# Patient Record
Sex: FEMALE | Race: BLACK OR AFRICAN AMERICAN | NOT HISPANIC OR LATINO | Employment: OTHER | URBAN - METROPOLITAN AREA
[De-identification: names, ages, dates, MRNs, and addresses within clinical notes are randomized per-mention and may not be internally consistent; named-entity substitution may affect disease eponyms.]

---

## 2017-01-20 ENCOUNTER — CLINICAL SUPPORT (OUTPATIENT)
Dept: FAMILY MEDICINE | Facility: CLINIC | Age: 62
End: 2017-01-20
Payer: COMMERCIAL

## 2017-01-20 DIAGNOSIS — E53.8 VITAMIN B12 DEFICIENCY: Primary | ICD-10-CM

## 2017-01-20 PROCEDURE — 96372 THER/PROPH/DIAG INJ SC/IM: CPT | Mod: S$GLB,,, | Performed by: FAMILY MEDICINE

## 2017-01-20 RX ADMIN — CYANOCOBALAMIN 1000 MCG: 1000 INJECTION, SOLUTION INTRAMUSCULAR; SUBCUTANEOUS at 10:01

## 2017-02-17 ENCOUNTER — CLINICAL SUPPORT (OUTPATIENT)
Dept: FAMILY MEDICINE | Facility: CLINIC | Age: 62
End: 2017-02-17
Payer: COMMERCIAL

## 2017-02-17 DIAGNOSIS — E53.8 VITAMIN B12 DEFICIENCY: Primary | ICD-10-CM

## 2017-02-17 PROCEDURE — 96372 THER/PROPH/DIAG INJ SC/IM: CPT | Mod: S$GLB,,, | Performed by: FAMILY MEDICINE

## 2017-02-17 RX ADMIN — CYANOCOBALAMIN 1000 MCG: 1000 INJECTION, SOLUTION INTRAMUSCULAR; SUBCUTANEOUS at 10:02

## 2017-02-28 ENCOUNTER — LAB VISIT (OUTPATIENT)
Dept: LAB | Facility: HOSPITAL | Age: 62
End: 2017-02-28
Attending: FAMILY MEDICINE
Payer: COMMERCIAL

## 2017-02-28 DIAGNOSIS — E53.8 B12 DEFICIENCY: ICD-10-CM

## 2017-02-28 LAB — VIT B12 SERPL-MCNC: 1189 PG/ML

## 2017-02-28 PROCEDURE — 36415 COLL VENOUS BLD VENIPUNCTURE: CPT | Mod: PO

## 2017-02-28 PROCEDURE — 82607 VITAMIN B-12: CPT

## 2017-03-17 ENCOUNTER — CLINICAL SUPPORT (OUTPATIENT)
Dept: FAMILY MEDICINE | Facility: CLINIC | Age: 62
End: 2017-03-17
Payer: COMMERCIAL

## 2017-03-17 DIAGNOSIS — E53.8 VITAMIN B12 DEFICIENCY: Primary | ICD-10-CM

## 2017-03-17 PROCEDURE — 96372 THER/PROPH/DIAG INJ SC/IM: CPT | Mod: S$GLB,,, | Performed by: FAMILY MEDICINE

## 2017-03-17 RX ADMIN — CYANOCOBALAMIN 1000 MCG: 1000 INJECTION, SOLUTION INTRAMUSCULAR; SUBCUTANEOUS at 10:03

## 2017-04-18 ENCOUNTER — CLINICAL SUPPORT (OUTPATIENT)
Dept: FAMILY MEDICINE | Facility: CLINIC | Age: 62
End: 2017-04-18
Payer: COMMERCIAL

## 2017-04-18 DIAGNOSIS — E53.8 VITAMIN B 12 DEFICIENCY: Primary | ICD-10-CM

## 2017-04-18 PROCEDURE — 96372 THER/PROPH/DIAG INJ SC/IM: CPT | Mod: S$GLB,,, | Performed by: FAMILY MEDICINE

## 2017-04-18 RX ADMIN — CYANOCOBALAMIN 1000 MCG: 1000 INJECTION, SOLUTION INTRAMUSCULAR; SUBCUTANEOUS at 10:04

## 2017-05-18 ENCOUNTER — CLINICAL SUPPORT (OUTPATIENT)
Dept: FAMILY MEDICINE | Facility: CLINIC | Age: 62
End: 2017-05-18
Payer: COMMERCIAL

## 2017-05-18 DIAGNOSIS — E53.8 B12 DEFICIENCY: Primary | ICD-10-CM

## 2017-05-18 PROCEDURE — 96372 THER/PROPH/DIAG INJ SC/IM: CPT | Mod: S$GLB,,, | Performed by: FAMILY MEDICINE

## 2017-05-18 RX ADMIN — CYANOCOBALAMIN 1000 MCG: 1000 INJECTION, SOLUTION INTRAMUSCULAR; SUBCUTANEOUS at 09:05

## 2017-05-18 NOTE — PROGRESS NOTES
B12, 1000mcg given IM to RVG, without difficulty, tolerated well.  Next appt scheduled, patient aware

## 2017-06-13 ENCOUNTER — TELEPHONE (OUTPATIENT)
Dept: FAMILY MEDICINE | Facility: CLINIC | Age: 62
End: 2017-06-13

## 2017-06-13 NOTE — TELEPHONE ENCOUNTER
----- Message from Jax Kyle sent at 6/12/2017  4:31 PM CDT -----  Contact: Pt   Pt need to  reschedule nurse visit to 06/21/17 would like a call back...[phone]

## 2017-06-13 NOTE — TELEPHONE ENCOUNTER
----- Message from Jax Kyle sent at 6/13/2017 10:18 AM CDT -----  Contact: Pt   Pt returning nurse phone call in regards to her injection would like a call back please..[phone] (home) [phone] (work)

## 2017-06-13 NOTE — TELEPHONE ENCOUNTER
Not available at home number, unable to leave message at work as no department number left and there are 9 options

## 2017-06-13 NOTE — TELEPHONE ENCOUNTER
----- Message from Crys Guerin sent at 6/13/2017  1:38 PM CDT -----  Pt is requesting a call from nurse to r/s her B12 injection.        Please call pt back at [phone]

## 2017-06-20 ENCOUNTER — CLINICAL SUPPORT (OUTPATIENT)
Dept: FAMILY MEDICINE | Facility: CLINIC | Age: 62
End: 2017-06-20
Payer: COMMERCIAL

## 2017-06-20 DIAGNOSIS — E53.8 VITAMIN B 12 DEFICIENCY: Primary | ICD-10-CM

## 2017-06-20 PROCEDURE — 96372 THER/PROPH/DIAG INJ SC/IM: CPT | Mod: S$GLB,,, | Performed by: FAMILY MEDICINE

## 2017-06-20 PROCEDURE — 99999 PR PBB SHADOW E&M-EST. PATIENT-LVL II: CPT | Mod: PBBFAC,,,

## 2017-06-20 RX ADMIN — CYANOCOBALAMIN 1000 MCG: 1000 INJECTION, SOLUTION INTRAMUSCULAR; SUBCUTANEOUS at 09:06

## 2017-06-29 RX ORDER — OMEPRAZOLE 20 MG/1
CAPSULE, DELAYED RELEASE ORAL
Qty: 90 CAPSULE | Refills: 0 | Status: SHIPPED | OUTPATIENT
Start: 2017-06-29 | End: 2017-08-02 | Stop reason: SDUPTHER

## 2017-06-29 RX ORDER — BENAZEPRIL/HYDROCHLOROTHIAZIDE 20 MG-25MG
1 TABLET ORAL DAILY
Qty: 90 TABLET | Refills: 0 | Status: SHIPPED | OUTPATIENT
Start: 2017-06-29 | End: 2017-09-28 | Stop reason: SDUPTHER

## 2017-07-06 RX ORDER — FOLIC ACID 1 MG/1
TABLET ORAL
Qty: 90 TABLET | Refills: 0 | Status: SHIPPED | OUTPATIENT
Start: 2017-07-06 | End: 2017-10-22 | Stop reason: SDUPTHER

## 2017-07-06 RX ORDER — PRAVASTATIN SODIUM 20 MG/1
TABLET ORAL
Qty: 90 TABLET | Refills: 0 | Status: SHIPPED | OUTPATIENT
Start: 2017-07-06 | End: 2017-10-22 | Stop reason: SDUPTHER

## 2017-07-18 ENCOUNTER — CLINICAL SUPPORT (OUTPATIENT)
Dept: FAMILY MEDICINE | Facility: CLINIC | Age: 62
End: 2017-07-18
Payer: COMMERCIAL

## 2017-07-18 ENCOUNTER — OFFICE VISIT (OUTPATIENT)
Dept: FAMILY MEDICINE | Facility: CLINIC | Age: 62
End: 2017-07-18
Payer: COMMERCIAL

## 2017-07-18 ENCOUNTER — LAB VISIT (OUTPATIENT)
Dept: LAB | Facility: HOSPITAL | Age: 62
End: 2017-07-18
Attending: FAMILY MEDICINE
Payer: COMMERCIAL

## 2017-07-18 VITALS
SYSTOLIC BLOOD PRESSURE: 128 MMHG | HEIGHT: 66 IN | BODY MASS INDEX: 28.93 KG/M2 | HEART RATE: 83 BPM | DIASTOLIC BLOOD PRESSURE: 74 MMHG | WEIGHT: 180 LBS

## 2017-07-18 DIAGNOSIS — I10 ESSENTIAL HYPERTENSION: ICD-10-CM

## 2017-07-18 DIAGNOSIS — D64.9 NORMOCYTIC ANEMIA: ICD-10-CM

## 2017-07-18 DIAGNOSIS — Z00.00 ROUTINE HISTORY AND PHYSICAL EXAMINATION OF ADULT: Primary | ICD-10-CM

## 2017-07-18 DIAGNOSIS — E78.5 HYPERLIPIDEMIA, UNSPECIFIED HYPERLIPIDEMIA TYPE: ICD-10-CM

## 2017-07-18 DIAGNOSIS — Z11.59 NEED FOR HEPATITIS C SCREENING TEST: ICD-10-CM

## 2017-07-18 DIAGNOSIS — R79.89 LOW VITAMIN B12 LEVEL: ICD-10-CM

## 2017-07-18 DIAGNOSIS — E53.8 VITAMIN B 12 DEFICIENCY: Primary | ICD-10-CM

## 2017-07-18 DIAGNOSIS — M85.80 OSTEOPENIA, UNSPECIFIED LOCATION: ICD-10-CM

## 2017-07-18 DIAGNOSIS — R76.8 ELEVATED SERUM IMMUNOGLOBULIN FREE LIGHT CHAINS: ICD-10-CM

## 2017-07-18 DIAGNOSIS — Z85.3 HISTORY OF BREAST CANCER: ICD-10-CM

## 2017-07-18 LAB
ALBUMIN SERPL BCP-MCNC: 3.5 G/DL
ALP SERPL-CCNC: 118 U/L
ALT SERPL W/O P-5'-P-CCNC: 12 U/L
ANION GAP SERPL CALC-SCNC: 12 MMOL/L
AST SERPL-CCNC: 21 U/L
BILIRUB SERPL-MCNC: 0.3 MG/DL
BUN SERPL-MCNC: 13 MG/DL
CALCIUM SERPL-MCNC: 8.2 MG/DL
CHLORIDE SERPL-SCNC: 96 MMOL/L
CHOLEST/HDLC SERPL: 2.8 {RATIO}
CO2 SERPL-SCNC: 26 MMOL/L
CREAT SERPL-MCNC: 1 MG/DL
EST. GFR  (AFRICAN AMERICAN): >60 ML/MIN/1.73 M^2
EST. GFR  (NON AFRICAN AMERICAN): 59.7 ML/MIN/1.73 M^2
GLUCOSE SERPL-MCNC: 101 MG/DL
HDL/CHOLESTEROL RATIO: 35.7 %
HDLC SERPL-MCNC: 171 MG/DL
HDLC SERPL-MCNC: 61 MG/DL
LDLC SERPL CALC-MCNC: 92 MG/DL
NONHDLC SERPL-MCNC: 110 MG/DL
POTASSIUM SERPL-SCNC: 3.9 MMOL/L
PROT SERPL-MCNC: 7.9 G/DL
SODIUM SERPL-SCNC: 134 MMOL/L
TRIGL SERPL-MCNC: 90 MG/DL

## 2017-07-18 PROCEDURE — 80061 LIPID PANEL: CPT

## 2017-07-18 PROCEDURE — 99999 PR PBB SHADOW E&M-EST. PATIENT-LVL II: CPT | Mod: PBBFAC,,,

## 2017-07-18 PROCEDURE — 80053 COMPREHEN METABOLIC PANEL: CPT

## 2017-07-18 PROCEDURE — 36415 COLL VENOUS BLD VENIPUNCTURE: CPT | Mod: PO

## 2017-07-18 PROCEDURE — 86803 HEPATITIS C AB TEST: CPT

## 2017-07-18 PROCEDURE — 96372 THER/PROPH/DIAG INJ SC/IM: CPT | Mod: S$GLB,,, | Performed by: FAMILY MEDICINE

## 2017-07-18 PROCEDURE — 99396 PREV VISIT EST AGE 40-64: CPT | Mod: 25,S$GLB,, | Performed by: FAMILY MEDICINE

## 2017-07-18 PROCEDURE — 99999 PR PBB SHADOW E&M-EST. PATIENT-LVL III: CPT | Mod: PBBFAC,,, | Performed by: FAMILY MEDICINE

## 2017-07-18 RX ADMIN — CYANOCOBALAMIN 1000 MCG: 1000 INJECTION, SOLUTION INTRAMUSCULAR; SUBCUTANEOUS at 08:07

## 2017-07-18 NOTE — PROGRESS NOTES
Patient presents physical exam.  Hypertension controlled.  Hyperlipidemia needs laboratory.  Low B12 currently on replacement.  She had elevated free light chains mild normocytic anemia last year.  Last note from oncology mentioned bone marrow biopsy, but apparently this was never done and she's not followed up.  History of breast cancer with mammograms done through her breast surgeon.  She is on Fosamax due to osteopenia associated with a apparently nontraumatic pubic ramus fracture last year.  This has healed.    Past Medical History:  Past Medical History:   Diagnosis Date    Acute pancreatitis 3/21/2016    Breast cancer 2005    right side with lumpectomy, chemoand radiation    Closed fracture of ramus of right pubis 6/8/2016    Colon polyp     last scope 4/2012- repeat 10 y per pt- Dr. Johnson    GERD (gastroesophageal reflux disease) 5/29/2012    Hyperlipidemia 4/16/2013    Hypertension     Osteopenia      Past Surgical History:   Procedure Laterality Date    BREAST LUMPECTOMY      DILATION AND CURETTAGE OF UTERUS      ESOPHAGEAL DILATION      ESOPHAGOGASTRODUODENOSCOPY  6/1/2012    ROTATOR CUFF REPAIR      TEAR DUCT SURGERY      right rye     Social History     Social History    Marital status:      Spouse name: N/A    Number of children: N/A    Years of education: N/A     Occupational History    Not on file.     Social History Main Topics    Smoking status: Former Smoker     Packs/day: 0.30     Types: Cigarettes     Quit date: 5/29/2005    Smokeless tobacco: Never Used    Alcohol use 1.2 oz/week     2 Standard drinks or equivalent per week      Comment: prior 6 pack/week    Drug use:     Sexual activity: Not on file     Other Topics Concern    Not on file     Social History Narrative    No narrative on file     Family History   Problem Relation Age of Onset    Stroke Brother 57     Review of patient's allergies indicates:   Allergen Reactions    No known drug allergies   "    Current Outpatient Prescriptions on File Prior to Visit   Medication Sig Dispense Refill    alendronate (FOSAMAX) 70 MG tablet Take 1 tablet (70 mg total) by mouth every 7 days. 4 tablet 11    benazepril-hydrochlorthiazide (LOTENSIN HCT) 20-25 mg Tab TAKE 1 TABLET BY MOUTH ONCE DAILY 90 tablet 0    cholecalciferol, vitamin D3, 50,000 unit capsule Take 1 capsule (50,000 Units total) by mouth once a week. 12 capsule 3    MAGOX 400 mg tablet Take 400 mg by mouth 3 (three) times daily.  0    omeprazole (PRILOSEC) 20 MG capsule TAKE 1 CAPSULE BY MOUTH ONCE DAILY 90 capsule 0    pravastatin (PRAVACHOL) 20 MG tablet TAKE 1 TABLET BY MOUTH ONCE DAILY 90 tablet 0    folic acid (FOLVITE) 1 MG tablet TAKE 1 TABLET BY MOUTH ONCE DAILY 90 tablet 0     Current Facility-Administered Medications on File Prior to Visit   Medication Dose Route Frequency Provider Last Rate Last Dose    cyanocobalamin injection 1,000 mcg  1,000 mcg Intramuscular Q30 Days Taqueria Melendrez MD   1,000 mcg at 06/20/17 0919    cyanocobalamin injection 1,000 mcg  1,000 mcg Intramuscular Q30 Days Taqueria Melendrez MD   1,000 mcg at 07/18/17 0847           ROS:  GENERAL: No fever, chills,  or significant weight changes.  HEENT: No headache or hearing complaints.  No dysphagia  Eyes: No vision complaints  CHEST: Denies MCDOWELL, cyanosis, wheezing, cough and sputum production.  CARDIOVASCULAR: Denies chest pain, PND, orthopnea or reduced exercise tolerance.  ABDOMEN: Appetite fine. Denies diarrhea, abdominal pain, hematemesis or blood in stool.  URINARY: No flank pain, dysuria or hematuria.  MUSCULOSKELETAL: No warmth swelling or tenderness of the joints  NEUROLOGIC: No focal weakness numbness or paresthesia  PSYCHIATRIC: Denies depression        OBJECTIVE:     Vitals:    07/18/17 0852   BP: 128/74   Pulse: 83   Weight: 81.6 kg (180 lb)   Height: 5' 5.5" (1.664 m)     Wt Readings from Last 3 Encounters:   07/18/17 81.6 kg (180 lb)   10/19/16 82.1 kg " (181 lb)   10/13/16 82.1 kg (181 lb)     APPEARANCE: Well nourished, well developed, in no acute distress.    HEAD: Normocephalic.  Atraumatic.  No sinus tenderness.  EYES:   Right eye: Pupil reactive.  Conjunctiva clear.    Left eye: Pupil reactive.  Conjunctiva clear.    Both fundi:  Grossly normal to nondilated exam. EOMI.    EARS: TM's intact. Light reflex normal. No retraction or perforation.    NOSE:  clear.  MOUTH & THROAT:  No pharyngeal erythema or exudate. No lesions.  NECK: Supple. No bruits.  No JVD.  No cervical lymphadenopathy.  No thyromegaly.    CHEST: Breath sounds clear bilaterally.  Normal respiratory effort  CARDIOVASCULAR: Normal rate.  Regular rhythm.  No murmurs.  No rub.  No gallops.  ABDOMEN: Bowel sounds normal.  Soft.  No tenderness.  No organomegaly.  PERIPHERAL VASCULAR: No cyanosis.  No clubbing.  No edema.  NEUROLOGIC: No focal findings.  MENTAL STATUS: Alert.  Oriented x 3.          Carla HERRING was seen today for medication refill.    Diagnoses and all orders for this visit:    Routine history and physical examination of adult    Essential hypertension  -     Comprehensive metabolic panel; Future    Hyperlipidemia, unspecified hyperlipidemia type  -     Comprehensive metabolic panel; Future  -     Lipid panel; Future    Low vitamin B12 level    Normocytic anemia    Elevated serum immunoglobulin free light chains    Need for hepatitis C screening test  -     Hepatitis C antibody; Future    History of breast cancer    Osteopenia, unspecified location      Anticipatory guidance: Don't smoke.  Healthy diet and regular exercise recommended.  Keep follow-up scheduled with her breast surgeon for her mammogram which is already pending.  Arrange follow-up appointment with hematology oncology Dr. Mejia.  Continue current medications

## 2017-07-19 LAB — HCV AB SERPL QL IA: NEGATIVE

## 2017-07-28 ENCOUNTER — TELEPHONE (OUTPATIENT)
Dept: FAMILY MEDICINE | Facility: CLINIC | Age: 62
End: 2017-07-28

## 2017-07-28 DIAGNOSIS — E83.51 LOW CALCIUM LEVELS: Primary | ICD-10-CM

## 2017-07-28 NOTE — TELEPHONE ENCOUNTER
MD AMAIRANI Shaw Staff             Calcium level was slightly low, otherwise blood work okay.  Recommend repeat calcium level, PTH, vitamin D

## 2017-08-02 RX ORDER — OMEPRAZOLE 20 MG/1
CAPSULE, DELAYED RELEASE ORAL
Qty: 90 CAPSULE | Refills: 3 | Status: SHIPPED | OUTPATIENT
Start: 2017-08-02 | End: 2018-09-26 | Stop reason: SDUPTHER

## 2017-08-02 RX ORDER — FOLIC ACID 1 MG/1
TABLET ORAL
Qty: 90 TABLET | Refills: 0 | OUTPATIENT
Start: 2017-08-02

## 2017-08-08 ENCOUNTER — TELEPHONE (OUTPATIENT)
Dept: FAMILY MEDICINE | Facility: CLINIC | Age: 62
End: 2017-08-08

## 2017-08-08 DIAGNOSIS — E83.51 LOW CALCIUM LEVELS: Primary | ICD-10-CM

## 2017-08-11 ENCOUNTER — LAB VISIT (OUTPATIENT)
Dept: LAB | Facility: HOSPITAL | Age: 62
End: 2017-08-11
Attending: FAMILY MEDICINE
Payer: COMMERCIAL

## 2017-08-11 DIAGNOSIS — E83.51 LOW CALCIUM LEVELS: ICD-10-CM

## 2017-08-11 LAB
25(OH)D3+25(OH)D2 SERPL-MCNC: 33 NG/ML
PTH-INTACT SERPL-MCNC: 189 PG/ML

## 2017-08-11 PROCEDURE — 83970 ASSAY OF PARATHORMONE: CPT

## 2017-08-11 PROCEDURE — 82306 VITAMIN D 25 HYDROXY: CPT

## 2017-08-11 PROCEDURE — 36415 COLL VENOUS BLD VENIPUNCTURE: CPT | Mod: PO

## 2017-08-17 ENCOUNTER — TELEPHONE (OUTPATIENT)
Dept: FAMILY MEDICINE | Facility: CLINIC | Age: 62
End: 2017-08-17

## 2017-08-17 ENCOUNTER — CLINICAL SUPPORT (OUTPATIENT)
Dept: FAMILY MEDICINE | Facility: CLINIC | Age: 62
End: 2017-08-17
Payer: COMMERCIAL

## 2017-08-17 DIAGNOSIS — R79.89 LOW VITAMIN B12 LEVEL: Primary | ICD-10-CM

## 2017-08-17 PROCEDURE — 99999 PR PBB SHADOW E&M-EST. PATIENT-LVL II: CPT | Mod: PBBFAC,,,

## 2017-08-17 PROCEDURE — 96372 THER/PROPH/DIAG INJ SC/IM: CPT | Mod: S$GLB,,, | Performed by: FAMILY MEDICINE

## 2017-08-17 RX ADMIN — CYANOCOBALAMIN 1000 MCG: 1000 INJECTION, SOLUTION INTRAMUSCULAR; SUBCUTANEOUS at 08:08

## 2017-08-17 NOTE — TELEPHONE ENCOUNTER
Pt rec'd b12 inj today. Her order end date is foe 8-20-17 after 12 doses. However she has not rec'd 12 doses. Can her end date be extended?   TY

## 2017-08-21 RX ORDER — CYANOCOBALAMIN 1000 UG/ML
1000 INJECTION, SOLUTION INTRAMUSCULAR; SUBCUTANEOUS
Status: ACTIVE | OUTPATIENT
Start: 2017-09-19 | End: 2018-09-14

## 2017-09-06 ENCOUNTER — TELEPHONE (OUTPATIENT)
Dept: FAMILY MEDICINE | Facility: CLINIC | Age: 62
End: 2017-09-06

## 2017-09-06 DIAGNOSIS — E55.9 VITAMIN D DEFICIENCY: Primary | ICD-10-CM

## 2017-09-06 DIAGNOSIS — R79.89 ELEVATED PARATHYROID HORMONE RELATED PEPTIDE LEVEL: ICD-10-CM

## 2017-09-06 NOTE — TELEPHONE ENCOUNTER
----- Message from Taqueria Melendrez MD sent at 8/30/2017  8:25 PM CDT -----  Parathyroid hormone elevated.  Vitamin D in the low-normal range.  Recommend continue vitamin D supplement.  She was supposed to have a calcium repeat, but apparently had to reschedule and was not done.  Please get a calcium level with albumin.  At this point I would go ahead and have her see endocrine to review.

## 2017-09-06 NOTE — TELEPHONE ENCOUNTER
----- Message from Francine Gambino sent at 9/5/2017  3:30 PM CDT -----  Pt at [phone]//states she is returning your call//please call again//melissa/duran

## 2017-09-07 ENCOUNTER — HOSPITAL ENCOUNTER (OUTPATIENT)
Dept: RADIOLOGY | Facility: HOSPITAL | Age: 62
Discharge: HOME | End: 2017-09-07
Attending: SURGERY
Payer: COMMERCIAL

## 2017-09-07 DIAGNOSIS — Z12.31 ENCOUNTER FOR SCREENING MAMMOGRAM FOR MALIGNANT NEOPLASM OF BREAST: ICD-10-CM

## 2017-09-07 PROCEDURE — 77067 SCR MAMMO BI INCL CAD: CPT | Mod: 26,,, | Performed by: RADIOLOGY

## 2017-09-07 PROCEDURE — 77063 BREAST TOMOSYNTHESIS BI: CPT | Mod: 26,,, | Performed by: RADIOLOGY

## 2017-09-07 PROCEDURE — 77067 SCR MAMMO BI INCL CAD: CPT | Mod: TC

## 2017-09-13 ENCOUNTER — LAB VISIT (OUTPATIENT)
Dept: LAB | Facility: HOSPITAL | Age: 62
End: 2017-09-13
Attending: INTERNAL MEDICINE
Payer: COMMERCIAL

## 2017-09-13 ENCOUNTER — OFFICE VISIT (OUTPATIENT)
Dept: HEMATOLOGY/ONCOLOGY | Facility: CLINIC | Age: 62
End: 2017-09-13
Payer: COMMERCIAL

## 2017-09-13 VITALS
DIASTOLIC BLOOD PRESSURE: 74 MMHG | BODY MASS INDEX: 30.22 KG/M2 | OXYGEN SATURATION: 100 % | HEIGHT: 66 IN | SYSTOLIC BLOOD PRESSURE: 110 MMHG | HEART RATE: 93 BPM | WEIGHT: 188.06 LBS

## 2017-09-13 DIAGNOSIS — E53.8 VITAMIN B12 DEFICIENCY: ICD-10-CM

## 2017-09-13 DIAGNOSIS — Z85.3 HISTORY OF BREAST CANCER: ICD-10-CM

## 2017-09-13 DIAGNOSIS — D64.9 NORMOCYTIC ANEMIA: Primary | ICD-10-CM

## 2017-09-13 DIAGNOSIS — R76.8 ELEVATED SERUM IMMUNOGLOBULIN FREE LIGHT CHAINS: ICD-10-CM

## 2017-09-13 DIAGNOSIS — D64.9 NORMOCYTIC ANEMIA: ICD-10-CM

## 2017-09-13 LAB
BASOPHILS # BLD AUTO: 0.03 K/UL
BASOPHILS NFR BLD: 0.3 %
DIFFERENTIAL METHOD: ABNORMAL
EOSINOPHIL # BLD AUTO: 0 K/UL
EOSINOPHIL NFR BLD: 0.5 %
ERYTHROCYTE [DISTWIDTH] IN BLOOD BY AUTOMATED COUNT: 12.7 %
ERYTHROCYTE [SEDIMENTATION RATE] IN BLOOD BY WESTERGREN METHOD: 34 MM/HR
FERRITIN SERPL-MCNC: 105 NG/ML
HCT VFR BLD AUTO: 31 %
HGB BLD-MCNC: 10.2 G/DL
IRON SERPL-MCNC: 54 UG/DL
LDH SERPL L TO P-CCNC: 186 U/L
LYMPHOCYTES # BLD AUTO: 2.2 K/UL
LYMPHOCYTES NFR BLD: 25.5 %
MCH RBC QN AUTO: 29.7 PG
MCHC RBC AUTO-ENTMCNC: 32.9 G/DL
MCV RBC AUTO: 90 FL
MONOCYTES # BLD AUTO: 0.7 K/UL
MONOCYTES NFR BLD: 8.1 %
NEUTROPHILS # BLD AUTO: 5.7 K/UL
NEUTROPHILS NFR BLD: 65.6 %
PLATELET # BLD AUTO: 326 K/UL
PMV BLD AUTO: 9.5 FL
RBC # BLD AUTO: 3.43 M/UL
SATURATED IRON: 18 %
TOTAL IRON BINDING CAPACITY: 295 UG/DL
TRANSFERRIN SERPL-MCNC: 199 MG/DL
VIT B12 SERPL-MCNC: 1247 PG/ML
WBC # BLD AUTO: 8.72 K/UL

## 2017-09-13 PROCEDURE — 36415 COLL VENOUS BLD VENIPUNCTURE: CPT | Mod: PO

## 2017-09-13 PROCEDURE — 84165 PROTEIN E-PHORESIS SERUM: CPT | Mod: 26,,, | Performed by: PATHOLOGY

## 2017-09-13 PROCEDURE — 99999 PR PBB SHADOW E&M-EST. PATIENT-LVL III: CPT | Mod: PBBFAC,,, | Performed by: INTERNAL MEDICINE

## 2017-09-13 PROCEDURE — 82607 VITAMIN B-12: CPT

## 2017-09-13 PROCEDURE — 82746 ASSAY OF FOLIC ACID SERUM: CPT

## 2017-09-13 PROCEDURE — 3074F SYST BP LT 130 MM HG: CPT | Mod: S$GLB,,, | Performed by: INTERNAL MEDICINE

## 2017-09-13 PROCEDURE — 85025 COMPLETE CBC W/AUTO DIFF WBC: CPT | Mod: PO

## 2017-09-13 PROCEDURE — 83520 IMMUNOASSAY QUANT NOS NONAB: CPT | Mod: 59

## 2017-09-13 PROCEDURE — 82728 ASSAY OF FERRITIN: CPT

## 2017-09-13 PROCEDURE — 83615 LACTATE (LD) (LDH) ENZYME: CPT

## 2017-09-13 PROCEDURE — 86334 IMMUNOFIX E-PHORESIS SERUM: CPT | Mod: 26,,, | Performed by: PATHOLOGY

## 2017-09-13 PROCEDURE — 83540 ASSAY OF IRON: CPT

## 2017-09-13 PROCEDURE — 85651 RBC SED RATE NONAUTOMATED: CPT | Mod: PO

## 2017-09-13 PROCEDURE — 99214 OFFICE O/P EST MOD 30 MIN: CPT | Mod: S$GLB,,, | Performed by: INTERNAL MEDICINE

## 2017-09-13 PROCEDURE — 86334 IMMUNOFIX E-PHORESIS SERUM: CPT

## 2017-09-13 PROCEDURE — 84165 PROTEIN E-PHORESIS SERUM: CPT

## 2017-09-13 PROCEDURE — 3078F DIAST BP <80 MM HG: CPT | Mod: S$GLB,,, | Performed by: INTERNAL MEDICINE

## 2017-09-13 PROCEDURE — 3008F BODY MASS INDEX DOCD: CPT | Mod: S$GLB,,, | Performed by: INTERNAL MEDICINE

## 2017-09-13 RX ORDER — MINOCYCLINE HYDROCHLORIDE 100 MG/1
90 TABLET ORAL
COMMUNITY
End: 2023-05-23

## 2017-09-14 LAB
ALBUMIN SERPL ELPH-MCNC: 3.93 G/DL
ALPHA1 GLOB SERPL ELPH-MCNC: 0.36 G/DL
ALPHA2 GLOB SERPL ELPH-MCNC: 0.89 G/DL
B-GLOBULIN SERPL ELPH-MCNC: 1.06 G/DL
FOLATE SERPL-MCNC: >40 NG/ML
GAMMA GLOB SERPL ELPH-MCNC: 1.37 G/DL
INTERPRETATION SERPL IFE-IMP: NORMAL
KAPPA LC SER QL IA: 3.02 MG/DL
KAPPA LC/LAMBDA SER IA: 1.56
LAMBDA LC SER QL IA: 1.93 MG/DL
PATHOLOGIST INTERPRETATION IFE: NORMAL
PATHOLOGIST INTERPRETATION SPE: NORMAL
PROT SERPL-MCNC: 7.6 G/DL

## 2017-09-14 NOTE — PROGRESS NOTES
Reason for visit: Normocytic anemia/Elevated free light chains    HPI:   The patient is a 62-year-old -American female who presents to the hematology oncology clinic today for follow up to discuss further evaluation and management recommendations for normocytic anemia and elevated free light chains.    Today the patient reports that overall she feels well and has no specific complaints.  She has been taking vitamin B12 injections monthly as recommended.  Her past clinical history is also significant for right breast carcinoma diagnosed and treated in 2005.  She denies any fevers, chills or night sweats.  She denies any loss of appetite or unintentional weight loss.  She denies any melena, hematochezia, hematemesis, hemoptysis or hematuria.  She denies any chest pain or shortness of breath.  She denies any nausea, vomiting or abdominal pain.  She reports that she had an EGD/colonoscopy by Dr. Johnson in 2016 and was told that she would need a repeat screening colonoscopy in 2026.  I have reviewed all of the patient's clinical history available in the medical record and have utilized this in my evaluation and management recommendations today.    PAST MEDICAL HISTORY:   1.  Right breast carcinoma diagnosed and treated in 2005 status post lumpectomy, axillary lymph node [[place] versus dissection], chemotherapy ×4 cycles and radiation therapy.  She states that she did not receive any adjuvant endocrine therapy.  She was treated by Dr. Nguyễn in [place].  2.  GERD  3.  Osteopenia  4.  Dyslipidemia  5.  Hypertension  6.  Vitamin B12 deficiency  7.  Acute pancreatitis  8.  Right pubic ramus fracture    SURGICAL HISTORY:   1.  Right breast lumpectomy in 2005  2.  D&C  3.  Esophageal dilation  4.  Rotator cuff repair    FAMILY HISTORY: She denies any immediate family members with cancer or bleeding/clotting disorders.    SOCIAL HISTORY: She reports a 1 pack year smoking history and quit in 2003.  She drinks alcohol  socially.  She has never used any recreational drugs.  She works as a  at the city [employer] in [place].  She is  and does not have any children.  She lives in [place], [place].    ALLERGIES: [NKDA]    MEDICATIONS: [Medcard has been reviewed and/or reconciled.]    REVIEW OF SYSTEMS:   GENERAL: [No fevers, chills or sweats. No fatigue, weight loss or loss of appetite.]  HEENT: [No blurred vision, tinnitus, nasal discharge, sorethroat or dysphagia.]  HEART: [No chest pain, palpitations or shortness of breath.]    LUNGS: [No cough, hemoptysis or breathing problems.]  ABDOMEN: [No abdominal pain, nausea, vomiting, diarrhea, constipation or melena.]  GENITOURINARY: [No dysuria, bleeding or malodorous discharge.]  NEURO: [No headache, dizziness or vertigo.]  HEMATOLOGY: [No easy bruising, spontaneous bleeding or blood clots in the past].  MUSCULOSKELETAL: [No arthralgias, myalgias or bone pains.]  SKIN: [No rashes or skin lesions.]  PSYCHIATRY: [No depression or anxiety.]    PHYSICAL EXAMINATION:   VS: Reviewed on nurse's notes.  APPEARANCE: The patient is a well-developed, well-nourished and well-groomed -American female who appears in no acute distress.  HEENT: No scleral icterus. Both external auditory canals clear. No oral ulcers, lesions. Throat clear  HEAD: No sinus tenderness.  NECK: Supple. No palpable lymphadenopathy. Thyroid non-tender, no palpable masses  CHEST: Breath sounds clear bilaterally. No rales. No rhonchi. Unlabored respirations.  CARDIOVASCULAR: Normal S1, S2. Normal rate. Regular rhythm.  ABDOMEN: Bowel sounds normal. No tenderness. No abdominal distention. No hepatomegaly. No splenomegaly.  LYMPHATIC: No palpable supraclavicular, axillary nodes  EXTREMITIES: No clubbing, cyanosis, edema  SKIN: No lesions. No petechiae. No ecchymoses. No induration or nodules  NEUROLOGIC: No focal findings. Alert & Oriented x 3. Mood appropriate to affect    LABS:    Reviewed    IMAGING:  Reviewed    IMPRESSION:  1.  Normocytic anemia  2.  Elevated free light chains with abnormal ratio  3.  Vitamin B12 deficiency  4.  History of right breast cancer in 2005    PLAN:  1.  I had a detailed discussion with the patient today with regard to the various possible etiologies for her normocytic anemia.  Results of prior labwork and UPEP reviewed in detail. Options include close observation vs proceeding with bone marrow biopsy for further evaluation.   2.   I have discussed that differential diagnosis does include the possibility of myelodysplastic syndrome considering her history of chemotherapy.    3.  She will continue monthly vitamin B12 injections for treatment of B12 deficiency.  She understands that she will have to continue on vitamin B12 replacement for the rest of her life.  4. She is agreeable to proceed with bone marrow biopsy for further evaluation for myelodysplasia and/or plasma cell dyscrasia. I will recheck labs today and we will make a decision about this after reviewing results. She would like to get this with sedation at [facility], [place] and this will be arranged if needed.    Further evaluation/management/follow-up recommendations will depend on the results of the above evaluation.  She knows to call for any additional questions or new problems.    Lance Mejia MD

## 2017-09-19 ENCOUNTER — CLINICAL SUPPORT (OUTPATIENT)
Dept: FAMILY MEDICINE | Facility: CLINIC | Age: 62
End: 2017-09-19
Payer: COMMERCIAL

## 2017-09-19 DIAGNOSIS — E55.9 VITAMIN D DEFICIENCY: Primary | ICD-10-CM

## 2017-09-19 PROCEDURE — 96372 THER/PROPH/DIAG INJ SC/IM: CPT | Mod: S$GLB,,, | Performed by: FAMILY MEDICINE

## 2017-09-19 PROCEDURE — 99999 PR PBB SHADOW E&M-EST. PATIENT-LVL II: CPT | Mod: PBBFAC,,,

## 2017-09-19 RX ADMIN — CYANOCOBALAMIN 1000 MCG: 1000 INJECTION, SOLUTION INTRAMUSCULAR at 08:09

## 2017-09-29 RX ORDER — BENAZEPRIL/HYDROCHLOROTHIAZIDE 20 MG-25MG
1 TABLET ORAL DAILY
Qty: 90 TABLET | Refills: 3 | Status: SHIPPED | OUTPATIENT
Start: 2017-09-29 | End: 2018-09-26 | Stop reason: SDUPTHER

## 2017-10-19 ENCOUNTER — CLINICAL SUPPORT (OUTPATIENT)
Dept: FAMILY MEDICINE | Facility: CLINIC | Age: 62
End: 2017-10-19
Payer: COMMERCIAL

## 2017-10-19 DIAGNOSIS — E55.9 VITAMIN D DEFICIENCY: Primary | ICD-10-CM

## 2017-10-19 PROCEDURE — 96372 THER/PROPH/DIAG INJ SC/IM: CPT | Mod: S$GLB,,, | Performed by: FAMILY MEDICINE

## 2017-10-19 PROCEDURE — 99999 PR PBB SHADOW E&M-EST. PATIENT-LVL II: CPT | Mod: PBBFAC,,,

## 2017-10-19 RX ADMIN — CYANOCOBALAMIN 1000 MCG: 1000 INJECTION, SOLUTION INTRAMUSCULAR at 11:10

## 2017-10-23 RX ORDER — PRAVASTATIN SODIUM 20 MG/1
TABLET ORAL
Qty: 90 TABLET | Refills: 5 | Status: SHIPPED | OUTPATIENT
Start: 2017-10-23 | End: 2018-09-26 | Stop reason: SDUPTHER

## 2017-10-23 RX ORDER — FOLIC ACID 1 MG/1
TABLET ORAL
Qty: 90 TABLET | Refills: 5 | Status: SHIPPED | OUTPATIENT
Start: 2017-10-23 | End: 2018-12-13 | Stop reason: SDUPTHER

## 2017-11-17 ENCOUNTER — CLINICAL SUPPORT (OUTPATIENT)
Dept: FAMILY MEDICINE | Facility: CLINIC | Age: 62
End: 2017-11-17
Payer: COMMERCIAL

## 2017-11-17 DIAGNOSIS — R79.89 LOW VITAMIN B12 LEVEL: Primary | ICD-10-CM

## 2017-11-17 PROCEDURE — 99999 PR PBB SHADOW E&M-EST. PATIENT-LVL I: CPT | Mod: PBBFAC,,,

## 2017-11-17 PROCEDURE — 96372 THER/PROPH/DIAG INJ SC/IM: CPT | Mod: S$GLB,,, | Performed by: FAMILY MEDICINE

## 2017-11-17 RX ADMIN — CYANOCOBALAMIN 1000 MCG: 1000 INJECTION, SOLUTION INTRAMUSCULAR at 09:11

## 2017-11-21 ENCOUNTER — IMMUNIZATION (OUTPATIENT)
Dept: FAMILY MEDICINE | Facility: CLINIC | Age: 62
End: 2017-11-21
Payer: COMMERCIAL

## 2017-11-21 PROCEDURE — 90686 IIV4 VACC NO PRSV 0.5 ML IM: CPT | Mod: S$GLB,,, | Performed by: FAMILY MEDICINE

## 2017-11-21 PROCEDURE — 90471 IMMUNIZATION ADMIN: CPT | Mod: S$GLB,,, | Performed by: FAMILY MEDICINE

## 2017-11-30 DIAGNOSIS — D64.9 NORMOCYTIC ANEMIA: Primary | ICD-10-CM

## 2017-12-19 ENCOUNTER — CLINICAL SUPPORT (OUTPATIENT)
Dept: FAMILY MEDICINE | Facility: CLINIC | Age: 62
End: 2017-12-19
Payer: COMMERCIAL

## 2017-12-19 DIAGNOSIS — E53.8 VITAMIN B 12 DEFICIENCY: Primary | ICD-10-CM

## 2017-12-19 PROCEDURE — 99499 UNLISTED E&M SERVICE: CPT | Mod: S$GLB,,, | Performed by: FAMILY MEDICINE

## 2017-12-19 PROCEDURE — 96372 THER/PROPH/DIAG INJ SC/IM: CPT | Mod: S$GLB,,, | Performed by: FAMILY MEDICINE

## 2017-12-19 RX ADMIN — CYANOCOBALAMIN 1000 MCG: 1000 INJECTION, SOLUTION INTRAMUSCULAR at 08:12

## 2018-01-03 ENCOUNTER — TELEPHONE (OUTPATIENT)
Dept: HEMATOLOGY/ONCOLOGY | Facility: CLINIC | Age: 63
End: 2018-01-03

## 2018-01-18 ENCOUNTER — CLINICAL SUPPORT (OUTPATIENT)
Dept: FAMILY MEDICINE | Facility: CLINIC | Age: 63
End: 2018-01-18
Payer: COMMERCIAL

## 2018-01-18 DIAGNOSIS — E53.8 B12 DEFICIENCY: Primary | ICD-10-CM

## 2018-01-18 PROCEDURE — 99999 PR PBB SHADOW E&M-EST. PATIENT-LVL II: CPT | Mod: PBBFAC,,,

## 2018-01-18 PROCEDURE — 96372 THER/PROPH/DIAG INJ SC/IM: CPT | Mod: S$GLB,,, | Performed by: FAMILY MEDICINE

## 2018-01-18 RX ADMIN — CYANOCOBALAMIN 1000 MCG: 1000 INJECTION, SOLUTION INTRAMUSCULAR at 01:01

## 2018-02-16 ENCOUNTER — CLINICAL SUPPORT (OUTPATIENT)
Dept: FAMILY MEDICINE | Facility: CLINIC | Age: 63
End: 2018-02-16
Payer: COMMERCIAL

## 2018-02-16 DIAGNOSIS — E53.8 VITAMIN B 12 DEFICIENCY: Primary | ICD-10-CM

## 2018-02-16 PROCEDURE — 99999 PR PBB SHADOW E&M-EST. PATIENT-LVL I: CPT | Mod: PBBFAC,,,

## 2018-02-16 PROCEDURE — 96372 THER/PROPH/DIAG INJ SC/IM: CPT | Mod: S$GLB,,, | Performed by: FAMILY MEDICINE

## 2018-02-16 RX ADMIN — CYANOCOBALAMIN 1000 MCG: 1000 INJECTION, SOLUTION INTRAMUSCULAR at 08:02

## 2018-03-16 ENCOUNTER — CLINICAL SUPPORT (OUTPATIENT)
Dept: FAMILY MEDICINE | Facility: CLINIC | Age: 63
End: 2018-03-16
Payer: COMMERCIAL

## 2018-03-16 DIAGNOSIS — E53.8 VITAMIN B 12 DEFICIENCY: Primary | ICD-10-CM

## 2018-03-16 PROCEDURE — 99999 PR PBB SHADOW E&M-EST. PATIENT-LVL I: CPT | Mod: PBBFAC,,,

## 2018-03-16 PROCEDURE — 96372 THER/PROPH/DIAG INJ SC/IM: CPT | Mod: S$GLB,,, | Performed by: FAMILY MEDICINE

## 2018-03-16 PROCEDURE — 99499 UNLISTED E&M SERVICE: CPT | Mod: S$GLB,,, | Performed by: FAMILY MEDICINE

## 2018-03-16 RX ADMIN — CYANOCOBALAMIN 1000 MCG: 1000 INJECTION, SOLUTION INTRAMUSCULAR at 09:03

## 2018-03-16 NOTE — PROGRESS NOTES
Administered B12 injection to right ventrogluteal per pt request. Pt tolerated well. Advised pt to wait in lobby 15 minutes prior to leaving. [id number] Exp 07/19

## 2018-04-13 ENCOUNTER — CLINICAL SUPPORT (OUTPATIENT)
Dept: FAMILY MEDICINE | Facility: CLINIC | Age: 63
End: 2018-04-13
Payer: COMMERCIAL

## 2018-04-13 DIAGNOSIS — E53.8 VITAMIN B 12 DEFICIENCY: Primary | ICD-10-CM

## 2018-04-13 PROCEDURE — 96372 THER/PROPH/DIAG INJ SC/IM: CPT | Mod: S$GLB,,, | Performed by: FAMILY MEDICINE

## 2018-04-13 PROCEDURE — 99499 UNLISTED E&M SERVICE: CPT | Mod: S$GLB,,, | Performed by: FAMILY MEDICINE

## 2018-04-13 PROCEDURE — 99999 PR PBB SHADOW E&M-EST. PATIENT-LVL I: CPT | Mod: PBBFAC,,,

## 2018-04-13 RX ADMIN — CYANOCOBALAMIN 1000 MCG: 1000 INJECTION, SOLUTION INTRAMUSCULAR at 09:04

## 2018-04-13 NOTE — PROGRESS NOTES
Administered B12 inj to left ventrogluteal. Pt tolerated well. Advised pt to wait in lobby 15 minutes prior to leaving. [id number] Exp 7/2019

## 2018-05-11 ENCOUNTER — CLINICAL SUPPORT (OUTPATIENT)
Dept: FAMILY MEDICINE | Facility: CLINIC | Age: 63
End: 2018-05-11
Payer: MEDICARE

## 2018-05-11 DIAGNOSIS — E53.8 VITAMIN B 12 DEFICIENCY: Primary | ICD-10-CM

## 2018-05-11 DIAGNOSIS — M85.80 OSTEOPENIA: ICD-10-CM

## 2018-05-11 PROCEDURE — 99211 OFF/OP EST MAY X REQ PHY/QHP: CPT | Mod: PBBFAC,PO

## 2018-05-11 PROCEDURE — 99999 PR PBB SHADOW E&M-EST. PATIENT-LVL I: CPT | Mod: PBBFAC,,,

## 2018-05-11 PROCEDURE — 96372 THER/PROPH/DIAG INJ SC/IM: CPT | Mod: PBBFAC,PO

## 2018-05-11 PROCEDURE — 99499 UNLISTED E&M SERVICE: CPT | Mod: S$PBB,,, | Performed by: FAMILY MEDICINE

## 2018-05-11 RX ORDER — ALENDRONATE SODIUM 70 MG/1
TABLET ORAL
Qty: 12 TABLET | Refills: 1 | Status: SHIPPED | OUTPATIENT
Start: 2018-05-11 | End: 2019-06-03 | Stop reason: SDUPTHER

## 2018-05-11 RX ADMIN — CYANOCOBALAMIN 1000 MCG: 1000 INJECTION, SOLUTION INTRAMUSCULAR at 09:05

## 2018-05-11 NOTE — PROGRESS NOTES
Administered B12 injection to right ventrogluteal. Pt tolerated well. Advised pt to wait in lobby 15 minutes prior to leaving. [id number] Exp 8/2019

## 2018-06-08 ENCOUNTER — CLINICAL SUPPORT (OUTPATIENT)
Dept: FAMILY MEDICINE | Facility: CLINIC | Age: 63
End: 2018-06-08
Payer: MEDICARE

## 2018-06-08 DIAGNOSIS — E53.8 VITAMIN B 12 DEFICIENCY: Primary | ICD-10-CM

## 2018-06-08 PROCEDURE — 99211 OFF/OP EST MAY X REQ PHY/QHP: CPT | Mod: PBBFAC,PO

## 2018-06-08 PROCEDURE — 99499 UNLISTED E&M SERVICE: CPT | Mod: S$PBB,,, | Performed by: FAMILY MEDICINE

## 2018-06-08 PROCEDURE — 99999 PR PBB SHADOW E&M-EST. PATIENT-LVL I: CPT | Mod: PBBFAC,,,

## 2018-06-08 NOTE — PROGRESS NOTES
Administered B12 inj to left ventrogluteal. Pt tolerated well. Advised to wait in lobby 15 minutes prior to leaving. [id number]  Exp 7/2019

## 2018-07-06 ENCOUNTER — CLINICAL SUPPORT (OUTPATIENT)
Dept: FAMILY MEDICINE | Facility: CLINIC | Age: 63
End: 2018-07-06
Payer: MEDICARE

## 2018-07-06 DIAGNOSIS — E53.8 VITAMIN B 12 DEFICIENCY: Primary | ICD-10-CM

## 2018-07-06 PROCEDURE — 99999 PR PBB SHADOW E&M-EST. PATIENT-LVL I: CPT | Mod: PBBFAC,,,

## 2018-07-06 PROCEDURE — 99211 OFF/OP EST MAY X REQ PHY/QHP: CPT | Mod: PBBFAC,PO,25

## 2018-07-06 PROCEDURE — 96372 THER/PROPH/DIAG INJ SC/IM: CPT | Mod: PBBFAC,PO

## 2018-07-06 PROCEDURE — 99499 UNLISTED E&M SERVICE: CPT | Mod: S$PBB,,, | Performed by: FAMILY MEDICINE

## 2018-07-06 RX ADMIN — CYANOCOBALAMIN 1000 MCG: 1000 INJECTION, SOLUTION INTRAMUSCULAR at 08:07

## 2018-07-06 NOTE — PROGRESS NOTES
Administered B12 inj to left ventrogluteal. Pt tolerated well. Advised to wait in lobby 15 minutes prior to leaving. [id number] Exp 9/2019

## 2018-08-03 ENCOUNTER — CLINICAL SUPPORT (OUTPATIENT)
Dept: FAMILY MEDICINE | Facility: CLINIC | Age: 63
End: 2018-08-03
Payer: MEDICARE

## 2018-08-03 DIAGNOSIS — E53.8 VITAMIN B 12 DEFICIENCY: Primary | ICD-10-CM

## 2018-08-03 DIAGNOSIS — E53.8 B12 DEFICIENCY: Primary | ICD-10-CM

## 2018-08-03 PROCEDURE — 99499 UNLISTED E&M SERVICE: CPT | Mod: S$PBB,,, | Performed by: FAMILY MEDICINE

## 2018-08-03 RX ORDER — CYANOCOBALAMIN 1000 UG/ML
1000 INJECTION, SOLUTION INTRAMUSCULAR; SUBCUTANEOUS
Status: ACTIVE | OUTPATIENT
Start: 2018-09-28 | End: 2019-09-23

## 2018-08-03 NOTE — PROGRESS NOTES
Administered B12 to right ventrogluteal. Pt tolerated well. Advised to wait in lobby 15 minutes prior to leaving. [id number] Exp 10/2019

## 2018-08-31 ENCOUNTER — CLINICAL SUPPORT (OUTPATIENT)
Dept: FAMILY MEDICINE | Facility: CLINIC | Age: 63
End: 2018-08-31
Payer: MEDICARE

## 2018-08-31 DIAGNOSIS — E53.8 VITAMIN B 12 DEFICIENCY: Primary | ICD-10-CM

## 2018-08-31 PROCEDURE — 99212 OFFICE O/P EST SF 10 MIN: CPT | Mod: PBBFAC,PO,25

## 2018-08-31 PROCEDURE — 99999 PR PBB SHADOW E&M-EST. PATIENT-LVL II: CPT | Mod: PBBFAC,,,

## 2018-08-31 PROCEDURE — 96372 THER/PROPH/DIAG INJ SC/IM: CPT | Mod: PBBFAC,PO

## 2018-08-31 RX ADMIN — CYANOCOBALAMIN 1000 MCG: 1000 INJECTION, SOLUTION INTRAMUSCULAR at 09:08

## 2018-09-11 ENCOUNTER — HOSPITAL ENCOUNTER (OUTPATIENT)
Dept: RADIOLOGY | Facility: HOSPITAL | Age: 63
Discharge: HOME | End: 2018-09-11
Attending: SURGERY
Payer: MEDICARE

## 2018-09-11 VITALS — WEIGHT: 188 LBS | BODY MASS INDEX: 31.32 KG/M2 | HEIGHT: 65 IN

## 2018-09-11 DIAGNOSIS — Z12.39 SCREENING BREAST EXAMINATION: ICD-10-CM

## 2018-09-11 PROCEDURE — 77067 SCR MAMMO BI INCL CAD: CPT | Mod: 26,,, | Performed by: RADIOLOGY

## 2018-09-11 PROCEDURE — 77063 BREAST TOMOSYNTHESIS BI: CPT | Mod: TC,PO

## 2018-09-11 PROCEDURE — 77063 BREAST TOMOSYNTHESIS BI: CPT | Mod: 26,,, | Performed by: RADIOLOGY

## 2018-09-17 ENCOUNTER — HOSPITAL ENCOUNTER (OUTPATIENT)
Dept: RADIOLOGY | Facility: HOSPITAL | Age: 63
Discharge: HOME | End: 2018-09-17
Attending: SURGERY
Payer: MEDICARE

## 2018-09-17 DIAGNOSIS — R92.30 BREAST DENSITY: ICD-10-CM

## 2018-09-17 DIAGNOSIS — N63.0 LUMP OR MASS IN BREAST: ICD-10-CM

## 2018-09-17 PROCEDURE — 76642 ULTRASOUND BREAST LIMITED: CPT | Mod: 26,RT,, | Performed by: RADIOLOGY

## 2018-09-17 PROCEDURE — 77065 DX MAMMO INCL CAD UNI: CPT | Mod: 26,,, | Performed by: RADIOLOGY

## 2018-09-17 PROCEDURE — 76642 ULTRASOUND BREAST LIMITED: CPT | Mod: TC,PO,RT

## 2018-09-17 PROCEDURE — 77061 BREAST TOMOSYNTHESIS UNI: CPT | Mod: TC,PO

## 2018-09-17 PROCEDURE — 77061 BREAST TOMOSYNTHESIS UNI: CPT | Mod: 26,,, | Performed by: RADIOLOGY

## 2018-09-17 PROCEDURE — 77065 DX MAMMO INCL CAD UNI: CPT | Mod: TC,PO

## 2018-09-20 ENCOUNTER — TELEPHONE (OUTPATIENT)
Dept: FAMILY MEDICINE | Facility: CLINIC | Age: 63
End: 2018-09-20

## 2018-09-20 NOTE — TELEPHONE ENCOUNTER
----- Message from Gayle Cosby sent at 9/20/2018  2:47 PM CDT -----  Contact: pt   Pt is requesting a call back from the nurse in regards to pt getting her next B12 shot  [phone] (home)

## 2018-09-24 RX ORDER — BENAZEPRIL/HYDROCHLOROTHIAZIDE 20 MG-25MG
1 TABLET ORAL DAILY
Qty: 90 TABLET | Refills: 0 | OUTPATIENT
Start: 2018-09-24

## 2018-09-24 RX ORDER — OMEPRAZOLE 20 MG/1
CAPSULE, DELAYED RELEASE ORAL
Qty: 90 CAPSULE | Refills: 0 | OUTPATIENT
Start: 2018-09-24

## 2018-09-25 ENCOUNTER — IMMUNIZATION (OUTPATIENT)
Dept: FAMILY MEDICINE | Facility: CLINIC | Age: 63
End: 2018-09-25
Payer: MEDICARE

## 2018-09-25 PROCEDURE — 90662 IIV NO PRSV INCREASED AG IM: CPT | Mod: PBBFAC,PO

## 2018-09-26 RX ORDER — PRAVASTATIN SODIUM 20 MG/1
20 TABLET ORAL DAILY
Qty: 90 TABLET | Refills: 0 | Status: SHIPPED | OUTPATIENT
Start: 2018-09-26 | End: 2018-12-13 | Stop reason: SDUPTHER

## 2018-09-26 RX ORDER — OMEPRAZOLE 20 MG/1
20 CAPSULE, DELAYED RELEASE ORAL DAILY
Qty: 90 CAPSULE | Refills: 0 | Status: SHIPPED | OUTPATIENT
Start: 2018-09-26 | End: 2018-12-13 | Stop reason: SDUPTHER

## 2018-09-26 RX ORDER — BENAZEPRIL/HYDROCHLOROTHIAZIDE 20 MG-25MG
1 TABLET ORAL DAILY
Qty: 90 TABLET | Refills: 0 | Status: SHIPPED | OUTPATIENT
Start: 2018-09-26 | End: 2018-12-13 | Stop reason: SDUPTHER

## 2018-09-26 NOTE — TELEPHONE ENCOUNTER
----- Message from Ubaldo Romero sent at 9/26/2018 12:30 PM CDT -----  Contact: [phone]   Pt would like refills called in on Rx medication   .1. What is the name of the medication you are requesting? Pravastatin, Benazet HCTZ  and Omprazole  2. What is the dose? Unknown  3. How do you take the medication? Orally, topically, etc? Orally   4. How often do you take this medication?  Unknown   5. Do you need a 30 day or 90 day supply? 90 day  6. How many refills are you requesting? However many that's allowed  7. What is your preferred pharmacy and location of the pharmacy?    [facility]  [place] - [address]  [address]  [place] [address]  Phone: [phone] Fax: [phone]      8. Who can we contact with further questions? Pt- [phone]

## 2018-09-27 RX ORDER — OMEPRAZOLE 20 MG/1
CAPSULE, DELAYED RELEASE ORAL
Qty: 90 CAPSULE | Refills: 0 | OUTPATIENT
Start: 2018-09-27

## 2018-09-27 RX ORDER — BENAZEPRIL/HYDROCHLOROTHIAZIDE 20 MG-25MG
1 TABLET ORAL DAILY
Qty: 90 TABLET | Refills: 0 | OUTPATIENT
Start: 2018-09-27

## 2018-09-28 ENCOUNTER — CLINICAL SUPPORT (OUTPATIENT)
Dept: FAMILY MEDICINE | Facility: CLINIC | Age: 63
End: 2018-09-28
Payer: MEDICARE

## 2018-09-28 DIAGNOSIS — E53.8 VITAMIN B 12 DEFICIENCY: Primary | ICD-10-CM

## 2018-09-28 PROCEDURE — 96372 THER/PROPH/DIAG INJ SC/IM: CPT | Mod: PBBFAC,PO

## 2018-09-28 PROCEDURE — 99999 PR PBB SHADOW E&M-EST. PATIENT-LVL I: CPT | Mod: PBBFAC,,,

## 2018-09-28 PROCEDURE — 99211 OFF/OP EST MAY X REQ PHY/QHP: CPT | Mod: PBBFAC,PO

## 2018-09-28 RX ADMIN — CYANOCOBALAMIN 1000 MCG: 1000 INJECTION, SOLUTION INTRAMUSCULAR at 09:09

## 2018-09-28 NOTE — PROGRESS NOTES
Pt received 1000mcg of b12 to right ventrogluteal. Pt instructed to wait in waiting area for 15 minutes after injection pt verbalized understanding. Follow up shot apt booked

## 2018-10-26 ENCOUNTER — CLINICAL SUPPORT (OUTPATIENT)
Dept: FAMILY MEDICINE | Facility: CLINIC | Age: 63
End: 2018-10-26
Payer: MEDICARE

## 2018-10-26 DIAGNOSIS — E53.8 VITAMIN B12 DEFICIENCY: Primary | ICD-10-CM

## 2018-10-26 PROCEDURE — 96372 THER/PROPH/DIAG INJ SC/IM: CPT | Mod: PBBFAC,PO

## 2018-10-26 PROCEDURE — 99211 OFF/OP EST MAY X REQ PHY/QHP: CPT | Mod: PBBFAC,PO

## 2018-10-26 PROCEDURE — 99999 PR PBB SHADOW E&M-EST. PATIENT-LVL I: CPT | Mod: PBBFAC,,,

## 2018-10-26 RX ADMIN — CYANOCOBALAMIN 1000 MCG: 1000 INJECTION, SOLUTION INTRAMUSCULAR at 09:10

## 2018-10-26 NOTE — PROGRESS NOTES
Pt received B12 injection in R ventrogluteal. Pt instructed to wait 15 min post injection. Pt tolerated well.

## 2018-11-23 ENCOUNTER — CLINICAL SUPPORT (OUTPATIENT)
Dept: FAMILY MEDICINE | Facility: CLINIC | Age: 63
End: 2018-11-23
Payer: MEDICARE

## 2018-11-23 DIAGNOSIS — E53.8 VITAMIN B12 DEFICIENCY: Primary | ICD-10-CM

## 2018-11-23 PROCEDURE — 99999 PR PBB SHADOW E&M-EST. PATIENT-LVL I: CPT | Mod: PBBFAC,,,

## 2018-11-23 PROCEDURE — 99211 OFF/OP EST MAY X REQ PHY/QHP: CPT | Mod: PBBFAC,PO,25

## 2018-11-23 PROCEDURE — 96372 THER/PROPH/DIAG INJ SC/IM: CPT | Mod: PBBFAC,PO

## 2018-11-23 RX ADMIN — CYANOCOBALAMIN 1000 MCG: 1000 INJECTION, SOLUTION INTRAMUSCULAR at 09:11

## 2018-11-23 NOTE — PROGRESS NOTES
Pt received B12 in left ventrogluteal. Pt instructed to wait 15 min post injection. Pt verb understanding and tolerated well.

## 2018-12-13 ENCOUNTER — OFFICE VISIT (OUTPATIENT)
Dept: FAMILY MEDICINE | Facility: CLINIC | Age: 63
End: 2018-12-13
Payer: MEDICARE

## 2018-12-13 VITALS
DIASTOLIC BLOOD PRESSURE: 62 MMHG | HEART RATE: 92 BPM | SYSTOLIC BLOOD PRESSURE: 108 MMHG | WEIGHT: 177 LBS | TEMPERATURE: 98 F | BODY MASS INDEX: 29.49 KG/M2 | HEIGHT: 65 IN

## 2018-12-13 DIAGNOSIS — D64.9 NORMOCYTIC ANEMIA: ICD-10-CM

## 2018-12-13 DIAGNOSIS — I10 ESSENTIAL HYPERTENSION: ICD-10-CM

## 2018-12-13 DIAGNOSIS — E55.9 VITAMIN D DEFICIENCY: ICD-10-CM

## 2018-12-13 DIAGNOSIS — Z00.00 ANNUAL PHYSICAL EXAM: Primary | ICD-10-CM

## 2018-12-13 DIAGNOSIS — M85.80 OSTEOPENIA, UNSPECIFIED LOCATION: ICD-10-CM

## 2018-12-13 DIAGNOSIS — K21.9 GASTROESOPHAGEAL REFLUX DISEASE, ESOPHAGITIS PRESENCE NOT SPECIFIED: ICD-10-CM

## 2018-12-13 DIAGNOSIS — Z85.3 HISTORY OF BREAST CANCER: ICD-10-CM

## 2018-12-13 DIAGNOSIS — E78.5 HYPERLIPIDEMIA, UNSPECIFIED HYPERLIPIDEMIA TYPE: ICD-10-CM

## 2018-12-13 DIAGNOSIS — E53.8 VITAMIN B 12 DEFICIENCY: ICD-10-CM

## 2018-12-13 PROCEDURE — 99213 OFFICE O/P EST LOW 20 MIN: CPT | Mod: S$PBB,,, | Performed by: NURSE PRACTITIONER

## 2018-12-13 PROCEDURE — 99999 PR PBB SHADOW E&M-EST. PATIENT-LVL IV: CPT | Mod: PBBFAC,,, | Performed by: NURSE PRACTITIONER

## 2018-12-13 PROCEDURE — 99214 OFFICE O/P EST MOD 30 MIN: CPT | Mod: PBBFAC,PO | Performed by: NURSE PRACTITIONER

## 2018-12-13 PROCEDURE — 90670 PCV13 VACCINE IM: CPT | Mod: PBBFAC,PO

## 2018-12-13 RX ORDER — BENAZEPRIL/HYDROCHLOROTHIAZIDE 20 MG-25MG
1 TABLET ORAL DAILY
Qty: 90 TABLET | Refills: 3 | Status: SHIPPED | OUTPATIENT
Start: 2018-12-13 | End: 2019-12-23

## 2018-12-13 RX ORDER — OMEPRAZOLE 20 MG/1
CAPSULE, DELAYED RELEASE ORAL
Qty: 90 CAPSULE | Refills: 0 | OUTPATIENT
Start: 2018-12-13

## 2018-12-13 RX ORDER — FOLIC ACID 1 MG/1
1000 TABLET ORAL DAILY
Qty: 90 TABLET | Refills: 3 | Status: SHIPPED | OUTPATIENT
Start: 2018-12-13 | End: 2020-01-16

## 2018-12-13 RX ORDER — OMEPRAZOLE 20 MG/1
20 CAPSULE, DELAYED RELEASE ORAL DAILY
Qty: 90 CAPSULE | Refills: 3 | Status: SHIPPED | OUTPATIENT
Start: 2018-12-13 | End: 2019-12-23

## 2018-12-13 RX ORDER — BENAZEPRIL/HYDROCHLOROTHIAZIDE 20 MG-25MG
1 TABLET ORAL DAILY
Qty: 90 TABLET | Refills: 0 | OUTPATIENT
Start: 2018-12-13

## 2018-12-13 RX ORDER — FOLIC ACID 1 MG/1
TABLET ORAL
Qty: 90 TABLET | Refills: 1 | OUTPATIENT
Start: 2018-12-13

## 2018-12-13 RX ORDER — PRAVASTATIN SODIUM 20 MG/1
20 TABLET ORAL DAILY
Qty: 90 TABLET | Refills: 3 | Status: SHIPPED | OUTPATIENT
Start: 2018-12-13 | End: 2020-01-15 | Stop reason: SDUPTHER

## 2018-12-14 NOTE — PROGRESS NOTES
Subjective:       Patient ID: Carla Dang is a 63 y.o. female.    Chief Complaint: Medication Refill  Pt in today for annual exam. HTN, hyperlipidemia managed with medication. DEXA UTD; taking vitamin D supplement for vitamin deficiency, osteopenia. Sees hematology for anemia, b12 deficiency. Mammogram UTD; has every 6 m due to breast cancer history. GERD controlled with medication. Labs, pneumonia vaccine due. Pt has no other complaints today.  Past Medical History:   Diagnosis Date    Acute pancreatitis 3/21/2016    Breast cancer     right side with lumpectomy, chemoand radiation    Closed fracture of ramus of right pubis 2016    Colon polyp     last scope 2012- repeat 10 y per pt- Dr. Johnson    GERD (gastroesophageal reflux disease) 2012    Hyperlipidemia 2013    Hypertension     Osteopenia      Social History     Socioeconomic History    Marital status:      Spouse name: Not on file    Number of children: Not on file    Years of education: Not on file    Highest education level: Not on file   Social Needs    Financial resource strain: Not on file    Food insecurity - worry: Not on file    Food insecurity - inability: Not on file    Transportation needs - medical: Not on file    Transportation needs - non-medical: Not on file   Occupational History    Not on file   Tobacco Use    Smoking status: Former Smoker     Packs/day: 0.30     Types: Cigarettes     Last attempt to quit: 2005     Years since quittin.5    Smokeless tobacco: Never Used   Substance and Sexual Activity    Alcohol use: Yes     Alcohol/week: 1.2 oz     Types: 2 Standard drinks or equivalent per week     Comment: prior 6 pack/week    Drug use: Yes    Sexual activity: Not on file   Other Topics Concern    Not on file   Social History Narrative    Not on file     Past Surgical History:   Procedure Laterality Date    BREAST LUMPECTOMY      DILATION AND CURETTAGE OF UTERUS       ESOPHAGEAL DILATION      ESOPHAGOGASTRODUODENOSCOPY  6/1/2012    ROTATOR CUFF REPAIR      TEAR DUCT SURGERY      right rye       HPI  Review of Systems   Constitutional: Negative.    HENT: Negative.    Eyes: Negative.    Respiratory: Negative.    Cardiovascular: Negative.    Gastrointestinal: Negative.    Endocrine: Negative.    Genitourinary: Negative.    Musculoskeletal: Negative.    Skin: Negative.    Allergic/Immunologic: Negative.    Neurological: Negative.    Psychiatric/Behavioral: Negative.        Objective:      Physical Exam   Constitutional: She is oriented to person, place, and time. She appears well-developed and well-nourished.   HENT:   Head: Normocephalic.   Right Ear: External ear normal.   Left Ear: External ear normal.   Nose: Nose normal.   Mouth/Throat: Oropharynx is clear and moist.   Eyes: Conjunctivae are normal. Pupils are equal, round, and reactive to light.   Neck: Normal range of motion. Neck supple.   Cardiovascular: Normal rate, regular rhythm and normal heart sounds.   Pulmonary/Chest: Effort normal and breath sounds normal.   Abdominal: Soft. Bowel sounds are normal.   Musculoskeletal: Normal range of motion.   Neurological: She is alert and oriented to person, place, and time.   Skin: Skin is warm and dry. Capillary refill takes 2 to 3 seconds.   Psychiatric: She has a normal mood and affect. Her behavior is normal. Judgment and thought content normal.   Nursing note and vitals reviewed.      Assessment:       1. Annual physical exam    2. Essential hypertension    3. Hyperlipidemia, unspecified hyperlipidemia type    4. Vitamin D deficiency    5. Osteopenia, unspecified location    6. Vitamin B 12 deficiency    7. History of breast cancer    8.      Medication refill   9.      Normocytic anemia  10.    Gastroesophageal reflux disease, esophagitis presence not specified      Plan:           Carla HERRING was seen today for medication refill.    Diagnoses and all orders for this  visit:    Annual physical exam  Essential hypertension  Hyperlipidemia, unspecified hyperlipidemia type  Vitamin D deficiency  Osteopenia, unspecified location  Vitamin B 12 deficiency  History of breast cancer  Normocytic anemia  Gastroesophageal reflux disease, esophagitis presence not specified  -     CBC auto differential; Future  -     Comprehensive metabolic panel; Future  -     Lipid panel; Future  -     TSH; Future  -     Vitamin B12; Future  -     Vitamin D; Future        -     (In Office Administered) Pneumococcal Conjugate Vaccine (13 Valent) (IM)    Medication refill  -     pravastatin (PRAVACHOL) 20 MG tablet; Take 1 tablet (20 mg total) by mouth once daily.  -     folic acid (FOLVITE) 1 MG tablet; Take 1 tablet (1,000 mcg total) by mouth once daily.  -     benazepril-hydrochlorthiazide (LOTENSIN HCT) 20-25 mg Tab; Take 1 tablet by mouth once daily.  -     omeprazole (PRILOSEC) 20 MG capsule; Take 1 capsule (20 mg total) by mouth once daily.

## 2018-12-21 ENCOUNTER — CLINICAL SUPPORT (OUTPATIENT)
Dept: FAMILY MEDICINE | Facility: CLINIC | Age: 63
End: 2018-12-21
Payer: MEDICARE

## 2018-12-21 DIAGNOSIS — E53.8 B12 DEFICIENCY: Primary | ICD-10-CM

## 2018-12-21 PROCEDURE — 96372 THER/PROPH/DIAG INJ SC/IM: CPT | Mod: PBBFAC,PO

## 2018-12-21 RX ADMIN — CYANOCOBALAMIN 1000 MCG: 1000 INJECTION, SOLUTION INTRAMUSCULAR at 09:12

## 2018-12-21 NOTE — PROGRESS NOTES
Pt received B12 in right ventrogluteal. Pt instructed to wait 15 min post injection. Pt verb understanding and tolerated well.

## 2019-01-18 ENCOUNTER — OFFICE VISIT (OUTPATIENT)
Dept: FAMILY MEDICINE | Facility: CLINIC | Age: 64
End: 2019-01-18
Payer: MEDICARE

## 2019-01-18 VITALS
DIASTOLIC BLOOD PRESSURE: 74 MMHG | WEIGHT: 174 LBS | HEART RATE: 94 BPM | HEIGHT: 66 IN | TEMPERATURE: 99 F | BODY MASS INDEX: 27.97 KG/M2 | SYSTOLIC BLOOD PRESSURE: 123 MMHG

## 2019-01-18 DIAGNOSIS — H69.92 DYSFUNCTION OF LEFT EUSTACHIAN TUBE: Primary | ICD-10-CM

## 2019-01-18 DIAGNOSIS — H93.12 TINNITUS OF LEFT EAR: ICD-10-CM

## 2019-01-18 PROCEDURE — 99999 PR PBB SHADOW E&M-EST. PATIENT-LVL III: ICD-10-PCS | Mod: PBBFAC,,, | Performed by: FAMILY MEDICINE

## 2019-01-18 PROCEDURE — 99999 PR PBB SHADOW E&M-EST. PATIENT-LVL III: CPT | Mod: PBBFAC,,, | Performed by: FAMILY MEDICINE

## 2019-01-18 PROCEDURE — 99213 PR OFFICE/OUTPT VISIT, EST, LEVL III, 20-29 MIN: ICD-10-PCS | Mod: S$PBB,,, | Performed by: FAMILY MEDICINE

## 2019-01-18 PROCEDURE — 99213 OFFICE O/P EST LOW 20 MIN: CPT | Mod: S$PBB,,, | Performed by: FAMILY MEDICINE

## 2019-01-18 PROCEDURE — 99213 OFFICE O/P EST LOW 20 MIN: CPT | Mod: PBBFAC,PO | Performed by: FAMILY MEDICINE

## 2019-01-18 RX ORDER — FLUTICASONE PROPIONATE 50 MCG
SPRAY, SUSPENSION (ML) NASAL
Qty: 16 G | Refills: 0 | Status: SHIPPED | OUTPATIENT
Start: 2019-01-18 | End: 2020-01-15

## 2019-01-18 NOTE — PROGRESS NOTES
Patient states that she had upper respiratory symptoms with congestion postnasal drainage mild cough about a week ago.  Symptoms all improved with the exception of some sneezing mild rhinorrhea yesterday.  She did develop an echo sensation at the left ear.  She has had some mild tinnitus on the left side-sounds like central heating.  Denies otalgia.  She can hear and of the left ear, though occasionally feels muffled.      Past Medical History:  Past Medical History:   Diagnosis Date    Acute pancreatitis 3/21/2016    Breast cancer 2005    right side with lumpectomy, chemoand radiation    Closed fracture of ramus of right pubis 2016    Colon polyp     last scope 2012- repeat 10 y per pt- Dr. Johnson    GERD (gastroesophageal reflux disease) 2012    Hyperlipidemia 2013    Hypertension     Osteopenia      Past Surgical History:   Procedure Laterality Date    BREAST LUMPECTOMY      DILATION AND CURETTAGE OF UTERUS      ESOPHAGEAL DILATION      ESOPHAGOGASTRODUODENOSCOPY  2012    ROTATOR CUFF REPAIR      TEAR DUCT SURGERY      right rye     Social History     Socioeconomic History    Marital status:      Spouse name: Not on file    Number of children: Not on file    Years of education: Not on file    Highest education level: Not on file   Social Needs    Financial resource strain: Not on file    Food insecurity - worry: Not on file    Food insecurity - inability: Not on file    Transportation needs - medical: Not on file    Transportation needs - non-medical: Not on file   Occupational History    Not on file   Tobacco Use    Smoking status: Former Smoker     Packs/day: 0.30     Types: Cigarettes     Last attempt to quit: 2005     Years since quittin.6    Smokeless tobacco: Never Used   Substance and Sexual Activity    Alcohol use: Yes     Alcohol/week: 1.2 oz     Types: 2 Standard drinks or equivalent per week     Comment: prior 6 pack/week    Drug use:  "Yes    Sexual activity: Not on file   Other Topics Concern    Not on file   Social History Narrative    Not on file     Family History   Problem Relation Age of Onset    Stroke Brother 57     Review of patient's allergies indicates:   Allergen Reactions    No known drug allergies      Current Outpatient Medications on File Prior to Visit   Medication Sig Dispense Refill    alendronate (FOSAMAX) 70 MG tablet TAKE 1 TABLET BY MOUTH ONCE WEEKLY  12 tablet 1    benazepril-hydrochlorthiazide (LOTENSIN HCT) 20-25 mg Tab Take 1 tablet by mouth once daily. 90 tablet 3    cholecalciferol, vitamin D3, 50,000 unit capsule Take 1 capsule (50,000 Units total) by mouth once a week. 12 capsule 3    folic acid (FOLVITE) 1 MG tablet Take 1 tablet (1,000 mcg total) by mouth once daily. 90 tablet 3    MAGOX 400 mg tablet Take 400 mg by mouth 3 (three) times daily.  0    minocycline (DYNACIN) 100 MG tablet Take 90 mg by mouth every 12 (twelve) hours.      omeprazole (PRILOSEC) 20 MG capsule Take 1 capsule (20 mg total) by mouth once daily. 90 capsule 3    pravastatin (PRAVACHOL) 20 MG tablet Take 1 tablet (20 mg total) by mouth once daily. 90 tablet 3     Current Facility-Administered Medications on File Prior to Visit   Medication Dose Route Frequency Provider Last Rate Last Dose    cyanocobalamin injection 1,000 mcg  1,000 mcg Intramuscular Q30 Days Taqueria Melendrez MD   1,000 mcg at 12/21/18 0930           OBJECTIVE:     Vitals:    01/18/19 1303   BP: 123/74   Pulse: 94   Temp: 98.6 °F (37 °C)   Weight: 78.9 kg (174 lb)   Height: 5' 5.5" (1.664 m)     Wt Readings from Last 3 Encounters:   01/18/19 78.9 kg (174 lb)   12/13/18 80.3 kg (177 lb)   09/11/18 85.3 kg (188 lb)     APPEARANCE: Well nourished, well developed, in no acute distress.    HEAD: Normocephalic.  Atraumatic.  No sinus tenderness.  EYES:   Right eye: Pupil reactive.  Conjunctiva clear.    Left eye: Pupil reactive.  Conjunctiva clear.     EOMI.    EARS: " TM's intact. Light reflex normal. No retraction or perforation.    NOSE:  clear.  MOUTH & THROAT:  No pharyngeal erythema or exudate. No lesions.  NECK: Supple. No bruits.  No JVD.  No cervical lymphadenopathy.  No thyromegaly.    CHEST: Breath sounds clear bilaterally.  Normal respiratory effort  CARDIOVASCULAR: Normal rate.  Regular rhythm.  No murmurs.  No rub.  No gallops.  NEUROLOGIC: No focal findings.  MENTAL STATUS: Alert.  Oriented x 3.      Carla HERRING was seen today for tinnitus.    Diagnoses and all orders for this visit:    Dysfunction of left eustachian tube    Tinnitus of left ear    Other orders  -     fluticasone (FLONASE) 50 mcg/actuation nasal spray; Use 2 sprays to each nostril daily      Follow-up ENT if symptoms not resolved in the next 2 weeks or if new symptoms develop

## 2019-01-22 ENCOUNTER — LAB VISIT (OUTPATIENT)
Dept: LAB | Facility: HOSPITAL | Age: 64
End: 2019-01-22
Attending: NURSE PRACTITIONER
Payer: MEDICARE

## 2019-01-22 ENCOUNTER — CLINICAL SUPPORT (OUTPATIENT)
Dept: FAMILY MEDICINE | Facility: CLINIC | Age: 64
End: 2019-01-22
Payer: MEDICARE

## 2019-01-22 DIAGNOSIS — I10 ESSENTIAL HYPERTENSION: ICD-10-CM

## 2019-01-22 DIAGNOSIS — Z00.00 ANNUAL PHYSICAL EXAM: ICD-10-CM

## 2019-01-22 DIAGNOSIS — E78.5 HYPERLIPIDEMIA, UNSPECIFIED HYPERLIPIDEMIA TYPE: ICD-10-CM

## 2019-01-22 DIAGNOSIS — E55.9 VITAMIN D DEFICIENCY: ICD-10-CM

## 2019-01-22 DIAGNOSIS — E53.8 VITAMIN B 12 DEFICIENCY: ICD-10-CM

## 2019-01-22 DIAGNOSIS — E53.8 B12 DEFICIENCY: Primary | ICD-10-CM

## 2019-01-22 LAB
25(OH)D3+25(OH)D2 SERPL-MCNC: 16 NG/ML
ALBUMIN SERPL BCP-MCNC: 3.5 G/DL
ALP SERPL-CCNC: 109 U/L
ALT SERPL W/O P-5'-P-CCNC: 10 U/L
ANION GAP SERPL CALC-SCNC: 10 MMOL/L
AST SERPL-CCNC: 16 U/L
BASOPHILS # BLD AUTO: 0.05 K/UL
BASOPHILS NFR BLD: 0.8 %
BILIRUB SERPL-MCNC: 0.4 MG/DL
BUN SERPL-MCNC: 14 MG/DL
CALCIUM SERPL-MCNC: 7.9 MG/DL
CHLORIDE SERPL-SCNC: 97 MMOL/L
CHOLEST SERPL-MCNC: 156 MG/DL
CHOLEST/HDLC SERPL: 2.2 {RATIO}
CO2 SERPL-SCNC: 27 MMOL/L
CREAT SERPL-MCNC: 1.1 MG/DL
DIFFERENTIAL METHOD: ABNORMAL
EOSINOPHIL # BLD AUTO: 0.1 K/UL
EOSINOPHIL NFR BLD: 0.8 %
ERYTHROCYTE [DISTWIDTH] IN BLOOD BY AUTOMATED COUNT: 12.3 %
EST. GFR  (AFRICAN AMERICAN): >60 ML/MIN/1.73 M^2
EST. GFR  (NON AFRICAN AMERICAN): 52.8 ML/MIN/1.73 M^2
GLUCOSE SERPL-MCNC: 114 MG/DL
HCT VFR BLD AUTO: 30.5 %
HDLC SERPL-MCNC: 71 MG/DL
HDLC SERPL: 45.5 %
HGB BLD-MCNC: 10 G/DL
IMM GRANULOCYTES # BLD AUTO: 0.02 K/UL
IMM GRANULOCYTES NFR BLD AUTO: 0.3 %
LDLC SERPL CALC-MCNC: 73.2 MG/DL
LYMPHOCYTES # BLD AUTO: 2.3 K/UL
LYMPHOCYTES NFR BLD: 36.6 %
MCH RBC QN AUTO: 30.5 PG
MCHC RBC AUTO-ENTMCNC: 32.8 G/DL
MCV RBC AUTO: 93 FL
MONOCYTES # BLD AUTO: 0.5 K/UL
MONOCYTES NFR BLD: 7.5 %
NEUTROPHILS # BLD AUTO: 3.4 K/UL
NEUTROPHILS NFR BLD: 54 %
NONHDLC SERPL-MCNC: 85 MG/DL
NRBC BLD-RTO: 0 /100 WBC
PLATELET # BLD AUTO: 349 K/UL
PMV BLD AUTO: 9.5 FL
POTASSIUM SERPL-SCNC: 3.5 MMOL/L
PROT SERPL-MCNC: 7.5 G/DL
RBC # BLD AUTO: 3.28 M/UL
SODIUM SERPL-SCNC: 134 MMOL/L
TRIGL SERPL-MCNC: 59 MG/DL
TSH SERPL DL<=0.005 MIU/L-ACNC: 0.52 UIU/ML
VIT B12 SERPL-MCNC: 1230 PG/ML
WBC # BLD AUTO: 6.36 K/UL

## 2019-01-22 PROCEDURE — 96372 THER/PROPH/DIAG INJ SC/IM: CPT | Mod: PBBFAC,PO

## 2019-01-22 PROCEDURE — 84443 ASSAY THYROID STIM HORMONE: CPT

## 2019-01-22 PROCEDURE — 80053 COMPREHEN METABOLIC PANEL: CPT

## 2019-01-22 PROCEDURE — 82306 VITAMIN D 25 HYDROXY: CPT

## 2019-01-22 PROCEDURE — 99212 OFFICE O/P EST SF 10 MIN: CPT | Mod: PBBFAC,PO

## 2019-01-22 PROCEDURE — 85025 COMPLETE CBC W/AUTO DIFF WBC: CPT

## 2019-01-22 PROCEDURE — 80061 LIPID PANEL: CPT

## 2019-01-22 PROCEDURE — 36415 COLL VENOUS BLD VENIPUNCTURE: CPT | Mod: PO

## 2019-01-22 PROCEDURE — 82607 VITAMIN B-12: CPT

## 2019-01-22 PROCEDURE — 99999 PR PBB SHADOW E&M-EST. PATIENT-LVL II: CPT | Mod: PBBFAC,,,

## 2019-01-22 PROCEDURE — 99999 PR PBB SHADOW E&M-EST. PATIENT-LVL II: ICD-10-PCS | Mod: PBBFAC,,,

## 2019-01-22 RX ADMIN — CYANOCOBALAMIN 1000 MCG: 1000 INJECTION, SOLUTION INTRAMUSCULAR at 08:01

## 2019-01-22 NOTE — PROGRESS NOTES
Patient here for vitamin b 12 1000 mcg every 30 days per Dr. Melendrez order, Given Im to left ventrogluteal IM, Patient advised to wait 15 min injection time in waiting room, patient verbalized understanding, patient tolerated well.

## 2019-01-23 ENCOUNTER — TELEPHONE (OUTPATIENT)
Dept: FAMILY MEDICINE | Facility: CLINIC | Age: 64
End: 2019-01-23

## 2019-01-23 DIAGNOSIS — E21.3 PARATHYROID HORMONE EXCESS: Primary | ICD-10-CM

## 2019-01-23 DIAGNOSIS — E83.51 LOW CALCIUM LEVELS: ICD-10-CM

## 2019-01-23 DIAGNOSIS — R79.89 LOW VITAMIN D LEVEL: ICD-10-CM

## 2019-01-23 NOTE — TELEPHONE ENCOUNTER
----- Message from Katty Marie NP sent at 1/23/2019  1:49 PM CST -----  Reviewed; vitamin D low; recommend vitamin D, calcium low. She does have a history of parathyroid hormone abnormality and was supposed to have been seeing endocrinology. I don't see where she ever followed up with them; she needs to see them. She receives b12 injections, which would account for the b12 level. BG elevated but not at diabetic level. Otherwise, labs stable.

## 2019-02-06 ENCOUNTER — OFFICE VISIT (OUTPATIENT)
Dept: ENDOCRINOLOGY | Facility: CLINIC | Age: 64
End: 2019-02-06
Payer: MEDICARE

## 2019-02-06 VITALS
HEART RATE: 97 BPM | HEIGHT: 65 IN | TEMPERATURE: 98 F | DIASTOLIC BLOOD PRESSURE: 72 MMHG | SYSTOLIC BLOOD PRESSURE: 130 MMHG | WEIGHT: 177.94 LBS | BODY MASS INDEX: 29.65 KG/M2

## 2019-02-06 DIAGNOSIS — E21.3 HYPERPARATHYROIDISM: ICD-10-CM

## 2019-02-06 DIAGNOSIS — E83.51 HYPOCALCEMIA: Primary | ICD-10-CM

## 2019-02-06 DIAGNOSIS — E55.9 VITAMIN D DEFICIENCY: ICD-10-CM

## 2019-02-06 PROCEDURE — 99204 OFFICE O/P NEW MOD 45 MIN: CPT | Mod: S$PBB,,, | Performed by: INTERNAL MEDICINE

## 2019-02-06 PROCEDURE — 99999 PR PBB SHADOW E&M-EST. PATIENT-LVL III: ICD-10-PCS | Mod: PBBFAC,,, | Performed by: INTERNAL MEDICINE

## 2019-02-06 PROCEDURE — 99204 PR OFFICE/OUTPT VISIT, NEW, LEVL IV, 45-59 MIN: ICD-10-PCS | Mod: S$PBB,,, | Performed by: INTERNAL MEDICINE

## 2019-02-06 PROCEDURE — 99999 PR PBB SHADOW E&M-EST. PATIENT-LVL III: CPT | Mod: PBBFAC,,, | Performed by: INTERNAL MEDICINE

## 2019-02-06 PROCEDURE — 99213 OFFICE O/P EST LOW 20 MIN: CPT | Mod: PBBFAC,PN | Performed by: INTERNAL MEDICINE

## 2019-02-06 RX ORDER — ERGOCALCIFEROL 1.25 MG/1
50000 CAPSULE ORAL
Qty: 12 CAPSULE | Refills: 1 | Status: SHIPPED | OUTPATIENT
Start: 2019-02-06 | End: 2019-04-10 | Stop reason: SDUPTHER

## 2019-02-06 NOTE — PATIENT INSTRUCTIONS
Take Calcium and vit D OTC twice a day with a meal.    -------------------------------------------------  Vit D 5,000 units once a week.  -------------------------------------------------

## 2019-02-06 NOTE — PROGRESS NOTES
Referring Provider:  Katty Marie *  PCP:  Taqueria Melendrez MD    Reason for referral:   Parathyroid hormone excess  Low vitamin-D level  Low calcium levels    Carla Dang 63 y.o. female    CC:  Parathyroid problem     HPI:  Lab Results   Component Value Date    .0 (H) 08/11/2017    CALCIUM 7.9 (L) 01/22/2019     Pt was found to have elevated level of PTH, and low calcium level and low vitamin-D level.  No Fracture history except for one toe fx years ago  No Kidney stone  No numbness or tingling  In hands or feet  No Bone pain  No muscle pain  No nausea  No polyuria  No teeth problem or jaw pain  Visiting to dentist on reg basis  Taking calium 600 mg supplement with Vit D  No taking otc vitamins  On B12 once a month  Breast cancer in 2005, treated by lumpectomy, chemo and radiation x 6 weeks  On Fosamax weekly.  Rare side effects on jaw discussed.    Past Medical History:   Diagnosis Date    Acute pancreatitis 3/21/2016    Breast cancer 2005    right side with lumpectomy, chemoand radiation    Closed fracture of ramus of right pubis 6/8/2016    Colon polyp     last scope 4/2012- repeat 10 y per pt- Dr. Johnson    GERD (gastroesophageal reflux disease) 5/29/2012    Hyperlipidemia 4/16/2013    Hypertension     Osteopenia        Past Surgical History:   Procedure Laterality Date    BREAST LUMPECTOMY  2005    DILATION AND CURETTAGE OF UTERUS      ESOPHAGEAL DILATION      ESOPHAGOGASTRODUODENOSCOPY  6/1/2012    ROTATOR CUFF REPAIR      TEAR DUCT SURGERY      right rye       Social History     Socioeconomic History    Marital status:      Spouse name: Not on file    Number of children: Not on file    Years of education: Not on file    Highest education level: Not on file   Social Needs    Financial resource strain: Not on file    Food insecurity - worry: Not on file    Food insecurity - inability: Not on file    Transportation needs - medical: Not on file     Transportation needs - non-medical: Not on file   Occupational History    Not on file   Tobacco Use    Smoking status: Former Smoker     Packs/day: 0.30     Types: Cigarettes     Last attempt to quit: 2005     Years since quittin.7    Smokeless tobacco: Never Used   Substance and Sexual Activity    Alcohol use: Yes     Alcohol/week: 1.2 oz     Types: 2 Standard drinks or equivalent per week     Comment: prior 6 pack/week    Drug use: Yes    Sexual activity: Not on file   Other Topics Concern    Not on file   Social History Narrative    Not on file         ROS:   History of breast cancer  No fracture  No kidney stones  No numbness or tingling  ROS otherwise normal except for what is mentioned in the PMH,PSH, and HPI.    PE:  Vitals:    19 1106   BP: 130/72   Pulse: 97   Temp: 98 °F (36.7 °C)     Alert and oriented  No acute distress  No proptosis or conjunctivitis  Nose nl  No rash on tongue  + Teeth  No goitre by inspection  Thyroid gland is not palpable  No cervical lymphadenopathy  Heart reg, no gallop  Lungs cta, no wheezing  Abd soft, no tnd  No edema in lower legs  [place]one tnd in lower legs  No rash  No bruises  No tremor in hands  Behavior normal  Speech normal  Body mass index is 29.61 kg/m².      Lab:    Lab Results   Component Value Date    .0 (H) 2017    CALCIUM 7.9 (L) 2019     Lab Results   Component Value Date    TSH 0.518 2019     Results for ANA BELL (MRN [id number]) as of 2019 11:33   Ref. Range 2017 09:24 2017 09:04 2017 15:36 2019 08:24   Calcium Latest Ref Range: 8.7 - 10.5 mg/dL 8.2 (L) 8.8  7.9 (L)      Ref. Range 2016 09:40 2017 08:37 2019 08:24   Vit D, 25-Hydroxy Latest Ref Range: 30 - 96 ng/mL 9 (L) 33 16 (L)         BMP  Lab Results   Component Value Date     (L) 2019    K 3.5 2019    CL 97 2019    CO2 27 2019    BUN 14 2019    CREATININE 1.1 2019    CALCIUM  7.9 (L) 01/22/2019    ANIONGAP 10 01/22/2019    ESTGFRAFRICA >60.0 01/22/2019    EGFRNONAA 52.8 (A) 01/22/2019         A/P:  Hypocalcemia, which is asymptomatic.  Calcium level was 7.9 recently.  Hyperparathyroidism.  Parathyroid hormone level was 189 in 2017.  Vitamin D deficiency.  Twenty-five hydroxy vitamin-D level was 16 recently.  -     PTH, intact; Future; Expected date: 02/06/2019  -     Calcium; Future; Expected date: 02/06/2019  -     Vitamin D; Future; Expected date: 02/06/2019    To increase the dose of calcium 600 mg plus vitamin D over-the-counter 2 twice a day with meals    -     ergocalciferol (ERGOCALCIFEROL) 50,000 unit Cap; Take 1 capsule (50,000 Units total) by mouth every 7 days.  Dispense: 12 capsule; Refill: 1      Appt in 2 weeks    Pt understands the plan and instructions.

## 2019-02-06 NOTE — LETTER
February 6, 2019      [place]  [address]  [place] [address]           [facility]  [address]  [place] [address]  Phone: [phone]  Fax: [phone]          Patient: Carla Dang   MR Number: [id number]   YOB: 1955   Date of Visit: 2/6/2019       Dear Katty Marie:    Thank you for referring Carla Dang to me for evaluation. Here you will find relevant portions of my assessment and plan of care.    A/P:  Hypocalcemia, which is asymptomatic.  Calcium level was 7.9 recently.  Hyperparathyroidism.  Parathyroid hormone level was 189 in 2017.  Vitamin D deficiency.  Twenty-five hydroxy vitamin-D level was 16 recently.  -     PTH, intact; Future; Expected date: 02/06/2019  -     Calcium; Future; Expected date: 02/06/2019  -     Vitamin D; Future; Expected date: 02/06/2019    To increase the dose of calcium 600 mg plus vitamin D over-the-counter 2 twice a day with meals    -     ergocalciferol (ERGOCALCIFEROL) 50,000 unit Cap; Take 1 capsule (50,000 Units total) by mouth every 7 days.  Dispense: 12 capsule; Refill: 1      Appt in 2 weeks    Pt understands the plan and instructions.          If you have questions, please do not hesitate to call me. I look forward to following Carla Dang along with you.    Sincerely,    Mckenna Sharpe MD    Enclosure  CC:  No Recipients    If you would like to receive this communication electronically, please contact [email or web address] or [phone] to request more information on [facility] access.    For providers and/or their staff who would like to refer a patient to [facility], please contact us through our one-stop-shop provider referral line, [facility] , at [phone].    If you feel you have received this communication in error or would no longer like to receive these types of communications, please e-mail [email or web address]

## 2019-02-22 ENCOUNTER — CLINICAL SUPPORT (OUTPATIENT)
Dept: FAMILY MEDICINE | Facility: CLINIC | Age: 64
End: 2019-02-22
Payer: MEDICARE

## 2019-02-22 DIAGNOSIS — E53.8 VITAMIN B 12 DEFICIENCY: Primary | ICD-10-CM

## 2019-02-22 PROCEDURE — 99212 OFFICE O/P EST SF 10 MIN: CPT | Mod: PBBFAC,PO,25

## 2019-02-22 PROCEDURE — 99999 PR PBB SHADOW E&M-EST. PATIENT-LVL II: ICD-10-PCS | Mod: PBBFAC,,,

## 2019-02-22 PROCEDURE — 96372 THER/PROPH/DIAG INJ SC/IM: CPT | Mod: PBBFAC,PO

## 2019-02-22 PROCEDURE — 99999 PR PBB SHADOW E&M-EST. PATIENT-LVL II: CPT | Mod: PBBFAC,,,

## 2019-02-22 RX ADMIN — CYANOCOBALAMIN 1000 MCG: 1000 INJECTION, SOLUTION INTRAMUSCULAR at 08:02

## 2019-02-22 NOTE — PROGRESS NOTES
Patient here for vit b 12 1000 mcg every 30 days, given IM to left ventrogluteal per patient request, patient advised to wait in waiting room 15 min injection time, patient verbalized understanding of this, patient tolerated well.

## 2019-03-22 ENCOUNTER — CLINICAL SUPPORT (OUTPATIENT)
Dept: FAMILY MEDICINE | Facility: CLINIC | Age: 64
End: 2019-03-22
Payer: MEDICARE

## 2019-03-22 DIAGNOSIS — E53.8 B12 DEFICIENCY: Primary | ICD-10-CM

## 2019-03-22 PROCEDURE — 99212 OFFICE O/P EST SF 10 MIN: CPT | Mod: PBBFAC,PO,25

## 2019-03-22 PROCEDURE — 96372 THER/PROPH/DIAG INJ SC/IM: CPT | Mod: PBBFAC,PO

## 2019-03-22 PROCEDURE — 99999 PR PBB SHADOW E&M-EST. PATIENT-LVL II: ICD-10-PCS | Mod: PBBFAC,,,

## 2019-03-22 PROCEDURE — 99999 PR PBB SHADOW E&M-EST. PATIENT-LVL II: CPT | Mod: PBBFAC,,,

## 2019-03-22 RX ADMIN — CYANOCOBALAMIN 1000 MCG: 1000 INJECTION, SOLUTION INTRAMUSCULAR at 08:03

## 2019-04-10 ENCOUNTER — LAB VISIT (OUTPATIENT)
Dept: LAB | Facility: HOSPITAL | Age: 64
End: 2019-04-10
Attending: INTERNAL MEDICINE
Payer: MEDICARE

## 2019-04-10 ENCOUNTER — OFFICE VISIT (OUTPATIENT)
Dept: ENDOCRINOLOGY | Facility: CLINIC | Age: 64
End: 2019-04-10
Payer: MEDICARE

## 2019-04-10 VITALS
HEIGHT: 65 IN | DIASTOLIC BLOOD PRESSURE: 70 MMHG | TEMPERATURE: 98 F | BODY MASS INDEX: 29.61 KG/M2 | HEART RATE: 83 BPM | WEIGHT: 177.69 LBS | SYSTOLIC BLOOD PRESSURE: 124 MMHG

## 2019-04-10 DIAGNOSIS — E83.51 HYPOCALCEMIA: Primary | ICD-10-CM

## 2019-04-10 DIAGNOSIS — E55.9 VITAMIN D DEFICIENCY: ICD-10-CM

## 2019-04-10 DIAGNOSIS — E83.51 HYPOCALCEMIA: ICD-10-CM

## 2019-04-10 LAB
25(OH)D3+25(OH)D2 SERPL-MCNC: 27 NG/ML (ref 30–96)
CALCIUM SERPL-MCNC: 7.9 MG/DL (ref 8.7–10.5)
PHOSPHATE SERPL-MCNC: 2.6 MG/DL (ref 2.7–4.5)
PTH-INTACT SERPL-MCNC: 358 PG/ML (ref 9–77)

## 2019-04-10 PROCEDURE — 99999 PR PBB SHADOW E&M-EST. PATIENT-LVL III: CPT | Mod: PBBFAC,,, | Performed by: INTERNAL MEDICINE

## 2019-04-10 PROCEDURE — 99999 PR PBB SHADOW E&M-EST. PATIENT-LVL III: ICD-10-PCS | Mod: PBBFAC,,, | Performed by: INTERNAL MEDICINE

## 2019-04-10 PROCEDURE — 82310 ASSAY OF CALCIUM: CPT

## 2019-04-10 PROCEDURE — 99213 OFFICE O/P EST LOW 20 MIN: CPT | Mod: PBBFAC,PN | Performed by: INTERNAL MEDICINE

## 2019-04-10 PROCEDURE — 99214 OFFICE O/P EST MOD 30 MIN: CPT | Mod: S$PBB,,, | Performed by: INTERNAL MEDICINE

## 2019-04-10 PROCEDURE — 99214 PR OFFICE/OUTPT VISIT, EST, LEVL IV, 30-39 MIN: ICD-10-PCS | Mod: S$PBB,,, | Performed by: INTERNAL MEDICINE

## 2019-04-10 PROCEDURE — 84100 ASSAY OF PHOSPHORUS: CPT

## 2019-04-10 PROCEDURE — 83970 ASSAY OF PARATHORMONE: CPT

## 2019-04-10 PROCEDURE — 36415 COLL VENOUS BLD VENIPUNCTURE: CPT

## 2019-04-10 PROCEDURE — 82306 VITAMIN D 25 HYDROXY: CPT

## 2019-04-10 RX ORDER — ERGOCALCIFEROL 1.25 MG/1
50000 CAPSULE ORAL
Qty: 12 CAPSULE | Refills: 0 | Status: SHIPPED | OUTPATIENT
Start: 2019-04-10 | End: 2019-07-10 | Stop reason: SDUPTHER

## 2019-04-10 NOTE — PROGRESS NOTES
Referring Provider:  Katty Marie NP    PCP:  Taqueria Melendrez MD    Reason for referral:   Parathyroid hormone excess  Low vitamin-D level  Low calcium levels    Carla Dang 63 y.o. female    CC:  Follow-up on the abnormal test.     HPI:  Lab Results   Component Value Date    .0 (H) 08/11/2017    CALCIUM 7.9 (L) 01/22/2019     Pt was found to have elevated level of PTH, and low calcium level and low vitamin-D level.  Vitamin-D 46749 units once a week was prescribed few months ago and calcium dose was increased to twice a day.  Patient is feeling fine.  She has no complaints of dysphagia, chest pain, shortness of breath, nausea, vomiting, rash, or edema.  No Fracture history except for one toe fx years ago  No Kidney stone  No numbness or tingling  In hands or feet  No Bone pain  No muscle pain  No teeth problem or jaw pain.Visiting to dentist on reg basis  On Fosamax weekly.Rare side effects on jaw and teeth discussed.  S/P visit to a dentist for teeth cleaning April 1st. Pt is advised to let her dentist know about Fosamax.  Taking calium 600 mg supplement with Vit D bid  On B12 once a month  Breast cancer in 2005, treated by lumpectomy, chemo and radiation x 6 weeks  History of Depo Inj x couple y many y ago.    Past Medical History:   Diagnosis Date    Acute pancreatitis 3/21/2016    Breast cancer 2005    right side with lumpectomy, chemoand radiation    Closed fracture of ramus of right pubis 6/8/2016    Colon polyp     last scope 4/2012- repeat 10 y per pt- Dr. Johnson    GERD (gastroesophageal reflux disease) 5/29/2012    Hyperlipidemia 4/16/2013    Hypertension     Osteopenia        Past Surgical History:   Procedure Laterality Date    BREAST LUMPECTOMY  2005    DILATION AND CURETTAGE OF UTERUS      ESOPHAGEAL DILATION      ESOPHAGOGASTRODUODENOSCOPY  6/1/2012    ROTATOR CUFF REPAIR      TEAR DUCT SURGERY      right rye       Social History     Socioeconomic History     Marital status:      Spouse name: Not on file    Number of children: Not on file    Years of education: Not on file    Highest education level: Not on file   Occupational History    Not on file   Social Needs    Financial resource strain: Not on file    Food insecurity:     Worry: Not on file     Inability: Not on file    Transportation needs:     Medical: Not on file     Non-medical: Not on file   Tobacco Use    Smoking status: Former Smoker     Packs/day: 0.30     Types: Cigarettes     Last attempt to quit: 2005     Years since quittin.8    Smokeless tobacco: Never Used   Substance and Sexual Activity    Alcohol use: Yes     Alcohol/week: 1.2 oz     Types: 2 Standard drinks or equivalent per week     Comment: prior 6 pack/week    Drug use: Yes    Sexual activity: Not on file   Lifestyle    Physical activity:     Days per week: Not on file     Minutes per session: Not on file    Stress: Not on file   Relationships    Social connections:     Talks on phone: Not on file     Gets together: Not on file     Attends Yazidi service: Not on file     Active member of club or organization: Not on file     Attends meetings of clubs or organizations: Not on file     Relationship status: Not on file   Other Topics Concern    Not on file   Social History Narrative    Not on file         ROS:   History of breast cancer  No fracture  No kidney stones  No numbness or tingling in feet or in hands  No jaw pain    PE:  Vitals:    04/10/19 1101   BP: 124/70   Pulse: 83   Temp: 98 °F (36.7 °C)     Alert and oriented  No acute distress  No proptosis or conjunctivitis  Nose nl  No rash on tongue  2 broken teeth   No goitre by inspection  Thyroid gland is not palpable  No cervical lymphadenopathy  Heart reg, no gallop  Lungs cta, no wheezing  Abd soft, no tnd  No edema in lower legs  No bone tnd in lower legs  No tremor in hands  Behavior normal  Speech normal  Body mass index is 29.57  kg/m².      Lab:    Lab Results   Component Value Date    .0 (H) 08/11/2017    CALCIUM 7.9 (L) 01/22/2019     Lab Results   Component Value Date    TSH 0.518 01/22/2019     Results for ANA BELL (MRN [id number]) as of 2/6/2019 11:33   Ref. Range 7/18/2017 09:24 9/7/2017 09:04 9/13/2017 15:36 1/22/2019 08:24   Calcium Latest Ref Range: 8.7 - 10.5 mg/dL 8.2 (L) 8.8  7.9 (L)      Ref. Range 7/19/2016 09:40 8/11/2017 08:37 1/22/2019 08:24   Vit D, 25-Hydroxy Latest Ref Range: 30 - 96 ng/mL 9 (L) 33 16 (L)         BMP  Lab Results   Component Value Date     (L) 01/22/2019    K 3.5 01/22/2019    CL 97 01/22/2019    CO2 27 01/22/2019    BUN 14 01/22/2019    CREATININE 1.1 01/22/2019    CALCIUM 7.9 (L) 01/22/2019    ANIONGAP 10 01/22/2019    ESTGFRAFRICA >60.0 01/22/2019    EGFRNONAA 52.8 (A) 01/22/2019       A/P:  Hypocalcemia, which is asymptomatic.  Calcium level was 7.9 in January.  Hyperparathyroidism.  Parathyroid hormone level was 189 in 2017.  Vitamin D deficiency    To continue taking the dose of calcium 600 mg plus vitamin D over-the-counter  twice a day with meals  -   On  ergocalciferol (ERGOCALCIFEROL) 50,000 unit Cap; Take 1 capsule (50,000 Units total) by mouth every 7 days.  Dispense: 12 capsule    Orders Placed This Encounter   Procedures    Calcium     Standing Status:   Future     Standing Expiration Date:   5/10/2019    PTH, intact     Standing Status:   Future     Standing Expiration Date:   5/10/2019    Vitamin D     Standing Status:   Future     Standing Expiration Date:   5/10/2019    Phosphorus     Standing Status:   Future     Standing Expiration Date:   5/10/2019       History of osteopenia  T-score was -2.2   On Fosamax once a week    Advised to see a dentist q 6 months.    Appt in 3 months.    Pt understands the plan and instructions.

## 2019-04-18 ENCOUNTER — CLINICAL SUPPORT (OUTPATIENT)
Dept: FAMILY MEDICINE | Facility: CLINIC | Age: 64
End: 2019-04-18
Payer: MEDICARE

## 2019-04-18 DIAGNOSIS — E53.8 VITAMIN B 12 DEFICIENCY: Primary | ICD-10-CM

## 2019-04-18 PROCEDURE — 99999 PR PBB SHADOW E&M-EST. PATIENT-LVL II: ICD-10-PCS | Mod: PBBFAC,,,

## 2019-04-18 PROCEDURE — 99212 OFFICE O/P EST SF 10 MIN: CPT | Mod: PBBFAC,PO

## 2019-04-18 PROCEDURE — 96372 THER/PROPH/DIAG INJ SC/IM: CPT | Mod: PBBFAC,PO

## 2019-04-18 PROCEDURE — 99999 PR PBB SHADOW E&M-EST. PATIENT-LVL II: CPT | Mod: PBBFAC,,,

## 2019-04-18 RX ADMIN — CYANOCOBALAMIN 1000 MCG: 1000 INJECTION, SOLUTION INTRAMUSCULAR at 08:04

## 2019-05-16 RX ORDER — CYANOCOBALAMIN 1000 UG/ML
1000 INJECTION, SOLUTION INTRAMUSCULAR; SUBCUTANEOUS
Status: DISCONTINUED | OUTPATIENT
Start: 2019-05-17 | End: 2020-12-23

## 2019-05-17 ENCOUNTER — CLINICAL SUPPORT (OUTPATIENT)
Dept: FAMILY MEDICINE | Facility: CLINIC | Age: 64
End: 2019-05-17
Payer: MEDICARE

## 2019-05-17 DIAGNOSIS — E53.8 VITAMIN B12 DEFICIENCY: Primary | ICD-10-CM

## 2019-05-17 PROCEDURE — 99999 PR PBB SHADOW E&M-EST. PATIENT-LVL II: ICD-10-PCS | Mod: PBBFAC,,,

## 2019-05-17 PROCEDURE — 99999 PR PBB SHADOW E&M-EST. PATIENT-LVL II: CPT | Mod: PBBFAC,,,

## 2019-05-17 PROCEDURE — 99212 OFFICE O/P EST SF 10 MIN: CPT | Mod: PBBFAC,PO

## 2019-05-17 PROCEDURE — 96372 THER/PROPH/DIAG INJ SC/IM: CPT | Mod: PBBFAC,PO

## 2019-05-17 RX ORDER — CYANOCOBALAMIN 1000 UG/ML
1000 INJECTION, SOLUTION INTRAMUSCULAR; SUBCUTANEOUS
Status: DISCONTINUED | OUTPATIENT
Start: 2019-05-17 | End: 2020-12-23

## 2019-05-17 RX ADMIN — CYANOCOBALAMIN 1000 MCG: 1000 INJECTION, SOLUTION INTRAMUSCULAR at 09:05

## 2019-05-17 NOTE — PROGRESS NOTES
Pt received 1000mcg b12 to left ventrogluteal. Pt advised to wait in waiting area for 15 minutes after injeciton pt verbalized understanding 9/1/2020

## 2019-06-03 DIAGNOSIS — M85.80 OSTEOPENIA: ICD-10-CM

## 2019-06-03 DIAGNOSIS — E21.3 HYPERPARATHYROIDISM: Primary | ICD-10-CM

## 2019-06-03 RX ORDER — ALENDRONATE SODIUM 70 MG/1
TABLET ORAL
Qty: 12 TABLET | Refills: 0 | Status: SHIPPED | OUTPATIENT
Start: 2019-06-03 | End: 2019-12-18

## 2019-06-05 DIAGNOSIS — M85.80 OSTEOPENIA: ICD-10-CM

## 2019-06-05 NOTE — TELEPHONE ENCOUNTER
Dexa scan appointment scheduled for patient, letter mailed to patient's address in [facility] for notification.

## 2019-06-05 NOTE — TELEPHONE ENCOUNTER
Patient notified that meds were sent to her pharmacy and notified of need for dexa scan in July, orders pended to be able to schedule.

## 2019-06-06 RX ORDER — ALENDRONATE SODIUM 70 MG/1
70 TABLET ORAL
Qty: 12 TABLET | Refills: 0 | OUTPATIENT
Start: 2019-06-06

## 2019-06-14 ENCOUNTER — CLINICAL SUPPORT (OUTPATIENT)
Dept: FAMILY MEDICINE | Facility: CLINIC | Age: 64
End: 2019-06-14
Payer: MEDICARE

## 2019-06-14 DIAGNOSIS — D64.9 ANEMIA, UNSPECIFIED TYPE: Primary | ICD-10-CM

## 2019-06-14 PROCEDURE — 99212 OFFICE O/P EST SF 10 MIN: CPT | Mod: PBBFAC,PO,25

## 2019-06-14 PROCEDURE — 96372 THER/PROPH/DIAG INJ SC/IM: CPT | Mod: PBBFAC,PO

## 2019-06-14 PROCEDURE — 99999 PR PBB SHADOW E&M-EST. PATIENT-LVL II: ICD-10-PCS | Mod: PBBFAC,,,

## 2019-06-14 PROCEDURE — 99999 PR PBB SHADOW E&M-EST. PATIENT-LVL II: CPT | Mod: PBBFAC,,,

## 2019-06-14 RX ADMIN — CYANOCOBALAMIN 1000 MCG: 1000 INJECTION, SOLUTION INTRAMUSCULAR at 09:06

## 2019-06-14 NOTE — PROGRESS NOTES
B12 1000mcg IM given to left ventrogluteal, tolerated well.  Patient instructed to remain in clinic 15 minutes after injection, verbalized understanding.

## 2019-07-10 ENCOUNTER — TELEPHONE (OUTPATIENT)
Dept: ENDOCRINOLOGY | Facility: CLINIC | Age: 64
End: 2019-07-10

## 2019-07-10 ENCOUNTER — LAB VISIT (OUTPATIENT)
Dept: LAB | Facility: HOSPITAL | Age: 64
End: 2019-07-10
Attending: INTERNAL MEDICINE
Payer: MEDICARE

## 2019-07-10 ENCOUNTER — OFFICE VISIT (OUTPATIENT)
Dept: ENDOCRINOLOGY | Facility: CLINIC | Age: 64
End: 2019-07-10
Payer: MEDICARE

## 2019-07-10 VITALS
DIASTOLIC BLOOD PRESSURE: 70 MMHG | HEIGHT: 65 IN | SYSTOLIC BLOOD PRESSURE: 120 MMHG | WEIGHT: 171.06 LBS | BODY MASS INDEX: 28.5 KG/M2 | TEMPERATURE: 98 F

## 2019-07-10 DIAGNOSIS — E83.51 HYPOCALCEMIA: ICD-10-CM

## 2019-07-10 DIAGNOSIS — E83.51 HYPOCALCEMIA: Primary | ICD-10-CM

## 2019-07-10 DIAGNOSIS — E21.3 HYPERPARATHYROIDISM: ICD-10-CM

## 2019-07-10 LAB
25(OH)D3+25(OH)D2 SERPL-MCNC: 31 NG/ML (ref 30–96)
CALCIUM SERPL-MCNC: 6.8 MG/DL (ref 8.7–10.5)
PHOSPHATE SERPL-MCNC: 2.3 MG/DL (ref 2.7–4.5)
PTH-INTACT SERPL-MCNC: 252 PG/ML (ref 9–77)

## 2019-07-10 PROCEDURE — 99214 PR OFFICE/OUTPT VISIT, EST, LEVL IV, 30-39 MIN: ICD-10-PCS | Mod: S$PBB,,, | Performed by: INTERNAL MEDICINE

## 2019-07-10 PROCEDURE — 82306 VITAMIN D 25 HYDROXY: CPT

## 2019-07-10 PROCEDURE — 99999 PR PBB SHADOW E&M-EST. PATIENT-LVL III: CPT | Mod: PBBFAC,,, | Performed by: INTERNAL MEDICINE

## 2019-07-10 PROCEDURE — 82310 ASSAY OF CALCIUM: CPT

## 2019-07-10 PROCEDURE — 99213 OFFICE O/P EST LOW 20 MIN: CPT | Mod: PBBFAC | Performed by: INTERNAL MEDICINE

## 2019-07-10 PROCEDURE — 99214 OFFICE O/P EST MOD 30 MIN: CPT | Mod: S$PBB,,, | Performed by: INTERNAL MEDICINE

## 2019-07-10 PROCEDURE — 99999 PR PBB SHADOW E&M-EST. PATIENT-LVL III: ICD-10-PCS | Mod: PBBFAC,,, | Performed by: INTERNAL MEDICINE

## 2019-07-10 PROCEDURE — 84100 ASSAY OF PHOSPHORUS: CPT

## 2019-07-10 PROCEDURE — 36415 COLL VENOUS BLD VENIPUNCTURE: CPT

## 2019-07-10 PROCEDURE — 83970 ASSAY OF PARATHORMONE: CPT

## 2019-07-10 RX ORDER — ERGOCALCIFEROL 1.25 MG/1
50000 CAPSULE ORAL
Qty: 24 CAPSULE | Refills: 0 | Status: SHIPPED | OUTPATIENT
Start: 2019-07-10 | End: 2019-07-10

## 2019-07-10 RX ORDER — CALCIUM CARBONATE 500(1250)
TABLET ORAL
Qty: 90 TABLET | Refills: 1 | COMMUNITY
Start: 2019-07-10 | End: 2019-07-10 | Stop reason: SDUPTHER

## 2019-07-10 RX ORDER — ERGOCALCIFEROL 1.25 MG/1
CAPSULE ORAL
Qty: 12 CAPSULE | Refills: 1 | Status: SHIPPED | OUTPATIENT
Start: 2019-07-10 | End: 2019-11-05 | Stop reason: SDUPTHER

## 2019-07-10 RX ORDER — ERGOCALCIFEROL 1.25 MG/1
50000 CAPSULE ORAL
Qty: 12 CAPSULE | Refills: 0 | Status: SHIPPED | OUTPATIENT
Start: 2019-07-10 | End: 2019-07-10 | Stop reason: SDUPTHER

## 2019-07-10 RX ORDER — CALCIUM CARBONATE 500(1250)
TABLET ORAL
Qty: 90 TABLET | Refills: 1 | COMMUNITY
Start: 2019-07-10 | End: 2020-09-15 | Stop reason: SDUPTHER

## 2019-07-10 RX ORDER — ERGOCALCIFEROL 1.25 MG/1
50000 CAPSULE ORAL
Qty: 12 CAPSULE | Refills: 0 | Status: SHIPPED | OUTPATIENT
Start: 2019-07-10 | End: 2019-07-10

## 2019-07-10 NOTE — TELEPHONE ENCOUNTER
Called patient and explained to her that her calcium was low and to take the calcium pills as directed in previous message by  . Patient verbalized she understood

## 2019-07-10 NOTE — PROGRESS NOTES
Referring Provider:  Katty Marie NP    PCP:  Taqueria Melendrez MD    Reason for referral:   Parathyroid hormone excess  Low vitamin-D level  Low calcium levels    Carla Dang 64 y.o. female    CC:  Follow-up on the abnormal test.     HPI:  Lab Results   Component Value Date    .0 (H) 04/10/2019    CALCIUM 7.9 (L) 04/10/2019    PHOS 2.6 (L) 04/10/2019     Pt was found to have elevated level of PTH, and low calcium level and low vitamin-D level.  Vitamin-D 15349 units once a week was prescribed few months ago and calcium dose was increased to twice a day.  Patient believes she has been taking calcium plus vitamin-D over-the-counter just once a day.  Patient is feeling fine.  She has no complaints of dysphagia, chest pain, shortness of breath, nausea, vomiting, rash, or edema.  No Fracture history except for one toe fx years ago  No Kidney stone  No numbness or tingling  In hands or feet  No Bone pain  No muscle pain  One tooth was pulled by a dentist since last visit.  No jaw pain.Visiting a dentist on reg basis  On Fosamax weekly.  On B12 once a month  Breast cancer in 2005, treated by lumpectomy, chemo and radiation x 6 weeks  History of Depo Inj x couple y many y ago.    Past Medical History:   Diagnosis Date    Acute pancreatitis 3/21/2016    Breast cancer 2005    right side with lumpectomy, chemoand radiation    Closed fracture of ramus of right pubis 6/8/2016    Colon polyp     last scope 4/2012- repeat 10 y per pt- Dr. Johnson    GERD (gastroesophageal reflux disease) 5/29/2012    Hyperlipidemia 4/16/2013    Hypertension     Osteopenia        Past Surgical History:   Procedure Laterality Date    BREAST LUMPECTOMY  2005    DILATION AND CURETTAGE OF UTERUS      ESOPHAGEAL DILATION      ESOPHAGOGASTRODUODENOSCOPY  6/1/2012    ROTATOR CUFF REPAIR      TEAR DUCT SURGERY      right rye       Social History     Socioeconomic History    Marital status:      Spouse name:  Not on file    Number of children: Not on file    Years of education: Not on file    Highest education level: Not on file   Occupational History    Not on file   Social Needs    Financial resource strain: Not on file    Food insecurity:     Worry: Not on file     Inability: Not on file    Transportation needs:     Medical: Not on file     Non-medical: Not on file   Tobacco Use    Smoking status: Former Smoker     Packs/day: 0.30     Types: Cigarettes     Last attempt to quit: 2005     Years since quittin.1    Smokeless tobacco: Never Used   Substance and Sexual Activity    Alcohol use: Yes     Alcohol/week: 1.2 oz     Types: 2 Standard drinks or equivalent per week     Comment: prior 6 pack/week    Drug use: Yes    Sexual activity: Not on file   Lifestyle    Physical activity:     Days per week: Not on file     Minutes per session: Not on file    Stress: Not on file   Relationships    Social connections:     Talks on phone: Not on file     Gets together: Not on file     Attends Tenriism service: Not on file     Active member of club or organization: Not on file     Attends meetings of clubs or organizations: Not on file     Relationship status: Not on file   Other Topics Concern    Not on file   Social History Narrative    Not on file         ROS:   History of breast cancer  History of No fracture except for toe fracture  No kidney stones  No numbness or tingling in feet or in hands  No jaw pain  S/P one tooth pulled  No complaints of nausea or vomiting  No complaints of chest pain or shortness breath    PE:  Vitals:    07/10/19 1007   BP: 120/70   Temp: 98.1 °F (36.7 °C)     Alert and oriented  No acute distress  No proptosis or conjunctivitis  Nose nl  No rash on tongue  No goitre by inspection  Thyroid gland is not palpable  No cervical lymphadenopathy  Heart reg, no gallop  Lungs cta, no wheezing  No edema in lower legs  No bone tnd in lower legs  No tremor in hands  Behavior  normal  Speech normal  Body mass index is 28.47 kg/m².      Lab:    Lab Results   Component Value Date    .0 (H) 04/10/2019    CALCIUM 7.9 (L) 04/10/2019    PHOS 2.6 (L) 04/10/2019     Lab Results   Component Value Date    TSH 0.518 01/22/2019        Ref. Range 7/18/2017 09:24 9/7/2017 09:04 9/13/2017 15:36 1/22/2019 08:24   Calcium Latest Ref Range: 8.7 - 10.5 mg/dL 8.2 (L) 8.8  7.9 (L)      Ref. Range 7/19/2016 09:40 8/11/2017 08:37 1/22/2019 08:24   Vit D, 25-Hydroxy Latest Ref Range: 30 - 96 ng/mL 9 (L) 33 16 (L)         BMP  Lab Results   Component Value Date     (L) 01/22/2019    K 3.5 01/22/2019    CL 97 01/22/2019    CO2 27 01/22/2019    BUN 14 01/22/2019    CREATININE 1.1 01/22/2019    CALCIUM 7.9 (L) 04/10/2019    ANIONGAP 10 01/22/2019    ESTGFRAFRICA >60.0 01/22/2019    EGFRNONAA 52.8 (A) 01/22/2019     A/P:  Hypocalcemia, which is asymptomatic.  Calcium level was 7.9.  Hyperparathyroidism.  Parathyroid hormone level was 189 in 2017, then last PTH was above 300.  Vitamin D deficiency    To continue taking the dose of calcium 600 mg plus vitamin D over-the-counter once a day  -   On  ergocalciferol (ERGOCALCIFEROL) 50,000 unit Cap; Take 1 capsule (50,000 Units total) by mouth every 7 days.  Dispense: 12 capsule  Orders Placed This Encounter   Procedures    Phosphorus     Standing Status:   Future     Number of Occurrences:   1     Standing Expiration Date:   11/10/2019    Calcium     Standing Status:   Future     Number of Occurrences:   1     Standing Expiration Date:   11/10/2019    PTH, intact     Standing Status:   Future     Number of Occurrences:   1     Standing Expiration Date:   11/10/2019    Vitamin D     Standing Status:   Future     Number of Occurrences:   1     Standing Expiration Date:   11/10/2019       History of osteopenia  T-score was -2.2   On Fosamax once a week  ONJ rare side effect discussed, and to let her dentist know she is on Fosamax      Appt in 3 months.    Pt  understands the plan and instructions.

## 2019-07-10 NOTE — TELEPHONE ENCOUNTER
----- Message from Mckenna Sharpe MD sent at 7/10/2019  4:35 PM CDT -----  Call pt to let her know her calcium level was low and she needs to do the following;  To start taking calcium pill (it is calcium carbonate) 1 tablet 3 TIMES A DAY  (have just sent a prescription to the pharmacy)  AND  To take the 59756 units of vitamin-D  every Thursday, Saturday and Monday.    BLOOD TEST AND APPT  IS A MUST IN 2 WEEKS.

## 2019-07-15 ENCOUNTER — TELEPHONE (OUTPATIENT)
Dept: ENDOCRINOLOGY | Facility: CLINIC | Age: 64
End: 2019-07-15

## 2019-07-15 NOTE — TELEPHONE ENCOUNTER
----- Message from Mckenna Sharpe MD sent at 7/12/2019 12:04 PM CDT -----  Please schedule the patient's lab before next visit.  BLOOD TEST AND APPT  IS A MUST IN 2 WEEKS.

## 2019-07-16 ENCOUNTER — CLINICAL SUPPORT (OUTPATIENT)
Dept: FAMILY MEDICINE | Facility: CLINIC | Age: 64
End: 2019-07-16
Payer: MEDICARE

## 2019-07-16 DIAGNOSIS — E53.8 VITAMIN B12 DEFICIENCY: Primary | ICD-10-CM

## 2019-07-16 PROCEDURE — 99999 PR PBB SHADOW E&M-EST. PATIENT-LVL II: ICD-10-PCS | Mod: PBBFAC,,,

## 2019-07-16 PROCEDURE — 96372 THER/PROPH/DIAG INJ SC/IM: CPT | Mod: PBBFAC,PO

## 2019-07-16 PROCEDURE — 99212 OFFICE O/P EST SF 10 MIN: CPT | Mod: PBBFAC,PO

## 2019-07-16 PROCEDURE — 99999 PR PBB SHADOW E&M-EST. PATIENT-LVL II: CPT | Mod: PBBFAC,,,

## 2019-07-16 RX ADMIN — CYANOCOBALAMIN 1000 MCG: 1000 INJECTION, SOLUTION INTRAMUSCULAR at 10:07

## 2019-07-16 NOTE — PROGRESS NOTES
Patient here for vit b12 1000mcg injection per orders from Dr. Melendrez, given Im to left ventrogluteal per patient request, patient advised to wait in waiting room 15 min injection time, patient verbalized understanding of this, patient tolerated well.

## 2019-07-24 ENCOUNTER — LAB VISIT (OUTPATIENT)
Dept: LAB | Facility: HOSPITAL | Age: 64
End: 2019-07-24
Attending: INTERNAL MEDICINE
Payer: MEDICARE

## 2019-07-24 ENCOUNTER — TELEPHONE (OUTPATIENT)
Dept: ENDOCRINOLOGY | Facility: CLINIC | Age: 64
End: 2019-07-24

## 2019-07-24 DIAGNOSIS — E83.42 HYPOMAGNESEMIA: ICD-10-CM

## 2019-07-24 DIAGNOSIS — E83.51 HYPOCALCEMIA: ICD-10-CM

## 2019-07-24 LAB
CALCIUM SERPL-MCNC: 8.8 MG/DL (ref 8.7–10.5)
MAGNESIUM SERPL-MCNC: 0.7 MG/DL (ref 1.6–2.6)

## 2019-07-24 PROCEDURE — 83735 ASSAY OF MAGNESIUM: CPT

## 2019-07-24 PROCEDURE — 82310 ASSAY OF CALCIUM: CPT

## 2019-07-24 PROCEDURE — 36415 COLL VENOUS BLD VENIPUNCTURE: CPT | Mod: PO

## 2019-07-24 RX ORDER — LANOLIN ALCOHOL/MO/W.PET/CERES
400 CREAM (GRAM) TOPICAL 3 TIMES DAILY
Qty: 90 TABLET | Refills: 0 | Status: SHIPPED | OUTPATIENT
Start: 2019-07-24 | End: 2019-08-22 | Stop reason: SDUPTHER

## 2019-07-24 NOTE — TELEPHONE ENCOUNTER
Called with critical magnesium level. Called patient and will start mg oxide 400 mg TID. Will notify her endocrine provider.

## 2019-07-25 ENCOUNTER — HOSPITAL ENCOUNTER (OUTPATIENT)
Dept: RADIOLOGY | Facility: HOSPITAL | Age: 64
Discharge: HOME | End: 2019-07-25
Attending: FAMILY MEDICINE
Payer: MEDICARE

## 2019-07-25 DIAGNOSIS — M85.80 OSTEOPENIA: ICD-10-CM

## 2019-07-25 DIAGNOSIS — E21.3 HYPERPARATHYROIDISM: ICD-10-CM

## 2019-07-25 PROCEDURE — 77080 DEXA BONE DENSITY SPINE HIP: ICD-10-PCS | Mod: 26,,, | Performed by: RADIOLOGY

## 2019-07-25 PROCEDURE — 77080 DXA BONE DENSITY AXIAL: CPT | Mod: TC,PO

## 2019-07-25 PROCEDURE — 77080 DXA BONE DENSITY AXIAL: CPT | Mod: 26,,, | Performed by: RADIOLOGY

## 2019-07-29 ENCOUNTER — OFFICE VISIT (OUTPATIENT)
Dept: ENDOCRINOLOGY | Facility: CLINIC | Age: 64
End: 2019-07-29
Payer: MEDICARE

## 2019-07-29 VITALS
SYSTOLIC BLOOD PRESSURE: 122 MMHG | DIASTOLIC BLOOD PRESSURE: 64 MMHG | TEMPERATURE: 98 F | HEIGHT: 65 IN | WEIGHT: 170.44 LBS | BODY MASS INDEX: 28.4 KG/M2 | HEART RATE: 64 BPM

## 2019-07-29 DIAGNOSIS — E83.42 HYPOMAGNESEMIA: ICD-10-CM

## 2019-07-29 DIAGNOSIS — E83.51 HYPOCALCEMIA: Primary | ICD-10-CM

## 2019-07-29 DIAGNOSIS — E55.9 VITAMIN D DEFICIENCY: ICD-10-CM

## 2019-07-29 PROCEDURE — 99213 OFFICE O/P EST LOW 20 MIN: CPT | Mod: PBBFAC | Performed by: INTERNAL MEDICINE

## 2019-07-29 PROCEDURE — 99214 PR OFFICE/OUTPT VISIT, EST, LEVL IV, 30-39 MIN: ICD-10-PCS | Mod: S$PBB,,, | Performed by: INTERNAL MEDICINE

## 2019-07-29 PROCEDURE — 99999 PR PBB SHADOW E&M-EST. PATIENT-LVL III: CPT | Mod: PBBFAC,,, | Performed by: INTERNAL MEDICINE

## 2019-07-29 PROCEDURE — 99214 OFFICE O/P EST MOD 30 MIN: CPT | Mod: S$PBB,,, | Performed by: INTERNAL MEDICINE

## 2019-07-29 PROCEDURE — 99999 PR PBB SHADOW E&M-EST. PATIENT-LVL III: ICD-10-PCS | Mod: PBBFAC,,, | Performed by: INTERNAL MEDICINE

## 2019-07-29 NOTE — PROGRESS NOTES
Referring Provider:  Katty Marie NP    PCP:  Taqueria Melendrez MD    Reason for referral:   Parathyroid hormone excess  Low vitamin-D level  Low calcium levels    Carla Dang 64 y.o. female    CC:  Follow-up on the abnormal test.     HPI:  Lab Results   Component Value Date    .0 (H) 07/10/2019    CALCIUM 8.8 07/24/2019    PHOS 2.3 (L) 07/10/2019     Pt was found to have elevated level of PTH, and low calcium level and low vitamin-D level.  Vitamin-D 50,000 units once a week was prescribed few months ago and calcium dose was increased to twice a day.  Patient was found to have very low magnesium level of 0.7 last week so she was prescribed  Magnesium oxide by Dr. Tejeda.  Patient has been taking:  Mag Oxide 400 mg TID  CALCIUM 600 MG+ Vit D 800 units tid  Vit D 50,000 units 3 times a week  Patient is feeling fine.  She has no complaints of dysphagia, chest pain, shortness of breath, nausea, vomiting, rash, or edema.  No Fracture history except for one toe fx years ago  No Kidney stone  No numbness or tingling   No Bone pain  No muscle pain  One tooth was pulled by a dentist prior to last visit.  No jaw pain or gum pain.Visiting a dentist on reg basis.  ONJ side effect discussed.  On Fosamax weekly.  On B12 once a month, but it is not helping and patient is still having fatigue  Breast cancer in 2005, treated by lumpectomy, chemo and radiation x 6 weeks  History of Depo Inj x couple y many y ago.    Past Medical History:   Diagnosis Date    Acute pancreatitis 3/21/2016    Breast cancer 2005    right side with lumpectomy, chemoand radiation    Closed fracture of ramus of right pubis 6/8/2016    Colon polyp     last scope 4/2012- repeat 10 y per pt- Dr. Johnson    GERD (gastroesophageal reflux disease) 5/29/2012    Hyperlipidemia 4/16/2013    Hypertension     Osteopenia        Past Surgical History:   Procedure Laterality Date    BREAST LUMPECTOMY  2005    DILATION AND CURETTAGE OF  UTERUS      ESOPHAGEAL DILATION      ESOPHAGOGASTRODUODENOSCOPY  2012    ROTATOR CUFF REPAIR      TEAR DUCT SURGERY      right rye       Social History     Socioeconomic History    Marital status:      Spouse name: Not on file    Number of children: Not on file    Years of education: Not on file    Highest education level: Not on file   Occupational History    Not on file   Social Needs    Financial resource strain: Not on file    Food insecurity:     Worry: Not on file     Inability: Not on file    Transportation needs:     Medical: Not on file     Non-medical: Not on file   Tobacco Use    Smoking status: Former Smoker     Packs/day: 0.30     Types: Cigarettes     Last attempt to quit: 2005     Years since quittin.1    Smokeless tobacco: Never Used   Substance and Sexual Activity    Alcohol use: Yes     Alcohol/week: 1.2 oz     Types: 2 Standard drinks or equivalent per week     Comment: prior 6 pack/week    Drug use: Yes    Sexual activity: Not on file   Lifestyle    Physical activity:     Days per week: Not on file     Minutes per session: Not on file    Stress: Not on file   Relationships    Social connections:     Talks on phone: Not on file     Gets together: Not on file     Attends Taoism service: Not on file     Active member of club or organization: Not on file     Attends meetings of clubs or organizations: Not on file     Relationship status: Not on file   Other Topics Concern    Not on file   Social History Narrative    Not on file         ROS:   No numbness or tingling in feet or in hands  No jaw pain  No muscle pain  No complaints of nausea or vomiting  No complaints of chest pain or shortness breath  ---------------------------  History of breast cancer  History of No fracture except for toe fracture  No kidney stones    PE:  Alert and oriented  No acute distress  No proptosis or conjunctivitis  No goitre by inspection  Thyroid gland is not palpable  Heart  reg, no gallop  Lungs cta, no wheezing  No edema in lower legs  No bone tnd in lower legs  No tremor in hands  Behavior normal  Speech normal    Lab:    Lab Results   Component Value Date    .0 (H) 07/10/2019    CALCIUM 8.8 07/24/2019    PHOS 2.3 (L) 07/10/2019     Lab Results   Component Value Date    TSH 0.518 01/22/2019     Results for ANA BELL (MRN [id number]) as of 7/29/2019 11:15   Ref. Range 4/10/2019 11:43 7/10/2019 10:53 7/24/2019 09:45   Calcium Latest Ref Range: 8.7 - 10.5 mg/dL 7.9 (L) 6.8 (LL) 8.8   Phosphorus Latest Ref Range: 2.7 - 4.5 mg/dL 2.6 (L) 2.3 (L)    Magnesium Latest Ref Range: 1.6 - 2.6 mg/dL   0.7 (LL)       BMP  Lab Results   Component Value Date     (L) 01/22/2019    K 3.5 01/22/2019    CL 97 01/22/2019    CO2 27 01/22/2019    BUN 14 01/22/2019    CREATININE 1.1 01/22/2019    CALCIUM 8.8 07/24/2019    ANIONGAP 10 01/22/2019    ESTGFRAFRICA >60.0 01/22/2019    EGFRNONAA 52.8 (A) 01/22/2019         A/P:  Hypocalcemia, which is asymptomatic.   Serum calcium improved from 6.8 to 8.8 within 2 weeks  Hypomagnesemia  Serum Magnesium was 0.7 prior to starting on magnesium oxide 400 mg tablet 3 times a day  Hyperparathyroidism  Significant elevation in parathyroid hormone  Vitamin D deficiency  History of breast cancer    Patient Instructions   Vitamin D 50,000 UNITS ONLY TWICE A WEEK.  --------------------------------------------------------------------------------  Mag Oxide 3 times a day till Friday, then twice a day.    --------------------------------------------------------------------    Calcium 600 mg +D  3 times a day.         Blood test will be necessary for follow-up by next visit in 3 weeks    History of osteopenia  History of T-score was -2.2   Bone density scan recently showed a T-score -1  On Fosamax once a week    Appt in 3 weeks.    Pt understands the plan and instructions.

## 2019-07-29 NOTE — PATIENT INSTRUCTIONS
Vitamin D 50,000 UNITS ONLY TWICE A WEEK.  --------------------------------------------------------------------------------  Mag Oxide 3 times a day till Friday, then twice a day.    --------------------------------------------------------------------    Calcium 600 mg +D  3 times a day.

## 2019-08-16 ENCOUNTER — CLINICAL SUPPORT (OUTPATIENT)
Dept: FAMILY MEDICINE | Facility: CLINIC | Age: 64
End: 2019-08-16
Payer: MEDICARE

## 2019-08-16 DIAGNOSIS — E53.8 VITAMIN B12 DEFICIENCY: Primary | ICD-10-CM

## 2019-08-16 PROCEDURE — 99212 OFFICE O/P EST SF 10 MIN: CPT | Mod: PBBFAC,PO,25

## 2019-08-16 PROCEDURE — 96372 THER/PROPH/DIAG INJ SC/IM: CPT | Mod: PBBFAC,PO

## 2019-08-16 PROCEDURE — 99999 PR PBB SHADOW E&M-EST. PATIENT-LVL II: CPT | Mod: PBBFAC,,,

## 2019-08-16 PROCEDURE — 99999 PR PBB SHADOW E&M-EST. PATIENT-LVL II: ICD-10-PCS | Mod: PBBFAC,,,

## 2019-08-16 RX ADMIN — CYANOCOBALAMIN 1000 MCG: 1000 INJECTION, SOLUTION INTRAMUSCULAR at 09:08

## 2019-08-19 ENCOUNTER — LAB VISIT (OUTPATIENT)
Dept: LAB | Facility: HOSPITAL | Age: 64
End: 2019-08-19
Attending: INTERNAL MEDICINE
Payer: MEDICARE

## 2019-08-19 DIAGNOSIS — E83.51 HYPOCALCEMIA: ICD-10-CM

## 2019-08-19 LAB
CALCIUM SERPL-MCNC: 9.7 MG/DL (ref 8.7–10.5)
MAGNESIUM SERPL-MCNC: 1.8 MG/DL (ref 1.6–2.6)
PHOSPHATE SERPL-MCNC: 3.7 MG/DL (ref 2.7–4.5)

## 2019-08-19 PROCEDURE — 36415 COLL VENOUS BLD VENIPUNCTURE: CPT | Mod: PO

## 2019-08-19 PROCEDURE — 83735 ASSAY OF MAGNESIUM: CPT

## 2019-08-19 PROCEDURE — 84100 ASSAY OF PHOSPHORUS: CPT

## 2019-08-19 PROCEDURE — 82310 ASSAY OF CALCIUM: CPT

## 2019-08-22 ENCOUNTER — OFFICE VISIT (OUTPATIENT)
Dept: ENDOCRINOLOGY | Facility: CLINIC | Age: 64
End: 2019-08-22
Payer: MEDICARE

## 2019-08-22 VITALS
SYSTOLIC BLOOD PRESSURE: 113 MMHG | WEIGHT: 170 LBS | HEIGHT: 65 IN | DIASTOLIC BLOOD PRESSURE: 65 MMHG | BODY MASS INDEX: 28.32 KG/M2

## 2019-08-22 DIAGNOSIS — E83.51 HYPOCALCEMIA: Primary | ICD-10-CM

## 2019-08-22 DIAGNOSIS — E55.9 VITAMIN D DEFICIENCY: ICD-10-CM

## 2019-08-22 DIAGNOSIS — E83.42 HYPOMAGNESEMIA: ICD-10-CM

## 2019-08-22 PROCEDURE — 99999 PR PBB SHADOW E&M-EST. PATIENT-LVL III: ICD-10-PCS | Mod: PBBFAC,,, | Performed by: INTERNAL MEDICINE

## 2019-08-22 PROCEDURE — 99214 OFFICE O/P EST MOD 30 MIN: CPT | Mod: S$PBB,,, | Performed by: INTERNAL MEDICINE

## 2019-08-22 PROCEDURE — 99999 PR PBB SHADOW E&M-EST. PATIENT-LVL III: CPT | Mod: PBBFAC,,, | Performed by: INTERNAL MEDICINE

## 2019-08-22 PROCEDURE — 99214 PR OFFICE/OUTPT VISIT, EST, LEVL IV, 30-39 MIN: ICD-10-PCS | Mod: S$PBB,,, | Performed by: INTERNAL MEDICINE

## 2019-08-22 PROCEDURE — 99213 OFFICE O/P EST LOW 20 MIN: CPT | Mod: PBBFAC | Performed by: INTERNAL MEDICINE

## 2019-08-22 RX ORDER — LANOLIN ALCOHOL/MO/W.PET/CERES
CREAM (GRAM) TOPICAL
Qty: 60 TABLET | Refills: 0 | Status: SHIPPED | OUTPATIENT
Start: 2019-08-22 | End: 2019-09-24 | Stop reason: SDUPTHER

## 2019-08-22 RX ORDER — HYDROCODONE BITARTRATE AND ACETAMINOPHEN 5; 325 MG/1; MG/1
1 TABLET ORAL EVERY 6 HOURS
Refills: 0 | COMMUNITY
Start: 2019-06-24 | End: 2019-11-05 | Stop reason: ALTCHOICE

## 2019-08-22 RX ORDER — AMOXICILLIN 500 MG/1
500 CAPSULE ORAL 3 TIMES DAILY
Refills: 0 | COMMUNITY
Start: 2019-06-24 | End: 2019-11-05 | Stop reason: ALTCHOICE

## 2019-08-22 NOTE — PROGRESS NOTES
Referring Provider:  Katty Marie NP    PCP:  Taqueria Melendrez MD    Reason for referral:   Parathyroid hormone excess  Low vitamin-D level  Low calcium levels    Carla Dang 64 y.o. female    CC:  Follow-up on the abnormal test.     HPI:  Lab Results   Component Value Date    .0 (H) 07/10/2019    CALCIUM 9.7 08/19/2019    PHOS 3.7 08/19/2019     Pt was found to have elevated level of PTH, and low calcium level and low vitamin-D level.  Patient is feeling fine.  Patient was found to have very low magnesium level of 0.7 prior to last visit.  Mag Oxide 400 mg BID  CALCIUM 600 MG+ Vit D 800 units tid  Vit D 50,000 units 3 times a week  Patient has no complaints of dysphagia, chest pain, shortness of breath, nausea, vomiting, rash, or edema.  No Fracture history except for one toe fx years ago  No Kidney stone  No numbness or tingling   No Bone pain  No muscle pain  One front tooth has been loose.  No jaw pain or gum pain.  ONJ side effect discussed.  Appointment with a dentist is pending.  On Fosamax weekly.  On B12 once a month  Breast cancer in 2005, treated by lumpectomy, chemo and radiation x 6 weeks  History of Depo Inj x couple y many y ago.    Past Medical History:   Diagnosis Date    Acute pancreatitis 3/21/2016    Breast cancer 2005    right side with lumpectomy, chemoand radiation    Closed fracture of ramus of right pubis 6/8/2016    Colon polyp     last scope 4/2012- repeat 10 y per pt- Dr. Johnson    GERD (gastroesophageal reflux disease) 5/29/2012    Hyperlipidemia 4/16/2013    Hypertension     Osteopenia        Past Surgical History:   Procedure Laterality Date    BREAST LUMPECTOMY  2005    DILATION AND CURETTAGE OF UTERUS      ESOPHAGEAL DILATION      ESOPHAGOGASTRODUODENOSCOPY  6/1/2012    ROTATOR CUFF REPAIR      TEAR DUCT SURGERY      right rye       Social History     Socioeconomic History    Marital status:      Spouse name: Not on file    Number of  children: Not on file    Years of education: Not on file    Highest education level: Not on file   Occupational History    Not on file   Social Needs    Financial resource strain: Not on file    Food insecurity:     Worry: Not on file     Inability: Not on file    Transportation needs:     Medical: Not on file     Non-medical: Not on file   Tobacco Use    Smoking status: Former Smoker     Packs/day: 0.30     Types: Cigarettes     Last attempt to quit: 2005     Years since quittin.2    Smokeless tobacco: Never Used   Substance and Sexual Activity    Alcohol use: Yes     Alcohol/week: 1.2 oz     Types: 2 Standard drinks or equivalent per week     Comment: prior 6 pack/week    Drug use: Yes    Sexual activity: Not on file   Lifestyle    Physical activity:     Days per week: Not on file     Minutes per session: Not on file    Stress: Not on file   Relationships    Social connections:     Talks on phone: Not on file     Gets together: Not on file     Attends Faith service: Not on file     Active member of club or organization: Not on file     Attends meetings of clubs or organizations: Not on file     Relationship status: Not on file   Other Topics Concern    Not on file   Social History Narrative    Not on file         ROS:   No complaints of nausea or vomiting  No complaints of chest pain or shortness breath  No jaw pain  One tooth is somewhat loose  No fracture  ---------------------------  History of breast cancer  History of No fracture except for toe fracture  No kidney stones    PE:  Alert and oriented  No acute distress  No proptosis or conjunctivitis  No goitre by inspection  Thyroid gland is not palpable  Heart reg, no gallop  Lungs cta, no wheezing  No edema in lower legs  No tremor in hands  Behavior normal  Speech normal    Lab:    Results for ANA BELL (MRN [id number]) as of 2019 10:40   Ref. Range 4/10/2019 11:43 7/10/2019 10:53 2019 09:45 2019 09:10  8/19/2019 10:50   Calcium Latest Ref Range: 8.7 - 10.5 mg/dL 7.9 (L) 6.8 (LL) 8.8  9.7   Phosphorus Latest Ref Range: 2.7 - 4.5 mg/dL 2.6 (L) 2.3 (L)   3.7   Magnesium Latest Ref Range: 1.6 - 2.6 mg/dL   0.7 (LL)  1.8   Vit D, 25-Hydroxy Latest Ref Range: 30 - 96 ng/mL 27 (L) 31      PTH Latest Ref Range: 9.0 - 77.0 pg/mL 358.0 (H) 252.0 (H)          A/P:  Hypocalcemia, asymptomatic  Serum calcium improved from 6.8 to 8.8  then to 9.7  Hypomagnesemia   Serum Magnesium is back to normal  Hyperparathyroidism  Significant elevation in parathyroid hormone  Vitamin D deficiency  25 hydroxy vitamin-D level improved  History of breast cancer    The doses of magnesium oxide, calcium, and vitamin-D to be reduced.  Patient Instructions   Mag Oxide 400 mg once a day    ----------------------------------------------------------------------  CALCIUM 600 MG+ Vit D 800 units   Twice a day  -----------------------------------------------------------------------    Vit D 50,000 units once a week.    -----------------------------------------------------------------------------  Let your dentist know you are taking Fosamax.  ----------------------------------------------------------------------------    Visit your PCP to discuss taking you off Fosamax       History of osteopenia  History of T-score was -2.2   Bone density scan recently showed a T-score -1  On Fosamax once a week  Patient make appointment with her primary care physician, Dr. Melendrez for consideration of DC of Fosamax    Appt in one month    Pt understands the plan and instructions.

## 2019-08-27 ENCOUNTER — TELEPHONE (OUTPATIENT)
Dept: FAMILY MEDICINE | Facility: CLINIC | Age: 64
End: 2019-08-27

## 2019-08-27 NOTE — TELEPHONE ENCOUNTER
----- Message from Ryan Covington MD sent at 8/22/2019  4:09 PM CDT -----  Patient should dc fosamax   ----- Message -----  From: Mckenna Sharpe MD  Sent: 8/22/2019  10:41 AM  To: MD Dr. Parvin Shaw,  I have sent a copy of my note to you.  Please review for consideration of DC of bisphosphonate. Thanks!

## 2019-09-13 ENCOUNTER — CLINICAL SUPPORT (OUTPATIENT)
Dept: FAMILY MEDICINE | Facility: CLINIC | Age: 64
End: 2019-09-13
Payer: MEDICARE

## 2019-09-13 DIAGNOSIS — E53.8 VITAMIN B12 DEFICIENCY: Primary | ICD-10-CM

## 2019-09-13 PROCEDURE — 99999 PR PBB SHADOW E&M-EST. PATIENT-LVL II: CPT | Mod: PBBFAC,,,

## 2019-09-13 PROCEDURE — 96372 THER/PROPH/DIAG INJ SC/IM: CPT | Mod: PBBFAC,PO

## 2019-09-13 PROCEDURE — 99212 OFFICE O/P EST SF 10 MIN: CPT | Mod: PBBFAC,PO

## 2019-09-13 PROCEDURE — 99999 PR PBB SHADOW E&M-EST. PATIENT-LVL II: ICD-10-PCS | Mod: PBBFAC,,,

## 2019-09-13 RX ADMIN — CYANOCOBALAMIN 1000 MCG: 1000 INJECTION, SOLUTION INTRAMUSCULAR at 10:09

## 2019-09-13 NOTE — Clinical Note
Administered last ordered injection of B12 on 9/13/19. Pt inquiring if she will continue to receive injections. Please contact pt to advise. Thanks!

## 2019-09-17 DIAGNOSIS — E53.8 VITAMIN B 12 DEFICIENCY: Primary | ICD-10-CM

## 2019-09-17 RX ORDER — CYANOCOBALAMIN 1000 UG/ML
1000 INJECTION, SOLUTION INTRAMUSCULAR; SUBCUTANEOUS
Status: COMPLETED | OUTPATIENT
Start: 2019-10-15 | End: 2020-09-08

## 2019-09-17 NOTE — PROGRESS NOTES
New orders submitted to Dr Melendrez to sign, left message on patient's voice mail, next appt 10/15/19, appt letter mailed

## 2019-09-23 ENCOUNTER — LAB VISIT (OUTPATIENT)
Dept: LAB | Facility: HOSPITAL | Age: 64
End: 2019-09-23
Attending: INTERNAL MEDICINE
Payer: MEDICARE

## 2019-09-23 DIAGNOSIS — E55.9 VITAMIN D DEFICIENCY: ICD-10-CM

## 2019-09-23 DIAGNOSIS — E83.51 HYPOCALCEMIA: ICD-10-CM

## 2019-09-23 DIAGNOSIS — E83.42 HYPOMAGNESEMIA: ICD-10-CM

## 2019-09-23 LAB
CALCIUM SERPL-MCNC: 8.4 MG/DL (ref 8.7–10.5)
MAGNESIUM SERPL-MCNC: 1.1 MG/DL (ref 1.6–2.6)
PHOSPHATE SERPL-MCNC: 3.1 MG/DL (ref 2.7–4.5)
PTH-INTACT SERPL-MCNC: 240 PG/ML (ref 9–77)

## 2019-09-23 PROCEDURE — 84100 ASSAY OF PHOSPHORUS: CPT

## 2019-09-23 PROCEDURE — 82310 ASSAY OF CALCIUM: CPT

## 2019-09-23 PROCEDURE — 83970 ASSAY OF PARATHORMONE: CPT

## 2019-09-23 PROCEDURE — 36415 COLL VENOUS BLD VENIPUNCTURE: CPT | Mod: PO

## 2019-09-23 PROCEDURE — 83735 ASSAY OF MAGNESIUM: CPT

## 2019-09-24 ENCOUNTER — TELEPHONE (OUTPATIENT)
Dept: ENDOCRINOLOGY | Facility: CLINIC | Age: 64
End: 2019-09-24

## 2019-09-24 DIAGNOSIS — E83.42 HYPOMAGNESEMIA: ICD-10-CM

## 2019-09-24 RX ORDER — LANOLIN ALCOHOL/MO/W.PET/CERES
CREAM (GRAM) TOPICAL
Qty: 60 TABLET | Refills: 0 | Status: SHIPPED | OUTPATIENT
Start: 2019-09-24 | End: 2019-09-30 | Stop reason: SDUPTHER

## 2019-09-24 NOTE — TELEPHONE ENCOUNTER
----- Message from Mckenna Sharpe MD sent at 9/24/2019  2:06 PM CDT -----  Call the pt to take Magnesium oxide TWICE A DAY ( I have sent a new prescription),  And to keep her appointment with me.

## 2019-09-24 NOTE — TELEPHONE ENCOUNTER
Informed pt to take Magnesium oxide TWICE A DAY  and to keep her appointment with provider, per Dr. Sharpe. Patient verbalized understanding of information given.

## 2019-09-30 ENCOUNTER — OFFICE VISIT (OUTPATIENT)
Dept: ENDOCRINOLOGY | Facility: CLINIC | Age: 64
End: 2019-09-30
Payer: MEDICARE

## 2019-09-30 VITALS
DIASTOLIC BLOOD PRESSURE: 68 MMHG | HEART RATE: 97 BPM | TEMPERATURE: 98 F | WEIGHT: 170.19 LBS | SYSTOLIC BLOOD PRESSURE: 122 MMHG | HEIGHT: 65 IN | BODY MASS INDEX: 28.36 KG/M2

## 2019-09-30 DIAGNOSIS — E83.51 HYPOCALCEMIA: ICD-10-CM

## 2019-09-30 DIAGNOSIS — N25.81 SECONDARY HYPERPARATHYROIDISM: ICD-10-CM

## 2019-09-30 DIAGNOSIS — E83.42 HYPOMAGNESEMIA: Primary | ICD-10-CM

## 2019-09-30 PROCEDURE — 99214 PR OFFICE/OUTPT VISIT, EST, LEVL IV, 30-39 MIN: ICD-10-PCS | Mod: S$PBB,,, | Performed by: INTERNAL MEDICINE

## 2019-09-30 PROCEDURE — 99999 PR PBB SHADOW E&M-EST. PATIENT-LVL III: ICD-10-PCS | Mod: PBBFAC,,, | Performed by: INTERNAL MEDICINE

## 2019-09-30 PROCEDURE — 99213 OFFICE O/P EST LOW 20 MIN: CPT | Mod: PBBFAC | Performed by: INTERNAL MEDICINE

## 2019-09-30 PROCEDURE — 99999 PR PBB SHADOW E&M-EST. PATIENT-LVL III: CPT | Mod: PBBFAC,,, | Performed by: INTERNAL MEDICINE

## 2019-09-30 PROCEDURE — 99214 OFFICE O/P EST MOD 30 MIN: CPT | Mod: S$PBB,,, | Performed by: INTERNAL MEDICINE

## 2019-09-30 RX ORDER — LANOLIN ALCOHOL/MO/W.PET/CERES
CREAM (GRAM) TOPICAL
Qty: 67 TABLET | Refills: 0 | Status: SHIPPED | OUTPATIENT
Start: 2019-09-30 | End: 2019-11-05 | Stop reason: SDUPTHER

## 2019-09-30 NOTE — PROGRESS NOTES
Referring Provider:  Katty Marie NP    PCP:  Taqueria Melendrez MD    Reason for referral:   Parathyroid hormone excess  Low vitamin-D level  Low calcium levels    Carla Dang 64 y.o. female    CC:  Follow-up on the abnormal test.     HPI:  Lab Results   Component Value Date    .0 (H) 09/23/2019    CALCIUM 8.4 (L) 09/23/2019    PHOS 3.1 09/23/2019     Pt was found to have elevated level of PTH, and low calcium level and low vitamin-D level.  Patient is feeling fine.  She has just been feeling pain and needles in her feet and like her feet asleep.  She was treated with magnesium oxide 3 times a day because magnesium was only 0.7  And then the level improved significantly to 1.8 then the dose was changed to only once a day  And magnesium level was down to 1.1 so few days ago patient was called to increase the dose to twice a day.  Current dose of magnesium oxide is twice a day.  Current dose of calcium carbonate is twice a day.    Mag Oxide 400 mg BID  CALCIUM 600 MG+ Vit D 800 units tid  Vit D 50,000 units 3 times a week  Patient has no complaints of dysphagia, chest pain, shortness of breath, nausea, vomiting, rash, or edema.  No Fracture history except for one toe fx years ago  No Kidney stone  No Bone pain  One front tooth has been loose.  No jaw pain or gum pain.  ONJ side effect discussed.  Appointment with a dentist is pending.  On Fosamax weekly.  On B12 once a month  Breast cancer in 2005, treated by lumpectomy, chemo and radiation x 6 weeks  History of Depo Inj x couple y many y ago.    Past Medical History:   Diagnosis Date    Acute pancreatitis 3/21/2016    Breast cancer 2005    right side with lumpectomy, chemoand radiation    Closed fracture of ramus of right pubis 6/8/2016    Colon polyp     last scope 4/2012- repeat 10 y per pt- Dr. Johnson    GERD (gastroesophageal reflux disease) 5/29/2012    Hyperlipidemia 4/16/2013    Hypertension     Osteopenia        Past Surgical  History:   Procedure Laterality Date    BREAST LUMPECTOMY  2005    DILATION AND CURETTAGE OF UTERUS      ESOPHAGEAL DILATION      ESOPHAGOGASTRODUODENOSCOPY  2012    ROTATOR CUFF REPAIR      TEAR DUCT SURGERY      right rye       Social History     Socioeconomic History    Marital status:      Spouse name: Not on file    Number of children: Not on file    Years of education: Not on file    Highest education level: Not on file   Occupational History    Not on file   Social Needs    Financial resource strain: Not on file    Food insecurity:     Worry: Not on file     Inability: Not on file    Transportation needs:     Medical: Not on file     Non-medical: Not on file   Tobacco Use    Smoking status: Former Smoker     Packs/day: 0.30     Types: Cigarettes     Last attempt to quit: 2005     Years since quittin.3    Smokeless tobacco: Never Used   Substance and Sexual Activity    Alcohol use: Yes     Alcohol/week: 2.0 standard drinks     Types: 2 Standard drinks or equivalent per week     Comment: prior 6 pack/week    Drug use: Yes    Sexual activity: Not on file   Lifestyle    Physical activity:     Days per week: Not on file     Minutes per session: Not on file    Stress: Not on file   Relationships    Social connections:     Talks on phone: Not on file     Gets together: Not on file     Attends Synagogue service: Not on file     Active member of club or organization: Not on file     Attends meetings of clubs or organizations: Not on file     Relationship status: Not on file   Other Topics Concern    Not on file   Social History Narrative    Not on file         ROS:   No complaints of nausea or vomiting  No complaints of chest pain or shortness breath  No complaint of jaw pain  No fracture  ---------------------------  History of breast cancer  History of No fracture except for toe fracture  No kidney stones    PE:  Alert and oriented  No acute distress  No proptosis or  conjunctivitis  No goitre by inspection  Thyroid gland is not palpable  Heart reg, no gallop  Lungs cta, no wheezing  No edema in lower legs  No tremor in hands  Behavior normal  Speech normal    Lab:    Results for ANA BELL (MRN [id number]) as of 8/22/2019 10:40   Ref. Range 4/10/2019 11:43 7/10/2019 10:53 7/24/2019 09:45 7/25/2019 09:10 8/19/2019 10:50   Calcium Latest Ref Range: 8.7 - 10.5 mg/dL 7.9 (L) 6.8 (LL) 8.8  9.7   Phosphorus Latest Ref Range: 2.7 - 4.5 mg/dL 2.6 (L) 2.3 (L)   3.7   Magnesium Latest Ref Range: 1.6 - 2.6 mg/dL   0.7 (LL)  1.8   Vit D, 25-Hydroxy Latest Ref Range: 30 - 96 ng/mL 27 (L) 31      PTH Latest Ref Range: 9.0 - 77.0 pg/mL 358.0 (H) 252.0 (H)           Ref. Range 2/4/2009 08:27 11/12/2010 10:20 5/29/2012 10:20 4/16/2013 15:50 12/20/2013 08:38 3/30/2015 09:40 4/6/2016 09:40 7/19/2016 09:40 7/18/2017 09:24 9/7/2017 09:04 1/22/2019 08:24 4/10/2019 11:43 7/10/2019 10:53 7/24/2019 09:45 8/19/2019 10:50 9/23/2019 09:08   Calcium Latest Ref Range: 8.7 - 10.5 mg/dL 10.2 9.2 9.2 9.1 9.3 8.7 8.6 (L) 8.9 8.2 (L) 8.8 7.9 (L) 7.9 (L) 6.8 (LL) 8.8 9.7 8.4 (L)     A/P:  Hypocalcemia  Hypomagnesemia   Secondary Hyperparathyroidism  Significant elevation in parathyroid hormone  Vitamin D deficiency  25 hydroxy vitamin-D level improved  History of breast cancer    Mag Oxide 400 mg twice a day  CALCIUM 600 MG+ Vit D 800 units   Twice a day  Vit D 50,000 units once a week.    To visit your PCP to discuss taking you off Fosamax     History of osteopenia  History of T-score was -2.2   Bone density scan recently showed a T-score -1    Appt in 3 weeks.    Pt understands the plan and instructions.

## 2019-10-15 ENCOUNTER — CLINICAL SUPPORT (OUTPATIENT)
Dept: FAMILY MEDICINE | Facility: CLINIC | Age: 64
End: 2019-10-15
Payer: MEDICARE

## 2019-10-15 DIAGNOSIS — E53.8 VITAMIN B 12 DEFICIENCY: Primary | ICD-10-CM

## 2019-10-15 PROCEDURE — 90662 IIV NO PRSV INCREASED AG IM: CPT | Mod: PBBFAC,PO

## 2019-10-15 PROCEDURE — 96372 THER/PROPH/DIAG INJ SC/IM: CPT | Mod: PBBFAC,PO

## 2019-10-15 RX ADMIN — CYANOCOBALAMIN 1000 MCG: 1000 INJECTION, SOLUTION INTRAMUSCULAR at 11:10

## 2019-10-15 NOTE — PROGRESS NOTES
Pt received B12 in left ventrogluteal and Flu in left deltoid. Pt instructed to wait 15 min post injection. Pt verb understanding.

## 2019-10-22 ENCOUNTER — LAB VISIT (OUTPATIENT)
Dept: LAB | Facility: HOSPITAL | Age: 64
End: 2019-10-22
Attending: INTERNAL MEDICINE
Payer: MEDICARE

## 2019-10-22 DIAGNOSIS — E83.42 HYPOMAGNESEMIA: ICD-10-CM

## 2019-10-22 DIAGNOSIS — E83.51 HYPOCALCEMIA: ICD-10-CM

## 2019-10-22 LAB
CALCIUM SERPL-MCNC: 9.1 MG/DL (ref 8.7–10.5)
MAGNESIUM SERPL-MCNC: 1.6 MG/DL (ref 1.6–2.6)

## 2019-10-22 PROCEDURE — 83735 ASSAY OF MAGNESIUM: CPT

## 2019-10-22 PROCEDURE — 36415 COLL VENOUS BLD VENIPUNCTURE: CPT | Mod: PO

## 2019-10-22 PROCEDURE — 82310 ASSAY OF CALCIUM: CPT

## 2019-11-05 ENCOUNTER — OFFICE VISIT (OUTPATIENT)
Dept: ENDOCRINOLOGY | Facility: CLINIC | Age: 64
End: 2019-11-05
Payer: MEDICARE

## 2019-11-05 VITALS
TEMPERATURE: 98 F | HEART RATE: 80 BPM | RESPIRATION RATE: 16 BRPM | HEIGHT: 66 IN | DIASTOLIC BLOOD PRESSURE: 68 MMHG | SYSTOLIC BLOOD PRESSURE: 116 MMHG | BODY MASS INDEX: 27.56 KG/M2 | WEIGHT: 171.5 LBS

## 2019-11-05 DIAGNOSIS — E83.42 HYPOMAGNESEMIA: ICD-10-CM

## 2019-11-05 DIAGNOSIS — E83.51 HYPOCALCEMIA: Primary | ICD-10-CM

## 2019-11-05 PROCEDURE — 99214 PR OFFICE/OUTPT VISIT, EST, LEVL IV, 30-39 MIN: ICD-10-PCS | Mod: S$PBB,,, | Performed by: INTERNAL MEDICINE

## 2019-11-05 PROCEDURE — 99213 OFFICE O/P EST LOW 20 MIN: CPT | Mod: PBBFAC | Performed by: INTERNAL MEDICINE

## 2019-11-05 PROCEDURE — 99214 OFFICE O/P EST MOD 30 MIN: CPT | Mod: S$PBB,,, | Performed by: INTERNAL MEDICINE

## 2019-11-05 PROCEDURE — 99999 PR PBB SHADOW E&M-EST. PATIENT-LVL III: ICD-10-PCS | Mod: PBBFAC,,, | Performed by: INTERNAL MEDICINE

## 2019-11-05 PROCEDURE — 99999 PR PBB SHADOW E&M-EST. PATIENT-LVL III: CPT | Mod: PBBFAC,,, | Performed by: INTERNAL MEDICINE

## 2019-11-05 RX ORDER — LANOLIN ALCOHOL/MO/W.PET/CERES
CREAM (GRAM) TOPICAL
Qty: 60 TABLET | Refills: 0 | Status: SHIPPED | OUTPATIENT
Start: 2019-11-05 | End: 2020-09-15 | Stop reason: SDUPTHER

## 2019-11-05 RX ORDER — ERGOCALCIFEROL 1.25 MG/1
CAPSULE ORAL
Qty: 12 CAPSULE | Refills: 1 | Status: SHIPPED | OUTPATIENT
Start: 2019-11-05 | End: 2019-12-18 | Stop reason: SDUPTHER

## 2019-11-05 NOTE — PROGRESS NOTES
Referring Provider:  Katty Marie NP    PCP:  Taqueria Melendrez MD    Reason for referral:   Parathyroid hormone excess  Low vitamin-D level  Low calcium levels    Carla Dang 64 y.o. female    CC:  Follow-up on the abnormal test.     HPI:  Lab Results   Component Value Date    .0 (H) 09/23/2019    CALCIUM 9.1 10/22/2019    PHOS 3.1 09/23/2019     Pt was found to have elevated level of PTH, and low calcium level and low vitamin-D level.  Patient is feeling fine.  No complaints today.    She had feeling pain and needles in her feet and like her feet asleep.  She was treated with magnesium oxide 3 times a day because magnesium was only 0.7  and then the level improved significantly to 1.8 then the dose was changed to only once a day  and magnesium level was down to 1.1 so few days ago patient was called to increase the dose to twice a day.  Current dose of magnesium oxide is twice a day.  Current dose of calcium carbonate is once a day.  Off vitamin-D 50,000 units for few weeks.  Mag Oxide 400 mg BID  CALCIUM 600 MG+ Vit D 800 units tid    Patient has no complaints of dysphagia, chest pain, shortness of breath, nausea, vomiting, rash, or edema.  No Fracture history except for one toe fx years ago  No Kidney stone  No Bone pain  One front tooth has been loose.  No jaw pain or gum pain.  ONJ side effect discussed.  Appointment with a dentist is pending.  On Fosamax weekly.  On B12 once a month  Breast cancer in 2005, treated by lumpectomy, chemo and radiation x 6 weeks  History of Depo Inj x couple y many y ago.    Past Medical History:   Diagnosis Date    Acute pancreatitis 3/21/2016    Breast cancer 2005    right side with lumpectomy, chemoand radiation    Closed fracture of ramus of right pubis 6/8/2016    Colon polyp     last scope 4/2012- repeat 10 y per pt- Dr. Johnson    GERD (gastroesophageal reflux disease) 5/29/2012    Hyperlipidemia 4/16/2013    Hypertension     Osteopenia         Past Surgical History:   Procedure Laterality Date    BREAST LUMPECTOMY  2005    DILATION AND CURETTAGE OF UTERUS      ESOPHAGEAL DILATION      ESOPHAGOGASTRODUODENOSCOPY  2012    ROTATOR CUFF REPAIR      TEAR DUCT SURGERY      right rye       Social History     Socioeconomic History    Marital status:      Spouse name: Not on file    Number of children: Not on file    Years of education: Not on file    Highest education level: Not on file   Occupational History    Not on file   Social Needs    Financial resource strain: Not on file    Food insecurity:     Worry: Not on file     Inability: Not on file    Transportation needs:     Medical: Not on file     Non-medical: Not on file   Tobacco Use    Smoking status: Former Smoker     Packs/day: 0.30     Types: Cigarettes     Last attempt to quit: 2005     Years since quittin.4    Smokeless tobacco: Never Used   Substance and Sexual Activity    Alcohol use: Yes     Alcohol/week: 2.0 standard drinks     Types: 2 Standard drinks or equivalent per week     Comment: prior 6 pack/week    Drug use: Yes    Sexual activity: Not on file   Lifestyle    Physical activity:     Days per week: Not on file     Minutes per session: Not on file    Stress: Not on file   Relationships    Social connections:     Talks on phone: Not on file     Gets together: Not on file     Attends Taoist service: Not on file     Active member of club or organization: Not on file     Attends meetings of clubs or organizations: Not on file     Relationship status: Not on file   Other Topics Concern    Not on file   Social History Narrative    Not on file         ROS:   No complaints of nausea or vomiting  No complaints of chest pain or shortness breath  No complaint of jaw pain  ---------------------------  History of breast cancer  History of No fracture except for toe fracture  No kidney stones    PE:  Alert and oriented  No acute distress  No proptosis  or conjunctivitis  No goitre by inspection  Thyroid gland is not palpable  Heart reg, no gallop  Lungs cta, no wheezing  No edema in lower legs  No tremor in hands  Behavior normal  Speech normal    Lab:       Ref. Range 4/10/2019 11:43 7/10/2019 10:53 7/24/2019 09:45 7/25/2019 09:10 8/19/2019 10:50   Calcium Latest Ref Range: 8.7 - 10.5 mg/dL 7.9 (L) 6.8 (LL) 8.8  9.7   Phosphorus Latest Ref Range: 2.7 - 4.5 mg/dL 2.6 (L) 2.3 (L)   3.7   Magnesium Latest Ref Range: 1.6 - 2.6 mg/dL   0.7 (LL)  1.8   Vit D, 25-Hydroxy Latest Ref Range: 30 - 96 ng/mL 27 (L) 31      PTH Latest Ref Range: 9.0 - 77.0 pg/mL 358.0 (H) 252.0 (H)           Ref. Range 2/4/2009 08:27 11/12/2010 10:20 5/29/2012 10:20 4/16/2013 15:50 12/20/2013 08:38 3/30/2015 09:40 4/6/2016 09:40 7/19/2016 09:40 7/18/2017 09:24 9/7/2017 09:04 1/22/2019 08:24 4/10/2019 11:43 7/10/2019 10:53 7/24/2019 09:45 8/19/2019 10:50 9/23/2019 09:08   Calcium Latest Ref Range: 8.7 - 10.5 mg/dL 10.2 9.2 9.2 9.1 9.3 8.7 8.6 (L) 8.9 8.2 (L) 8.8 7.9 (L) 7.9 (L) 6.8 (LL) 8.8 9.7 8.4 (L)     A/P:  Hypocalcemia  Hypomagnesemia   Serum magnesium was back to normal on 10/22/2019  Secondary Hyperparathyroidism  Significant elevation in parathyroid hormone  Vitamin D deficiency  25 hydroxy vitamin-D level improved  History of breast cancer  Mag Oxide 400 mg twice a day  CALCIUM 600 MG+ Vit D 800 units   once a day  Vit D 50,000 units once a week.  Blood test to be done 2 weeks  Orders Placed This Encounter   Procedures    Calcium     Standing Status:   Future     Standing Expiration Date:   3/5/2020    Magnesium     Standing Status:   Future     Standing Expiration Date:   1/3/2021    PTH, intact     Standing Status:   Future     Standing Expiration Date:   3/5/2020           History of osteopenia  History of T-score was -2.2   Bone density scan recently showed a T-score -1        To visit your PCP to discuss taking you off Fosamax       Appt in 6 weeks.

## 2019-11-13 ENCOUNTER — TELEPHONE (OUTPATIENT)
Dept: FAMILY MEDICINE | Facility: CLINIC | Age: 64
End: 2019-11-13

## 2019-11-13 NOTE — TELEPHONE ENCOUNTER
----- Message from Piedad Wu sent at 11/13/2019 11:14 AM CST -----  Contact: pt  Patient needs to r/s B12 shot for today or Friday and would like a call back at [phone] or [phone].        Thanks,  Piedad Wu

## 2019-11-15 ENCOUNTER — CLINICAL SUPPORT (OUTPATIENT)
Dept: FAMILY MEDICINE | Facility: CLINIC | Age: 64
End: 2019-11-15
Payer: MEDICARE

## 2019-11-15 DIAGNOSIS — E53.8 VITAMIN B 12 DEFICIENCY: Primary | ICD-10-CM

## 2019-11-15 PROCEDURE — 99999 PR PBB SHADOW E&M-EST. PATIENT-LVL II: CPT | Mod: PBBFAC,,,

## 2019-11-15 PROCEDURE — 96372 THER/PROPH/DIAG INJ SC/IM: CPT | Mod: PBBFAC,PO

## 2019-11-15 PROCEDURE — 99999 PR PBB SHADOW E&M-EST. PATIENT-LVL II: ICD-10-PCS | Mod: PBBFAC,,,

## 2019-11-15 PROCEDURE — 99212 OFFICE O/P EST SF 10 MIN: CPT | Mod: PBBFAC,PO,25

## 2019-11-15 RX ADMIN — CYANOCOBALAMIN 1000 MCG: 1000 INJECTION, SOLUTION INTRAMUSCULAR at 04:11

## 2019-11-19 ENCOUNTER — LAB VISIT (OUTPATIENT)
Dept: LAB | Facility: HOSPITAL | Age: 64
End: 2019-11-19
Attending: INTERNAL MEDICINE
Payer: MEDICARE

## 2019-11-19 DIAGNOSIS — E83.42 HYPOMAGNESEMIA: ICD-10-CM

## 2019-11-19 LAB
CALCIUM SERPL-MCNC: 8.8 MG/DL (ref 8.7–10.5)
MAGNESIUM SERPL-MCNC: 1.3 MG/DL (ref 1.6–2.6)
PTH-INTACT SERPL-MCNC: 291 PG/ML (ref 9–77)

## 2019-11-19 PROCEDURE — 83735 ASSAY OF MAGNESIUM: CPT

## 2019-11-19 PROCEDURE — 83970 ASSAY OF PARATHORMONE: CPT

## 2019-11-19 PROCEDURE — 36415 COLL VENOUS BLD VENIPUNCTURE: CPT | Mod: PO

## 2019-11-19 PROCEDURE — 82310 ASSAY OF CALCIUM: CPT

## 2019-11-21 ENCOUNTER — TELEPHONE (OUTPATIENT)
Dept: ENDOCRINOLOGY | Facility: CLINIC | Age: 64
End: 2019-11-21

## 2019-11-21 NOTE — TELEPHONE ENCOUNTER
Ask the pt to take Mg Oxide as follows:  Twice a day on Sunday, Tuesday, Thursday,   and 3 times a day on other days of the week.patient return verbalization of instructions

## 2019-12-13 ENCOUNTER — CLINICAL SUPPORT (OUTPATIENT)
Dept: FAMILY MEDICINE | Facility: CLINIC | Age: 64
End: 2019-12-13
Payer: MEDICARE

## 2019-12-13 DIAGNOSIS — E53.8 VITAMIN B 12 DEFICIENCY: Primary | ICD-10-CM

## 2019-12-13 PROCEDURE — 99999 PR PBB SHADOW E&M-EST. PATIENT-LVL II: ICD-10-PCS | Mod: PBBFAC,,,

## 2019-12-13 PROCEDURE — 96372 THER/PROPH/DIAG INJ SC/IM: CPT | Mod: PBBFAC,PO

## 2019-12-13 PROCEDURE — 99999 PR PBB SHADOW E&M-EST. PATIENT-LVL II: CPT | Mod: PBBFAC,,,

## 2019-12-13 PROCEDURE — 99212 OFFICE O/P EST SF 10 MIN: CPT | Mod: PBBFAC,PO,25

## 2019-12-13 RX ADMIN — CYANOCOBALAMIN 1000 MCG: 1000 INJECTION, SOLUTION INTRAMUSCULAR at 10:12

## 2019-12-18 ENCOUNTER — OFFICE VISIT (OUTPATIENT)
Dept: ENDOCRINOLOGY | Facility: CLINIC | Age: 64
End: 2019-12-18
Payer: MEDICARE

## 2019-12-18 ENCOUNTER — LAB VISIT (OUTPATIENT)
Dept: LAB | Facility: HOSPITAL | Age: 64
End: 2019-12-18
Attending: INTERNAL MEDICINE
Payer: MEDICARE

## 2019-12-18 VITALS
HEART RATE: 79 BPM | BODY MASS INDEX: 27.67 KG/M2 | SYSTOLIC BLOOD PRESSURE: 122 MMHG | WEIGHT: 172.19 LBS | DIASTOLIC BLOOD PRESSURE: 70 MMHG | HEIGHT: 66 IN | TEMPERATURE: 98 F

## 2019-12-18 DIAGNOSIS — E83.42 HYPOMAGNESEMIA: Primary | ICD-10-CM

## 2019-12-18 DIAGNOSIS — E83.42 HYPOMAGNESEMIA: ICD-10-CM

## 2019-12-18 DIAGNOSIS — N25.81 SECONDARY HYPERPARATHYROIDISM: ICD-10-CM

## 2019-12-18 LAB
CALCIUM SERPL-MCNC: 9.5 MG/DL (ref 8.7–10.5)
MAGNESIUM SERPL-MCNC: 1.9 MG/DL (ref 1.6–2.6)
PTH-INTACT SERPL-MCNC: 144 PG/ML (ref 9–77)

## 2019-12-18 PROCEDURE — 82310 ASSAY OF CALCIUM: CPT

## 2019-12-18 PROCEDURE — 1159F MED LIST DOCD IN RCRD: CPT | Mod: ,,, | Performed by: INTERNAL MEDICINE

## 2019-12-18 PROCEDURE — 99213 OFFICE O/P EST LOW 20 MIN: CPT | Mod: PBBFAC | Performed by: INTERNAL MEDICINE

## 2019-12-18 PROCEDURE — 36415 COLL VENOUS BLD VENIPUNCTURE: CPT

## 2019-12-18 PROCEDURE — 83735 ASSAY OF MAGNESIUM: CPT

## 2019-12-18 PROCEDURE — 1159F PR MEDICATION LIST DOCUMENTED IN MEDICAL RECORD: ICD-10-PCS | Mod: ,,, | Performed by: INTERNAL MEDICINE

## 2019-12-18 PROCEDURE — 99999 PR PBB SHADOW E&M-EST. PATIENT-LVL III: CPT | Mod: PBBFAC,,, | Performed by: INTERNAL MEDICINE

## 2019-12-18 PROCEDURE — 99999 PR PBB SHADOW E&M-EST. PATIENT-LVL III: ICD-10-PCS | Mod: PBBFAC,,, | Performed by: INTERNAL MEDICINE

## 2019-12-18 PROCEDURE — 99214 OFFICE O/P EST MOD 30 MIN: CPT | Mod: S$PBB,,, | Performed by: INTERNAL MEDICINE

## 2019-12-18 PROCEDURE — 1126F AMNT PAIN NOTED NONE PRSNT: CPT | Mod: ,,, | Performed by: INTERNAL MEDICINE

## 2019-12-18 PROCEDURE — 83970 ASSAY OF PARATHORMONE: CPT

## 2019-12-18 PROCEDURE — 1126F PR PAIN SEVERITY QUANTIFIED, NO PAIN PRESENT: ICD-10-PCS | Mod: ,,, | Performed by: INTERNAL MEDICINE

## 2019-12-18 PROCEDURE — 99214 PR OFFICE/OUTPT VISIT, EST, LEVL IV, 30-39 MIN: ICD-10-PCS | Mod: S$PBB,,, | Performed by: INTERNAL MEDICINE

## 2019-12-18 RX ORDER — ERGOCALCIFEROL 1.25 MG/1
CAPSULE ORAL
Qty: 12 CAPSULE | Refills: 1 | Status: SHIPPED | OUTPATIENT
Start: 2019-12-18 | End: 2020-02-20 | Stop reason: SDUPTHER

## 2019-12-18 NOTE — PROGRESS NOTES
"Referring Provider:  Katty Marie NP    PCP:  Taqueria Melendrez MD    Reason for referral:   Parathyroid hormone excess  Low vitamin-D level  Low calcium levels    Carla Dang 64 y.o. female    CC:  Follow-up on the abnormal test.     HPI:  Lab Results   Component Value Date    .0 (H) 11/19/2019    CALCIUM 8.8 11/19/2019    PHOS 3.1 09/23/2019     Pt was found to have elevated level of PTH, and low calcium level and low vitamin-D level.  Patient is feeling fine.  No complaints today.  She was treated with magnesium oxide 3 times a day because magnesium was only 0.7  and then the level improved significantly to 1.8 then the dose was changed to only once a day  and magnesium level was down to 1.1.  Last magnesium was 1.3 when patient was on magnesium oxide twice a day.  Current dose of magnesium oxide is twice every other day in alteration with 3 times every other day.    Off vitamin-D 50,000 units for few weeks.  Mag Oxide 400 mg BID  CALCIUM 600 MG+ Vit D 800 units daily  History of feeling pain and needles in her feet and feeling "feet asleep".  Patient has no complaints of dysphagia, chest pain, shortness of breath, nausea, vomiting, rash, or edema.  No Fracture history except for one toe fx years ago  No Kidney stone  No Bone pain  One front tooth has been loose.  No jaw pain or gum pain.  Off Fosamax.  On B12 once a month  Breast cancer in 2005, treated by lumpectomy, chemo and radiation x 6 weeks  History of Depo Inj x couple y many y ago.    Past Medical History:   Diagnosis Date    Acute pancreatitis 3/21/2016    Breast cancer 2005    right side with lumpectomy, chemoand radiation    Closed fracture of ramus of right pubis 6/8/2016    Colon polyp     last scope 4/2012- repeat 10 y per pt- Dr. Johnson    GERD (gastroesophageal reflux disease) 5/29/2012    Hyperlipidemia 4/16/2013    Hypertension     Osteopenia        Past Surgical History:   Procedure Laterality Date    BREAST " LUMPECTOMY  2005    DILATION AND CURETTAGE OF UTERUS      ESOPHAGEAL DILATION      ESOPHAGOGASTRODUODENOSCOPY  2012    ROTATOR CUFF REPAIR      TEAR DUCT SURGERY      right rye       Social History     Socioeconomic History    Marital status:      Spouse name: Not on file    Number of children: Not on file    Years of education: Not on file    Highest education level: Not on file   Occupational History    Not on file   Social Needs    Financial resource strain: Not on file    Food insecurity:     Worry: Not on file     Inability: Not on file    Transportation needs:     Medical: Not on file     Non-medical: Not on file   Tobacco Use    Smoking status: Former Smoker     Packs/day: 0.30     Types: Cigarettes     Last attempt to quit: 2005     Years since quittin.5    Smokeless tobacco: Never Used   Substance and Sexual Activity    Alcohol use: Yes     Alcohol/week: 2.0 standard drinks     Types: 2 Standard drinks or equivalent per week     Comment: prior 6 pack/week    Drug use: Yes    Sexual activity: Not on file   Lifestyle    Physical activity:     Days per week: Not on file     Minutes per session: Not on file    Stress: Not on file   Relationships    Social connections:     Talks on phone: Not on file     Gets together: Not on file     Attends Hoahaoism service: Not on file     Active member of club or organization: Not on file     Attends meetings of clubs or organizations: Not on file     Relationship status: Not on file   Other Topics Concern    Not on file   Social History Narrative    Not on file         ROS:   No complaints of nausea or vomiting  No complaints of chest pain or shortness breath  No complaint of jaw pain  ---------------------------  History of breast cancer  History of No fracture except for toe fracture  No kidney stones    PE:  Alert and oriented  No acute distress  No proptosis or conjunctivitis  No goitre by inspection  Thyroid gland is not  palpable  Heart reg, no gallop  Lungs cta, no wheezing  No edema in lower legs  No tremor in hands  Behavior normal  Speech normal    Lab:       Ref. Range 4/10/2019 11:43 7/10/2019 10:53 7/24/2019 09:45 7/25/2019 09:10 8/19/2019 10:50   Calcium Latest Ref Range: 8.7 - 10.5 mg/dL 7.9 (L) 6.8 (LL) 8.8  9.7   Phosphorus Latest Ref Range: 2.7 - 4.5 mg/dL 2.6 (L) 2.3 (L)   3.7   Magnesium Latest Ref Range: 1.6 - 2.6 mg/dL   0.7 (LL)  1.8   Vit D, 25-Hydroxy Latest Ref Range: 30 - 96 ng/mL 27 (L) 31      PTH Latest Ref Range: 9.0 - 77.0 pg/mL 358.0 (H) 252.0 (H)           Ref. Range 2/4/2009 08:27 11/12/2010 10:20 5/29/2012 10:20 4/16/2013 15:50 12/20/2013 08:38 3/30/2015 09:40 4/6/2016 09:40 7/19/2016 09:40 7/18/2017 09:24 9/7/2017 09:04 1/22/2019 08:24 4/10/2019 11:43 7/10/2019 10:53 7/24/2019 09:45 8/19/2019 10:50 9/23/2019 09:08   Calcium Latest Ref Range: 8.7 - 10.5 mg/dL 10.2 9.2 9.2 9.1 9.3 8.7 8.6 (L) 8.9 8.2 (L) 8.8 7.9 (L) 7.9 (L) 6.8 (LL) 8.8 9.7 8.4 (L)     A/P:  Hypocalcemia  Hypomagnesemia   Secondary Hyperparathyroidism  Significant elevation in parathyroid hormone  Vitamin D deficiency  25 hydroxy vitamin-D level improved  History of breast cancer  Mag Oxide 400 mg twice every other day in alteration with 3 times every other day  On CALCIUM 600 MG+ Vit D 800 units once a day  Vit D 50,000 units 3 times a week    History of osteopenia  History of T-score was -2.2   Bone density scan recently showed a T-score -1        Off Fosamax     Orders Placed This Encounter   Procedures    Magnesium     Standing Status:   Future     Standing Expiration Date:   2/15/2021    Calcium     Standing Status:   Future     Standing Expiration Date:   4/18/2020    PTH, intact     Standing Status:   Future     Standing Expiration Date:   4/18/2020         Medications Ordered This Encounter   Medications    ergocalciferol (ERGOCALCIFEROL) 50,000 unit Cap     Sig: Three times a week ( Thurs, Sat and Mon)     Dispense:  12  capsule     Refill:  1         Appt in 2 months.

## 2019-12-19 ENCOUNTER — TELEPHONE (OUTPATIENT)
Dept: ENDOCRINOLOGY | Facility: CLINIC | Age: 64
End: 2019-12-19

## 2019-12-19 NOTE — TELEPHONE ENCOUNTER
Left message to return call         ----- Message from Mckenna Sharpe MD sent at 12/19/2019  5:25 PM CST -----  Call pt to change the frequency of taking the medication   Mag Oxide 400 mg twice every other day in alteration with 3 times every other day to the following:  Twice a day every day except for Sunday and Wednesday to take 3 times a day.  ALL lab results improved a lot.

## 2019-12-20 ENCOUNTER — TELEPHONE (OUTPATIENT)
Dept: ENDOCRINOLOGY | Facility: CLINIC | Age: 64
End: 2019-12-20

## 2019-12-20 NOTE — TELEPHONE ENCOUNTER
----- Message from Jacqueline Rosario sent at 12/20/2019 10:23 AM CST -----  Contact: pt  Type:  Patient Returning Call    Who Called:pt  Who Left Message for Patient:nurse  Does the patient know what this is regarding?:she was seen on Wednesday, maybe blood work  Would the patient rather a call back or a response via [facility]? Call back  Best Call Back Number:[phone]  Additional Information: .    Thank you

## 2019-12-20 NOTE — TELEPHONE ENCOUNTER
----- Message from Sandra Chen sent at 12/20/2019  1:53 PM CST -----  Contact: self  Type:  Patient Returning Call    Who Called:pt  Who Left Message for Patient:n/a  Does the patient know what this is regarding?:no  Would the patient rather a call back or a response via [facility]? Call back  Best Call Back Number:[phone]  Additional Information: PLEASE CALL CELL PHONE ONLY.

## 2019-12-23 RX ORDER — BENAZEPRIL/HYDROCHLOROTHIAZIDE 20 MG-25MG
1 TABLET ORAL DAILY
Qty: 90 TABLET | Refills: 0 | Status: SHIPPED | OUTPATIENT
Start: 2019-12-23 | End: 2020-03-26

## 2019-12-23 RX ORDER — OMEPRAZOLE 20 MG/1
CAPSULE, DELAYED RELEASE ORAL
Qty: 90 CAPSULE | Refills: 0 | Status: SHIPPED | OUTPATIENT
Start: 2019-12-23 | End: 2020-03-26

## 2020-01-14 ENCOUNTER — CLINICAL SUPPORT (OUTPATIENT)
Dept: FAMILY MEDICINE | Facility: CLINIC | Age: 65
End: 2020-01-14
Payer: MEDICARE

## 2020-01-14 DIAGNOSIS — E53.8 VITAMIN B 12 DEFICIENCY: Primary | ICD-10-CM

## 2020-01-14 PROCEDURE — 99999 PR PBB SHADOW E&M-EST. PATIENT-LVL II: CPT | Mod: PBBFAC,,,

## 2020-01-14 PROCEDURE — 99212 OFFICE O/P EST SF 10 MIN: CPT | Mod: PBBFAC,PO

## 2020-01-14 PROCEDURE — 96372 THER/PROPH/DIAG INJ SC/IM: CPT | Mod: PBBFAC,PO

## 2020-01-14 PROCEDURE — 99999 PR PBB SHADOW E&M-EST. PATIENT-LVL II: ICD-10-PCS | Mod: PBBFAC,,,

## 2020-01-14 RX ADMIN — CYANOCOBALAMIN 1000 MCG: 1000 INJECTION, SOLUTION INTRAMUSCULAR at 10:01

## 2020-01-15 ENCOUNTER — LAB VISIT (OUTPATIENT)
Dept: LAB | Facility: HOSPITAL | Age: 65
End: 2020-01-15
Attending: FAMILY MEDICINE
Payer: MEDICARE

## 2020-01-15 ENCOUNTER — OFFICE VISIT (OUTPATIENT)
Dept: FAMILY MEDICINE | Facility: CLINIC | Age: 65
End: 2020-01-15
Payer: MEDICARE

## 2020-01-15 VITALS
SYSTOLIC BLOOD PRESSURE: 124 MMHG | BODY MASS INDEX: 27.7 KG/M2 | TEMPERATURE: 98 F | HEART RATE: 80 BPM | DIASTOLIC BLOOD PRESSURE: 79 MMHG | WEIGHT: 172.38 LBS | HEIGHT: 66 IN

## 2020-01-15 DIAGNOSIS — E53.8 VITAMIN B12 DEFICIENCY: ICD-10-CM

## 2020-01-15 DIAGNOSIS — I10 ESSENTIAL HYPERTENSION: ICD-10-CM

## 2020-01-15 DIAGNOSIS — R76.8 ELEVATED SERUM IMMUNOGLOBULIN FREE LIGHT CHAINS: ICD-10-CM

## 2020-01-15 DIAGNOSIS — E55.9 VITAMIN D DEFICIENCY: ICD-10-CM

## 2020-01-15 DIAGNOSIS — N25.81 SECONDARY HYPERPARATHYROIDISM: ICD-10-CM

## 2020-01-15 DIAGNOSIS — D64.9 NORMOCYTIC ANEMIA: ICD-10-CM

## 2020-01-15 DIAGNOSIS — Z85.3 HISTORY OF BREAST CANCER: ICD-10-CM

## 2020-01-15 DIAGNOSIS — M85.80 OSTEOPENIA, UNSPECIFIED LOCATION: ICD-10-CM

## 2020-01-15 DIAGNOSIS — E78.5 HYPERLIPIDEMIA, UNSPECIFIED HYPERLIPIDEMIA TYPE: ICD-10-CM

## 2020-01-15 DIAGNOSIS — K21.9 GASTROESOPHAGEAL REFLUX DISEASE, ESOPHAGITIS PRESENCE NOT SPECIFIED: ICD-10-CM

## 2020-01-15 DIAGNOSIS — D64.9 NORMOCYTIC ANEMIA: Primary | ICD-10-CM

## 2020-01-15 LAB
BASOPHILS # BLD AUTO: 0.05 K/UL (ref 0–0.2)
BASOPHILS NFR BLD: 0.8 % (ref 0–1.9)
DIFFERENTIAL METHOD: ABNORMAL
EOSINOPHIL # BLD AUTO: 0.1 K/UL (ref 0–0.5)
EOSINOPHIL NFR BLD: 1.6 % (ref 0–8)
ERYTHROCYTE [DISTWIDTH] IN BLOOD BY AUTOMATED COUNT: 13.2 % (ref 11.5–14.5)
HCT VFR BLD AUTO: 32.6 % (ref 37–48.5)
HGB BLD-MCNC: 9.9 G/DL (ref 12–16)
IMM GRANULOCYTES # BLD AUTO: 0.01 K/UL (ref 0–0.04)
IMM GRANULOCYTES NFR BLD AUTO: 0.2 % (ref 0–0.5)
LYMPHOCYTES # BLD AUTO: 2.1 K/UL (ref 1–4.8)
LYMPHOCYTES NFR BLD: 35 % (ref 18–48)
MCH RBC QN AUTO: 29.8 PG (ref 27–31)
MCHC RBC AUTO-ENTMCNC: 30.4 G/DL (ref 32–36)
MCV RBC AUTO: 98 FL (ref 82–98)
MONOCYTES # BLD AUTO: 0.5 K/UL (ref 0.3–1)
MONOCYTES NFR BLD: 7.4 % (ref 4–15)
NEUTROPHILS # BLD AUTO: 3.4 K/UL (ref 1.8–7.7)
NEUTROPHILS NFR BLD: 55 % (ref 38–73)
NRBC BLD-RTO: 0 /100 WBC
PLATELET # BLD AUTO: 314 K/UL (ref 150–350)
PMV BLD AUTO: 10 FL (ref 9.2–12.9)
RBC # BLD AUTO: 3.32 M/UL (ref 4–5.4)
WBC # BLD AUTO: 6.11 K/UL (ref 3.9–12.7)

## 2020-01-15 PROCEDURE — 1126F AMNT PAIN NOTED NONE PRSNT: CPT | Mod: ,,, | Performed by: FAMILY MEDICINE

## 2020-01-15 PROCEDURE — 99999 PR PBB SHADOW E&M-EST. PATIENT-LVL III: CPT | Mod: PBBFAC,,, | Performed by: FAMILY MEDICINE

## 2020-01-15 PROCEDURE — 1159F MED LIST DOCD IN RCRD: CPT | Mod: ,,, | Performed by: FAMILY MEDICINE

## 2020-01-15 PROCEDURE — 99999 PR PBB SHADOW E&M-EST. PATIENT-LVL III: ICD-10-PCS | Mod: PBBFAC,,, | Performed by: FAMILY MEDICINE

## 2020-01-15 PROCEDURE — 85025 COMPLETE CBC W/AUTO DIFF WBC: CPT

## 2020-01-15 PROCEDURE — 99214 PR OFFICE/OUTPT VISIT, EST, LEVL IV, 30-39 MIN: ICD-10-PCS | Mod: S$PBB,,, | Performed by: FAMILY MEDICINE

## 2020-01-15 PROCEDURE — 99214 OFFICE O/P EST MOD 30 MIN: CPT | Mod: S$PBB,,, | Performed by: FAMILY MEDICINE

## 2020-01-15 PROCEDURE — 1159F PR MEDICATION LIST DOCUMENTED IN MEDICAL RECORD: ICD-10-PCS | Mod: ,,, | Performed by: FAMILY MEDICINE

## 2020-01-15 PROCEDURE — 80061 LIPID PANEL: CPT

## 2020-01-15 PROCEDURE — 36415 COLL VENOUS BLD VENIPUNCTURE: CPT | Mod: PO

## 2020-01-15 PROCEDURE — 80053 COMPREHEN METABOLIC PANEL: CPT

## 2020-01-15 PROCEDURE — 1126F PR PAIN SEVERITY QUANTIFIED, NO PAIN PRESENT: ICD-10-PCS | Mod: ,,, | Performed by: FAMILY MEDICINE

## 2020-01-15 PROCEDURE — 82607 VITAMIN B-12: CPT

## 2020-01-15 PROCEDURE — 99213 OFFICE O/P EST LOW 20 MIN: CPT | Mod: PBBFAC,PO,25 | Performed by: FAMILY MEDICINE

## 2020-01-15 PROCEDURE — G0009 ADMIN PNEUMOCOCCAL VACCINE: HCPCS | Mod: PBBFAC,PO

## 2020-01-15 RX ORDER — PRAVASTATIN SODIUM 20 MG/1
20 TABLET ORAL DAILY
Qty: 90 TABLET | Refills: 3 | Status: SHIPPED | OUTPATIENT
Start: 2020-01-15 | End: 2020-12-28

## 2020-01-15 NOTE — PROGRESS NOTES
Patient presents for follow-up.  Blood pressure controlled.  Hyperlipidemia needs laboratory.  She is seeing Endocrine regarding vitamin-D deficiency, osteopenia and secondary hyperparathyroidism and calcium issues.  She does have a chronic history of anemia.  She is on B12 injections.  Previously seeing Hematology Oncology, but not for several years.  Previous mild elevation of free light chains, but ratio was normal on repeat.  Reflux controlled.  Remote breast cancer, mammogram up-to-date.    Carla HERRING was seen today for medication refill.    Diagnoses and all orders for this visit:    Normocytic anemia  -     CBC auto differential; Future  -     Vitamin B12; Future    Elevated serum immunoglobulin free light chains    Essential hypertension  -     Comprehensive metabolic panel; Future    Hyperlipidemia, unspecified hyperlipidemia type  -     Lipid panel; Future    Secondary hyperparathyroidism    Vitamin B12 deficiency  -     CBC auto differential; Future  -     Vitamin B12; Future    Vitamin D deficiency    Osteopenia, unspecified location    Gastroesophageal reflux disease, esophagitis presence not specified    History of breast cancer    Other orders  -     pravastatin (PRAVACHOL) 20 MG tablet; Take 1 tablet (20 mg total) by mouth once daily.  -     Pneumococcal Polysaccharide Vaccine (23 Valent) (SQ/IM)      Recent laboratory reviewed within [facility] from Endocrine.  Obtain laboratory above.  She will likely need follow-up with the hematologist regarding chronic anemia.  Continue current medications for now.            Past Medical History:  Past Medical History:   Diagnosis Date    Acute pancreatitis 3/21/2016    Breast cancer 2005    right side with lumpectomy, chemoand radiation    Closed fracture of ramus of right pubis 6/8/2016    Colon polyp     last scope 4/2012- repeat 10 y per pt- Dr. Johnson    GERD (gastroesophageal reflux disease) 5/29/2012    Hyperlipidemia 4/16/2013    Hypertension      Osteopenia      Past Surgical History:   Procedure Laterality Date    BREAST LUMPECTOMY  2005    DILATION AND CURETTAGE OF UTERUS      ESOPHAGEAL DILATION      ESOPHAGOGASTRODUODENOSCOPY  6/1/2012    ROTATOR CUFF REPAIR      TEAR DUCT SURGERY      right rye     Review of patient's allergies indicates:   Allergen Reactions    No known drug allergies      Current Outpatient Medications on File Prior to Visit   Medication Sig Dispense Refill    benazepril-hydrochlorthiazide (LOTENSIN HCT) 20-25 mg Tab TAKE 1 TABLET BY MOUTH ONCE DAILY 90 tablet 0    calcium carbonate (OS-NASEEM) 500 mg calcium (1,250 mg) tablet tid 90 tablet 1    ergocalciferol (ERGOCALCIFEROL) 50,000 unit Cap Three times a week ( Thurs, Sat and Mon) 12 capsule 1    folic acid (FOLVITE) 1 MG tablet Take 1 tablet (1,000 mcg total) by mouth once daily. 90 tablet 3    magnesium oxide (MAG-OX) 400 mg (241.3 mg magnesium) tablet BID 60 tablet 0    minocycline (DYNACIN) 100 MG tablet Take 90 mg by mouth every 12 (twelve) hours.      omeprazole (PRILOSEC) 20 MG capsule TAKE 1 CAPSULE BY MOUTH ONCE DAILY 90 capsule 0    [DISCONTINUED] pravastatin (PRAVACHOL) 20 MG tablet Take 1 tablet (20 mg total) by mouth once daily. 90 tablet 3    [DISCONTINUED] fluticasone (FLONASE) 50 mcg/actuation nasal spray Use 2 sprays to each nostril daily (Patient not taking: Reported on 1/15/2020) 16 g 0     Current Facility-Administered Medications on File Prior to Visit   Medication Dose Route Frequency Provider Last Rate Last Dose    cyanocobalamin injection 1,000 mcg  1,000 mcg Subcutaneous Q30 Days Ryan Covington MD        cyanocobalamin injection 1,000 mcg  1,000 mcg Intramuscular Q30 Days Phan Goode MD   1,000 mcg at 09/13/19 1056    cyanocobalamin injection 1,000 mcg  1,000 mcg Intramuscular Q30 Days Taqueria Melendrez MD   1,000 mcg at 01/14/20 1011     Social History     Socioeconomic History    Marital status:      Spouse name: Not on file     "Number of children: Not on file    Years of education: Not on file    Highest education level: Not on file   Occupational History    Not on file   Social Needs    Financial resource strain: Not on file    Food insecurity:     Worry: Not on file     Inability: Not on file    Transportation needs:     Medical: Not on file     Non-medical: Not on file   Tobacco Use    Smoking status: Former Smoker     Packs/day: 0.30     Types: Cigarettes     Last attempt to quit: 2005     Years since quittin.6    Smokeless tobacco: Never Used   Substance and Sexual Activity    Alcohol use: Yes     Alcohol/week: 2.0 standard drinks     Types: 2 Standard drinks or equivalent per week     Comment: prior 6 pack/week    Drug use: Yes    Sexual activity: Not on file   Lifestyle    Physical activity:     Days per week: Not on file     Minutes per session: Not on file    Stress: Not on file   Relationships    Social connections:     Talks on phone: Not on file     Gets together: Not on file     Attends Zoroastrianism service: Not on file     Active member of club or organization: Not on file     Attends meetings of clubs or organizations: Not on file     Relationship status: Not on file   Other Topics Concern    Not on file   Social History Narrative    Not on file     Family History   Problem Relation Age of Onset    Stroke Brother 57           ROS:  GENERAL: No fever, chills,  or significant weight changes.   CARDIOVASCULAR: Denies chest pain, PND, orthopnea or reduced exercise tolerance.  ABDOMEN: Appetite fine. Denies diarrhea, abdominal pain, hematemesis or blood in stool.  URINARY: No flank pain, dysuria or hematuria.    Vitals:    01/15/20 0919   BP: 124/79   Pulse: 80   Temp: 98.2 °F (36.8 °C)   Weight: 78.2 kg (172 lb 6.4 oz)   Height: 5' 5.5" (1.664 m)     Wt Readings from Last 3 Encounters:   01/15/20 78.2 kg (172 lb 6.4 oz)   19 78.1 kg (172 lb 2.9 oz)   19 77.8 kg (171 lb 8.3 oz)       OBJECTIVE: "   APPEARANCE: Well nourished, well developed, in no acute distress.    HEAD: Normocephalic.  Atraumatic.  No sinus tenderness.  EYES:   Right eye: Pupil reactive.  Conjunctiva clear.    Left eye: Pupil reactive.  Conjunctiva clear.  EOMI.    EARS: TM's intact. Light reflex normal. No retraction or perforation.    NOSE:  clear.  MOUTH & THROAT:  No pharyngeal erythema or exudate. No lesions.  NECK: Supple. No bruits.  No JVD.  No cervical lymphadenopathy.  No thyromegaly.    CHEST: Breath sounds clear bilaterally.  Normal respiratory effort  CARDIOVASCULAR: Normal rate.  Regular rhythm.  No murmurs.  No rub.  No gallops.  ABDOMEN: Bowel sounds normal.  Soft.  No tenderness.  No organomegaly.  PERIPHERAL VASCULAR: No cyanosis.  No clubbing.  No edema.  NEUROLOGIC: No focal findings.  MENTAL STATUS: Alert.  Oriented x 3.

## 2020-01-16 LAB
ALBUMIN SERPL BCP-MCNC: 3.6 G/DL (ref 3.5–5.2)
ALP SERPL-CCNC: 86 U/L (ref 55–135)
ALT SERPL W/O P-5'-P-CCNC: 5 U/L (ref 10–44)
ANION GAP SERPL CALC-SCNC: 10 MMOL/L (ref 8–16)
AST SERPL-CCNC: 16 U/L (ref 10–40)
BILIRUB SERPL-MCNC: 0.3 MG/DL (ref 0.1–1)
BUN SERPL-MCNC: 20 MG/DL (ref 8–23)
CALCIUM SERPL-MCNC: 9.1 MG/DL (ref 8.7–10.5)
CHLORIDE SERPL-SCNC: 101 MMOL/L (ref 95–110)
CHOLEST SERPL-MCNC: 188 MG/DL (ref 120–199)
CHOLEST/HDLC SERPL: 2.2 {RATIO} (ref 2–5)
CO2 SERPL-SCNC: 24 MMOL/L (ref 23–29)
CREAT SERPL-MCNC: 1.4 MG/DL (ref 0.5–1.4)
EST. GFR  (AFRICAN AMERICAN): 45.2 ML/MIN/1.73 M^2
EST. GFR  (NON AFRICAN AMERICAN): 39.2 ML/MIN/1.73 M^2
GLUCOSE SERPL-MCNC: 92 MG/DL (ref 70–110)
HDLC SERPL-MCNC: 86 MG/DL (ref 40–75)
HDLC SERPL: 45.7 % (ref 20–50)
LDLC SERPL CALC-MCNC: 90.4 MG/DL (ref 63–159)
NONHDLC SERPL-MCNC: 102 MG/DL
POTASSIUM SERPL-SCNC: 4 MMOL/L (ref 3.5–5.1)
PROT SERPL-MCNC: 7.6 G/DL (ref 6–8.4)
SODIUM SERPL-SCNC: 135 MMOL/L (ref 136–145)
TRIGL SERPL-MCNC: 58 MG/DL (ref 30–150)
VIT B12 SERPL-MCNC: >2000 PG/ML (ref 210–950)

## 2020-01-16 RX ORDER — FOLIC ACID 1 MG/1
TABLET ORAL
Qty: 90 TABLET | Refills: 0 | Status: SHIPPED | OUTPATIENT
Start: 2020-01-16 | End: 2020-03-30

## 2020-02-03 ENCOUNTER — TELEPHONE (OUTPATIENT)
Dept: FAMILY MEDICINE | Facility: CLINIC | Age: 65
End: 2020-02-03

## 2020-02-03 DIAGNOSIS — D64.9 ANEMIA, UNSPECIFIED TYPE: ICD-10-CM

## 2020-02-03 DIAGNOSIS — N28.9 FUNCTION KIDNEY DECREASED: Primary | ICD-10-CM

## 2020-02-03 NOTE — TELEPHONE ENCOUNTER
MD AMAIRANI Shaw Staff             Cholesterol okay.  Persistent anemia noted.  Kidney function slightly decreased from last year.  Make sure that she is drinking plenty of fluids.  Avoid NSAIDs such as ibuprofen or Aleve.  Refer to Hematology regarding anemia.  Repeat BMP and urinalysis in 3 months

## 2020-02-04 ENCOUNTER — HOSPITAL ENCOUNTER (OUTPATIENT)
Dept: RADIOLOGY | Facility: HOSPITAL | Age: 65
Discharge: HOME | End: 2020-02-04
Attending: NURSE PRACTITIONER
Payer: MEDICARE

## 2020-02-04 ENCOUNTER — OFFICE VISIT (OUTPATIENT)
Dept: FAMILY MEDICINE | Facility: CLINIC | Age: 65
End: 2020-02-04
Payer: MEDICARE

## 2020-02-04 VITALS
BODY MASS INDEX: 28.69 KG/M2 | TEMPERATURE: 98 F | HEART RATE: 80 BPM | SYSTOLIC BLOOD PRESSURE: 124 MMHG | DIASTOLIC BLOOD PRESSURE: 73 MMHG | HEIGHT: 65 IN | WEIGHT: 172.19 LBS

## 2020-02-04 DIAGNOSIS — M25.561 ACUTE PAIN OF RIGHT KNEE: ICD-10-CM

## 2020-02-04 DIAGNOSIS — J06.9 UPPER RESPIRATORY TRACT INFECTION, UNSPECIFIED TYPE: Primary | ICD-10-CM

## 2020-02-04 DIAGNOSIS — R05.9 COUGH: ICD-10-CM

## 2020-02-04 PROCEDURE — 73562 X-RAY EXAM OF KNEE 3: CPT | Mod: 59,TC,PO,LT

## 2020-02-04 PROCEDURE — 99999 PR PBB SHADOW E&M-EST. PATIENT-LVL IV: ICD-10-PCS | Mod: PBBFAC,,, | Performed by: NURSE PRACTITIONER

## 2020-02-04 PROCEDURE — 73564 X-RAY EXAM KNEE 4 OR MORE: CPT | Mod: 26,RT,, | Performed by: RADIOLOGY

## 2020-02-04 PROCEDURE — 73562 X-RAY EXAM OF KNEE 3: CPT | Mod: 26,LT,, | Performed by: RADIOLOGY

## 2020-02-04 PROCEDURE — 73562 XR KNEE ORTHO RIGHT WITH FLEXION: ICD-10-PCS | Mod: 26,LT,, | Performed by: RADIOLOGY

## 2020-02-04 PROCEDURE — 99214 OFFICE O/P EST MOD 30 MIN: CPT | Mod: PBBFAC,PO,25 | Performed by: NURSE PRACTITIONER

## 2020-02-04 PROCEDURE — 99213 PR OFFICE/OUTPT VISIT, EST, LEVL III, 20-29 MIN: ICD-10-PCS | Mod: S$PBB,,, | Performed by: NURSE PRACTITIONER

## 2020-02-04 PROCEDURE — 99999 PR PBB SHADOW E&M-EST. PATIENT-LVL IV: CPT | Mod: PBBFAC,,, | Performed by: NURSE PRACTITIONER

## 2020-02-04 PROCEDURE — 73564 XR KNEE ORTHO RIGHT WITH FLEXION: ICD-10-PCS | Mod: 26,RT,, | Performed by: RADIOLOGY

## 2020-02-04 PROCEDURE — 99213 OFFICE O/P EST LOW 20 MIN: CPT | Mod: S$PBB,,, | Performed by: NURSE PRACTITIONER

## 2020-02-04 RX ORDER — CETIRIZINE HYDROCHLORIDE 10 MG/1
10 TABLET ORAL DAILY
Qty: 10 TABLET | Refills: 0 | Status: SHIPPED | OUTPATIENT
Start: 2020-02-04 | End: 2021-04-06

## 2020-02-04 RX ORDER — MONTELUKAST SODIUM 10 MG/1
10 TABLET ORAL NIGHTLY
Qty: 30 TABLET | Refills: 0 | Status: SHIPPED | OUTPATIENT
Start: 2020-02-04 | End: 2020-03-30

## 2020-02-04 RX ORDER — BENZONATATE 200 MG/1
200 CAPSULE ORAL 3 TIMES DAILY PRN
Qty: 30 CAPSULE | Refills: 0 | Status: SHIPPED | OUTPATIENT
Start: 2020-02-04 | End: 2020-02-14

## 2020-02-04 NOTE — PROGRESS NOTES
Subjective:       Patient ID: Carla Dang is a 64 y.o. female.    Chief Complaint: Leg Swelling (right leg since last wednesday)    URI    This is a new problem. The current episode started in the past 7 days. The problem has been unchanged. There has been no fever. Associated symptoms include congestion, coughing and rhinorrhea. Pertinent negatives include no abdominal pain, chest pain, diarrhea, dysuria, ear pain, headaches, joint pain, joint swelling, nausea, neck pain, plugged ear sensation, rash, sinus pain, sneezing, sore throat, swollen glands, vomiting or wheezing. She has tried nothing for the symptoms. The treatment provided no relief.   Knee Pain    The incident occurred 3 to 5 days ago. There was no injury mechanism. The pain is present in the right knee. The pain is mild. Pain course: resolved. Pertinent negatives include no inability to bear weight, loss of motion, loss of sensation, muscle weakness, numbness or tingling. She reports no foreign bodies present. The symptoms are aggravated by weight bearing. She has tried NSAIDs (States used voltaren gel) for the symptoms. The treatment provided significant relief.     Past Medical History:   Diagnosis Date    Acute pancreatitis 3/21/2016    Breast cancer 2005    right side with lumpectomy, chemoand radiation    Closed fracture of ramus of right pubis 6/8/2016    Colon polyp     last scope 4/2012- repeat 10 y per pt- Dr. Johnson    GERD (gastroesophageal reflux disease) 5/29/2012    Hyperlipidemia 4/16/2013    Hypertension     Osteopenia      Social History     Socioeconomic History    Marital status:      Spouse name: Not on file    Number of children: Not on file    Years of education: Not on file    Highest education level: Not on file   Occupational History    Not on file   Social Needs    Financial resource strain: Not on file    Food insecurity:     Worry: Not on file     Inability: Not on file    Transportation needs:      Medical: Not on file     Non-medical: Not on file   Tobacco Use    Smoking status: Former Smoker     Packs/day: 0.30     Types: Cigarettes     Last attempt to quit: 2005     Years since quittin.6    Smokeless tobacco: Never Used   Substance and Sexual Activity    Alcohol use: Yes     Alcohol/week: 2.0 standard drinks     Types: 2 Standard drinks or equivalent per week     Comment: prior 6 pack/week    Drug use: Yes    Sexual activity: Not on file   Lifestyle    Physical activity:     Days per week: Not on file     Minutes per session: Not on file    Stress: Not on file   Relationships    Social connections:     Talks on phone: Not on file     Gets together: Not on file     Attends Jain service: Not on file     Active member of club or organization: Not on file     Attends meetings of clubs or organizations: Not on file     Relationship status: Not on file   Other Topics Concern    Not on file   Social History Narrative    Not on file     Past Surgical History:   Procedure Laterality Date    BREAST LUMPECTOMY      DILATION AND CURETTAGE OF UTERUS      ESOPHAGEAL DILATION      ESOPHAGOGASTRODUODENOSCOPY  2012    ROTATOR CUFF REPAIR      TEAR DUCT SURGERY      right rye       Review of Systems   Constitutional: Negative.    HENT: Positive for congestion, postnasal drip and rhinorrhea. Negative for ear pain, sinus pain, sneezing and sore throat.    Eyes: Negative.    Respiratory: Positive for cough. Negative for wheezing.    Cardiovascular: Negative.  Negative for chest pain.   Gastrointestinal: Negative.  Negative for abdominal pain, diarrhea, nausea and vomiting.   Endocrine: Negative.    Genitourinary: Negative.  Negative for dysuria.   Musculoskeletal: Positive for arthralgias (right knee; resolved). Negative for joint pain and neck pain.   Skin: Negative.  Negative for rash.   Allergic/Immunologic: Negative.    Neurological: Negative.  Negative for tingling, numbness and  headaches.   Psychiatric/Behavioral: Negative.        Objective:      Physical Exam   Constitutional: She is oriented to person, place, and time. She appears well-developed and well-nourished.   HENT:   Head: Normocephalic.   Right Ear: Hearing, tympanic membrane, external ear and ear canal normal.   Left Ear: Hearing, tympanic membrane, external ear and ear canal normal.   Nose: Rhinorrhea present. Right sinus exhibits no maxillary sinus tenderness and no frontal sinus tenderness. Left sinus exhibits no maxillary sinus tenderness and no frontal sinus tenderness.   Mouth/Throat: Uvula is midline, oropharynx is clear and moist and mucous membranes are normal.   Eyes: Pupils are equal, round, and reactive to light. Conjunctivae are normal.   Neck: Normal range of motion. Neck supple.   Cardiovascular: Normal rate, regular rhythm and normal heart sounds.   Pulmonary/Chest: Effort normal and breath sounds normal.   Abdominal: Soft. Bowel sounds are normal.   Musculoskeletal: Normal range of motion.        Right knee: Normal. She exhibits normal range of motion, no swelling, no effusion, no ecchymosis, no deformity, no laceration, no erythema, normal alignment, no LCL laxity, normal patellar mobility, no bony tenderness, normal meniscus and no MCL laxity. No tenderness found.   Neurological: She is alert and oriented to person, place, and time.   Skin: Skin is warm and dry. Capillary refill takes 2 to 3 seconds.   Psychiatric: She has a normal mood and affect. Her behavior is normal. Judgment and thought content normal.   Nursing note and vitals reviewed.      Assessment:       1. Upper respiratory tract infection, unspecified type    2. Cough    3. Acute pain of right knee        Plan:           Carla HERRING was seen today for leg swelling.    Diagnoses and all orders for this visit:    Upper respiratory tract infection, unspecified type  Cough  -     benzonatate (TESSALON) 200 MG capsule; Take 1 capsule (200 mg total) by  mouth 3 (three) times daily as needed.  -     montelukast (SINGULAIR) 10 mg tablet; Take 1 tablet (10 mg total) by mouth every evening. for 7 days  -     cetirizine (ZYRTEC) 10 MG tablet; Take 1 tablet (10 mg total) by mouth once daily. for 10 days  Handout r/t singulair uses, potential side effects/interactions given    Acute pain of right knee  -     X-ray Knee Ortho Right with Flexion; Future

## 2020-02-04 NOTE — PATIENT INSTRUCTIONS
"Zyrtec in AM; singulair in PM  Report to ER immediately if symptoms worsen    Montelukast: Patient drug information   Access [employer] here.   Copyright 9803-2485 [employer] All rights reserved.   (For additional information see "Montelukast: Drug information" and see "Montelukast: Pediatric drug information")  Brand Names: US   Singulair  Brand Names: Audi   ACH-Montelukast [DSC];   AG-Montelukast;   APO-Montelukast;   Auro-Montelukast;   BIO-Montelukast;   DOM-Montelukast;   DOM-Montelukast FC;   JAMP-Montelukast;   Mar-Montelukast;   MINT-Montelukast;   MYLAN-Montelukast [DSC];   PMS-Montelukast;   PRIVA-Montelukast FC;   RAN-Montelukast;   RIDGE-Montelukast FC;   SANDOZ Montelukast;   Singulair;   TEVA-Montelukast  What is this drug used for?    It is used to ease allergy signs.    It is used to prevent breathing problems that happen with exercise.    It is used to treat or prevent asthma.    It may be given to you for other reasons. Talk with the doctor.   For breathing problems:    This drug is not to be used to treat intense flare-ups of shortness of breath. Use a rescue inhaler. Talk with the doctor.   What do I need to tell my doctor BEFORE I take this drug?    If you are allergic to this drug; any part of this drug; or any other drugs, foods, or substances. Tell your doctor about the allergy and what signs you had.   This drug may interact with other drugs or health problems.   Tell your doctor and pharmacist about all of your drugs (prescription or OTC, natural products, vitamins) and health problems. You must check to make sure that it is safe for you to take this drug with all of your drugs and health problems. Do not start, stop, or change the dose of any drug without checking with your doctor.  What are some things I need to know or do while I take this drug?   For all uses of this drug:    Tell all of your health care providers that you take this drug. This includes your doctors, " nurses, pharmacists, and dentists.    If you have phenylketonuria (PKU), talk with your doctor. Some products have phenylalanine.    Tell your doctor if you are pregnant, plan on getting pregnant, or are breast-feeding. You will need to talk about the benefits and risks to you and the baby.   For breathing problems:    Call your doctor right away if your breathing problems get worse, if your rescue inhaler does not work as well, or if you need to use your rescue inhaler more often.    If you take this drug for asthma or allergy, do not take another dose to prevent breathing problems that happen with exercise.    If you have asthma and taking aspirin makes it worse, keep avoiding aspirin and NSAIDs while you take this drug.    If you are switching to this drug from a steroid, do not stop taking the steroid all of a sudden. The dose of the steroid may need to be slowly lowered to avoid side effects. Talk with the doctor.  What are some side effects that I need to call my doctor about right away?   WARNING/CAUTION: Even though it may be rare, some people may have very bad and sometimes deadly side effects when taking a drug. Tell your doctor or get medical help right away if you have any of the following signs or symptoms that may be related to a very bad side effect:   For all patients taking this drug:    Signs of an allergic reaction, like rash; hives; itching; red, swollen, blistered, or peeling skin with or without fever; wheezing; tightness in the chest or throat; trouble breathing, swallowing, or talking; unusual hoarseness; or swelling of the mouth, face, lips, tongue, or throat.    Signs of liver problems like dark urine, feeling tired, not hungry, upset stomach or stomach pain, light-colored stools, throwing up, or yellow skin or eyes.    Signs of a pancreas problem (pancreatitis) like very bad stomach pain, very bad back pain, or very bad upset stomach or throwing up.    Signs or symptoms of  depression, suicidal thoughts, emotional ups and downs, abnormal thinking, anxiety, or lack of interest in life.    Signs of a very bad skin reaction (Awad-Oziel syndrome/toxic epidermal necrolysis) like red, swollen, blistered, or peeling skin (with or without fever); red or irritated eyes; or sores in the mouth, throat, nose, or eyes.    Hallucinations (seeing or hearing things that are not there).    Memory problems or loss.    Feeling confused, not able to focus, or change in behavior.    Strange or odd dreams.    Trouble sleeping.    Trouble speaking.    Restlessness.    Sleepwalking.    Shakiness.    Trouble controlling body movements.    Seizures.    Trouble breathing that is new or worse.    Fever.    Flu-like signs.    Sinus pain.    Chest pain.    A heartbeat that does not feel normal.    Any unexplained bruising or bleeding.    A burning, numbness, or tingling feeling that is not normal.    Ear pain.    Muscle or joint pain.    Swelling.   Children:    Bedwetting.  What are some other side effects of this drug?   All drugs may cause side effects. However, many people have no side effects or only have minor side effects. Call your doctor or get medical help if any of these side effects or any other side effects bother you or do not go away:    Headache.    Stomach pain or diarrhea.    Signs of a common cold.    Cough.   These are not all of the side effects that may occur. If you have questions about side effects, call your doctor. Call your doctor for medical advice about side effects.   You may report side effects to your national health agency.  How is this drug best taken?   Use this drug as ordered by your doctor. Read all information given to you. Follow all instructions closely.   All products:    Take with or without food.    Keep taking this drug even when you are not having symptoms.    If working out or playing sports causes signs, use at least 2 hours before  doing it.    If this drug is for asthma, take in the evening.   Chewable tablet:    Chew well before swallowing.   Granules:    Granules may be placed right in the mouth or mixed with cold or room temperature applesauce, baby formula, breast milk, mashed carrots, rice, or ice cream. Do not mix granules in other liquids.    Do not open the packet until you are ready to take a dose. If mixing, take your dose within 15 minutes. Do not store for future use.  What do I do if I miss a dose?    Skip the missed dose and go back to your normal time.    Do not take 2 doses at the same time or extra doses.  How do I store and/or throw out this drug?    Store at room temperature protected from light. Store in a dry place. Do not store in a bathroom.    Store in original container.    Keep all drugs in a safe place. Keep all drugs out of the reach of children and pets.    Throw away unused or  drugs. Do not flush down a toilet or pour down a drain unless you are told to do so. Check with your pharmacist if you have questions about the best way to throw out drugs. There may be drug take-back programs in your area.  General drug facts    If your symptoms or health problems do not get better or if they become worse, call your doctor.    Do not share your drugs with others and do not take anyone else's drugs.    Some drugs may have another patient information leaflet. If you have any questions about this drug, please talk with your doctor, nurse, pharmacist, or other health care provider.    If you think there has been an overdose, call your poison control center or get medical care right away. Be ready to tell or show what was taken, how much, and when it happened.

## 2020-02-06 ENCOUNTER — TELEPHONE (OUTPATIENT)
Dept: FAMILY MEDICINE | Facility: CLINIC | Age: 65
End: 2020-02-06

## 2020-02-06 NOTE — TELEPHONE ENCOUNTER
----- Message from Lynnette Rodrigues sent at 2/6/2020 11:53 AM CST -----  Contact: [phone]/self  Patient called in returning your call. Please advise.

## 2020-02-11 ENCOUNTER — CLINICAL SUPPORT (OUTPATIENT)
Dept: FAMILY MEDICINE | Facility: CLINIC | Age: 65
End: 2020-02-11
Payer: MEDICARE

## 2020-02-11 DIAGNOSIS — E53.8 VITAMIN B12 DEFICIENCY: Primary | ICD-10-CM

## 2020-02-11 PROCEDURE — 99999 PR PBB SHADOW E&M-EST. PATIENT-LVL II: ICD-10-PCS | Mod: PBBFAC,,,

## 2020-02-11 PROCEDURE — 96372 THER/PROPH/DIAG INJ SC/IM: CPT | Mod: PBBFAC,PO

## 2020-02-11 PROCEDURE — 99999 PR PBB SHADOW E&M-EST. PATIENT-LVL II: CPT | Mod: PBBFAC,,,

## 2020-02-11 PROCEDURE — 99212 OFFICE O/P EST SF 10 MIN: CPT | Mod: PBBFAC,PO,25

## 2020-02-11 RX ADMIN — CYANOCOBALAMIN 1000 MCG: 1000 INJECTION, SOLUTION INTRAMUSCULAR at 11:02

## 2020-02-18 ENCOUNTER — LAB VISIT (OUTPATIENT)
Dept: LAB | Facility: HOSPITAL | Age: 65
End: 2020-02-18
Attending: INTERNAL MEDICINE
Payer: MEDICARE

## 2020-02-18 ENCOUNTER — OFFICE VISIT (OUTPATIENT)
Dept: ENDOCRINOLOGY | Facility: CLINIC | Age: 65
End: 2020-02-18
Payer: MEDICARE

## 2020-02-18 VITALS
HEIGHT: 65 IN | WEIGHT: 175.69 LBS | BODY MASS INDEX: 29.27 KG/M2 | DIASTOLIC BLOOD PRESSURE: 76 MMHG | SYSTOLIC BLOOD PRESSURE: 118 MMHG | HEART RATE: 80 BPM

## 2020-02-18 DIAGNOSIS — R79.89 ELEVATED PARATHYROID HORMONE: ICD-10-CM

## 2020-02-18 DIAGNOSIS — N25.81 SECONDARY HYPERPARATHYROIDISM: ICD-10-CM

## 2020-02-18 DIAGNOSIS — E55.9 VITAMIN D DEFICIENCY: ICD-10-CM

## 2020-02-18 DIAGNOSIS — R79.89 ELEVATED PARATHYROID HORMONE: Primary | ICD-10-CM

## 2020-02-18 PROCEDURE — 36415 COLL VENOUS BLD VENIPUNCTURE: CPT

## 2020-02-18 PROCEDURE — 99213 OFFICE O/P EST LOW 20 MIN: CPT | Mod: PBBFAC | Performed by: INTERNAL MEDICINE

## 2020-02-18 PROCEDURE — 99214 OFFICE O/P EST MOD 30 MIN: CPT | Mod: S$PBB,,, | Performed by: INTERNAL MEDICINE

## 2020-02-18 PROCEDURE — 99999 PR PBB SHADOW E&M-EST. PATIENT-LVL III: ICD-10-PCS | Mod: PBBFAC,,, | Performed by: INTERNAL MEDICINE

## 2020-02-18 PROCEDURE — 99999 PR PBB SHADOW E&M-EST. PATIENT-LVL III: CPT | Mod: PBBFAC,,, | Performed by: INTERNAL MEDICINE

## 2020-02-18 PROCEDURE — 83735 ASSAY OF MAGNESIUM: CPT

## 2020-02-18 PROCEDURE — 83970 ASSAY OF PARATHORMONE: CPT

## 2020-02-18 PROCEDURE — 99214 PR OFFICE/OUTPT VISIT, EST, LEVL IV, 30-39 MIN: ICD-10-PCS | Mod: S$PBB,,, | Performed by: INTERNAL MEDICINE

## 2020-02-18 NOTE — PROGRESS NOTES
"Referring Provider:  Katty Marie NP    PCP:  Taqueria Melendrez MD    Reason for referral:   Parathyroid hormone excess  Low vitamin-D level  Low calcium levels    Carla Dang 64 y.o. female    CC:  Follow-up on the abnormal test.     HPI:  Lab Results   Component Value Date    .0 (H) 12/18/2019    CALCIUM 9.1 01/15/2020    PHOS 3.1 09/23/2019     Pt was found to have elevated level of PTH, and low calcium level and low vitamin-D level several months ago, in April 2019.  In the last few weeks she had some right knee pain and she visited her primary care physician, otherwise she is doing fine.  She was treated with magnesium oxide 3 times a day because magnesium was only 0.7  and then the level improved significantly to 1.8 then the dose was changed to only once a day  and magnesium level was down to 1.1  Current dose of magnesium oxide is twice daily 5 days a week and 3 times daily 2 days a week  Taking vitamin-D 50,000 units once a week only  CALCIUM 600 MG+ Vit D 800 units daily  History of feeling pain and needles in her feet and feeling "feet asleep".  Patient has no complaints of dysphagia, chest pain, shortness of breath, nausea, vomiting, rash, or edema.  No Fracture history except for one toe fx years ago  No Kidney stone  No Bone pain  One front tooth has been loose.  No jaw pain or gum pain.  Off Fosamax.  On B12 once a month  Breast cancer in 2005, treated by lumpectomy, chemo and radiation x 6 weeks  History of Depo Inj x couple y many y ago.    Past Medical History:   Diagnosis Date    Acute pancreatitis 3/21/2016    Breast cancer 2005    right side with lumpectomy, chemoand radiation    Closed fracture of ramus of right pubis 6/8/2016    Colon polyp     last scope 4/2012- repeat 10 y per pt- Dr. Johnson    GERD (gastroesophageal reflux disease) 5/29/2012    Hyperlipidemia 4/16/2013    Hypertension     Osteopenia        Past Surgical History:   Procedure Laterality Date "    BREAST LUMPECTOMY  2005    DILATION AND CURETTAGE OF UTERUS      ESOPHAGEAL DILATION      ESOPHAGOGASTRODUODENOSCOPY  2012    ROTATOR CUFF REPAIR      TEAR DUCT SURGERY      right rye       Social History     Socioeconomic History    Marital status:      Spouse name: Not on file    Number of children: Not on file    Years of education: Not on file    Highest education level: Not on file   Occupational History    Not on file   Social Needs    Financial resource strain: Not on file    Food insecurity:     Worry: Not on file     Inability: Not on file    Transportation needs:     Medical: Not on file     Non-medical: Not on file   Tobacco Use    Smoking status: Former Smoker     Packs/day: 0.30     Types: Cigarettes     Last attempt to quit: 2005     Years since quittin.7    Smokeless tobacco: Never Used   Substance and Sexual Activity    Alcohol use: Yes     Alcohol/week: 2.0 standard drinks     Types: 2 Standard drinks or equivalent per week     Comment: prior 6 pack/week    Drug use: Yes    Sexual activity: Not on file   Lifestyle    Physical activity:     Days per week: Not on file     Minutes per session: Not on file    Stress: Not on file   Relationships    Social connections:     Talks on phone: Not on file     Gets together: Not on file     Attends Adventism service: Not on file     Active member of club or organization: Not on file     Attends meetings of clubs or organizations: Not on file     Relationship status: Not on file   Other Topics Concern    Not on file   Social History Narrative    Not on file         ROS:   No complaints of nausea or vomiting  No complaints of chest pain or shortness breath  No complaint of jaw pain  History of breast cancer  History of No fracture except for toe fracture  Right knee pain improved    PE:  Alert and oriented  No acute distress  No proptosis or conjunctivitis  No goitre by inspection  Thyroid gland is not  palpable  Heart reg, no gallop  Lungs cta, no wheezing  No edema in lower legs  No tremor in hands  Behavior normal  Speech normal    Lab:       Ref. Range 4/10/2019 11:43 7/10/2019 10:53 7/24/2019 09:45 7/25/2019 09:10 8/19/2019 10:50   Calcium Latest Ref Range: 8.7 - 10.5 mg/dL 7.9 (L) 6.8 (LL) 8.8  9.7   Phosphorus Latest Ref Range: 2.7 - 4.5 mg/dL 2.6 (L) 2.3 (L)   3.7   Magnesium Latest Ref Range: 1.6 - 2.6 mg/dL   0.7 (LL)  1.8   Vit D, 25-Hydroxy Latest Ref Range: 30 - 96 ng/mL 27 (L) 31      PTH Latest Ref Range: 9.0 - 77.0 pg/mL 358.0 (H) 252.0 (H)           Ref. Range 2/4/2009 08:27 11/12/2010 10:20 5/29/2012 10:20 4/16/2013 15:50 12/20/2013 08:38 3/30/2015 09:40 4/6/2016 09:40 7/19/2016 09:40 7/18/2017 09:24 9/7/2017 09:04 1/22/2019 08:24 4/10/2019 11:43 7/10/2019 10:53 7/24/2019 09:45 8/19/2019 10:50 9/23/2019 09:08   Calcium Latest Ref Range: 8.7 - 10.5 mg/dL 10.2 9.2 9.2 9.1 9.3 8.7 8.6 (L) 8.9 8.2 (L) 8.8 7.9 (L) 7.9 (L) 6.8 (LL) 8.8 9.7 8.4 (L)     A/P:  Hypocalcemia  Hypomagnesemia   Secondary Hyperparathyroidism  Significant elevation in parathyroid hormone  Serum PTH improved from above 300 to just above 200 and the last PTH was 144  Vitamin D deficiency  25 hydroxy vitamin-D level improved  History of breast cancer  Mag Oxide 400 mg twice a day daily except for 3 times a day twice a week  On CALCIUM 600 MG+ Vit D 800 units once a day  Vit D 50,000 units once a week    Orders Placed This Encounter   Procedures    Magnesium     Standing Status:   Future     Standing Expiration Date:   4/18/2021    PTH, intact     Standing Status:   Future     Standing Expiration Date:   6/18/2020         History of osteopenia  History of T-score was -2.2   Bone density scan recently showed a T-score -1        Off Fosamax     Appt in 9 weeks.

## 2020-02-19 LAB
MAGNESIUM SERPL-MCNC: 1.5 MG/DL (ref 1.6–2.6)
PTH-INTACT SERPL-MCNC: 220 PG/ML (ref 9–77)

## 2020-02-20 ENCOUNTER — TELEPHONE (OUTPATIENT)
Dept: ENDOCRINOLOGY | Facility: CLINIC | Age: 65
End: 2020-02-20

## 2020-02-20 RX ORDER — ERGOCALCIFEROL 1.25 MG/1
CAPSULE ORAL
Qty: 24 CAPSULE | Refills: 1 | Status: SHIPPED | OUTPATIENT
Start: 2020-02-20 | End: 2020-09-15 | Stop reason: SDUPTHER

## 2020-02-20 NOTE — TELEPHONE ENCOUNTER
----- Message from Mckenna Sharpe MD sent at 2/20/2020  9:24 AM CST -----  Call the pt. Mg is borderline, so patient will need to continue taking magnesium same way it is prescribed.  She will need to take the vitamin-D (Ergocalciferol) twice a week instead of just once a week  So I will send a new prescription.  She will need a new follow-up appointment in 5 weeks.

## 2020-02-20 NOTE — TELEPHONE ENCOUNTER
Spoke with patient, and confirmed medication changes. Pt understands and will follow up in 5 weeks.

## 2020-03-10 ENCOUNTER — OFFICE VISIT (OUTPATIENT)
Dept: FAMILY MEDICINE | Facility: CLINIC | Age: 65
End: 2020-03-10
Payer: MEDICARE

## 2020-03-10 VITALS
HEIGHT: 65 IN | TEMPERATURE: 99 F | OXYGEN SATURATION: 100 % | HEART RATE: 91 BPM | WEIGHT: 171.19 LBS | BODY MASS INDEX: 28.52 KG/M2 | SYSTOLIC BLOOD PRESSURE: 107 MMHG | DIASTOLIC BLOOD PRESSURE: 68 MMHG

## 2020-03-10 DIAGNOSIS — R52 BODY ACHES: ICD-10-CM

## 2020-03-10 DIAGNOSIS — J06.9 UPPER RESPIRATORY TRACT INFECTION, UNSPECIFIED TYPE: Primary | ICD-10-CM

## 2020-03-10 DIAGNOSIS — R05.9 COUGH: ICD-10-CM

## 2020-03-10 LAB
INFLUENZA A, MOLECULAR: NEGATIVE
INFLUENZA B, MOLECULAR: NEGATIVE
SPECIMEN SOURCE: NORMAL

## 2020-03-10 PROCEDURE — 99999 PR PBB SHADOW E&M-EST. PATIENT-LVL IV: ICD-10-PCS | Mod: PBBFAC,,, | Performed by: NURSE PRACTITIONER

## 2020-03-10 PROCEDURE — 99213 OFFICE O/P EST LOW 20 MIN: CPT | Mod: S$PBB,,, | Performed by: NURSE PRACTITIONER

## 2020-03-10 PROCEDURE — 99213 PR OFFICE/OUTPT VISIT, EST, LEVL III, 20-29 MIN: ICD-10-PCS | Mod: S$PBB,,, | Performed by: NURSE PRACTITIONER

## 2020-03-10 PROCEDURE — 99999 PR PBB SHADOW E&M-EST. PATIENT-LVL IV: CPT | Mod: PBBFAC,,, | Performed by: NURSE PRACTITIONER

## 2020-03-10 PROCEDURE — 99214 OFFICE O/P EST MOD 30 MIN: CPT | Mod: PBBFAC,PO | Performed by: NURSE PRACTITIONER

## 2020-03-10 PROCEDURE — 87502 INFLUENZA DNA AMP PROBE: CPT | Mod: PO

## 2020-03-10 RX ORDER — CEPHALEXIN 500 MG/1
500 CAPSULE ORAL EVERY 12 HOURS
Qty: 20 CAPSULE | Refills: 0 | Status: SHIPPED | OUTPATIENT
Start: 2020-03-10 | End: 2020-03-20

## 2020-03-10 RX ORDER — PROMETHAZINE HYDROCHLORIDE AND DEXTROMETHORPHAN HYDROBROMIDE 6.25; 15 MG/5ML; MG/5ML
5 SYRUP ORAL 2 TIMES DAILY PRN
Qty: 118 ML | Refills: 0 | Status: SHIPPED | OUTPATIENT
Start: 2020-03-10 | End: 2020-03-20

## 2020-03-10 RX ORDER — ALBUTEROL SULFATE 90 UG/1
2 AEROSOL, METERED RESPIRATORY (INHALATION) EVERY 6 HOURS PRN
Qty: 18 G | Refills: 0 | Status: SHIPPED | OUTPATIENT
Start: 2020-03-10 | End: 2021-04-06

## 2020-03-10 NOTE — PROGRESS NOTES
Subjective:       Patient ID: Carla Dang is a 64 y.o. female.    Chief Complaint: Follow-up (URI)    Cough   This is a new problem. The current episode started in the past 7 days (States started on Friday). The problem has been unchanged. The problem occurs every few minutes. The cough is productive of sputum. Associated symptoms include chills, myalgias, nasal congestion, postnasal drip, rhinorrhea and wheezing. Pertinent negatives include no chest pain, ear congestion, ear pain, fever, headaches, heartburn, hemoptysis, rash, sore throat, shortness of breath, sweats or weight loss. Nothing aggravates the symptoms. She has tried nothing for the symptoms. The treatment provided no relief. There is no history of asthma, bronchiectasis, bronchitis, COPD, emphysema, environmental allergies or pneumonia.     Past Medical History:   Diagnosis Date    Acute pancreatitis 3/21/2016    Breast cancer 2005    right side with lumpectomy, chemoand radiation    Closed fracture of ramus of right pubis 2016    Colon polyp     last scope 2012- repeat 10 y per pt- Dr. Johnson    GERD (gastroesophageal reflux disease) 2012    Hyperlipidemia 2013    Hypertension     Osteopenia      Social History     Socioeconomic History    Marital status:      Spouse name: Not on file    Number of children: Not on file    Years of education: Not on file    Highest education level: Not on file   Occupational History    Not on file   Social Needs    Financial resource strain: Not on file    Food insecurity:     Worry: Not on file     Inability: Not on file    Transportation needs:     Medical: Not on file     Non-medical: Not on file   Tobacco Use    Smoking status: Former Smoker     Packs/day: 0.30     Types: Cigarettes     Last attempt to quit: 2005     Years since quittin.7    Smokeless tobacco: Never Used   Substance and Sexual Activity    Alcohol use: Yes     Alcohol/week: 2.0 standard  drinks     Types: 2 Standard drinks or equivalent per week     Comment: prior 6 pack/week    Drug use: Yes    Sexual activity: Not on file   Lifestyle    Physical activity:     Days per week: Not on file     Minutes per session: Not on file    Stress: Not on file   Relationships    Social connections:     Talks on phone: Not on file     Gets together: Not on file     Attends Mandaen service: Not on file     Active member of club or organization: Not on file     Attends meetings of clubs or organizations: Not on file     Relationship status: Not on file   Other Topics Concern    Not on file   Social History Narrative    Not on file     Past Surgical History:   Procedure Laterality Date    BREAST LUMPECTOMY  2005    DILATION AND CURETTAGE OF UTERUS      ESOPHAGEAL DILATION      ESOPHAGOGASTRODUODENOSCOPY  6/1/2012    ROTATOR CUFF REPAIR      TEAR DUCT SURGERY      right rye       Review of Systems   Constitutional: Positive for chills. Negative for fever and weight loss.   HENT: Positive for postnasal drip, rhinorrhea and sinus pressure. Negative for ear pain and sore throat.    Eyes: Negative.    Respiratory: Positive for cough and wheezing. Negative for hemoptysis and shortness of breath.    Cardiovascular: Negative.  Negative for chest pain.   Gastrointestinal: Negative.  Negative for heartburn.   Endocrine: Negative.    Genitourinary: Negative.    Musculoskeletal: Positive for myalgias.   Skin: Negative.  Negative for rash.   Allergic/Immunologic: Negative.  Negative for environmental allergies.   Neurological: Negative.  Negative for headaches.   Psychiatric/Behavioral: Negative.        Objective:      Physical Exam   Constitutional: She is oriented to person, place, and time. She appears well-developed and well-nourished.   HENT:   Head: Normocephalic.   Right Ear: Hearing, tympanic membrane, external ear and ear canal normal.   Left Ear: Hearing, tympanic membrane, external ear and ear canal  normal.   Nose: Rhinorrhea present. Right sinus exhibits no maxillary sinus tenderness and no frontal sinus tenderness. Left sinus exhibits no maxillary sinus tenderness and no frontal sinus tenderness.   Mouth/Throat: Uvula is midline, oropharynx is clear and moist and mucous membranes are normal.   Eyes: Pupils are equal, round, and reactive to light. Conjunctivae are normal.   Neck: Normal range of motion. Neck supple.   Cardiovascular: Normal rate, regular rhythm and normal heart sounds.   Pulmonary/Chest: Effort normal and breath sounds normal.   Abdominal: Soft. Bowel sounds are normal.   Musculoskeletal: Normal range of motion.   Neurological: She is alert and oriented to person, place, and time.   Skin: Skin is warm and dry. Capillary refill takes 2 to 3 seconds.   Psychiatric: She has a normal mood and affect. Her behavior is normal. Judgment and thought content normal.   Nursing note and vitals reviewed.      Assessment:   1.      Upper respiratory tract infection, unspecified type  2. Body aches    3. Cough        Plan:            Carla HERRING was seen today for follow-up.    Diagnoses and all orders for this visit:    Upper respiratory tract infection, unspecified type  Body aches  Cough  -     Influenza A & B by Molecular  -     albuterol (PROAIR HFA) 90 mcg/actuation inhaler; Inhale 2 puffs into the lungs every 6 (six) hours as needed for Wheezing. Rescue  -     promethazine-dextromethorphan (PROMETHAZINE-DM) 6.25-15 mg/5 mL Syrp; Take 5 mLs by mouth 2 (two) times daily as needed.  -     cephALEXin (KEFLEX) 500 MG capsule; Take 1 capsule (500 mg total) by mouth every 12 (twelve) hours. for 10 days  Hydrate well  Motrin OTC as directed  Albuterol can cause palpitations, jitteriness  Cough medication causes drowsiness  Report to ER immediately if symptoms worsen

## 2020-03-10 NOTE — PATIENT INSTRUCTIONS
Hydrate well  Motrin OTC as directed  Albuterol can cause palpitations, jitteriness  Cough medication causes drowsiness  Report to ER immediately if symptoms worsen

## 2020-03-13 ENCOUNTER — CLINICAL SUPPORT (OUTPATIENT)
Dept: FAMILY MEDICINE | Facility: CLINIC | Age: 65
End: 2020-03-13
Payer: MEDICARE

## 2020-03-13 DIAGNOSIS — E53.8 VITAMIN B12 DEFICIENCY: Primary | ICD-10-CM

## 2020-03-13 PROCEDURE — 96372 THER/PROPH/DIAG INJ SC/IM: CPT | Mod: PBBFAC,PO

## 2020-03-13 PROCEDURE — 99212 OFFICE O/P EST SF 10 MIN: CPT | Mod: PBBFAC,PO

## 2020-03-13 PROCEDURE — 99999 PR PBB SHADOW E&M-EST. PATIENT-LVL II: ICD-10-PCS | Mod: PBBFAC,,,

## 2020-03-13 PROCEDURE — 99999 PR PBB SHADOW E&M-EST. PATIENT-LVL II: CPT | Mod: PBBFAC,,,

## 2020-03-13 RX ADMIN — CYANOCOBALAMIN 1000 MCG: 1000 INJECTION, SOLUTION INTRAMUSCULAR at 08:03

## 2020-03-26 RX ORDER — OMEPRAZOLE 20 MG/1
CAPSULE, DELAYED RELEASE ORAL
Qty: 90 CAPSULE | Refills: 0 | Status: SHIPPED | OUTPATIENT
Start: 2020-03-26 | End: 2020-06-12

## 2020-03-26 RX ORDER — BENAZEPRIL/HYDROCHLOROTHIAZIDE 20 MG-25MG
1 TABLET ORAL DAILY
Qty: 90 TABLET | Refills: 0 | Status: SHIPPED | OUTPATIENT
Start: 2020-03-26 | End: 2020-06-12

## 2020-03-30 RX ORDER — MONTELUKAST SODIUM 10 MG/1
TABLET ORAL
Qty: 30 TABLET | Refills: 0 | Status: SHIPPED | OUTPATIENT
Start: 2020-03-30 | End: 2021-05-11

## 2020-03-30 RX ORDER — FOLIC ACID 1 MG/1
TABLET ORAL
Qty: 90 TABLET | Refills: 0 | Status: SHIPPED | OUTPATIENT
Start: 2020-03-30 | End: 2020-06-12

## 2020-04-01 ENCOUNTER — TELEPHONE (OUTPATIENT)
Dept: FAMILY MEDICINE | Facility: CLINIC | Age: 65
End: 2020-04-01

## 2020-04-01 NOTE — TELEPHONE ENCOUNTER
----- Message from Ana Maria Ferreira sent at 4/1/2020 11:24 AM CDT -----  Contact: pt  1. What is the name of the medication you are requesting? Cough Syrup   2. What is the dose? n/a  3. How do you take the medication? Orally, topically, etc? Orally  4. How often do you take this medication? n/a  5. Do you need a 30 day or 90 day supply? n/a  6. How many refills are you requesting? n/a  7. What is your preferred pharmacy and location of the pharmacy? [facility]  8. Who can we contact with further questions? The pt at [phone]

## 2020-04-02 RX ORDER — PROMETHAZINE HYDROCHLORIDE AND DEXTROMETHORPHAN HYDROBROMIDE 6.25; 15 MG/5ML; MG/5ML
5 SYRUP ORAL 2 TIMES DAILY PRN
Qty: 118 ML | Refills: 0 | Status: SHIPPED | OUTPATIENT
Start: 2020-04-02 | End: 2020-04-12

## 2020-04-14 ENCOUNTER — CLINICAL SUPPORT (OUTPATIENT)
Dept: FAMILY MEDICINE | Facility: CLINIC | Age: 65
End: 2020-04-14
Payer: MEDICARE

## 2020-04-14 DIAGNOSIS — E53.8 VITAMIN B12 DEFICIENCY: Primary | ICD-10-CM

## 2020-04-14 PROCEDURE — 99999 PR PBB SHADOW E&M-EST. PATIENT-LVL II: ICD-10-PCS | Mod: PBBFAC,,,

## 2020-04-14 PROCEDURE — 99999 PR PBB SHADOW E&M-EST. PATIENT-LVL II: CPT | Mod: PBBFAC,,,

## 2020-04-14 PROCEDURE — 96372 THER/PROPH/DIAG INJ SC/IM: CPT | Mod: PBBFAC,PO

## 2020-04-14 RX ADMIN — CYANOCOBALAMIN 1000 MCG: 1000 INJECTION, SOLUTION INTRAMUSCULAR at 08:04

## 2020-05-12 ENCOUNTER — CLINICAL SUPPORT (OUTPATIENT)
Dept: FAMILY MEDICINE | Facility: CLINIC | Age: 65
End: 2020-05-12
Payer: MEDICARE

## 2020-05-12 DIAGNOSIS — E53.8 VITAMIN B12 DEFICIENCY: Primary | ICD-10-CM

## 2020-05-12 PROCEDURE — 96372 THER/PROPH/DIAG INJ SC/IM: CPT | Mod: PBBFAC,PO

## 2020-05-12 PROCEDURE — 99999 PR PBB SHADOW E&M-EST. PATIENT-LVL II: CPT | Mod: PBBFAC,,,

## 2020-05-12 PROCEDURE — 99999 PR PBB SHADOW E&M-EST. PATIENT-LVL II: ICD-10-PCS | Mod: PBBFAC,,,

## 2020-05-12 RX ADMIN — CYANOCOBALAMIN 1000 MCG: 1000 INJECTION, SOLUTION INTRAMUSCULAR at 11:05

## 2020-06-09 ENCOUNTER — PATIENT OUTREACH (OUTPATIENT)
Dept: ADMINISTRATIVE | Facility: OTHER | Age: 65
End: 2020-06-09

## 2020-06-12 ENCOUNTER — CLINICAL SUPPORT (OUTPATIENT)
Dept: FAMILY MEDICINE | Facility: CLINIC | Age: 65
End: 2020-06-12
Payer: MEDICARE

## 2020-06-12 DIAGNOSIS — E53.8 VITAMIN B12 DEFICIENCY: Primary | ICD-10-CM

## 2020-06-12 PROCEDURE — 99999 PR PBB SHADOW E&M-EST. PATIENT-LVL II: ICD-10-PCS | Mod: PBBFAC,,,

## 2020-06-12 PROCEDURE — 96372 THER/PROPH/DIAG INJ SC/IM: CPT | Mod: PBBFAC,PO

## 2020-06-12 PROCEDURE — 99999 PR PBB SHADOW E&M-EST. PATIENT-LVL II: CPT | Mod: PBBFAC,,,

## 2020-06-12 RX ADMIN — CYANOCOBALAMIN 1000 MCG: 1000 INJECTION, SOLUTION INTRAMUSCULAR at 09:06

## 2020-07-10 ENCOUNTER — CLINICAL SUPPORT (OUTPATIENT)
Dept: FAMILY MEDICINE | Facility: CLINIC | Age: 65
End: 2020-07-10
Payer: MEDICARE

## 2020-07-10 DIAGNOSIS — E53.8 VITAMIN B12 DEFICIENCY: Primary | ICD-10-CM

## 2020-07-10 PROCEDURE — 99999 PR PBB SHADOW E&M-EST. PATIENT-LVL II: ICD-10-PCS | Mod: PBBFAC,,,

## 2020-07-10 PROCEDURE — 96372 THER/PROPH/DIAG INJ SC/IM: CPT | Mod: PBBFAC,PO

## 2020-07-10 PROCEDURE — 99999 PR PBB SHADOW E&M-EST. PATIENT-LVL II: CPT | Mod: PBBFAC,,,

## 2020-07-10 RX ADMIN — CYANOCOBALAMIN 1000 MCG: 1000 INJECTION, SOLUTION INTRAMUSCULAR at 10:07

## 2020-07-17 DIAGNOSIS — Z71.89 COMPLEX CARE COORDINATION: ICD-10-CM

## 2020-08-11 ENCOUNTER — CLINICAL SUPPORT (OUTPATIENT)
Dept: FAMILY MEDICINE | Facility: CLINIC | Age: 65
End: 2020-08-11
Payer: MEDICARE

## 2020-08-11 DIAGNOSIS — E53.8 VITAMIN B12 DEFICIENCY: Primary | ICD-10-CM

## 2020-08-11 PROCEDURE — 96372 THER/PROPH/DIAG INJ SC/IM: CPT | Mod: PBBFAC,PO

## 2020-08-11 PROCEDURE — 99999 PR PBB SHADOW E&M-EST. PATIENT-LVL II: CPT | Mod: PBBFAC,,,

## 2020-08-11 PROCEDURE — 99999 PR PBB SHADOW E&M-EST. PATIENT-LVL II: ICD-10-PCS | Mod: PBBFAC,,,

## 2020-08-11 RX ADMIN — CYANOCOBALAMIN 1000 MCG: 1000 INJECTION, SOLUTION INTRAMUSCULAR at 10:08

## 2020-09-08 ENCOUNTER — CLINICAL SUPPORT (OUTPATIENT)
Dept: FAMILY MEDICINE | Facility: CLINIC | Age: 65
End: 2020-09-08
Payer: MEDICARE

## 2020-09-08 DIAGNOSIS — E53.8 VITAMIN B12 DEFICIENCY: Primary | ICD-10-CM

## 2020-09-08 PROCEDURE — 96372 THER/PROPH/DIAG INJ SC/IM: CPT | Mod: PBBFAC,PO

## 2020-09-08 PROCEDURE — 99999 PR PBB SHADOW E&M-EST. PATIENT-LVL II: ICD-10-PCS | Mod: PBBFAC,,,

## 2020-09-08 PROCEDURE — 99999 PR PBB SHADOW E&M-EST. PATIENT-LVL II: CPT | Mod: PBBFAC,,,

## 2020-09-08 RX ADMIN — CYANOCOBALAMIN 1000 MCG: 1000 INJECTION, SOLUTION INTRAMUSCULAR at 10:09

## 2020-09-15 ENCOUNTER — OFFICE VISIT (OUTPATIENT)
Dept: ENDOCRINOLOGY | Facility: CLINIC | Age: 65
End: 2020-09-15
Payer: MEDICARE

## 2020-09-15 ENCOUNTER — LAB VISIT (OUTPATIENT)
Dept: LAB | Facility: HOSPITAL | Age: 65
End: 2020-09-15
Attending: INTERNAL MEDICINE
Payer: MEDICARE

## 2020-09-15 ENCOUNTER — TELEPHONE (OUTPATIENT)
Dept: FAMILY MEDICINE | Facility: CLINIC | Age: 65
End: 2020-09-15

## 2020-09-15 ENCOUNTER — TELEPHONE (OUTPATIENT)
Dept: ENDOCRINOLOGY | Facility: CLINIC | Age: 65
End: 2020-09-15

## 2020-09-15 VITALS
WEIGHT: 178.13 LBS | BODY MASS INDEX: 29.68 KG/M2 | DIASTOLIC BLOOD PRESSURE: 76 MMHG | SYSTOLIC BLOOD PRESSURE: 132 MMHG | RESPIRATION RATE: 16 BRPM | HEART RATE: 82 BPM | HEIGHT: 65 IN

## 2020-09-15 DIAGNOSIS — E83.51 HYPOCALCEMIA: ICD-10-CM

## 2020-09-15 DIAGNOSIS — M85.80 OSTEOPENIA, UNSPECIFIED LOCATION: ICD-10-CM

## 2020-09-15 DIAGNOSIS — E83.51 HYPOCALCEMIA: Primary | ICD-10-CM

## 2020-09-15 DIAGNOSIS — E53.8 VITAMIN B 12 DEFICIENCY: Primary | ICD-10-CM

## 2020-09-15 DIAGNOSIS — E83.42 HYPOMAGNESEMIA: ICD-10-CM

## 2020-09-15 LAB
25(OH)D3+25(OH)D2 SERPL-MCNC: 31 NG/ML (ref 30–96)
CALCIUM SERPL-MCNC: 7.5 MG/DL (ref 8.7–10.5)
MAGNESIUM SERPL-MCNC: 0.9 MG/DL (ref 1.6–2.6)
PTH-INTACT SERPL-MCNC: 213 PG/ML (ref 9–77)

## 2020-09-15 PROCEDURE — 99214 PR OFFICE/OUTPT VISIT, EST, LEVL IV, 30-39 MIN: ICD-10-PCS | Mod: S$PBB,,, | Performed by: INTERNAL MEDICINE

## 2020-09-15 PROCEDURE — 99999 PR PBB SHADOW E&M-EST. PATIENT-LVL IV: ICD-10-PCS | Mod: PBBFAC,,, | Performed by: INTERNAL MEDICINE

## 2020-09-15 PROCEDURE — 99214 OFFICE O/P EST MOD 30 MIN: CPT | Mod: PBBFAC | Performed by: INTERNAL MEDICINE

## 2020-09-15 PROCEDURE — 99214 OFFICE O/P EST MOD 30 MIN: CPT | Mod: S$PBB,,, | Performed by: INTERNAL MEDICINE

## 2020-09-15 PROCEDURE — 83970 ASSAY OF PARATHORMONE: CPT

## 2020-09-15 PROCEDURE — 82306 VITAMIN D 25 HYDROXY: CPT

## 2020-09-15 PROCEDURE — 83735 ASSAY OF MAGNESIUM: CPT

## 2020-09-15 PROCEDURE — 82310 ASSAY OF CALCIUM: CPT

## 2020-09-15 PROCEDURE — 99999 PR PBB SHADOW E&M-EST. PATIENT-LVL IV: CPT | Mod: PBBFAC,,, | Performed by: INTERNAL MEDICINE

## 2020-09-15 PROCEDURE — 36415 COLL VENOUS BLD VENIPUNCTURE: CPT

## 2020-09-15 RX ORDER — CALCIUM CARBONATE 500(1250)
TABLET ORAL
Qty: 120 TABLET | Refills: 0 | COMMUNITY
Start: 2020-09-15 | End: 2020-09-16 | Stop reason: SDUPTHER

## 2020-09-15 RX ORDER — LANOLIN ALCOHOL/MO/W.PET/CERES
CREAM (GRAM) TOPICAL
Qty: 60 TABLET | Refills: 0 | Status: SHIPPED | OUTPATIENT
Start: 2020-09-15 | End: 2020-11-20

## 2020-09-15 RX ORDER — CYANOCOBALAMIN 1000 UG/ML
1000 INJECTION, SOLUTION INTRAMUSCULAR; SUBCUTANEOUS
Status: DISCONTINUED | OUTPATIENT
Start: 2020-10-08 | End: 2020-12-23

## 2020-09-15 RX ORDER — ERGOCALCIFEROL 1.25 MG/1
CAPSULE ORAL
Qty: 4 CAPSULE | Refills: 3 | Status: SHIPPED | OUTPATIENT
Start: 2020-09-15 | End: 2020-12-15 | Stop reason: SDUPTHER

## 2020-09-15 NOTE — PROGRESS NOTES
My medical assistant is calling the patient and talking to her regarding my instructions  On the medications:  Magnesium medication to be taken twice a day  Calcium pill 4 times a day  Vitamin-D once a week    Magnesium level was critically low 0.9  Serum calcium was 7.5

## 2020-09-15 NOTE — TELEPHONE ENCOUNTER
She can continue the B12, but she still needs to see the hematologist regarding anemia.  Something else going on besides just B12.

## 2020-09-15 NOTE — TELEPHONE ENCOUNTER
Spoke with Corie from lab regarding a critical lab value :  Magnesium level is 0.9%.  Calcium level 7.5% (MD received notification).    Dr. Sharpe was made aware right away, we contacted the patient and let her know that medications were sent in for her and to please pick them up.    Isra Ferreira called patient.    Instructions:   Calcium take 4 times a day and Magnesium twice a day.  Vit D, once a week.    Patient wrote these down and verbalized understanding.     -YC

## 2020-09-15 NOTE — TELEPHONE ENCOUNTER
Pt received last dose of B12. Please advise if injections will be continued or if pt needs lab work prior to further injections. Thank you.

## 2020-09-15 NOTE — PROGRESS NOTES
"Referring Provider:  Katty Marie NP    PCP:  Taqueria Melendrez MD    Reason for referral:   Parathyroid hormone excess  Low vitamin-D level  Low calcium levels    Carla Dang 65 y.o. female    CC:  Follow-up on the abnormal test.     HPI:  Lab Results   Component Value Date    .0 (H) 02/18/2020    CALCIUM 9.1 01/15/2020    PHOS 3.1 09/23/2019     Pt was found to have elevated level of PTH, and low calcium level and low vitamin-D level in 2019.  She was treated with magnesium oxide 3 times a day because magnesium was only 0.7  and then the level improved significantly to 1.8 then the dose was changed to only once a day  and magnesium level was down to 1.1  PATIENT SAID SHE HAS BEEN TAKING MAGNESIUM ONCE A DAY, AND NOT TAKING CALCIUM AND VITAMIN-D.  TAKING VITAMIN-D MED ONCE A WEEK    PATIENT CAME WITH HER  THIS MORNING FROM [place], [place]  LAST VISIT HERE WAS IN APRIL 2020  PATIENT SAID SHE DID FEEL WELL THIS MORNING WHEN SHE WAS GETTING THE LOCAL NEWSPAPER  BUT SHE HAS NO SPECIFIC COMPLAINTS AND SHEIS NOT HAVING NAUSEA, VOMITING, CHEST PAIN  OR OTHER SYMPTOMS.  History of feeling pain and needles in her feet and feeling "feet asleep".  No Fracture history except for one toe fx years ago  No Kidney stone  No Bone pain  One front tooth has been loose.  No jaw pain or gum pain.  Off Fosamax.  On B12 once a month  Breast cancer in 2005, treated by lumpectomy, chemo and radiation x 6 weeks  History of Depo Inj x couple y many y ago.    Past Medical History:   Diagnosis Date    Acute pancreatitis 3/21/2016    Breast cancer 2005    right side with lumpectomy, chemoand radiation    Closed fracture of ramus of right pubis 6/8/2016    Colon polyp     last scope 4/2012- repeat 10 y per pt- Dr. Johnson    GERD (gastroesophageal reflux disease) 5/29/2012    Hyperlipidemia 4/16/2013    Hypertension     Osteopenia        Past Surgical History:   Procedure Laterality Date    BREAST LUMPECTOMY "  2005    DILATION AND CURETTAGE OF UTERUS      ESOPHAGEAL DILATION      ESOPHAGOGASTRODUODENOSCOPY  6/1/2012    ROTATOR CUFF REPAIR      TEAR DUCT SURGERY      right rye       Social History     Socioeconomic History    Marital status:      Spouse name: Not on file    Number of children: Not on file    Years of education: Not on file    Highest education level: Not on file   Occupational History    Not on file   Social Needs    Financial resource strain: Not on file    Food insecurity     Worry: Not on file     Inability: Not on file    Transportation needs     Medical: Not on file     Non-medical: Not on file   Tobacco Use    Smoking status: Former Smoker     Packs/day: 0.30     Types: Cigarettes     Quit date: 5/29/2005     Years since quitting: 15.3    Smokeless tobacco: Never Used   Substance and Sexual Activity    Alcohol use: Yes     Alcohol/week: 2.0 standard drinks     Types: 2 Standard drinks or equivalent per week     Comment: prior 6 pack/week    Drug use: Yes    Sexual activity: Not on file   Lifestyle    Physical activity     Days per week: Not on file     Minutes per session: Not on file    Stress: Not on file   Relationships    Social connections     Talks on phone: Not on file     Gets together: Not on file     Attends Yazidi service: Not on file     Active member of club or organization: Not on file     Attends meetings of clubs or organizations: Not on file     Relationship status: Not on file   Other Topics Concern    Not on file   Social History Narrative    Not on file         ROS:   No complaints of nausea or vomiting  No complaints of chest pain or shortness breath  No complaint of jaw pain  History of breast cancer  History of No fracture except for toe fracture    PE:  Alert and oriented  No acute distress  No proptosis or conjunctivitis  No goitre by inspection  Thyroid gland is not palpable  Heart reg, no gallop  Lungs cta, no wheezing  No edema in lower  legs  No tremor in hands  Behavior normal  Speech normal    Lab:       Ref. Range 4/10/2019 11:43 7/10/2019 10:53 7/24/2019 09:45 7/25/2019 09:10 8/19/2019 10:50   Calcium Latest Ref Range: 8.7 - 10.5 mg/dL 7.9 (L) 6.8 (LL) 8.8  9.7   Phosphorus Latest Ref Range: 2.7 - 4.5 mg/dL 2.6 (L) 2.3 (L)   3.7   Magnesium Latest Ref Range: 1.6 - 2.6 mg/dL   0.7 (LL)  1.8   Vit D, 25-Hydroxy Latest Ref Range: 30 - 96 ng/mL 27 (L) 31      PTH Latest Ref Range: 9.0 - 77.0 pg/mL 358.0 (H) 252.0 (H)           Ref. Range 2/4/2009 08:27 11/12/2010 10:20 5/29/2012 10:20 4/16/2013 15:50 12/20/2013 08:38 3/30/2015 09:40 4/6/2016 09:40 7/19/2016 09:40 7/18/2017 09:24 9/7/2017 09:04 1/22/2019 08:24 4/10/2019 11:43 7/10/2019 10:53 7/24/2019 09:45 8/19/2019 10:50 9/23/2019 09:08   Calcium Latest Ref Range: 8.7 - 10.5 mg/dL 10.2 9.2 9.2 9.1 9.3 8.7 8.6 (L) 8.9 8.2 (L) 8.8 7.9 (L) 7.9 (L) 6.8 (LL) 8.8 9.7 8.4 (L)     A/P:  Hypocalcemia  Hypomagnesemia   Secondary Hyperparathyroidism  Significant elevation in parathyroid hormone  Serum PTH improved from above 300 to 144  Vitamin D deficiency  25 hydroxy vitamin-D level   History of breast cancer  On Mag Oxide 400 mg daily  Not taking CALCIUM 600 MG+ Vit D 800 units once a day  Vit D 50,000 units once a week  Orders Placed This Encounter   Procedures    Calcium     Standing Status:   Future     Number of Occurrences:   1     Standing Expiration Date:   1/15/2021    PTH, intact     Standing Status:   Future     Number of Occurrences:   1     Standing Expiration Date:   1/15/2021    Vitamin D     Standing Status:   Future     Number of Occurrences:   1     Standing Expiration Date:   1/15/2021    Magnesium     Standing Status:   Future     Number of Occurrences:   1     Standing Expiration Date:   11/14/2021         History of osteopenia  History of T-score was -2.2   Bone density scan recently showed a T-score -1        Off Fosamax     Appt in 3 months.

## 2020-09-15 NOTE — TELEPHONE ENCOUNTER
Last B12, in January, persistent anemia and she was referred to hematology, appt scheduled but patient cancelled and did not go, please advise.

## 2020-09-16 RX ORDER — CALCIUM CARBONATE 500(1250)
TABLET ORAL
Qty: 120 TABLET | Refills: 0 | COMMUNITY
Start: 2020-09-16 | End: 2020-09-18

## 2020-09-18 ENCOUNTER — TELEPHONE (OUTPATIENT)
Dept: FAMILY MEDICINE | Facility: CLINIC | Age: 65
End: 2020-09-18

## 2020-09-18 DIAGNOSIS — E83.51 HYPOCALCEMIA: Primary | ICD-10-CM

## 2020-09-18 RX ORDER — CALCIUM CARBONATE 500(1250)
TABLET ORAL
Qty: 120 TABLET | Refills: 0 | COMMUNITY
Start: 2020-09-18

## 2020-09-22 ENCOUNTER — TELEPHONE (OUTPATIENT)
Dept: ENDOCRINOLOGY | Facility: CLINIC | Age: 65
End: 2020-09-22

## 2020-09-22 NOTE — TELEPHONE ENCOUNTER
Spoke with pt, notified of the medication freq. Pt verbalized understanding, pt also stated she will  the Ca+ medication from the pharmacy. She prefers the prescription rather than the OTC Ca+ tablet.

## 2020-09-22 NOTE — TELEPHONE ENCOUNTER
----- Message from Mckenna Sharpe MD sent at 9/18/2020  2:54 PM CDT -----  Bc she is not taking OTC Calcium,   I have sent prescription for calcium 500 mg tablets.  The does need to be 1 tablet 4 times a day.  She need to start taking this afternoon ( let her take today  a total of 2 tablets, so 1 this afternoon and another 1 at night)  ----- Message -----  From: Isra Ferreira MA  Sent: 9/18/2020  10:19 AM CDT  To: Mckenna Sharpe MD    Called and spoke with pt, pt stated she is feeling better but did not received the Ca+ prescription. Is it okay for pt to take OTC calcium medication?  ----- Message -----  From: Mckenna Sharpe MD  Sent: 9/18/2020  10:07 AM CDT  To: [facility] Staff    Ask the pt if she is feeling better after starting on calcium and magnesium.  She will need to have blood test done in 3 weeks, and to have labs done before next visit.

## 2020-10-01 ENCOUNTER — PATIENT MESSAGE (OUTPATIENT)
Dept: OTHER | Facility: OTHER | Age: 65
End: 2020-10-01

## 2020-10-09 ENCOUNTER — TELEPHONE (OUTPATIENT)
Dept: FAMILY MEDICINE | Facility: CLINIC | Age: 65
End: 2020-10-09

## 2020-10-09 DIAGNOSIS — E53.8 VITAMIN B 12 DEFICIENCY: Primary | ICD-10-CM

## 2020-10-09 RX ORDER — CYANOCOBALAMIN 1000 UG/ML
1000 INJECTION, SOLUTION INTRAMUSCULAR; SUBCUTANEOUS
Status: DISCONTINUED | OUTPATIENT
Start: 2020-10-15 | End: 2020-12-23

## 2020-10-12 ENCOUNTER — LAB VISIT (OUTPATIENT)
Dept: LAB | Facility: HOSPITAL | Age: 65
End: 2020-10-12
Attending: INTERNAL MEDICINE
Payer: MEDICARE

## 2020-10-12 DIAGNOSIS — E83.51 HYPOCALCEMIA: ICD-10-CM

## 2020-10-12 LAB
ALBUMIN SERPL BCP-MCNC: 3.6 G/DL (ref 3.5–5.2)
CALCIUM SERPL-MCNC: 9.1 MG/DL (ref 8.7–10.5)
MAGNESIUM SERPL-MCNC: 1.7 MG/DL (ref 1.6–2.6)
PHOSPHATE SERPL-MCNC: 3.1 MG/DL (ref 2.7–4.5)
PTH-INTACT SERPL-MCNC: 215 PG/ML (ref 9–77)

## 2020-10-12 PROCEDURE — 36415 COLL VENOUS BLD VENIPUNCTURE: CPT | Mod: PO

## 2020-10-12 PROCEDURE — 83735 ASSAY OF MAGNESIUM: CPT

## 2020-10-12 PROCEDURE — 82040 ASSAY OF SERUM ALBUMIN: CPT

## 2020-10-12 PROCEDURE — 82310 ASSAY OF CALCIUM: CPT

## 2020-10-12 PROCEDURE — 83970 ASSAY OF PARATHORMONE: CPT

## 2020-10-12 PROCEDURE — 84100 ASSAY OF PHOSPHORUS: CPT

## 2020-10-13 PROBLEM — N63.25 MASS OVERLAPPING MULTIPLE QUADRANTS OF LEFT BREAST: Status: ACTIVE | Noted: 2020-10-13

## 2020-10-13 PROBLEM — C50.911 INFILTRATING DUCTAL CARCINOMA OF BREAST, RIGHT: Status: ACTIVE | Noted: 2020-10-13

## 2020-10-13 PROBLEM — Z12.31 ENCOUNTER FOR SCREENING MAMMOGRAM FOR BREAST CANCER: Status: ACTIVE | Noted: 2020-10-13

## 2020-10-13 PROBLEM — N63.15 MASS OVERLAPPING MULTIPLE QUADRANTS OF RIGHT BREAST: Status: ACTIVE | Noted: 2020-10-13

## 2020-10-15 ENCOUNTER — CLINICAL SUPPORT (OUTPATIENT)
Dept: FAMILY MEDICINE | Facility: CLINIC | Age: 65
End: 2020-10-15
Payer: MEDICARE

## 2020-10-15 ENCOUNTER — IMMUNIZATION (OUTPATIENT)
Dept: FAMILY MEDICINE | Facility: CLINIC | Age: 65
End: 2020-10-15
Payer: MEDICARE

## 2020-10-15 DIAGNOSIS — E53.8 VITAMIN B 12 DEFICIENCY: Primary | ICD-10-CM

## 2020-10-15 PROCEDURE — G0008 ADMIN INFLUENZA VIRUS VAC: HCPCS | Mod: PBBFAC,PO

## 2020-10-15 PROCEDURE — 99999 PR PBB SHADOW E&M-EST. PATIENT-LVL II: CPT | Mod: PBBFAC,,,

## 2020-10-15 PROCEDURE — 90694 VACC AIIV4 NO PRSRV 0.5ML IM: CPT | Mod: PBBFAC,PO

## 2020-10-15 PROCEDURE — 96372 THER/PROPH/DIAG INJ SC/IM: CPT | Mod: PBBFAC,PO

## 2020-10-15 PROCEDURE — 99999 PR PBB SHADOW E&M-EST. PATIENT-LVL II: ICD-10-PCS | Mod: PBBFAC,,,

## 2020-10-15 PROCEDURE — 99212 OFFICE O/P EST SF 10 MIN: CPT | Mod: PBBFAC,PO

## 2020-10-15 RX ADMIN — CYANOCOBALAMIN 1000 MCG: 1000 INJECTION, SOLUTION INTRAMUSCULAR at 10:10

## 2020-10-31 ENCOUNTER — EXTERNAL CHRONIC CARE MANAGEMENT (OUTPATIENT)
Dept: PRIMARY CARE CLINIC | Facility: CLINIC | Age: 65
End: 2020-10-31
Payer: MEDICARE

## 2020-10-31 PROCEDURE — 99490 CHRNC CARE MGMT STAFF 1ST 20: CPT | Mod: PBBFAC,PO | Performed by: FAMILY MEDICINE

## 2020-10-31 PROCEDURE — 99490 PR CHRONIC CARE MGMT, 1ST 20 MIN: ICD-10-PCS | Mod: S$PBB,,, | Performed by: FAMILY MEDICINE

## 2020-10-31 PROCEDURE — 99490 CHRNC CARE MGMT STAFF 1ST 20: CPT | Mod: S$PBB,,, | Performed by: FAMILY MEDICINE

## 2020-11-13 ENCOUNTER — CLINICAL SUPPORT (OUTPATIENT)
Dept: FAMILY MEDICINE | Facility: CLINIC | Age: 65
End: 2020-11-13
Payer: MEDICARE

## 2020-11-13 DIAGNOSIS — E53.8 B12 DEFICIENCY: Primary | ICD-10-CM

## 2020-11-13 PROCEDURE — 99212 OFFICE O/P EST SF 10 MIN: CPT | Mod: PBBFAC,PO

## 2020-11-13 PROCEDURE — 96372 THER/PROPH/DIAG INJ SC/IM: CPT | Mod: PBBFAC,PO

## 2020-11-13 PROCEDURE — 99999 PR PBB SHADOW E&M-EST. PATIENT-LVL II: ICD-10-PCS | Mod: PBBFAC,,,

## 2020-11-13 PROCEDURE — 99999 PR PBB SHADOW E&M-EST. PATIENT-LVL II: CPT | Mod: PBBFAC,,,

## 2020-11-13 RX ADMIN — CYANOCOBALAMIN 1000 MCG: 1000 INJECTION, SOLUTION INTRAMUSCULAR at 12:11

## 2020-11-30 ENCOUNTER — EXTERNAL CHRONIC CARE MANAGEMENT (OUTPATIENT)
Dept: PRIMARY CARE CLINIC | Facility: CLINIC | Age: 65
End: 2020-11-30
Payer: MEDICARE

## 2020-11-30 PROCEDURE — 99490 PR CHRONIC CARE MGMT, 1ST 20 MIN: ICD-10-PCS | Mod: S$PBB,,, | Performed by: FAMILY MEDICINE

## 2020-11-30 PROCEDURE — 99490 CHRNC CARE MGMT STAFF 1ST 20: CPT | Mod: S$PBB,,, | Performed by: FAMILY MEDICINE

## 2020-11-30 PROCEDURE — 99490 CHRNC CARE MGMT STAFF 1ST 20: CPT | Mod: PBBFAC,PO | Performed by: FAMILY MEDICINE

## 2020-12-14 ENCOUNTER — PATIENT OUTREACH (OUTPATIENT)
Dept: ADMINISTRATIVE | Facility: OTHER | Age: 65
End: 2020-12-14

## 2020-12-15 ENCOUNTER — LAB VISIT (OUTPATIENT)
Dept: LAB | Facility: HOSPITAL | Age: 65
End: 2020-12-15
Attending: INTERNAL MEDICINE
Payer: MEDICARE

## 2020-12-15 ENCOUNTER — OFFICE VISIT (OUTPATIENT)
Dept: ENDOCRINOLOGY | Facility: CLINIC | Age: 65
End: 2020-12-15
Payer: MEDICARE

## 2020-12-15 VITALS
SYSTOLIC BLOOD PRESSURE: 155 MMHG | BODY MASS INDEX: 29.62 KG/M2 | DIASTOLIC BLOOD PRESSURE: 77 MMHG | HEIGHT: 65 IN | HEART RATE: 92 BPM

## 2020-12-15 DIAGNOSIS — E83.42 HYPOMAGNESEMIA: Primary | ICD-10-CM

## 2020-12-15 DIAGNOSIS — E83.51 HYPOCALCEMIA: ICD-10-CM

## 2020-12-15 DIAGNOSIS — E83.42 HYPOMAGNESEMIA: ICD-10-CM

## 2020-12-15 LAB
CALCIUM SERPL-MCNC: 8.8 MG/DL (ref 8.7–10.5)
MAGNESIUM SERPL-MCNC: 1.5 MG/DL (ref 1.6–2.6)

## 2020-12-15 PROCEDURE — 83735 ASSAY OF MAGNESIUM: CPT

## 2020-12-15 PROCEDURE — 99214 PR OFFICE/OUTPT VISIT, EST, LEVL IV, 30-39 MIN: ICD-10-PCS | Mod: S$PBB,,, | Performed by: INTERNAL MEDICINE

## 2020-12-15 PROCEDURE — 99999 PR PBB SHADOW E&M-EST. PATIENT-LVL III: CPT | Mod: PBBFAC,,, | Performed by: INTERNAL MEDICINE

## 2020-12-15 PROCEDURE — 99214 OFFICE O/P EST MOD 30 MIN: CPT | Mod: S$PBB,,, | Performed by: INTERNAL MEDICINE

## 2020-12-15 PROCEDURE — 99213 OFFICE O/P EST LOW 20 MIN: CPT | Mod: PBBFAC | Performed by: INTERNAL MEDICINE

## 2020-12-15 PROCEDURE — 99999 PR PBB SHADOW E&M-EST. PATIENT-LVL III: ICD-10-PCS | Mod: PBBFAC,,, | Performed by: INTERNAL MEDICINE

## 2020-12-15 PROCEDURE — 36415 COLL VENOUS BLD VENIPUNCTURE: CPT

## 2020-12-15 PROCEDURE — 82310 ASSAY OF CALCIUM: CPT

## 2020-12-15 RX ORDER — ERGOCALCIFEROL 1.25 MG/1
CAPSULE ORAL
Qty: 4 CAPSULE | Refills: 5 | Status: SHIPPED | OUTPATIENT
Start: 2020-12-15 | End: 2023-08-30

## 2020-12-15 RX ORDER — LANOLIN ALCOHOL/MO/W.PET/CERES
CREAM (GRAM) TOPICAL
Qty: 60 TABLET | Refills: 1 | Status: SHIPPED | OUTPATIENT
Start: 2020-12-15

## 2020-12-15 NOTE — PROGRESS NOTES
"      Magnesium level was critically low 0.9  Serum calcium was 7.5    -------------------------------------    Referring Provider:  Katty Marie NP    PCP:  Taqueria Melendrez MD    Reason for referral:   Parathyroid hormone excess  Low vitamin-D level  Low calcium levels    Carla Dang 65 y.o. female    CC:  Follow-up on the abnormal test.     HPI:  Lab Results   Component Value Date    .0 (H) 10/12/2020    CALCIUM 9.1 10/12/2020    PHOS 3.1 10/12/2020      Ref. Range 2/18/2020 12:00 9/15/2020 10:12 10/12/2020 10:38   Magnesium Latest Ref Range: 1.6 - 2.6 mg/dL 1.5 (L) 0.9 (LL) 1.7     Patient is feeling fine.  Pt was found to have elevated level of PTH, and low calcium level and low vitamin-D level in 2019.  She was treated with magnesium oxide 3 times a day because magnesium was only 0.7  and then the level improved significantly to 1.8 then the dose was changed to only once a day  and magnesium level was down to 1.1.  Last magnesium was 1.7  Taking currently:  Magnesium medication twice a day  Calcium pill 4 times a day  Vitamin-D once a week    History of feeling pain and needles in her feet and feeling "feet asleep".  No Fracture history except for one toe fx years ago  No Kidney stone  No Bone pain  One front tooth has been loose.  No jaw pain or gum pain.  Off Fosamax.  On B12 once a month  Breast cancer in 2005, treated by lumpectomy, chemo and radiation x 6 weeks  History of Depo Inj x couple y many y ago.    Past Medical History:   Diagnosis Date    Acute pancreatitis 3/21/2016    Breast cancer 2005    right side with lumpectomy, chemo and radiation    Closed fracture of ramus of right pubis 6/8/2016    Colon polyp     last scope 4/2012- repeat 10 y per pt- Dr. Johnson    GERD (gastroesophageal reflux disease) 5/29/2012    Hyperlipidemia 4/16/2013    Hypertension     Osteopenia        Past Surgical History:   Procedure Laterality Date    BREAST LUMPECTOMY  2005    " DILATION AND CURETTAGE OF UTERUS      ESOPHAGEAL DILATION      ESOPHAGOGASTRODUODENOSCOPY  6/1/2012    ROTATOR CUFF REPAIR      TEAR DUCT SURGERY      right rye       Social History     Socioeconomic History    Marital status:      Spouse name: Not on file    Number of children: Not on file    Years of education: Not on file    Highest education level: Not on file   Occupational History    Not on file   Social Needs    Financial resource strain: Not on file    Food insecurity     Worry: Not on file     Inability: Not on file    Transportation needs     Medical: Not on file     Non-medical: Not on file   Tobacco Use    Smoking status: Former Smoker     Packs/day: 0.30     Types: Cigarettes     Quit date: 5/29/2005     Years since quitting: 15.5    Smokeless tobacco: Never Used   Substance and Sexual Activity    Alcohol use: Yes     Alcohol/week: 2.0 standard drinks     Types: 2 Standard drinks or equivalent per week     Comment: prior 6 pack/week    Drug use: Yes    Sexual activity: Not on file   Lifestyle    Physical activity     Days per week: 0 days     Minutes per session: 0 min    Stress: Only a little   Relationships    Social connections     Talks on phone: More than three times a week     Gets together: More than three times a week     Attends [employer] service: 1 to 4 times per year     Active member of club or organization: Yes     Attends meetings of clubs or organizations: 1 to 4 times per year     Relationship status:    Other Topics Concern    Not on file   Social History Narrative    Not on file         ROS:   No complaints of nausea or vomiting  No complaints of chest pain or shortness breath  No complaints of tingling or numbness  No complaint of jaw pain  -------------------  History of breast cancer  History of No fracture except for toe fracture    PE:  Alert and oriented  No acute distress  No proptosis or conjunctivitis  No goitre by inspection  Thyroid gland  is not palpable  Heart reg, no gallop  Lungs cta, no wheezing  No edema in lower legs  No tremor in hands  Behavior normal  Speech normal    Lab:       Ref. Range 4/10/2019 11:43 7/10/2019 10:53 7/24/2019 09:45 7/25/2019 09:10 8/19/2019 10:50   Calcium Latest Ref Range: 8.7 - 10.5 mg/dL 7.9 (L) 6.8 (LL) 8.8  9.7   Phosphorus Latest Ref Range: 2.7 - 4.5 mg/dL 2.6 (L) 2.3 (L)   3.7   Magnesium Latest Ref Range: 1.6 - 2.6 mg/dL   0.7 (LL)  1.8   Vit D, 25-Hydroxy Latest Ref Range: 30 - 96 ng/mL 27 (L) 31      PTH Latest Ref Range: 9.0 - 77.0 pg/mL 358.0 (H) 252.0 (H)           Ref. Range 2/4/2009 08:27 11/12/2010 10:20 5/29/2012 10:20 4/16/2013 15:50 12/20/2013 08:38 3/30/2015 09:40 4/6/2016 09:40 7/19/2016 09:40 7/18/2017 09:24 9/7/2017 09:04 1/22/2019 08:24 4/10/2019 11:43 7/10/2019 10:53 7/24/2019 09:45 8/19/2019 10:50 9/23/2019 09:08   Calcium Latest Ref Range: 8.7 - 10.5 mg/dL 10.2 9.2 9.2 9.1 9.3 8.7 8.6 (L) 8.9 8.2 (L) 8.8 7.9 (L) 7.9 (L) 6.8 (LL) 8.8 9.7 8.4 (L)     A/P:  Hypocalcemia  Hypomagnesemia   Secondary Hyperparathyroidism  Significant elevation in parathyroid hormone  Serum PTH improved from above 300 to 144 and last couple PTH levels have been just above 200  Vitamin D deficiency  History of breast cancer  On Mag Oxide 400 mg twice a day  On calcium over-the-counter 4 times a day  On Vit D 50,000 units once a week    Orders Placed This Encounter   Procedures    Calcium     Standing Status:   Future     Number of Occurrences:   1     Standing Expiration Date:   4/15/2021    Magnesium     Standing Status:   Future     Number of Occurrences:   1     Standing Expiration Date:   2/13/2022           History of osteopenia  History of T-score was -2.2   Bone density scan recently showed a T-score -1        Off Fosamax     Appt in 2 months.

## 2020-12-17 ENCOUNTER — TELEPHONE (OUTPATIENT)
Dept: FAMILY MEDICINE | Facility: CLINIC | Age: 65
End: 2020-12-17

## 2020-12-17 NOTE — TELEPHONE ENCOUNTER
----- Message from Loyda Aaron sent at 12/17/2020 10:17 AM CST -----  Regarding: Appt  Contact: self--[phone]  Would like to consult with the nurse, patient has an Appt on Dec 18 and would like to know if she can get an Earlier Time to be seen in the office, patient would like a call back concerning this, please call back thanks sj

## 2020-12-17 NOTE — TELEPHONE ENCOUNTER
Patient informed no other available times on nurse visit schedule tomorrow, she will keep 315 appt

## 2020-12-23 ENCOUNTER — TELEPHONE (OUTPATIENT)
Dept: FAMILY MEDICINE | Facility: CLINIC | Age: 65
End: 2020-12-23

## 2020-12-23 DIAGNOSIS — E53.8 VITAMIN B 12 DEFICIENCY: ICD-10-CM

## 2020-12-23 RX ORDER — CYANOCOBALAMIN 1000 UG/ML
1000 INJECTION, SOLUTION INTRAMUSCULAR; SUBCUTANEOUS
Status: DISCONTINUED | OUTPATIENT
Start: 2020-12-31 | End: 2021-09-20

## 2020-12-23 NOTE — TELEPHONE ENCOUNTER
----- Message from Kylah Lara sent at 12/23/2020  2:23 PM CST -----  Regarding: b12/ missed appt needs to reschedule  Type:  Needs Medical Advice    Who Called: pt  Symptoms (please be specific): n/a   How long has patient had these symptoms:  n/a  Pharmacy name and phone #:  n/a  Would the patient rather a call back or a response via [facility]? Call back   Best Call Back Number: [phone]  Additional Information: Please call back.Thanks

## 2020-12-28 RX ORDER — PRAVASTATIN SODIUM 20 MG/1
TABLET ORAL
Qty: 90 TABLET | Refills: 0 | Status: SHIPPED | OUTPATIENT
Start: 2020-12-28 | End: 2021-03-26

## 2020-12-31 ENCOUNTER — EXTERNAL CHRONIC CARE MANAGEMENT (OUTPATIENT)
Dept: PRIMARY CARE CLINIC | Facility: CLINIC | Age: 65
End: 2020-12-31
Payer: MEDICARE

## 2020-12-31 ENCOUNTER — CLINICAL SUPPORT (OUTPATIENT)
Dept: FAMILY MEDICINE | Facility: CLINIC | Age: 65
End: 2020-12-31
Payer: MEDICARE

## 2020-12-31 DIAGNOSIS — E53.8 B12 DEFICIENCY: Primary | ICD-10-CM

## 2020-12-31 PROCEDURE — 99490 CHRNC CARE MGMT STAFF 1ST 20: CPT | Mod: S$PBB,,, | Performed by: FAMILY MEDICINE

## 2020-12-31 PROCEDURE — 99490 CHRNC CARE MGMT STAFF 1ST 20: CPT | Mod: PBBFAC,PO | Performed by: FAMILY MEDICINE

## 2020-12-31 PROCEDURE — 99490 PR CHRONIC CARE MGMT, 1ST 20 MIN: ICD-10-PCS | Mod: S$PBB,,, | Performed by: FAMILY MEDICINE

## 2020-12-31 PROCEDURE — 99999 PR PBB SHADOW E&M-EST. PATIENT-LVL II: ICD-10-PCS | Mod: PBBFAC,,,

## 2020-12-31 PROCEDURE — 96372 THER/PROPH/DIAG INJ SC/IM: CPT | Mod: PBBFAC,PO

## 2020-12-31 PROCEDURE — 99999 PR PBB SHADOW E&M-EST. PATIENT-LVL II: CPT | Mod: PBBFAC,,,

## 2020-12-31 RX ADMIN — CYANOCOBALAMIN 1000 MCG: 1000 INJECTION, SOLUTION INTRAMUSCULAR at 01:12

## 2021-01-29 ENCOUNTER — CLINICAL SUPPORT (OUTPATIENT)
Dept: FAMILY MEDICINE | Facility: CLINIC | Age: 66
End: 2021-01-29
Payer: MEDICARE

## 2021-01-29 DIAGNOSIS — E53.8 B12 DEFICIENCY: Primary | ICD-10-CM

## 2021-01-29 PROCEDURE — 96372 THER/PROPH/DIAG INJ SC/IM: CPT | Mod: PBBFAC,PO

## 2021-01-29 PROCEDURE — 99999 PR PBB SHADOW E&M-EST. PATIENT-LVL II: CPT | Mod: PBBFAC,,,

## 2021-01-29 PROCEDURE — 99999 PR PBB SHADOW E&M-EST. PATIENT-LVL II: ICD-10-PCS | Mod: PBBFAC,,,

## 2021-01-29 RX ADMIN — CYANOCOBALAMIN 1000 MCG: 1000 INJECTION, SOLUTION INTRAMUSCULAR at 10:01

## 2021-01-31 ENCOUNTER — EXTERNAL CHRONIC CARE MANAGEMENT (OUTPATIENT)
Dept: PRIMARY CARE CLINIC | Facility: CLINIC | Age: 66
End: 2021-01-31
Payer: MEDICARE

## 2021-01-31 PROCEDURE — 99490 CHRNC CARE MGMT STAFF 1ST 20: CPT | Mod: S$PBB,,, | Performed by: FAMILY MEDICINE

## 2021-01-31 PROCEDURE — 99490 PR CHRONIC CARE MGMT, 1ST 20 MIN: ICD-10-PCS | Mod: S$PBB,,, | Performed by: FAMILY MEDICINE

## 2021-01-31 PROCEDURE — 99490 CHRNC CARE MGMT STAFF 1ST 20: CPT | Mod: PBBFAC,PO | Performed by: FAMILY MEDICINE

## 2021-02-28 ENCOUNTER — EXTERNAL CHRONIC CARE MANAGEMENT (OUTPATIENT)
Dept: PRIMARY CARE CLINIC | Facility: CLINIC | Age: 66
End: 2021-02-28
Payer: MEDICARE

## 2021-02-28 PROCEDURE — 99490 CHRNC CARE MGMT STAFF 1ST 20: CPT | Mod: S$PBB,,, | Performed by: FAMILY MEDICINE

## 2021-02-28 PROCEDURE — 99490 CHRNC CARE MGMT STAFF 1ST 20: CPT | Mod: PBBFAC,PO | Performed by: FAMILY MEDICINE

## 2021-02-28 PROCEDURE — 99490 PR CHRONIC CARE MGMT, 1ST 20 MIN: ICD-10-PCS | Mod: S$PBB,,, | Performed by: FAMILY MEDICINE

## 2021-03-05 ENCOUNTER — CLINICAL SUPPORT (OUTPATIENT)
Dept: FAMILY MEDICINE | Facility: CLINIC | Age: 66
End: 2021-03-05
Payer: MEDICARE

## 2021-03-05 DIAGNOSIS — E53.8 B12 DEFICIENCY: Primary | ICD-10-CM

## 2021-03-05 PROCEDURE — 99999 PR PBB SHADOW E&M-EST. PATIENT-LVL II: ICD-10-PCS | Mod: PBBFAC,,,

## 2021-03-05 PROCEDURE — 99999 PR PBB SHADOW E&M-EST. PATIENT-LVL II: CPT | Mod: PBBFAC,,,

## 2021-03-05 PROCEDURE — 96372 THER/PROPH/DIAG INJ SC/IM: CPT | Mod: PBBFAC,PO

## 2021-03-05 RX ADMIN — CYANOCOBALAMIN 1000 MCG: 1000 INJECTION, SOLUTION INTRAMUSCULAR at 10:03

## 2021-03-16 ENCOUNTER — TELEPHONE (OUTPATIENT)
Dept: ENDOCRINOLOGY | Facility: CLINIC | Age: 66
End: 2021-03-16

## 2021-03-19 ENCOUNTER — TELEPHONE (OUTPATIENT)
Dept: ENDOCRINOLOGY | Facility: CLINIC | Age: 66
End: 2021-03-19

## 2021-03-25 ENCOUNTER — PATIENT MESSAGE (OUTPATIENT)
Dept: ADMINISTRATIVE | Facility: HOSPITAL | Age: 66
End: 2021-03-25

## 2021-03-26 RX ORDER — PRAVASTATIN SODIUM 20 MG/1
TABLET ORAL
Qty: 90 TABLET | Refills: 0 | Status: SHIPPED | OUTPATIENT
Start: 2021-03-26 | End: 2021-07-18

## 2021-03-29 ENCOUNTER — TELEPHONE (OUTPATIENT)
Dept: FAMILY MEDICINE | Facility: CLINIC | Age: 66
End: 2021-03-29

## 2021-03-31 ENCOUNTER — EXTERNAL CHRONIC CARE MANAGEMENT (OUTPATIENT)
Dept: PRIMARY CARE CLINIC | Facility: CLINIC | Age: 66
End: 2021-03-31
Payer: MEDICARE

## 2021-03-31 PROCEDURE — 99490 PR CHRONIC CARE MGMT, 1ST 20 MIN: ICD-10-PCS | Mod: S$PBB,,, | Performed by: FAMILY MEDICINE

## 2021-03-31 PROCEDURE — 99490 CHRNC CARE MGMT STAFF 1ST 20: CPT | Mod: PBBFAC,PO | Performed by: FAMILY MEDICINE

## 2021-03-31 PROCEDURE — 99490 CHRNC CARE MGMT STAFF 1ST 20: CPT | Mod: S$PBB,,, | Performed by: FAMILY MEDICINE

## 2021-04-01 ENCOUNTER — CLINICAL SUPPORT (OUTPATIENT)
Dept: FAMILY MEDICINE | Facility: CLINIC | Age: 66
End: 2021-04-01
Payer: MEDICARE

## 2021-04-01 DIAGNOSIS — E53.8 B12 DEFICIENCY: Primary | ICD-10-CM

## 2021-04-01 PROCEDURE — 96372 THER/PROPH/DIAG INJ SC/IM: CPT | Mod: PBBFAC,PO

## 2021-04-01 PROCEDURE — 99999 PR PBB SHADOW E&M-EST. PATIENT-LVL II: ICD-10-PCS | Mod: PBBFAC,,,

## 2021-04-01 PROCEDURE — 99999 PR PBB SHADOW E&M-EST. PATIENT-LVL II: CPT | Mod: PBBFAC,,,

## 2021-04-01 RX ADMIN — CYANOCOBALAMIN 1000 MCG: 1000 INJECTION, SOLUTION INTRAMUSCULAR at 03:04

## 2021-04-06 ENCOUNTER — OFFICE VISIT (OUTPATIENT)
Dept: FAMILY MEDICINE | Facility: CLINIC | Age: 66
End: 2021-04-06
Payer: MEDICARE

## 2021-04-06 ENCOUNTER — HOSPITAL ENCOUNTER (OUTPATIENT)
Dept: RADIOLOGY | Facility: HOSPITAL | Age: 66
Discharge: HOME | End: 2021-04-06
Attending: NURSE PRACTITIONER
Payer: MEDICARE

## 2021-04-06 VITALS
SYSTOLIC BLOOD PRESSURE: 149 MMHG | HEIGHT: 65 IN | WEIGHT: 170.19 LBS | HEART RATE: 87 BPM | BODY MASS INDEX: 28.36 KG/M2 | TEMPERATURE: 98 F | DIASTOLIC BLOOD PRESSURE: 92 MMHG

## 2021-04-06 DIAGNOSIS — S19.9XXA INJURY OF NECK, INITIAL ENCOUNTER: ICD-10-CM

## 2021-04-06 DIAGNOSIS — S19.9XXA INJURY OF NECK, INITIAL ENCOUNTER: Primary | ICD-10-CM

## 2021-04-06 DIAGNOSIS — M79.10 MUSCLE PAIN: ICD-10-CM
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
## 2021-04-06 DIAGNOSIS — M54.2 NECK PAIN: ICD-10-CM

## 2021-04-06 PROCEDURE — 99213 OFFICE O/P EST LOW 20 MIN: CPT | Mod: S$PBB,,, | Performed by: NURSE PRACTITIONER

## 2021-04-06 PROCEDURE — 99999 PR PBB SHADOW E&M-EST. PATIENT-LVL IV: ICD-10-PCS | Mod: PBBFAC,,, | Performed by: NURSE PRACTITIONER

## 2021-04-06 PROCEDURE — 72040 XR CERVICAL SPINE AP LATERAL: ICD-10-PCS | Mod: 26,,, | Performed by: RADIOLOGY

## 2021-04-06 PROCEDURE — 99999 PR PBB SHADOW E&M-EST. PATIENT-LVL IV: CPT | Mod: PBBFAC,,, | Performed by: NURSE PRACTITIONER

## 2021-04-06 PROCEDURE — 99213 PR OFFICE/OUTPT VISIT, EST, LEVL III, 20-29 MIN: ICD-10-PCS | Mod: S$PBB,,, | Performed by: NURSE PRACTITIONER

## 2021-04-06 PROCEDURE — 72040 X-RAY EXAM NECK SPINE 2-3 VW: CPT | Mod: 26,,, | Performed by: RADIOLOGY

## 2021-04-06 PROCEDURE — 72040 X-RAY EXAM NECK SPINE 2-3 VW: CPT | Mod: TC,PO

## 2021-04-06 PROCEDURE — 99214 OFFICE O/P EST MOD 30 MIN: CPT | Mod: PBBFAC,25,PO | Performed by: NURSE PRACTITIONER

## 2021-04-06 RX ORDER — BACLOFEN 20 MG/1
20 TABLET ORAL 2 TIMES DAILY PRN
Qty: 14 TABLET | Refills: 0 | Status: SHIPPED | OUTPATIENT
Start: 2021-04-06 | End: 2021-04-19

## 2021-04-06 RX ORDER — DICLOFENAC SODIUM 50 MG/1
50 TABLET, DELAYED RELEASE ORAL 2 TIMES DAILY
Qty: 20 TABLET | Refills: 0 | Status: SHIPPED | OUTPATIENT
Start: 2021-04-06 | End: 2021-04-16

## 2021-04-19 ENCOUNTER — OFFICE VISIT (OUTPATIENT)
Dept: FAMILY MEDICINE | Facility: CLINIC | Age: 66
End: 2021-04-19
Payer: MEDICARE

## 2021-04-19 VITALS
DIASTOLIC BLOOD PRESSURE: 94 MMHG | SYSTOLIC BLOOD PRESSURE: 166 MMHG | HEART RATE: 90 BPM | HEIGHT: 65 IN | TEMPERATURE: 98 F | WEIGHT: 163.81 LBS | BODY MASS INDEX: 27.29 KG/M2

## 2021-04-19 DIAGNOSIS — R53.83 FATIGUE, UNSPECIFIED TYPE: Primary | ICD-10-CM

## 2021-04-19 DIAGNOSIS — E83.42 HYPOMAGNESEMIA: ICD-10-CM

## 2021-04-19 DIAGNOSIS — R11.2 NON-INTRACTABLE VOMITING WITH NAUSEA, UNSPECIFIED VOMITING TYPE: ICD-10-CM

## 2021-04-19 DIAGNOSIS — R63.4 WEIGHT LOSS, NON-INTENTIONAL: ICD-10-CM

## 2021-04-19 DIAGNOSIS — E55.9 VITAMIN D DEFICIENCY: ICD-10-CM

## 2021-04-19 DIAGNOSIS — N25.81 SECONDARY HYPERPARATHYROIDISM: ICD-10-CM

## 2021-04-19 DIAGNOSIS — E78.5 HYPERLIPIDEMIA, UNSPECIFIED HYPERLIPIDEMIA TYPE: ICD-10-CM

## 2021-04-19 DIAGNOSIS — D64.9 NORMOCYTIC ANEMIA: ICD-10-CM

## 2021-04-19 DIAGNOSIS — R20.2 PARESTHESIA OF BOTH HANDS: ICD-10-CM

## 2021-04-19 DIAGNOSIS — E53.8 VITAMIN B 12 DEFICIENCY: ICD-10-CM

## 2021-04-19 DIAGNOSIS — R63.0 DECREASED APPETITE: ICD-10-CM

## 2021-04-19 DIAGNOSIS — Z85.3 HISTORY OF BREAST CANCER: ICD-10-CM

## 2021-04-19 DIAGNOSIS — I10 ESSENTIAL HYPERTENSION: ICD-10-CM

## 2021-04-19 DIAGNOSIS — R20.2 PARESTHESIA OF FOOT, BILATERAL: ICD-10-CM

## 2021-04-19 DIAGNOSIS — R68.89 FORGETFULNESS: ICD-10-CM

## 2021-04-19 PROCEDURE — 99999 PR PBB SHADOW E&M-EST. PATIENT-LVL IV: CPT | Mod: PBBFAC,,, | Performed by: FAMILY MEDICINE

## 2021-04-19 PROCEDURE — 99999 PR PBB SHADOW E&M-EST. PATIENT-LVL IV: ICD-10-PCS | Mod: PBBFAC,,, | Performed by: FAMILY MEDICINE

## 2021-04-19 PROCEDURE — 99214 OFFICE O/P EST MOD 30 MIN: CPT | Mod: PBBFAC,PO | Performed by: FAMILY MEDICINE

## 2021-04-19 PROCEDURE — 99214 OFFICE O/P EST MOD 30 MIN: CPT | Mod: S$PBB,,, | Performed by: FAMILY MEDICINE

## 2021-04-19 PROCEDURE — 99214 PR OFFICE/OUTPT VISIT, EST, LEVL IV, 30-39 MIN: ICD-10-PCS | Mod: S$PBB,,, | Performed by: FAMILY MEDICINE

## 2021-04-19 PROCEDURE — 81003 URINALYSIS AUTO W/O SCOPE: CPT | Mod: PO | Performed by: FAMILY MEDICINE

## 2021-04-20 ENCOUNTER — LAB VISIT (OUTPATIENT)
Dept: LAB | Facility: HOSPITAL | Age: 66
End: 2021-04-20
Attending: FAMILY MEDICINE
Payer: MEDICARE

## 2021-04-20 ENCOUNTER — TELEPHONE (OUTPATIENT)
Dept: FAMILY MEDICINE | Facility: CLINIC | Age: 66
End: 2021-04-20

## 2021-04-20 DIAGNOSIS — R20.2 PARESTHESIA OF FOOT, BILATERAL: ICD-10-CM

## 2021-04-20 DIAGNOSIS — E83.42 HYPOMAGNESEMIA: ICD-10-CM

## 2021-04-20 DIAGNOSIS — E53.8 VITAMIN B 12 DEFICIENCY: ICD-10-CM

## 2021-04-20 DIAGNOSIS — N25.81 SECONDARY HYPERPARATHYROIDISM: ICD-10-CM

## 2021-04-20 DIAGNOSIS — R53.83 FATIGUE, UNSPECIFIED TYPE: ICD-10-CM

## 2021-04-20 DIAGNOSIS — E55.9 VITAMIN D DEFICIENCY: ICD-10-CM

## 2021-04-20 DIAGNOSIS — E78.5 HYPERLIPIDEMIA, UNSPECIFIED HYPERLIPIDEMIA TYPE: ICD-10-CM

## 2021-04-20 LAB
25(OH)D3+25(OH)D2 SERPL-MCNC: 44 NG/ML (ref 30–96)
ALBUMIN SERPL BCP-MCNC: 3.5 G/DL (ref 3.5–5.2)
ALP SERPL-CCNC: 78 U/L (ref 55–135)
ALT SERPL W/O P-5'-P-CCNC: 9 U/L (ref 10–44)
ANION GAP SERPL CALC-SCNC: 20 MMOL/L (ref 8–16)
AST SERPL-CCNC: 31 U/L (ref 10–40)
BASOPHILS # BLD AUTO: 0.04 K/UL (ref 0–0.2)
BASOPHILS NFR BLD: 0.5 % (ref 0–1.9)
BILIRUB SERPL-MCNC: 0.6 MG/DL (ref 0.1–1)
BILIRUB UR QL STRIP: NEGATIVE
BUN SERPL-MCNC: 9 MG/DL (ref 8–23)
CALCIUM SERPL-MCNC: 6.1 MG/DL (ref 8.7–10.5)
CHLORIDE SERPL-SCNC: 95 MMOL/L (ref 95–110)
CHOLEST SERPL-MCNC: 155 MG/DL (ref 120–199)
CHOLEST/HDLC SERPL: 3.2 {RATIO} (ref 2–5)
CLARITY UR: CLEAR
CO2 SERPL-SCNC: 22 MMOL/L (ref 23–29)
COLOR UR: YELLOW
CREAT SERPL-MCNC: 1.4 MG/DL (ref 0.5–1.4)
DIFFERENTIAL METHOD: ABNORMAL
EOSINOPHIL # BLD AUTO: 0 K/UL (ref 0–0.5)
EOSINOPHIL NFR BLD: 0.1 % (ref 0–8)
ERYTHROCYTE [DISTWIDTH] IN BLOOD BY AUTOMATED COUNT: 13 % (ref 11.5–14.5)
ERYTHROCYTE [SEDIMENTATION RATE] IN BLOOD BY WESTERGREN METHOD: 48 MM/HR (ref 0–20)
EST. GFR  (AFRICAN AMERICAN): 44.9 ML/MIN/1.73 M^2
EST. GFR  (NON AFRICAN AMERICAN): 38.9 ML/MIN/1.73 M^2
FOLATE SERPL-MCNC: 19.5 NG/ML (ref 4–24)
GLUCOSE SERPL-MCNC: 87 MG/DL (ref 70–110)
GLUCOSE UR QL STRIP: NEGATIVE
HCT VFR BLD AUTO: 36.3 % (ref 37–48.5)
HDLC SERPL-MCNC: 49 MG/DL (ref 40–75)
HDLC SERPL: 31.6 % (ref 20–50)
HGB BLD-MCNC: 11.7 G/DL (ref 12–16)
HGB UR QL STRIP: NEGATIVE
IMM GRANULOCYTES # BLD AUTO: 0.03 K/UL (ref 0–0.04)
IMM GRANULOCYTES NFR BLD AUTO: 0.4 % (ref 0–0.5)
KETONES UR QL STRIP: NEGATIVE
LDLC SERPL CALC-MCNC: 86.8 MG/DL (ref 63–159)
LEUKOCYTE ESTERASE UR QL STRIP: NEGATIVE
LYMPHOCYTES # BLD AUTO: 1.5 K/UL (ref 1–4.8)
LYMPHOCYTES NFR BLD: 18.2 % (ref 18–48)
MAGNESIUM SERPL-MCNC: <0.7 MG/DL (ref 1.6–2.6)
MCH RBC QN AUTO: 29.7 PG (ref 27–31)
MCHC RBC AUTO-ENTMCNC: 32.2 G/DL (ref 32–36)
MCV RBC AUTO: 92 FL (ref 82–98)
MONOCYTES # BLD AUTO: 0.4 K/UL (ref 0.3–1)
MONOCYTES NFR BLD: 5 % (ref 4–15)
NEUTROPHILS # BLD AUTO: 6.2 K/UL (ref 1.8–7.7)
NEUTROPHILS NFR BLD: 75.8 % (ref 38–73)
NITRITE UR QL STRIP: NEGATIVE
NONHDLC SERPL-MCNC: 106 MG/DL
NRBC BLD-RTO: 0 /100 WBC
PH UR STRIP: 7 [PH] (ref 5–8)
PLATELET # BLD AUTO: 368 K/UL (ref 150–450)
PMV BLD AUTO: 11.2 FL (ref 9.2–12.9)
POTASSIUM SERPL-SCNC: 2.7 MMOL/L (ref 3.5–5.1)
PROT SERPL-MCNC: 8 G/DL (ref 6–8.4)
PROT UR QL STRIP: NEGATIVE
PTH-INTACT SERPL-MCNC: 108 PG/ML (ref 9–77)
RBC # BLD AUTO: 3.94 M/UL (ref 4–5.4)
SODIUM SERPL-SCNC: 137 MMOL/L (ref 136–145)
SP GR UR STRIP: 1.01 (ref 1–1.03)
T4 FREE SERPL-MCNC: 1.64 NG/DL (ref 0.71–1.51)
TRIGL SERPL-MCNC: 96 MG/DL (ref 30–150)
TSH SERPL DL<=0.005 MIU/L-ACNC: 0.27 UIU/ML (ref 0.4–4)
URN SPEC COLLECT METH UR: NORMAL
VIT B12 SERPL-MCNC: >2000 PG/ML (ref 210–950)
WBC # BLD AUTO: 8.18 K/UL (ref 3.9–12.7)

## 2021-04-20 PROCEDURE — 83970 ASSAY OF PARATHORMONE: CPT | Performed by: FAMILY MEDICINE

## 2021-04-20 PROCEDURE — 80053 COMPREHEN METABOLIC PANEL: CPT | Performed by: FAMILY MEDICINE

## 2021-04-20 PROCEDURE — 83735 ASSAY OF MAGNESIUM: CPT | Performed by: FAMILY MEDICINE

## 2021-04-20 PROCEDURE — 82746 ASSAY OF FOLIC ACID SERUM: CPT | Performed by: FAMILY MEDICINE

## 2021-04-20 PROCEDURE — 82607 VITAMIN B-12: CPT | Performed by: FAMILY MEDICINE

## 2021-04-20 PROCEDURE — 85651 RBC SED RATE NONAUTOMATED: CPT | Mod: PO | Performed by: FAMILY MEDICINE

## 2021-04-20 PROCEDURE — 84439 ASSAY OF FREE THYROXINE: CPT | Performed by: FAMILY MEDICINE

## 2021-04-20 PROCEDURE — 85025 COMPLETE CBC W/AUTO DIFF WBC: CPT | Performed by: FAMILY MEDICINE

## 2021-04-20 PROCEDURE — 36415 COLL VENOUS BLD VENIPUNCTURE: CPT | Mod: PO | Performed by: FAMILY MEDICINE

## 2021-04-20 PROCEDURE — 80061 LIPID PANEL: CPT | Performed by: FAMILY MEDICINE

## 2021-04-20 PROCEDURE — 84443 ASSAY THYROID STIM HORMONE: CPT | Performed by: FAMILY MEDICINE

## 2021-04-20 PROCEDURE — 82306 VITAMIN D 25 HYDROXY: CPT | Performed by: FAMILY MEDICINE

## 2021-04-26 ENCOUNTER — EXTERNAL HOSPITAL ADMISSION (OUTPATIENT)
Dept: ADMINISTRATIVE | Facility: CLINIC | Age: 66
End: 2021-04-26

## 2021-04-26 ENCOUNTER — PATIENT OUTREACH (OUTPATIENT)
Dept: ADMINISTRATIVE | Facility: CLINIC | Age: 66
End: 2021-04-26

## 2021-04-26 ENCOUNTER — PATIENT MESSAGE (OUTPATIENT)
Dept: ADMINISTRATIVE | Facility: CLINIC | Age: 66
End: 2021-04-26

## 2021-04-30 ENCOUNTER — EXTERNAL CHRONIC CARE MANAGEMENT (OUTPATIENT)
Dept: PRIMARY CARE CLINIC | Facility: CLINIC | Age: 66
End: 2021-04-30
Payer: MEDICARE

## 2021-04-30 ENCOUNTER — CLINICAL SUPPORT (OUTPATIENT)
Dept: FAMILY MEDICINE | Facility: CLINIC | Age: 66
End: 2021-04-30
Payer: MEDICARE

## 2021-04-30 DIAGNOSIS — E53.8 B12 DEFICIENCY: Primary | ICD-10-CM

## 2021-04-30 PROBLEM — E87.1 HYPONATREMIA: Status: ACTIVE | Noted: 2021-04-20

## 2021-04-30 PROBLEM — G93.41 METABOLIC ENCEPHALOPATHY: Status: ACTIVE | Noted: 2021-04-20

## 2021-04-30 PROBLEM — N17.9 AKI (ACUTE KIDNEY INJURY): Status: ACTIVE | Noted: 2021-04-20

## 2021-04-30 PROCEDURE — 99490 CHRNC CARE MGMT STAFF 1ST 20: CPT | Mod: S$PBB,,, | Performed by: FAMILY MEDICINE

## 2021-04-30 PROCEDURE — 99999 PR PBB SHADOW E&M-EST. PATIENT-LVL II: CPT | Mod: PBBFAC,,,

## 2021-04-30 PROCEDURE — 99490 PR CHRONIC CARE MGMT, 1ST 20 MIN: ICD-10-PCS | Mod: S$PBB,,, | Performed by: FAMILY MEDICINE

## 2021-04-30 PROCEDURE — 96372 THER/PROPH/DIAG INJ SC/IM: CPT | Mod: PBBFAC,PO

## 2021-04-30 PROCEDURE — 99999 PR PBB SHADOW E&M-EST. PATIENT-LVL II: ICD-10-PCS | Mod: PBBFAC,,,

## 2021-04-30 PROCEDURE — 99490 CHRNC CARE MGMT STAFF 1ST 20: CPT | Mod: PBBFAC,PO | Performed by: FAMILY MEDICINE

## 2021-04-30 RX ADMIN — CYANOCOBALAMIN 1000 MCG: 1000 INJECTION, SOLUTION INTRAMUSCULAR at 01:04

## 2021-05-05 DIAGNOSIS — N18.9 CHRONIC KIDNEY DISEASE, UNSPECIFIED CKD STAGE: ICD-10-CM

## 2021-05-11 ENCOUNTER — OFFICE VISIT (OUTPATIENT)
Dept: FAMILY MEDICINE | Facility: CLINIC | Age: 66
End: 2021-05-11
Payer: MEDICARE

## 2021-05-11 VITALS
SYSTOLIC BLOOD PRESSURE: 115 MMHG | HEIGHT: 65 IN | WEIGHT: 163 LBS | RESPIRATION RATE: 18 BRPM | HEART RATE: 97 BPM | TEMPERATURE: 98 F | BODY MASS INDEX: 27.16 KG/M2 | DIASTOLIC BLOOD PRESSURE: 67 MMHG

## 2021-05-11 DIAGNOSIS — E83.51 HYPOCALCEMIA: ICD-10-CM

## 2021-05-11 DIAGNOSIS — E87.1 HYPONATREMIA: ICD-10-CM

## 2021-05-11 DIAGNOSIS — E87.6 HYPOKALEMIA: ICD-10-CM

## 2021-05-11 DIAGNOSIS — E83.42 HYPOMAGNESEMIA: Primary | ICD-10-CM

## 2021-05-11 PROCEDURE — 99214 OFFICE O/P EST MOD 30 MIN: CPT | Mod: PBBFAC,PO | Performed by: FAMILY MEDICINE

## 2021-05-11 PROCEDURE — 99214 PR OFFICE/OUTPT VISIT, EST, LEVL IV, 30-39 MIN: ICD-10-PCS | Mod: S$PBB,,, | Performed by: FAMILY MEDICINE

## 2021-05-11 PROCEDURE — 99999 PR PBB SHADOW E&M-EST. PATIENT-LVL IV: ICD-10-PCS | Mod: PBBFAC,,, | Performed by: FAMILY MEDICINE

## 2021-05-11 PROCEDURE — 99214 OFFICE O/P EST MOD 30 MIN: CPT | Mod: S$PBB,,, | Performed by: FAMILY MEDICINE

## 2021-05-11 PROCEDURE — 99999 PR PBB SHADOW E&M-EST. PATIENT-LVL IV: CPT | Mod: PBBFAC,,, | Performed by: FAMILY MEDICINE

## 2021-05-11 RX ORDER — MELATONIN 3 MG
3 CAPSULE ORAL NIGHTLY
COMMUNITY
Start: 2021-05-11 | End: 2022-09-13

## 2021-05-11 RX ORDER — AMLODIPINE BESYLATE 10 MG/1
10 TABLET ORAL DAILY
COMMUNITY
Start: 2021-04-24 | End: 2021-05-11 | Stop reason: SDUPTHER

## 2021-05-11 RX ORDER — AMLODIPINE BESYLATE 10 MG/1
10 TABLET ORAL DAILY
Qty: 90 TABLET | Refills: 3 | Status: SHIPPED | OUTPATIENT
Start: 2021-05-11 | End: 2021-05-27

## 2021-05-26 ENCOUNTER — LAB VISIT (OUTPATIENT)
Dept: LAB | Facility: HOSPITAL | Age: 66
End: 2021-05-26
Attending: NURSE PRACTITIONER
Payer: MEDICARE

## 2021-05-26 ENCOUNTER — OFFICE VISIT (OUTPATIENT)
Dept: FAMILY MEDICINE | Facility: CLINIC | Age: 66
End: 2021-05-26
Payer: MEDICARE

## 2021-05-26 ENCOUNTER — TELEPHONE (OUTPATIENT)
Dept: FAMILY MEDICINE | Facility: CLINIC | Age: 66
End: 2021-05-26

## 2021-05-26 VITALS
WEIGHT: 166.44 LBS | SYSTOLIC BLOOD PRESSURE: 135 MMHG | HEART RATE: 89 BPM | TEMPERATURE: 99 F | BODY MASS INDEX: 27.73 KG/M2 | HEIGHT: 65 IN | DIASTOLIC BLOOD PRESSURE: 76 MMHG

## 2021-05-26 DIAGNOSIS — M79.89 FOOT SWELLING: Primary | ICD-10-CM

## 2021-05-26 DIAGNOSIS — R60.9 EDEMA, UNSPECIFIED TYPE: ICD-10-CM

## 2021-05-26 DIAGNOSIS — M79.89 FOOT SWELLING: ICD-10-CM

## 2021-05-26 LAB
ALBUMIN SERPL BCP-MCNC: 3.4 G/DL (ref 3.5–5.2)
ALP SERPL-CCNC: 120 U/L (ref 55–135)
ALT SERPL W/O P-5'-P-CCNC: 7 U/L (ref 10–44)
ANION GAP SERPL CALC-SCNC: 15 MMOL/L (ref 8–16)
AST SERPL-CCNC: 15 U/L (ref 10–40)
BILIRUB SERPL-MCNC: 0.3 MG/DL (ref 0.1–1)
BNP SERPL-MCNC: 64 PG/ML (ref 0–99)
BUN SERPL-MCNC: 8 MG/DL (ref 8–23)
CALCIUM SERPL-MCNC: 8.9 MG/DL (ref 8.7–10.5)
CHLORIDE SERPL-SCNC: 101 MMOL/L (ref 95–110)
CO2 SERPL-SCNC: 25 MMOL/L (ref 23–29)
CREAT SERPL-MCNC: 1.2 MG/DL (ref 0.5–1.4)
EST. GFR  (AFRICAN AMERICAN): 54 ML/MIN/1.73 M^2
EST. GFR  (NON AFRICAN AMERICAN): 46.9 ML/MIN/1.73 M^2
GLUCOSE SERPL-MCNC: 109 MG/DL (ref 70–110)
POTASSIUM SERPL-SCNC: 3.4 MMOL/L (ref 3.5–5.1)
PROT SERPL-MCNC: 7.8 G/DL (ref 6–8.4)
SODIUM SERPL-SCNC: 141 MMOL/L (ref 136–145)

## 2021-05-26 PROCEDURE — 99214 OFFICE O/P EST MOD 30 MIN: CPT | Mod: PBBFAC,PO | Performed by: NURSE PRACTITIONER

## 2021-05-26 PROCEDURE — 99999 PR PBB SHADOW E&M-EST. PATIENT-LVL IV: ICD-10-PCS | Mod: PBBFAC,,, | Performed by: NURSE PRACTITIONER

## 2021-05-26 PROCEDURE — 36415 COLL VENOUS BLD VENIPUNCTURE: CPT | Mod: PO | Performed by: NURSE PRACTITIONER

## 2021-05-26 PROCEDURE — 99213 PR OFFICE/OUTPT VISIT, EST, LEVL III, 20-29 MIN: ICD-10-PCS | Mod: S$PBB,,, | Performed by: NURSE PRACTITIONER

## 2021-05-26 PROCEDURE — 80053 COMPREHEN METABOLIC PANEL: CPT | Performed by: NURSE PRACTITIONER

## 2021-05-26 PROCEDURE — 99999 PR PBB SHADOW E&M-EST. PATIENT-LVL IV: CPT | Mod: PBBFAC,,, | Performed by: NURSE PRACTITIONER

## 2021-05-26 PROCEDURE — 83880 ASSAY OF NATRIURETIC PEPTIDE: CPT | Performed by: NURSE PRACTITIONER

## 2021-05-26 PROCEDURE — 99213 OFFICE O/P EST LOW 20 MIN: CPT | Mod: S$PBB,,, | Performed by: NURSE PRACTITIONER

## 2021-05-27 RX ORDER — AMLODIPINE BESYLATE 5 MG/1
5 TABLET ORAL DAILY
Qty: 30 TABLET | Refills: 1 | Status: SHIPPED | OUTPATIENT
Start: 2021-05-27 | End: 2021-07-07

## 2021-05-28 ENCOUNTER — CLINICAL SUPPORT (OUTPATIENT)
Dept: FAMILY MEDICINE | Facility: CLINIC | Age: 66
End: 2021-05-28
Payer: MEDICARE

## 2021-05-28 DIAGNOSIS — E53.8 B12 DEFICIENCY: Primary | ICD-10-CM

## 2021-05-28 PROCEDURE — 99999 PR PBB SHADOW E&M-EST. PATIENT-LVL II: CPT | Mod: PBBFAC,,,

## 2021-05-28 PROCEDURE — 99999 PR PBB SHADOW E&M-EST. PATIENT-LVL II: ICD-10-PCS | Mod: PBBFAC,,,

## 2021-05-28 PROCEDURE — 96372 THER/PROPH/DIAG INJ SC/IM: CPT | Mod: PBBFAC,PO

## 2021-05-28 RX ADMIN — CYANOCOBALAMIN 1000 MCG: 1000 INJECTION, SOLUTION INTRAMUSCULAR at 11:05

## 2021-05-31 ENCOUNTER — EXTERNAL CHRONIC CARE MANAGEMENT (OUTPATIENT)
Dept: PRIMARY CARE CLINIC | Facility: CLINIC | Age: 66
End: 2021-05-31
Payer: MEDICARE

## 2021-05-31 ENCOUNTER — TELEPHONE (OUTPATIENT)
Dept: FAMILY MEDICINE | Facility: CLINIC | Age: 66
End: 2021-05-31

## 2021-05-31 PROCEDURE — 99490 CHRNC CARE MGMT STAFF 1ST 20: CPT | Mod: PBBFAC,PO | Performed by: FAMILY MEDICINE

## 2021-05-31 PROCEDURE — 99490 CHRNC CARE MGMT STAFF 1ST 20: CPT | Mod: S$PBB,,, | Performed by: FAMILY MEDICINE

## 2021-05-31 PROCEDURE — 99490 PR CHRONIC CARE MGMT, 1ST 20 MIN: ICD-10-PCS | Mod: S$PBB,,, | Performed by: FAMILY MEDICINE

## 2021-06-01 ENCOUNTER — OFFICE VISIT (OUTPATIENT)
Dept: FAMILY MEDICINE | Facility: CLINIC | Age: 66
End: 2021-06-01
Payer: MEDICARE

## 2021-06-01 VITALS
WEIGHT: 162.13 LBS | HEART RATE: 99 BPM | TEMPERATURE: 99 F | HEIGHT: 65 IN | SYSTOLIC BLOOD PRESSURE: 118 MMHG | BODY MASS INDEX: 27.01 KG/M2 | DIASTOLIC BLOOD PRESSURE: 66 MMHG

## 2021-06-01 DIAGNOSIS — I10 ESSENTIAL HYPERTENSION: Primary | ICD-10-CM

## 2021-06-01 DIAGNOSIS — R60.9 DEPENDENT EDEMA: ICD-10-CM

## 2021-06-01 PROCEDURE — 99999 PR PBB SHADOW E&M-EST. PATIENT-LVL III: CPT | Mod: PBBFAC,,, | Performed by: FAMILY MEDICINE

## 2021-06-01 PROCEDURE — 99213 OFFICE O/P EST LOW 20 MIN: CPT | Mod: PBBFAC,PO | Performed by: FAMILY MEDICINE

## 2021-06-01 PROCEDURE — 99999 PR PBB SHADOW E&M-EST. PATIENT-LVL III: ICD-10-PCS | Mod: PBBFAC,,, | Performed by: FAMILY MEDICINE

## 2021-06-01 PROCEDURE — 99213 OFFICE O/P EST LOW 20 MIN: CPT | Mod: S$PBB,,, | Performed by: FAMILY MEDICINE

## 2021-06-01 PROCEDURE — 99213 PR OFFICE/OUTPT VISIT, EST, LEVL III, 20-29 MIN: ICD-10-PCS | Mod: S$PBB,,, | Performed by: FAMILY MEDICINE

## 2021-06-17 ENCOUNTER — OFFICE VISIT (OUTPATIENT)
Dept: FAMILY MEDICINE | Facility: CLINIC | Age: 66
End: 2021-06-17
Payer: MEDICARE

## 2021-06-17 VITALS
HEIGHT: 66 IN | WEIGHT: 163.56 LBS | OXYGEN SATURATION: 100 % | DIASTOLIC BLOOD PRESSURE: 82 MMHG | BODY MASS INDEX: 26.29 KG/M2 | HEART RATE: 97 BPM | RESPIRATION RATE: 16 BRPM | TEMPERATURE: 98 F | SYSTOLIC BLOOD PRESSURE: 131 MMHG

## 2021-06-17 DIAGNOSIS — N89.8 VAGINA ITCHING: ICD-10-CM

## 2021-06-17 DIAGNOSIS — R60.9 DEPENDENT EDEMA: ICD-10-CM

## 2021-06-17 DIAGNOSIS — I10 ESSENTIAL HYPERTENSION: Primary | ICD-10-CM

## 2021-06-17 PROCEDURE — 99999 PR PBB SHADOW E&M-EST. PATIENT-LVL III: CPT | Mod: PBBFAC,,, | Performed by: FAMILY MEDICINE

## 2021-06-17 PROCEDURE — 99999 PR PBB SHADOW E&M-EST. PATIENT-LVL III: ICD-10-PCS | Mod: PBBFAC,,, | Performed by: FAMILY MEDICINE

## 2021-06-17 PROCEDURE — 99213 OFFICE O/P EST LOW 20 MIN: CPT | Mod: S$PBB,,, | Performed by: FAMILY MEDICINE

## 2021-06-17 PROCEDURE — 99213 OFFICE O/P EST LOW 20 MIN: CPT | Mod: PBBFAC,PO | Performed by: FAMILY MEDICINE

## 2021-06-17 PROCEDURE — 99213 PR OFFICE/OUTPT VISIT, EST, LEVL III, 20-29 MIN: ICD-10-PCS | Mod: S$PBB,,, | Performed by: FAMILY MEDICINE

## 2021-06-17 RX ORDER — FLUCONAZOLE 150 MG/1
150 TABLET ORAL ONCE
Qty: 1 TABLET | Refills: 0 | Status: SHIPPED | OUTPATIENT
Start: 2021-06-17 | End: 2021-06-17

## 2021-06-17 RX ORDER — BENAZEPRIL HYDROCHLORIDE 10 MG/1
10 TABLET ORAL DAILY
Qty: 30 TABLET | Refills: 1 | Status: SHIPPED | OUTPATIENT
Start: 2021-06-17 | End: 2021-08-15

## 2021-06-30 ENCOUNTER — EXTERNAL CHRONIC CARE MANAGEMENT (OUTPATIENT)
Dept: PRIMARY CARE CLINIC | Facility: CLINIC | Age: 66
End: 2021-06-30
Payer: MEDICARE

## 2021-06-30 PROCEDURE — 99490 CHRNC CARE MGMT STAFF 1ST 20: CPT | Mod: S$PBB,,, | Performed by: FAMILY MEDICINE

## 2021-06-30 PROCEDURE — 99490 CHRNC CARE MGMT STAFF 1ST 20: CPT | Mod: PBBFAC,PO | Performed by: FAMILY MEDICINE

## 2021-06-30 PROCEDURE — 99490 PR CHRONIC CARE MGMT, 1ST 20 MIN: ICD-10-PCS | Mod: S$PBB,,, | Performed by: FAMILY MEDICINE

## 2021-07-02 ENCOUNTER — CLINICAL SUPPORT (OUTPATIENT)
Dept: FAMILY MEDICINE | Facility: CLINIC | Age: 66
End: 2021-07-02
Payer: MEDICARE

## 2021-07-02 DIAGNOSIS — E53.8 B12 DEFICIENCY: Primary | ICD-10-CM

## 2021-07-02 PROCEDURE — 99999 PR PBB SHADOW E&M-EST. PATIENT-LVL II: ICD-10-PCS | Mod: PBBFAC,,,

## 2021-07-02 PROCEDURE — 99999 PR PBB SHADOW E&M-EST. PATIENT-LVL II: CPT | Mod: PBBFAC,,,

## 2021-07-02 PROCEDURE — 96372 THER/PROPH/DIAG INJ SC/IM: CPT | Mod: PBBFAC,PO

## 2021-07-02 RX ADMIN — CYANOCOBALAMIN 1000 MCG: 1000 INJECTION, SOLUTION INTRAMUSCULAR at 12:07

## 2021-07-07 ENCOUNTER — OFFICE VISIT (OUTPATIENT)
Dept: FAMILY MEDICINE | Facility: CLINIC | Age: 66
End: 2021-07-07
Payer: MEDICARE

## 2021-07-07 VITALS
RESPIRATION RATE: 20 BRPM | OXYGEN SATURATION: 99 % | HEIGHT: 66 IN | TEMPERATURE: 98 F | DIASTOLIC BLOOD PRESSURE: 74 MMHG | WEIGHT: 167 LBS | HEART RATE: 85 BPM | SYSTOLIC BLOOD PRESSURE: 123 MMHG | BODY MASS INDEX: 26.84 KG/M2

## 2021-07-07 DIAGNOSIS — I10 ESSENTIAL HYPERTENSION: Primary | ICD-10-CM

## 2021-07-07 PROBLEM — G93.41 METABOLIC ENCEPHALOPATHY: Status: RESOLVED | Noted: 2021-04-20 | Resolved: 2021-07-07

## 2021-07-07 PROCEDURE — 99214 OFFICE O/P EST MOD 30 MIN: CPT | Mod: PBBFAC,PO | Performed by: FAMILY MEDICINE

## 2021-07-07 PROCEDURE — 99213 OFFICE O/P EST LOW 20 MIN: CPT | Mod: S$PBB,,, | Performed by: FAMILY MEDICINE

## 2021-07-07 PROCEDURE — 99213 PR OFFICE/OUTPT VISIT, EST, LEVL III, 20-29 MIN: ICD-10-PCS | Mod: S$PBB,,, | Performed by: FAMILY MEDICINE

## 2021-07-07 PROCEDURE — 99999 PR PBB SHADOW E&M-EST. PATIENT-LVL IV: CPT | Mod: PBBFAC,,, | Performed by: FAMILY MEDICINE

## 2021-07-07 PROCEDURE — 99999 PR PBB SHADOW E&M-EST. PATIENT-LVL IV: ICD-10-PCS | Mod: PBBFAC,,, | Performed by: FAMILY MEDICINE

## 2021-07-07 RX ORDER — AMLODIPINE BESYLATE 2.5 MG/1
2.5 TABLET ORAL DAILY
Qty: 30 TABLET | Refills: 1 | Status: SHIPPED | OUTPATIENT
Start: 2021-07-07 | End: 2021-09-04 | Stop reason: SDUPTHER

## 2021-07-18 RX ORDER — PRAVASTATIN SODIUM 20 MG/1
TABLET ORAL
Qty: 90 TABLET | Refills: 2 | Status: SHIPPED | OUTPATIENT
Start: 2021-07-18 | End: 2022-04-05

## 2021-07-30 ENCOUNTER — CLINICAL SUPPORT (OUTPATIENT)
Dept: FAMILY MEDICINE | Facility: CLINIC | Age: 66
End: 2021-07-30
Payer: MEDICARE

## 2021-07-30 DIAGNOSIS — E53.8 B12 DEFICIENCY: Primary | ICD-10-CM

## 2021-07-30 PROCEDURE — 99999 PR PBB SHADOW E&M-EST. PATIENT-LVL II: CPT | Mod: PBBFAC,,,

## 2021-07-30 PROCEDURE — 99999 PR PBB SHADOW E&M-EST. PATIENT-LVL II: ICD-10-PCS | Mod: PBBFAC,,,

## 2021-07-30 PROCEDURE — 96372 THER/PROPH/DIAG INJ SC/IM: CPT | Mod: PBBFAC,PO

## 2021-07-30 RX ADMIN — CYANOCOBALAMIN 1000 MCG: 1000 INJECTION, SOLUTION INTRAMUSCULAR at 01:07

## 2021-07-31 ENCOUNTER — EXTERNAL CHRONIC CARE MANAGEMENT (OUTPATIENT)
Dept: PRIMARY CARE CLINIC | Facility: CLINIC | Age: 66
End: 2021-07-31
Payer: MEDICARE

## 2021-07-31 PROCEDURE — 99490 CHRNC CARE MGMT STAFF 1ST 20: CPT | Mod: S$PBB,,, | Performed by: FAMILY MEDICINE

## 2021-07-31 PROCEDURE — 99490 PR CHRONIC CARE MGMT, 1ST 20 MIN: ICD-10-PCS | Mod: S$PBB,,, | Performed by: FAMILY MEDICINE

## 2021-07-31 PROCEDURE — 99490 CHRNC CARE MGMT STAFF 1ST 20: CPT | Mod: PBBFAC,PO | Performed by: FAMILY MEDICINE

## 2021-08-10 ENCOUNTER — TELEPHONE (OUTPATIENT)
Dept: FAMILY MEDICINE | Facility: CLINIC | Age: 66
End: 2021-08-10

## 2021-08-15 RX ORDER — BENAZEPRIL HYDROCHLORIDE 10 MG/1
TABLET ORAL
Qty: 90 TABLET | Refills: 3 | Status: SHIPPED | OUTPATIENT
Start: 2021-08-15 | End: 2022-08-08

## 2021-08-17 ENCOUNTER — OFFICE VISIT (OUTPATIENT)
Dept: FAMILY MEDICINE | Facility: CLINIC | Age: 66
End: 2021-08-17
Payer: MEDICARE

## 2021-08-17 VITALS
SYSTOLIC BLOOD PRESSURE: 137 MMHG | TEMPERATURE: 98 F | RESPIRATION RATE: 18 BRPM | DIASTOLIC BLOOD PRESSURE: 79 MMHG | HEIGHT: 66 IN | OXYGEN SATURATION: 98 % | BODY MASS INDEX: 26.68 KG/M2 | WEIGHT: 166 LBS | HEART RATE: 92 BPM

## 2021-08-17 DIAGNOSIS — L65.0 TELOGEN EFFLUVIUM: Primary | ICD-10-CM

## 2021-08-17 DIAGNOSIS — I10 ESSENTIAL HYPERTENSION: ICD-10-CM

## 2021-08-17 PROCEDURE — 99999 PR PBB SHADOW E&M-EST. PATIENT-LVL IV: CPT | Mod: PBBFAC,,, | Performed by: FAMILY MEDICINE

## 2021-08-17 PROCEDURE — 99213 PR OFFICE/OUTPT VISIT, EST, LEVL III, 20-29 MIN: ICD-10-PCS | Mod: S$PBB,,, | Performed by: FAMILY MEDICINE

## 2021-08-17 PROCEDURE — 99214 OFFICE O/P EST MOD 30 MIN: CPT | Mod: PBBFAC,PO | Performed by: FAMILY MEDICINE

## 2021-08-17 PROCEDURE — 99999 PR PBB SHADOW E&M-EST. PATIENT-LVL IV: ICD-10-PCS | Mod: PBBFAC,,, | Performed by: FAMILY MEDICINE

## 2021-08-17 PROCEDURE — 99213 OFFICE O/P EST LOW 20 MIN: CPT | Mod: S$PBB,,, | Performed by: FAMILY MEDICINE

## 2021-08-17 RX ORDER — METRONIDAZOLE 7.5 MG/G
GEL TOPICAL
COMMUNITY
Start: 2021-07-20 | End: 2022-01-19

## 2021-08-26 ENCOUNTER — TELEPHONE (OUTPATIENT)
Dept: FAMILY MEDICINE | Facility: CLINIC | Age: 66
End: 2021-08-26

## 2021-08-31 ENCOUNTER — EXTERNAL CHRONIC CARE MANAGEMENT (OUTPATIENT)
Dept: PRIMARY CARE CLINIC | Facility: CLINIC | Age: 66
End: 2021-08-31
Payer: MEDICARE

## 2021-08-31 PROCEDURE — 99490 PR CHRONIC CARE MGMT, 1ST 20 MIN: ICD-10-PCS | Mod: S$PBB,,, | Performed by: FAMILY MEDICINE

## 2021-08-31 PROCEDURE — 99490 CHRNC CARE MGMT STAFF 1ST 20: CPT | Mod: PBBFAC,PO | Performed by: FAMILY MEDICINE

## 2021-08-31 PROCEDURE — 99490 CHRNC CARE MGMT STAFF 1ST 20: CPT | Mod: S$PBB,,, | Performed by: FAMILY MEDICINE

## 2021-09-02 ENCOUNTER — TELEPHONE (OUTPATIENT)
Dept: INTERNAL MEDICINE | Facility: CLINIC | Age: 66
End: 2021-09-02

## 2021-09-20 ENCOUNTER — TELEPHONE (OUTPATIENT)
Dept: FAMILY MEDICINE | Facility: CLINIC | Age: 66
End: 2021-09-20

## 2021-09-20 DIAGNOSIS — E53.8 VITAMIN B 12 DEFICIENCY: ICD-10-CM

## 2021-09-20 RX ORDER — CYANOCOBALAMIN 1000 UG/ML
1000 INJECTION, SOLUTION INTRAMUSCULAR; SUBCUTANEOUS
Status: COMPLETED | OUTPATIENT
Start: 2021-09-24 | End: 2021-11-23

## 2021-09-21 ENCOUNTER — TELEPHONE (OUTPATIENT)
Dept: FAMILY MEDICINE | Facility: CLINIC | Age: 66
End: 2021-09-21

## 2021-09-28 ENCOUNTER — CLINICAL SUPPORT (OUTPATIENT)
Dept: FAMILY MEDICINE | Facility: CLINIC | Age: 66
End: 2021-09-28
Payer: MEDICARE

## 2021-09-28 DIAGNOSIS — E53.8 B12 DEFICIENCY: Primary | ICD-10-CM

## 2021-09-28 PROCEDURE — 99999 PR PBB SHADOW E&M-EST. PATIENT-LVL II: ICD-10-PCS | Mod: PBBFAC,,,

## 2021-09-28 PROCEDURE — 96372 THER/PROPH/DIAG INJ SC/IM: CPT | Mod: PBBFAC,PO

## 2021-09-28 PROCEDURE — 99999 PR PBB SHADOW E&M-EST. PATIENT-LVL II: CPT | Mod: PBBFAC,,,

## 2021-09-28 RX ADMIN — CYANOCOBALAMIN 1000 MCG: 1000 INJECTION, SOLUTION INTRAMUSCULAR at 10:09

## 2021-09-30 ENCOUNTER — EXTERNAL CHRONIC CARE MANAGEMENT (OUTPATIENT)
Dept: PRIMARY CARE CLINIC | Facility: CLINIC | Age: 66
End: 2021-09-30
Payer: MEDICARE

## 2021-09-30 PROCEDURE — 99490 CHRNC CARE MGMT STAFF 1ST 20: CPT | Mod: S$PBB,,, | Performed by: FAMILY MEDICINE

## 2021-09-30 PROCEDURE — 99490 CHRNC CARE MGMT STAFF 1ST 20: CPT | Mod: PBBFAC,PO | Performed by: FAMILY MEDICINE

## 2021-09-30 PROCEDURE — 99490 PR CHRONIC CARE MGMT, 1ST 20 MIN: ICD-10-PCS | Mod: S$PBB,,, | Performed by: FAMILY MEDICINE

## 2021-10-26 ENCOUNTER — CLINICAL SUPPORT (OUTPATIENT)
Dept: FAMILY MEDICINE | Facility: CLINIC | Age: 66
End: 2021-10-26
Payer: MEDICARE

## 2021-10-26 DIAGNOSIS — Z23 NEEDS FLU SHOT: ICD-10-CM

## 2021-10-26 DIAGNOSIS — E53.8 B12 DEFICIENCY: Primary | ICD-10-CM

## 2021-10-26 PROCEDURE — 99999 PR PBB SHADOW E&M-EST. PATIENT-LVL II: ICD-10-PCS | Mod: PBBFAC,,,

## 2021-10-26 PROCEDURE — 96372 THER/PROPH/DIAG INJ SC/IM: CPT | Mod: PBBFAC,PO

## 2021-10-26 PROCEDURE — G0008 ADMIN INFLUENZA VIRUS VAC: HCPCS | Mod: PBBFAC,59,PO

## 2021-10-26 PROCEDURE — 99999 PR PBB SHADOW E&M-EST. PATIENT-LVL II: CPT | Mod: PBBFAC,,,

## 2021-10-26 PROCEDURE — 90694 VACC AIIV4 NO PRSRV 0.5ML IM: CPT | Mod: PBBFAC,PO

## 2021-10-26 RX ADMIN — CYANOCOBALAMIN 1000 MCG: 1000 INJECTION, SOLUTION INTRAMUSCULAR at 10:10

## 2021-10-31 ENCOUNTER — EXTERNAL CHRONIC CARE MANAGEMENT (OUTPATIENT)
Dept: PRIMARY CARE CLINIC | Facility: CLINIC | Age: 66
End: 2021-10-31
Payer: MEDICARE

## 2021-10-31 PROCEDURE — 99490 CHRNC CARE MGMT STAFF 1ST 20: CPT | Mod: PBBFAC,PO | Performed by: FAMILY MEDICINE

## 2021-10-31 PROCEDURE — 99490 PR CHRONIC CARE MGMT, 1ST 20 MIN: ICD-10-PCS | Mod: S$PBB,,, | Performed by: FAMILY MEDICINE

## 2021-10-31 PROCEDURE — 99490 CHRNC CARE MGMT STAFF 1ST 20: CPT | Mod: S$PBB,,, | Performed by: FAMILY MEDICINE

## 2021-11-23 ENCOUNTER — TELEPHONE (OUTPATIENT)
Dept: FAMILY MEDICINE | Facility: CLINIC | Age: 66
End: 2021-11-23

## 2021-11-23 ENCOUNTER — CLINICAL SUPPORT (OUTPATIENT)
Dept: FAMILY MEDICINE | Facility: CLINIC | Age: 66
End: 2021-11-23
Payer: MEDICARE

## 2021-11-23 DIAGNOSIS — E53.8 B12 DEFICIENCY: Primary | ICD-10-CM

## 2021-11-23 PROCEDURE — 99999 PR PBB SHADOW E&M-EST. PATIENT-LVL II: ICD-10-PCS | Mod: PBBFAC,,,

## 2021-11-23 PROCEDURE — 99999 PR PBB SHADOW E&M-EST. PATIENT-LVL II: CPT | Mod: PBBFAC,,,

## 2021-11-23 PROCEDURE — 96372 THER/PROPH/DIAG INJ SC/IM: CPT | Mod: PBBFAC,PO

## 2021-11-23 RX ADMIN — CYANOCOBALAMIN 1000 MCG: 1000 INJECTION, SOLUTION INTRAMUSCULAR at 10:11

## 2021-11-30 ENCOUNTER — EXTERNAL CHRONIC CARE MANAGEMENT (OUTPATIENT)
Dept: PRIMARY CARE CLINIC | Facility: CLINIC | Age: 66
End: 2021-11-30
Payer: MEDICARE

## 2021-11-30 PROCEDURE — 99490 CHRNC CARE MGMT STAFF 1ST 20: CPT | Mod: S$PBB,,, | Performed by: FAMILY MEDICINE

## 2021-11-30 PROCEDURE — 99490 PR CHRONIC CARE MGMT, 1ST 20 MIN: ICD-10-PCS | Mod: S$PBB,,, | Performed by: FAMILY MEDICINE

## 2021-11-30 PROCEDURE — 99490 CHRNC CARE MGMT STAFF 1ST 20: CPT | Mod: PBBFAC,PO | Performed by: FAMILY MEDICINE

## 2021-12-14 ENCOUNTER — OFFICE VISIT (OUTPATIENT)
Dept: FAMILY MEDICINE | Facility: CLINIC | Age: 66
End: 2021-12-14
Payer: MEDICARE

## 2021-12-14 VITALS
SYSTOLIC BLOOD PRESSURE: 114 MMHG | BODY MASS INDEX: 27.82 KG/M2 | HEIGHT: 65 IN | HEART RATE: 64 BPM | DIASTOLIC BLOOD PRESSURE: 80 MMHG | WEIGHT: 167 LBS | TEMPERATURE: 97 F

## 2021-12-14 DIAGNOSIS — C50.911 INFILTRATING DUCTAL CARCINOMA OF BREAST, RIGHT: ICD-10-CM

## 2021-12-14 DIAGNOSIS — E53.8 VITAMIN B12 DEFICIENCY: Primary | ICD-10-CM

## 2021-12-14 DIAGNOSIS — M17.11 PRIMARY OSTEOARTHRITIS OF RIGHT KNEE: ICD-10-CM

## 2021-12-14 DIAGNOSIS — I10 PRIMARY HYPERTENSION: ICD-10-CM

## 2021-12-14 PROCEDURE — 99214 OFFICE O/P EST MOD 30 MIN: CPT | Mod: PBBFAC,PO | Performed by: FAMILY MEDICINE

## 2021-12-14 PROCEDURE — 99999 PR PBB SHADOW E&M-EST. PATIENT-LVL IV: ICD-10-PCS | Mod: PBBFAC,,, | Performed by: FAMILY MEDICINE

## 2021-12-14 PROCEDURE — 99213 OFFICE O/P EST LOW 20 MIN: CPT | Mod: S$PBB,,, | Performed by: FAMILY MEDICINE

## 2021-12-14 PROCEDURE — 99213 PR OFFICE/OUTPT VISIT, EST, LEVL III, 20-29 MIN: ICD-10-PCS | Mod: S$PBB,,, | Performed by: FAMILY MEDICINE

## 2021-12-14 PROCEDURE — 99999 PR PBB SHADOW E&M-EST. PATIENT-LVL IV: CPT | Mod: PBBFAC,,, | Performed by: FAMILY MEDICINE

## 2021-12-14 RX ORDER — CYANOCOBALAMIN 1000 UG/ML
1000 INJECTION, SOLUTION INTRAMUSCULAR; SUBCUTANEOUS
Status: ACTIVE | OUTPATIENT
Start: 2021-12-14 | End: 2022-12-09

## 2021-12-14 RX ORDER — ZOSTER VACCINE RECOMBINANT, ADJUVANTED 50 MCG/0.5
0.5 KIT INTRAMUSCULAR ONCE
Qty: 1 EACH | Refills: 1 | Status: SHIPPED | OUTPATIENT
Start: 2021-12-14 | End: 2021-12-14

## 2021-12-17 ENCOUNTER — TELEPHONE (OUTPATIENT)
Dept: FAMILY MEDICINE | Facility: CLINIC | Age: 66
End: 2021-12-17
Payer: MEDICARE

## 2021-12-17 DIAGNOSIS — E53.8 VITAMIN B12 DEFICIENCY: Primary | ICD-10-CM

## 2021-12-17 RX ORDER — CYANOCOBALAMIN 1000 UG/ML
1000 INJECTION, SOLUTION INTRAMUSCULAR; SUBCUTANEOUS
Status: COMPLETED | OUTPATIENT
Start: 2021-12-23 | End: 2022-11-18

## 2021-12-23 ENCOUNTER — CLINICAL SUPPORT (OUTPATIENT)
Dept: FAMILY MEDICINE | Facility: CLINIC | Age: 66
End: 2021-12-23
Payer: MEDICARE

## 2021-12-23 DIAGNOSIS — E53.8 B12 DEFICIENCY: Primary | ICD-10-CM

## 2021-12-23 PROCEDURE — 99999 PR PBB SHADOW E&M-EST. PATIENT-LVL II: CPT | Mod: PBBFAC,,,

## 2021-12-23 PROCEDURE — 99999 PR PBB SHADOW E&M-EST. PATIENT-LVL II: ICD-10-PCS | Mod: PBBFAC,,,

## 2021-12-23 PROCEDURE — 96372 THER/PROPH/DIAG INJ SC/IM: CPT | Mod: PBBFAC,PO

## 2021-12-23 RX ADMIN — CYANOCOBALAMIN 1000 MCG: 1000 INJECTION, SOLUTION INTRAMUSCULAR at 10:12

## 2021-12-28 RX ORDER — OMEPRAZOLE 20 MG/1
CAPSULE, DELAYED RELEASE ORAL
Qty: 90 CAPSULE | Refills: 3 | Status: SHIPPED | OUTPATIENT
Start: 2021-12-28 | End: 2022-12-14

## 2021-12-31 ENCOUNTER — EXTERNAL CHRONIC CARE MANAGEMENT (OUTPATIENT)
Dept: PRIMARY CARE CLINIC | Facility: CLINIC | Age: 66
End: 2021-12-31
Payer: MEDICARE

## 2021-12-31 PROCEDURE — 99490 PR CHRONIC CARE MGMT, 1ST 20 MIN: ICD-10-PCS | Mod: S$PBB,,, | Performed by: FAMILY MEDICINE

## 2021-12-31 PROCEDURE — 99490 CHRNC CARE MGMT STAFF 1ST 20: CPT | Mod: S$PBB,,, | Performed by: FAMILY MEDICINE

## 2021-12-31 PROCEDURE — 99490 CHRNC CARE MGMT STAFF 1ST 20: CPT | Mod: PBBFAC,PO | Performed by: FAMILY MEDICINE

## 2022-01-12 ENCOUNTER — OFFICE VISIT (OUTPATIENT)
Dept: FAMILY MEDICINE | Facility: CLINIC | Age: 67
End: 2022-01-12
Payer: MEDICARE

## 2022-01-12 VITALS
BODY MASS INDEX: 27.22 KG/M2 | TEMPERATURE: 98 F | DIASTOLIC BLOOD PRESSURE: 85 MMHG | HEART RATE: 98 BPM | SYSTOLIC BLOOD PRESSURE: 137 MMHG | WEIGHT: 163.38 LBS | HEIGHT: 65 IN

## 2022-01-12 DIAGNOSIS — R10.31 RIGHT LOWER QUADRANT ABDOMINAL PAIN: ICD-10-CM

## 2022-01-12 DIAGNOSIS — R10.31 RLQ DISCOMFORT: Primary | ICD-10-CM

## 2022-01-12 LAB
BILIRUB UR QL STRIP: NEGATIVE
CLARITY UR: CLEAR
COLOR UR: YELLOW
GLUCOSE UR QL STRIP: NEGATIVE
HGB UR QL STRIP: NEGATIVE
KETONES UR QL STRIP: NEGATIVE
LEUKOCYTE ESTERASE UR QL STRIP: NEGATIVE
NITRITE UR QL STRIP: NEGATIVE
PH UR STRIP: 8 [PH] (ref 5–8)
PROT UR QL STRIP: NEGATIVE
SP GR UR STRIP: 1.01 (ref 1–1.03)
URN SPEC COLLECT METH UR: NORMAL

## 2022-01-12 PROCEDURE — 87086 URINE CULTURE/COLONY COUNT: CPT | Performed by: NURSE PRACTITIONER

## 2022-01-12 PROCEDURE — 81002 URINALYSIS NONAUTO W/O SCOPE: CPT | Mod: PO | Performed by: NURSE PRACTITIONER

## 2022-01-12 PROCEDURE — 99999 PR PBB SHADOW E&M-EST. PATIENT-LVL V: ICD-10-PCS | Mod: PBBFAC,,, | Performed by: NURSE PRACTITIONER

## 2022-01-12 PROCEDURE — 99999 PR PBB SHADOW E&M-EST. PATIENT-LVL V: CPT | Mod: PBBFAC,,, | Performed by: NURSE PRACTITIONER

## 2022-01-12 PROCEDURE — 99213 OFFICE O/P EST LOW 20 MIN: CPT | Mod: S$PBB,,, | Performed by: NURSE PRACTITIONER

## 2022-01-12 PROCEDURE — 99215 OFFICE O/P EST HI 40 MIN: CPT | Mod: PBBFAC,PO | Performed by: NURSE PRACTITIONER

## 2022-01-12 PROCEDURE — 99213 PR OFFICE/OUTPT VISIT, EST, LEVL III, 20-29 MIN: ICD-10-PCS | Mod: S$PBB,,, | Performed by: NURSE PRACTITIONER

## 2022-01-12 NOTE — PROGRESS NOTES
Subjective:       Patient ID: Carla Dang is a 66 y.o. female.    Chief Complaint: Abdominal Pain (X2 weeks, RLQ)    Abdominal Pain  This is a new problem. The current episode started 1 to 4 weeks ago (x 2 w). The problem occurs daily. The problem has been gradually improving. The pain is located in the RLQ. The pain is mild. The quality of the pain is colicky. The abdominal pain does not radiate. Pertinent negatives include no anorexia, arthralgias, belching, constipation, diarrhea, dysuria, fever, flatus, frequency, headaches, hematochezia, hematuria, melena, myalgias, nausea, vomiting or weight loss. Nothing aggravates the pain. The pain is relieved by nothing. Treatments tried: AZO. The treatment provided moderate relief. Prior diagnostic workup includes CT scan (CT ordered today). Her past medical history is significant for GERD. There is no history of abdominal surgery, colon cancer, Crohn's disease, gallstones, irritable bowel syndrome, pancreatitis, PUD or ulcerative colitis. Patient's medical history does not include kidney stones and UTI.     Past Medical History:   Diagnosis Date    Acute pancreatitis 3/21/2016    Breast cancer     right side with lumpectomy, chemo and radiation    Closed fracture of ramus of right pubis 2016    Colon polyp     last scope 2012- repeat 10 y per pt- Dr. Johnson    GERD (gastroesophageal reflux disease) 2012    Hyperlipidemia 2013    Hypertension     Metabolic encephalopathy 2021    Osteopenia      Social History     Socioeconomic History    Marital status:    Tobacco Use    Smoking status: Former Smoker     Packs/day: 0.30     Types: Cigarettes     Quit date: 2005     Years since quittin.6    Smokeless tobacco: Never Used   Substance and Sexual Activity    Alcohol use: Yes     Alcohol/week: 2.0 standard drinks     Types: 2 Standard drinks or equivalent per week     Comment: prior 6 pack/week    Drug use: Yes      Past Surgical History:   Procedure Laterality Date    BREAST LUMPECTOMY  2005    DILATION AND CURETTAGE OF UTERUS      ESOPHAGEAL DILATION      ESOPHAGOGASTRODUODENOSCOPY  6/1/2012    ROTATOR CUFF REPAIR      TEAR DUCT SURGERY      right rye       Review of Systems   Constitutional: Negative.  Negative for fever and weight loss.   HENT: Negative.    Eyes: Negative.    Respiratory: Negative.    Cardiovascular: Negative.    Gastrointestinal: Positive for abdominal pain. Negative for anorexia, constipation, diarrhea, flatus, hematochezia, melena, nausea and vomiting.   Endocrine: Negative.    Genitourinary: Negative.  Negative for dysuria, frequency and hematuria.   Musculoskeletal: Negative.  Negative for arthralgias and myalgias.   Integumentary:  Negative.   Allergic/Immunologic: Negative.    Neurological: Negative.  Negative for headaches.   Psychiatric/Behavioral: Negative.          Objective:      Physical Exam  Vitals and nursing note reviewed.   Constitutional:       Appearance: Normal appearance.   HENT:      Head: Normocephalic.      Right Ear: Tympanic membrane, ear canal and external ear normal.      Left Ear: Tympanic membrane, ear canal and external ear normal.      Nose: Nose normal.      Mouth/Throat:      Mouth: Mucous membranes are moist.      Pharynx: Oropharynx is clear.   Eyes:      Conjunctiva/sclera: Conjunctivae normal.      Pupils: Pupils are equal, round, and reactive to light.   Cardiovascular:      Rate and Rhythm: Normal rate and regular rhythm.      Pulses: Normal pulses.      Heart sounds: Normal heart sounds.   Pulmonary:      Effort: Pulmonary effort is normal.      Breath sounds: Normal breath sounds.   Abdominal:      General: Bowel sounds are normal.      Palpations: Abdomen is soft.      Tenderness: There is no abdominal tenderness. There is no right CVA tenderness, left CVA tenderness, guarding or rebound. Negative signs include Conde's sign, Rovsing's sign, McBurney's  sign, psoas sign and obturator sign.   Musculoskeletal:         General: Normal range of motion.      Cervical back: Normal range of motion and neck supple.   Skin:     General: Skin is warm and dry.      Capillary Refill: Capillary refill takes 2 to 3 seconds.   Neurological:      Mental Status: She is alert and oriented to person, place, and time.   Psychiatric:         Mood and Affect: Mood normal.         Behavior: Behavior normal.         Thought Content: Thought content normal.         Judgment: Judgment normal.         Assessment:       Problem List Items Addressed This Visit    None     Visit Diagnoses     RLQ discomfort    -  Primary    Relevant Orders    Urinalysis (Completed)    Urine culture    Right lower quadrant abdominal pain        Relevant Orders    CT Abdomen Pelvis  Without Contrast          Plan:           Carla HERRING was seen today for abdominal pain.    Diagnoses and all orders for this visit:    RLQ discomfort  Right lower quadrant abdominal pain  -     Urinalysis  -     Urine culture; Future  -     CT Abdomen Pelvis  Without Contrast; Future  Hydrate well   Rest  Report to ER immediately if symptoms worsen or persist

## 2022-01-13 ENCOUNTER — TELEPHONE (OUTPATIENT)
Dept: FAMILY MEDICINE | Facility: CLINIC | Age: 67
End: 2022-01-13
Payer: MEDICARE

## 2022-01-13 DIAGNOSIS — R10.31 RIGHT LOWER QUADRANT PAIN: ICD-10-CM

## 2022-01-13 LAB
BACTERIA UR CULT: NORMAL
BACTERIA UR CULT: NORMAL

## 2022-01-13 NOTE — TELEPHONE ENCOUNTER
----- Message from Jennyfer Rosario, RT sent at 1/13/2022  8:35 AM CST -----  Regarding: CT  Ms Dang is scheduled for a CT Abdomen Pelvis Without contrast for RLQ pain. Per radiologist recommendation for RLQ pain, please change this order to WITH contrast if there is no contraindication.     Also, the patient will need a STAT CREATININE SERUM ordered and booked at least 1 1/2 hours to her appt so we can inject contrast.    Thanks,  Lizz  [phone]

## 2022-01-18 ENCOUNTER — HOSPITAL ENCOUNTER (OUTPATIENT)
Dept: RADIOLOGY | Facility: HOSPITAL | Age: 67
Discharge: HOME | End: 2022-01-18
Attending: NURSE PRACTITIONER
Payer: MEDICARE

## 2022-01-18 DIAGNOSIS — R10.31 RIGHT LOWER QUADRANT PAIN: ICD-10-CM

## 2022-01-18 PROCEDURE — 74177 CT ABD & PELVIS W/CONTRAST: CPT | Mod: 26,,, | Performed by: RADIOLOGY

## 2022-01-18 PROCEDURE — A9698 NON-RAD CONTRAST MATERIALNOC: HCPCS | Mod: PO | Performed by: NURSE PRACTITIONER

## 2022-01-18 PROCEDURE — 74177 CT ABD & PELVIS W/CONTRAST: CPT | Mod: TC,PO

## 2022-01-18 PROCEDURE — 74177 CT ABDOMEN PELVIS WITH CONTRAST: ICD-10-PCS | Mod: 26,,, | Performed by: RADIOLOGY

## 2022-01-18 PROCEDURE — 25500020 PHARM REV CODE 255: Mod: PO | Performed by: NURSE PRACTITIONER

## 2022-01-18 RX ADMIN — IOHEXOL 1000 ML: 9 SOLUTION ORAL at 01:01

## 2022-01-18 RX ADMIN — IOHEXOL 75 ML: 350 INJECTION, SOLUTION INTRAVENOUS at 01:01

## 2022-01-19 ENCOUNTER — TELEPHONE (OUTPATIENT)
Dept: FAMILY MEDICINE | Facility: CLINIC | Age: 67
End: 2022-01-19
Payer: MEDICARE

## 2022-01-19 DIAGNOSIS — R10.31 RIGHT LOWER QUADRANT ABDOMINAL PAIN: Primary | ICD-10-CM

## 2022-01-19 DIAGNOSIS — R10.31 RIGHT LOWER QUADRANT PAIN: ICD-10-CM

## 2022-01-19 DIAGNOSIS — R91.1 SOLITARY PULMONARY NODULE: ICD-10-CM

## 2022-01-19 RX ORDER — METRONIDAZOLE 500 MG/1
500 TABLET ORAL EVERY 8 HOURS
Qty: 21 TABLET | Refills: 0 | Status: SHIPPED | OUTPATIENT
Start: 2022-01-19 | End: 2022-09-13

## 2022-01-19 NOTE — TELEPHONE ENCOUNTER
----- Message from Katty Marie NP sent at 1/19/2022  2:30 PM CST -----  Reviewed; CT indicates possible inflammation of cecum/intestine (eg. infectious colitis, inflammatory bowel disease, and ischemic colitis), chronic pancreatitis; GI recommended ASAP for further evaluation. Rx sent to pharmacy; report to ER immediately if symptoms worsening. Liver nodule also noted; likely hemangioma, however MRI liver recommended for further evaluation. Moderate hiatal hernia noted; general surgery referral recommended. Renal artery stenosis noted to right renal artery due to large calcification;vascular surgery referral recommended. Solid nodule noted to right lower lung; due to breast cancer history, CT chest without contrast recommended in 6 m, then 18 m, however if change noted at 6 m interval, I will be referring her to pulmonology/oncology. Xray also shows remote/healed fractures to pelvis; was she aware of this. Calcification noted to right ovary; consider US, however recommend GI assessment first. Degenerative changes noted to thoracic/lumbar spine with bulging discs; will refer if symptomatic.

## 2022-01-25 ENCOUNTER — CLINICAL SUPPORT (OUTPATIENT)
Dept: FAMILY MEDICINE | Facility: CLINIC | Age: 67
End: 2022-01-25
Payer: MEDICARE

## 2022-01-25 DIAGNOSIS — E53.8 B12 DEFICIENCY: Primary | ICD-10-CM

## 2022-01-25 PROCEDURE — 96372 THER/PROPH/DIAG INJ SC/IM: CPT | Mod: PBBFAC,PO

## 2022-01-25 PROCEDURE — 99999 PR PBB SHADOW E&M-EST. PATIENT-LVL II: CPT | Mod: PBBFAC,,,

## 2022-01-25 PROCEDURE — 99999 PR PBB SHADOW E&M-EST. PATIENT-LVL II: ICD-10-PCS | Mod: PBBFAC,,,

## 2022-01-25 RX ADMIN — CYANOCOBALAMIN 1000 MCG: 1000 INJECTION, SOLUTION INTRAMUSCULAR at 10:01

## 2022-01-27 ENCOUNTER — PATIENT OUTREACH (OUTPATIENT)
Dept: ADMINISTRATIVE | Facility: HOSPITAL | Age: 67
End: 2022-01-27
Payer: MEDICARE

## 2022-01-28 ENCOUNTER — HOSPITAL ENCOUNTER (OUTPATIENT)
Dept: RADIOLOGY | Facility: HOSPITAL | Age: 67
Discharge: HOME | End: 2022-01-28
Attending: NURSE PRACTITIONER
Payer: MEDICARE

## 2022-01-28 DIAGNOSIS — R10.31 RIGHT LOWER QUADRANT ABDOMINAL PAIN: ICD-10-CM

## 2022-01-28 PROCEDURE — 74176 CT ABD & PELVIS W/O CONTRAST: CPT | Mod: 26,,, | Performed by: RADIOLOGY

## 2022-01-28 PROCEDURE — 74176 CT ABDOMEN PELVIS WITHOUT CONTRAST: ICD-10-PCS | Mod: 26,,, | Performed by: RADIOLOGY

## 2022-01-28 PROCEDURE — A9698 NON-RAD CONTRAST MATERIALNOC: HCPCS | Mod: PO | Performed by: NURSE PRACTITIONER

## 2022-01-28 PROCEDURE — 74176 CT ABD & PELVIS W/O CONTRAST: CPT | Mod: TC,PO

## 2022-01-28 PROCEDURE — 25500020 PHARM REV CODE 255: Mod: PO | Performed by: NURSE PRACTITIONER

## 2022-01-28 RX ADMIN — IOHEXOL 1000 ML: 12 SOLUTION ORAL at 03:01

## 2022-01-29 ENCOUNTER — PATIENT OUTREACH (OUTPATIENT)
Dept: ADMINISTRATIVE | Facility: OTHER | Age: 67
End: 2022-01-29
Payer: MEDICARE

## 2022-01-29 NOTE — PROGRESS NOTES
Health Maintenance Due   Topic Date Due    TETANUS VACCINE  Never done    Colorectal Cancer Screening  Never done    Shingles Vaccine (1 of 2) Never done     Updates were requested from care everywhere.  Chart was reviewed for overdue Proactive [facility] Encounters (MANNY) topics (CRS, Breast Cancer Screening, Eye exam)  Health Maintenance has been updated.  LINKS immunization registry triggered.  Immunizations were reconciled.

## 2022-01-31 ENCOUNTER — EXTERNAL CHRONIC CARE MANAGEMENT (OUTPATIENT)
Dept: PRIMARY CARE CLINIC | Facility: CLINIC | Age: 67
End: 2022-01-31
Payer: MEDICARE

## 2022-01-31 PROCEDURE — 99490 CHRNC CARE MGMT STAFF 1ST 20: CPT | Mod: S$PBB,,, | Performed by: FAMILY MEDICINE

## 2022-01-31 PROCEDURE — 99490 CHRNC CARE MGMT STAFF 1ST 20: CPT | Mod: PBBFAC,PO | Performed by: FAMILY MEDICINE

## 2022-01-31 PROCEDURE — 99490 PR CHRONIC CARE MGMT, 1ST 20 MIN: ICD-10-PCS | Mod: S$PBB,,, | Performed by: FAMILY MEDICINE

## 2022-02-01 ENCOUNTER — OFFICE VISIT (OUTPATIENT)
Dept: SURGERY | Facility: CLINIC | Age: 67
End: 2022-02-01
Payer: MEDICARE

## 2022-02-01 VITALS
SYSTOLIC BLOOD PRESSURE: 131 MMHG | BODY MASS INDEX: 27.62 KG/M2 | WEIGHT: 166 LBS | TEMPERATURE: 99 F | HEART RATE: 85 BPM | DIASTOLIC BLOOD PRESSURE: 77 MMHG

## 2022-02-01 DIAGNOSIS — R10.31 RIGHT LOWER QUADRANT ABDOMINAL PAIN: ICD-10-CM

## 2022-02-01 DIAGNOSIS — K52.9 COLITIS: ICD-10-CM

## 2022-02-01 DIAGNOSIS — K44.9 HIATAL HERNIA WITHOUT GANGRENE OR OBSTRUCTION: Primary | ICD-10-CM

## 2022-02-01 PROCEDURE — 99203 OFFICE O/P NEW LOW 30 MIN: CPT | Mod: S$PBB,,, | Performed by: SURGERY

## 2022-02-01 PROCEDURE — 99999 PR PBB SHADOW E&M-EST. PATIENT-LVL III: CPT | Mod: PBBFAC,,, | Performed by: SURGERY

## 2022-02-01 PROCEDURE — 99213 OFFICE O/P EST LOW 20 MIN: CPT | Mod: PBBFAC | Performed by: SURGERY

## 2022-02-01 PROCEDURE — 99999 PR PBB SHADOW E&M-EST. PATIENT-LVL III: ICD-10-PCS | Mod: PBBFAC,,, | Performed by: SURGERY

## 2022-02-01 PROCEDURE — 99203 PR OFFICE/OUTPT VISIT, NEW, LEVL III, 30-44 MIN: ICD-10-PCS | Mod: S$PBB,,, | Performed by: SURGERY

## 2022-02-01 RX ORDER — AMOXICILLIN AND CLAVULANATE POTASSIUM 875; 125 MG/1; MG/1
1 TABLET, FILM COATED ORAL EVERY 12 HOURS
Qty: 14 TABLET | Refills: 0 | Status: SHIPPED | OUTPATIENT
Start: 2022-02-01 | End: 2022-02-08

## 2022-02-01 NOTE — PROGRESS NOTES
[facility] -   General Surgery History & Physical    SUBJECTIVE:     History of Present Illness:  Patient is a 66 y.o. female referred by her PCP due to hiatal hernia incidentally found on CT scan. Patient reports RLQ abdominal discomfort ongoing for a month. It is not tender to touch, but the discomfort comes and goes. She denies reflux type symptoms or dysphagia.    Chief Complaint   Patient presents with    Consult     Hiatal hernia        Review of patient's allergies indicates:   Allergen Reactions    Hydrochlorothiazide Other (See Comments)     Multiple electrolyte abnormalities       Current Outpatient Medications   Medication Sig Dispense Refill    amLODIPine (NORVASC) 2.5 MG tablet Take 1 tablet by mouth once daily 30 tablet 11    benazepriL (LOTENSIN) 10 MG tablet Take 1 tablet by mouth once daily 90 tablet 3    calcium carbonate (OS-NASEEM) 500 mg calcium (1,250 mg) tablet qid 120 tablet 0    ergocalciferol (ERGOCALCIFEROL) 50,000 unit Cap Once a week 4 capsule 5    folic acid (FOLVITE) 1 MG tablet Take 1 tablet by mouth once daily 90 tablet 3    magnesium oxide (MAG-OX) 400 mg (241.3 mg magnesium) tablet bid 60 tablet 1    melatonin 3 mg Cap Take 3 mg by mouth every evening.      minocycline (DYNACIN) 100 MG tablet Take 90 mg by mouth every 12 (twelve) hours.      omeprazole (PRILOSEC) 20 MG capsule Take 1 capsule by mouth once daily 90 capsule 3    pravastatin (PRAVACHOL) 20 MG tablet Take 1 tablet by mouth once daily 90 tablet 2    amoxicillin-clavulanate 875-125mg (AUGMENTIN) 875-125 mg per tablet Take 1 tablet by mouth every 12 (twelve) hours. for 7 days 14 tablet 0    metroNIDAZOLE (FLAGYL) 500 MG tablet Take 1 tablet (500 mg total) by mouth every 8 (eight) hours. for 7 days 21 tablet 0     Current Facility-Administered Medications   Medication Dose Route Frequency Provider Last Rate Last Admin    cyanocobalamin injection 1,000 mcg  1,000 mcg Intramuscular Q30 Days Taqueria  JOHN Melendrez MD        cyanocobalamin injection 1,000 mcg  1,000 mcg Intramuscular Q30 Days Taqueria Melendrez MD   1,000 mcg at 22 1016       Past Medical History:   Diagnosis Date    Acute pancreatitis 3/21/2016    Breast cancer 2005    right side with lumpectomy, chemo and radiation    Closed fracture of ramus of right pubis 2016    Colon polyp     last scope 2012- repeat 10 y per pt- Dr. Johnson    GERD (gastroesophageal reflux disease) 2012    Hyperlipidemia 2013    Hypertension     Metabolic encephalopathy 2021    Osteopenia      Past Surgical History:   Procedure Laterality Date    BREAST LUMPECTOMY  2005    DILATION AND CURETTAGE OF UTERUS      ESOPHAGEAL DILATION      ESOPHAGOGASTRODUODENOSCOPY  2012    ROTATOR CUFF REPAIR      TEAR DUCT SURGERY      right rye     Family History   Problem Relation Age of Onset    Stroke Brother 57    Breast cancer Mother 87     Social History     Tobacco Use    Smoking status: Former Smoker     Packs/day: 0.30     Types: Cigarettes     Quit date: 2005     Years since quittin.6    Smokeless tobacco: Never Used   Substance Use Topics    Alcohol use: Yes     Alcohol/week: 2.0 standard drinks     Types: 2 Standard drinks or equivalent per week     Comment: prior 6 pack/week    Drug use: Yes        Review of Systems:  Review of Systems   Constitutional: Negative for fever and unexpected weight change.   HENT: Negative for trouble swallowing.    Respiratory: Negative for cough and shortness of breath.    Cardiovascular: Negative for chest pain.   Gastrointestinal: Positive for abdominal pain. Negative for blood in stool, nausea and vomiting.   Genitourinary: Negative for difficulty urinating, dysuria and hematuria.       OBJECTIVE:     Vital Signs (Most Recent)  Temp: 98.5 °F (36.9 °C) (22 0859)  Pulse: 85 (22 0859)  BP: 131/77 (22 0859)     75.3 kg (166 lb 0.1 oz)     Physical Exam:  Physical Exam  Vitals  and nursing note reviewed.   Constitutional:       General: She is not in acute distress.     Appearance: Normal appearance. She is not ill-appearing, toxic-appearing or diaphoretic.   HENT:      Head: Normocephalic and atraumatic.      Right Ear: External ear normal.      Left Ear: External ear normal.      Nose: Nose normal. No congestion or rhinorrhea.      Mouth/Throat:      Mouth: Mucous membranes are moist.      Pharynx: Oropharynx is clear.   Eyes:      General: No scleral icterus.        Right eye: No discharge.         Left eye: No discharge.      Extraocular Movements: Extraocular movements intact.      Conjunctiva/sclera: Conjunctivae normal.      Pupils: Pupils are equal, round, and reactive to light.   Cardiovascular:      Rate and Rhythm: Normal rate and regular rhythm.   Pulmonary:      Effort: Pulmonary effort is normal. No respiratory distress.      Breath sounds: No stridor.   Abdominal:      General: There is no distension.      Palpations: Abdomen is soft.      Tenderness: There is no abdominal tenderness. There is no guarding or rebound.   Musculoskeletal:         General: No deformity. Normal range of motion.      Cervical back: Normal range of motion and neck supple. No rigidity.   Skin:     General: Skin is warm and dry.      Capillary Refill: Capillary refill takes less than 2 seconds.      Coloration: Skin is not jaundiced.      Findings: No rash.   Neurological:      General: No focal deficit present.      Mental Status: She is alert and oriented to person, place, and time. Mental status is at baseline.      Cranial Nerves: No cranial nerve deficit.      Coordination: Coordination normal.      Gait: Gait normal.   Psychiatric:         Mood and Affect: Mood normal.         Behavior: Behavior normal.         Thought Content: Thought content normal.         Judgment: Judgment normal.         Laboratory      Diagnostic Results:  CT abd/pelvis imaging personally reviewed: there is a moderately  sized hiatal hernia without evidence of torsion or ischemia; thickening of the descending/sigmoid colon with diverticulosis.    ASSESSMENT/PLAN:     Carla was seen today for consult.    Diagnoses and all orders for this visit:    Hiatal hernia without gangrene or obstruction    Right lower quadrant abdominal pain  -     Ambulatory referral/consult to General Surgery    Colitis    Other orders  -     amoxicillin-clavulanate 875-125mg (AUGMENTIN) 875-125 mg per tablet; Take 1 tablet by mouth every 12 (twelve) hours. for 7 days         No surgical intervention for asymptomatic, incidentally found hiatal hernia. Will prescribe Augmentin for colitis. She has a colonoscopy scheduled with GI later this month--will follow up on findings.    Sera Camargo,   General Surgery  [facility]  2/1/2022    I spent a total of 30 minutes on the day of the visit.This includes face to face time and non-face to face time preparing to see the patient (eg, review of tests), obtaining and/or reviewing separately obtained history, documenting clinical information in the electronic or other health record, independently interpreting results and communicating results to the patient/family/caregiver, or care coordinator.

## 2022-02-07 ENCOUNTER — TELEPHONE (OUTPATIENT)
Dept: FAMILY MEDICINE | Facility: CLINIC | Age: 67
End: 2022-02-07
Payer: MEDICARE

## 2022-02-07 RX ORDER — FLUCONAZOLE 150 MG/1
150 TABLET ORAL DAILY
Qty: 1 TABLET | Refills: 0 | Status: SHIPPED | OUTPATIENT
Start: 2022-02-07 | End: 2022-02-08

## 2022-02-07 NOTE — TELEPHONE ENCOUNTER
Patient was placed on antibiotics last week and now has a yeast infection. Please call in something for her yeast.

## 2022-02-07 NOTE — TELEPHONE ENCOUNTER
----- Message from Mireille Kemp sent at 2/7/2022  9:58 AM CST -----  Contact: self/[phone]  .1 Patient would like to get medical advice.  Symptoms (please be specific): yeas infection  How long has patient had these symptoms: last week  Pharmacy name and phone#:[facility] [address]   Phone:  [phone]  Fax:  [phone]  Any drug allergies: on file  Comments: Patient would like to get medical advice. Patient is asking for a courtesy call from NP. Thank you

## 2022-02-09 NOTE — PROGRESS NOTES
Duplicate colonoscopy received.2-   Hyperlinked Colonoscopy already in media.-2-  Linked Pathology Report to HM from outside facility.2-

## 2022-02-15 LAB — CRC RECOMMENDATION EXT: NORMAL

## 2022-02-22 ENCOUNTER — CLINICAL SUPPORT (OUTPATIENT)
Dept: FAMILY MEDICINE | Facility: CLINIC | Age: 67
End: 2022-02-22
Payer: MEDICARE

## 2022-02-22 DIAGNOSIS — E53.8 B12 DEFICIENCY: Primary | ICD-10-CM

## 2022-02-22 PROCEDURE — 99999 PR PBB SHADOW E&M-EST. PATIENT-LVL II: ICD-10-PCS | Mod: PBBFAC,,,

## 2022-02-22 PROCEDURE — 99999 PR PBB SHADOW E&M-EST. PATIENT-LVL II: CPT | Mod: PBBFAC,,,

## 2022-02-22 PROCEDURE — 96372 THER/PROPH/DIAG INJ SC/IM: CPT | Mod: PBBFAC,PO

## 2022-02-22 RX ADMIN — CYANOCOBALAMIN 1000 MCG: 1000 INJECTION, SOLUTION INTRAMUSCULAR at 09:02

## 2022-02-28 ENCOUNTER — EXTERNAL CHRONIC CARE MANAGEMENT (OUTPATIENT)
Dept: PRIMARY CARE CLINIC | Facility: CLINIC | Age: 67
End: 2022-02-28
Payer: MEDICARE

## 2022-02-28 PROCEDURE — 99490 CHRNC CARE MGMT STAFF 1ST 20: CPT | Mod: PBBFAC,PO | Performed by: FAMILY MEDICINE

## 2022-02-28 PROCEDURE — 99490 PR CHRONIC CARE MGMT, 1ST 20 MIN: ICD-10-PCS | Mod: S$PBB,,, | Performed by: FAMILY MEDICINE

## 2022-02-28 PROCEDURE — 99490 CHRNC CARE MGMT STAFF 1ST 20: CPT | Mod: S$PBB,,, | Performed by: FAMILY MEDICINE

## 2022-03-02 ENCOUNTER — TELEPHONE (OUTPATIENT)
Dept: VASCULAR SURGERY | Facility: CLINIC | Age: 67
End: 2022-03-02
Payer: MEDICARE

## 2022-03-02 NOTE — TELEPHONE ENCOUNTER
Called patient to reschedule yesterday's appointment with Dr Martinez. He booked out for surgery. Left message

## 2022-03-02 NOTE — TELEPHONE ENCOUNTER
----- Message from Clarice Smiley sent at 3/2/2022  9:21 AM CST -----  Contact: Patient [phone]  Good Morning  Patient is returning a phone call.  Who left a message for the patient: Bernice Aaron MA   Does patient know what this is regarding:    Would you like a call back, or a response through your [facility] portal?:   yes/call and portal message   Comments:

## 2022-03-22 ENCOUNTER — CLINICAL SUPPORT (OUTPATIENT)
Dept: FAMILY MEDICINE | Facility: CLINIC | Age: 67
End: 2022-03-22
Payer: MEDICARE

## 2022-03-22 DIAGNOSIS — E53.8 B12 DEFICIENCY: Primary | ICD-10-CM

## 2022-03-22 PROCEDURE — 99999 PR PBB SHADOW E&M-EST. PATIENT-LVL II: ICD-10-PCS | Mod: PBBFAC,,,

## 2022-03-22 PROCEDURE — 96372 THER/PROPH/DIAG INJ SC/IM: CPT | Mod: PBBFAC,PO

## 2022-03-22 PROCEDURE — 99999 PR PBB SHADOW E&M-EST. PATIENT-LVL II: CPT | Mod: PBBFAC,,,

## 2022-03-22 RX ADMIN — CYANOCOBALAMIN 1000 MCG: 1000 INJECTION, SOLUTION INTRAMUSCULAR at 10:03

## 2022-03-30 ENCOUNTER — HOSPITAL ENCOUNTER (OUTPATIENT)
Dept: RADIOLOGY | Facility: HOSPITAL | Age: 67
Discharge: HOME | End: 2022-03-30
Attending: FAMILY MEDICINE
Payer: MEDICARE

## 2022-03-30 ENCOUNTER — OFFICE VISIT (OUTPATIENT)
Dept: FAMILY MEDICINE | Facility: CLINIC | Age: 67
End: 2022-03-30
Payer: MEDICARE

## 2022-03-30 VITALS
WEIGHT: 166 LBS | TEMPERATURE: 98 F | HEIGHT: 65 IN | BODY MASS INDEX: 27.66 KG/M2 | SYSTOLIC BLOOD PRESSURE: 138 MMHG | HEART RATE: 93 BPM | DIASTOLIC BLOOD PRESSURE: 68 MMHG

## 2022-03-30 DIAGNOSIS — M25.512 ACUTE PAIN OF LEFT SHOULDER: ICD-10-CM

## 2022-03-30 DIAGNOSIS — C50.911 INFILTRATING DUCTAL CARCINOMA OF BREAST, RIGHT: ICD-10-CM

## 2022-03-30 DIAGNOSIS — M25.562 ACUTE PAIN OF BOTH KNEES: ICD-10-CM

## 2022-03-30 DIAGNOSIS — M54.2 NECK PAIN: ICD-10-CM

## 2022-03-30 DIAGNOSIS — M25.561 ACUTE PAIN OF BOTH KNEES: ICD-10-CM

## 2022-03-30 DIAGNOSIS — M25.512 ACUTE PAIN OF LEFT SHOULDER: Primary | ICD-10-CM

## 2022-03-30 DIAGNOSIS — N18.30 STAGE 3 CHRONIC KIDNEY DISEASE, UNSPECIFIED WHETHER STAGE 3A OR 3B CKD: ICD-10-CM

## 2022-03-30 DIAGNOSIS — N25.81 SECONDARY HYPERPARATHYROIDISM: ICD-10-CM

## 2022-03-30 DIAGNOSIS — M17.11 PRIMARY OSTEOARTHRITIS OF RIGHT KNEE: ICD-10-CM

## 2022-03-30 PROCEDURE — 73030 X-RAY EXAM OF SHOULDER: CPT | Mod: TC,PO,LT

## 2022-03-30 PROCEDURE — 99214 OFFICE O/P EST MOD 30 MIN: CPT | Mod: S$PBB,,, | Performed by: FAMILY MEDICINE

## 2022-03-30 PROCEDURE — 99214 PR OFFICE/OUTPT VISIT, EST, LEVL IV, 30-39 MIN: ICD-10-PCS | Mod: S$PBB,,, | Performed by: FAMILY MEDICINE

## 2022-03-30 PROCEDURE — 99999 PR PBB SHADOW E&M-EST. PATIENT-LVL V: ICD-10-PCS | Mod: PBBFAC,,, | Performed by: FAMILY MEDICINE

## 2022-03-30 PROCEDURE — 73030 XR SHOULDER COMPLETE 2 OR MORE VIEWS LEFT: ICD-10-PCS | Mod: 26,LT,, | Performed by: RADIOLOGY

## 2022-03-30 PROCEDURE — 99999 PR PBB SHADOW E&M-EST. PATIENT-LVL V: CPT | Mod: PBBFAC,,, | Performed by: FAMILY MEDICINE

## 2022-03-30 PROCEDURE — 73030 X-RAY EXAM OF SHOULDER: CPT | Mod: 26,LT,, | Performed by: RADIOLOGY

## 2022-03-30 PROCEDURE — 99215 OFFICE O/P EST HI 40 MIN: CPT | Mod: PBBFAC,PO | Performed by: FAMILY MEDICINE

## 2022-03-30 RX ORDER — METHYLPREDNISOLONE 4 MG/1
TABLET ORAL
Qty: 21 TABLET | Refills: 0 | Status: SHIPPED | OUTPATIENT
Start: 2022-03-30 | End: 2022-09-01

## 2022-03-30 RX ORDER — DEXTROMETHORPHAN HYDROBROMIDE, GUAIFENESIN 5; 100 MG/5ML; MG/5ML
1300 LIQUID ORAL EVERY 8 HOURS
Refills: 0 | COMMUNITY
Start: 2022-03-30 | End: 2023-08-30

## 2022-03-30 RX ORDER — TRAMADOL HYDROCHLORIDE 50 MG/1
50 TABLET ORAL EVERY 6 HOURS PRN
Qty: 20 TABLET | Refills: 0 | Status: SHIPPED | OUTPATIENT
Start: 2022-03-30 | End: 2022-04-06

## 2022-03-30 NOTE — PROGRESS NOTES
She complains of waking up with significant discomfort at the left shoulder with some radiation further down the upper arm.  She does have some pain in the left neck posteriorly.  She noted some pain and swelling in the knees bilaterally.  Seem to be okay yesterday.  She does have previous arthritis noted in the knees.  She denied any injury.  Current treatment Tylenol without resolution.  Stage 3 chronic kidney disease wanting NSAIDs.  Previous ductal carcinoma of the breast being followed by breast specialist without evidence recurrence.  Secondary hyperparathyroidism stable.    Carla was seen today for arm pain.    Diagnoses and all orders for this visit:    Acute pain of left shoulder  -     CBC Auto Differential; Future  -     C-Reactive Protein; Future  -     Sedimentation rate; Future  -     Uric Acid; Future  -     CYCLIC CITRUL PEPTIDE ANTIBODY, IGG; Future  -     Rheumatoid Factor; Future  -     X-Ray Shoulder 2 or More Views Left; Future  -     Ambulatory referral/consult to Orthopedics; Future    Neck pain    Acute pain of both knees  -     CBC Auto Differential; Future  -     C-Reactive Protein; Future  -     Sedimentation rate; Future  -     Uric Acid; Future  -     CYCLIC CITRUL PEPTIDE ANTIBODY, IGG; Future  -     Rheumatoid Factor; Future  -     Ambulatory referral/consult to Orthopedics; Future    Primary osteoarthritis of right knee  -     Ambulatory referral/consult to Orthopedics; Future    Stage 3 chronic kidney disease, unspecified whether stage 3a or 3b CKD  -     Ambulatory referral/consult to Orthopedics; Future    Infiltrating ductal carcinoma of breast, right    Secondary hyperparathyroidism    Other orders  -     methylPREDNISolone (MEDROL DOSEPACK) 4 mg tablet; follow package directions  -     traMADoL (ULTRAM) 50 mg tablet; Take 1 tablet (50 mg total) by mouth every 6 (six) hours as needed for Pain.  -     acetaminophen (TYLENOL) 650 MG TbSR; Take 2 tablets (1,300 mg total) by mouth  every 8 (eight) hours.      Suspect symptoms in the knees related to arthritis.  She probably has some neck strain.  I think the shoulder pain however is a separate issue with her significant decreased range of motion.  Will have her see orthopedics and check for inflammatory disease as above.  Also request report from her last colonoscopy which she states she had last month.            Past Medical History:  Past Medical History:   Diagnosis Date    Acute pancreatitis 3/21/2016    Breast cancer 2005    right side with lumpectomy, chemo and radiation    Closed fracture of ramus of right pubis 6/8/2016    Colon polyp     last scope 4/2012- repeat 10 y per pt- Dr. Johnson    GERD (gastroesophageal reflux disease) 5/29/2012    Hyperlipidemia 4/16/2013    Hypertension     Metabolic encephalopathy 4/20/2021    Osteopenia      Past Surgical History:   Procedure Laterality Date    BREAST LUMPECTOMY  2005    DILATION AND CURETTAGE OF UTERUS      ESOPHAGEAL DILATION      ESOPHAGOGASTRODUODENOSCOPY  6/1/2012    ROTATOR CUFF REPAIR      TEAR DUCT SURGERY      right rye     Review of patient's allergies indicates:   Allergen Reactions    Hydrochlorothiazide Other (See Comments)     Multiple electrolyte abnormalities     Current Outpatient Medications on File Prior to Visit   Medication Sig Dispense Refill    amLODIPine (NORVASC) 2.5 MG tablet Take 1 tablet by mouth once daily 30 tablet 11    benazepriL (LOTENSIN) 10 MG tablet Take 1 tablet by mouth once daily 90 tablet 3    calcium carbonate (OS-NASEEM) 500 mg calcium (1,250 mg) tablet qid 120 tablet 0    ergocalciferol (ERGOCALCIFEROL) 50,000 unit Cap Once a week 4 capsule 5    folic acid (FOLVITE) 1 MG tablet Take 1 tablet by mouth once daily 90 tablet 3    magnesium oxide (MAG-OX) 400 mg (241.3 mg magnesium) tablet bid 60 tablet 1    minocycline (DYNACIN) 100 MG tablet Take 90 mg by mouth every 12 (twelve) hours.      omeprazole (PRILOSEC) 20 MG capsule  "Take 1 capsule by mouth once daily 90 capsule 3    pravastatin (PRAVACHOL) 20 MG tablet Take 1 tablet by mouth once daily 90 tablet 2    melatonin 3 mg Cap Take 3 mg by mouth every evening. (Patient not taking: Reported on 3/30/2022)      metroNIDAZOLE (FLAGYL) 500 MG tablet Take 1 tablet (500 mg total) by mouth every 8 (eight) hours. for 7 days 21 tablet 0     Current Facility-Administered Medications on File Prior to Visit   Medication Dose Route Frequency Provider Last Rate Last Admin    cyanocobalamin injection 1,000 mcg  1,000 mcg Intramuscular Q30 Days Taqueria Melendrez MD        cyanocobalamin injection 1,000 mcg  1,000 mcg Intramuscular Q30 Days Taqueria Melendrez MD   1,000 mcg at 22 1023     Social History     Socioeconomic History    Marital status:    Tobacco Use    Smoking status: Former Smoker     Packs/day: 0.30     Types: Cigarettes     Quit date: 2005     Years since quittin.8    Smokeless tobacco: Never Used   Substance and Sexual Activity    Alcohol use: Yes     Alcohol/week: 2.0 standard drinks     Types: 2 Standard drinks or equivalent per week     Comment: prior 6 pack/week    Drug use: Yes     Family History   Problem Relation Age of Onset    Stroke Brother 57    Breast cancer Mother 87           ROS:  GENERAL: No fever, chills,  or significant weight changes.   CARDIOVASCULAR: Denies chest pain, PND, orthopnea or reduced exercise tolerance.  ABDOMEN: Appetite fine. Denies diarrhea, abdominal pain, hematemesis or blood in stool.  URINARY: No flank pain, dysuria or hematuria.    Vitals:    22 1348 22 1436   BP: (!) 144/78 138/68   Pulse: 93    Temp: 97.7 °F (36.5 °C)    TempSrc: Temporal    Weight: 75.3 kg (166 lb)    Height: 5' 5" (1.651 m)      Wt Readings from Last 3 Encounters:   22 75.3 kg (166 lb)   22 75.3 kg (166 lb 0.1 oz)   22 74.1 kg (163 lb 6.4 oz)       OBJECTIVE:   APPEARANCE: Well nourished, well developed, in no " acute distress.    HEAD: Normocephalic.  Atraumatic.  No sinus tenderness.  EYES:   Right eye: Pupil reactive.  Conjunctiva clear.    Left eye: Pupil reactive.  Conjunctiva clear.  EOMI.    EARS: TM's intact. Light reflex normal. No retraction or perforation.    NOSE:  clear.  MOUTH & THROAT:  No pharyngeal erythema or exudate. No lesions.  NECK: Supple.  Mild tenderness palpation posteriorly.  No spinous tenderness.  No bruits.  No JVD.  No cervical lymphadenopathy.  No thyromegaly.    CHEST: Breath sounds clear bilaterally.  Normal respiratory effort  CARDIOVASCULAR: Normal rate.  Regular rhythm.  No murmurs.  No rub.  No gallops.  ABDOMEN: Bowel sounds normal.  Soft.  No tenderness.  No organomegaly.  PERIPHERAL VASCULAR: No cyanosis.  No clubbing.  No edema.  NEUROLOGIC: No focal findings.  MENTAL STATUS: Alert.  Oriented x 3.  Both knees have full range of motion.  She does walk slowly.  Possible slight swelling anteriorly.  Negative Lachman's and Cinthya's.  Not significantly tender to palpation.  Left shoulder has significant decreased range of motion with difficulty lifting past 90°.  Possibly some weakness at the rotator cuff though difficult to distinguish from pain.  She does have some decreased passive range of motion as well though this may be pain related as well.

## 2022-03-31 ENCOUNTER — PATIENT OUTREACH (OUTPATIENT)
Dept: ADMINISTRATIVE | Facility: OTHER | Age: 67
End: 2022-03-31
Payer: COMMERCIAL

## 2022-03-31 ENCOUNTER — EXTERNAL CHRONIC CARE MANAGEMENT (OUTPATIENT)
Dept: PRIMARY CARE CLINIC | Facility: CLINIC | Age: 67
End: 2022-03-31
Payer: MEDICARE

## 2022-03-31 DIAGNOSIS — M25.561 PAIN IN BOTH KNEES, UNSPECIFIED CHRONICITY: Primary | ICD-10-CM

## 2022-03-31 DIAGNOSIS — M25.562 PAIN IN BOTH KNEES, UNSPECIFIED CHRONICITY: Primary | ICD-10-CM

## 2022-03-31 PROCEDURE — 99490 CHRNC CARE MGMT STAFF 1ST 20: CPT | Mod: PBBFAC,PO | Performed by: FAMILY MEDICINE

## 2022-03-31 PROCEDURE — 99490 CHRNC CARE MGMT STAFF 1ST 20: CPT | Mod: S$PBB,,, | Performed by: FAMILY MEDICINE

## 2022-03-31 PROCEDURE — 99490 PR CHRONIC CARE MGMT, 1ST 20 MIN: ICD-10-PCS | Mod: S$PBB,,, | Performed by: FAMILY MEDICINE

## 2022-03-31 NOTE — PROGRESS NOTES
LINKS immunization registry updated  Care Everywhere updated  Health Maintenance updated  Chart reviewed for overdue Proactive [facility]counters (MANNY) health maintenance testing (CRS, Breast Ca, Diabetic Eye Exam)   Orders entered:N/A

## 2022-04-01 ENCOUNTER — TELEPHONE (OUTPATIENT)
Dept: FAMILY MEDICINE | Facility: CLINIC | Age: 67
End: 2022-04-01
Payer: COMMERCIAL

## 2022-04-01 ENCOUNTER — OFFICE VISIT (OUTPATIENT)
Dept: ORTHOPEDICS | Facility: CLINIC | Age: 67
End: 2022-04-01
Payer: MEDICARE

## 2022-04-01 ENCOUNTER — HOSPITAL ENCOUNTER (OUTPATIENT)
Dept: RADIOLOGY | Facility: HOSPITAL | Age: 67
Discharge: HOME | End: 2022-04-01
Attending: NURSE PRACTITIONER
Payer: MEDICARE

## 2022-04-01 VITALS — HEIGHT: 65 IN | BODY MASS INDEX: 27.66 KG/M2 | WEIGHT: 166 LBS

## 2022-04-01 DIAGNOSIS — M25.562 PAIN IN BOTH KNEES, UNSPECIFIED CHRONICITY: ICD-10-CM

## 2022-04-01 DIAGNOSIS — M25.562 ACUTE PAIN OF BOTH KNEES: ICD-10-CM

## 2022-04-01 DIAGNOSIS — M25.561 PAIN IN BOTH KNEES, UNSPECIFIED CHRONICITY: ICD-10-CM

## 2022-04-01 DIAGNOSIS — M25.512 ACUTE PAIN OF LEFT SHOULDER: ICD-10-CM

## 2022-04-01 DIAGNOSIS — M25.561 ACUTE PAIN OF BOTH KNEES: ICD-10-CM

## 2022-04-01 DIAGNOSIS — R76.8 CYCLIC CITRULLINATED PEPTIDE (CCP) ANTIBODY POSITIVE: Primary | ICD-10-CM

## 2022-04-01 DIAGNOSIS — R70.0 ELEVATED SEDIMENTATION RATE: ICD-10-CM

## 2022-04-01 DIAGNOSIS — M17.11 PRIMARY OSTEOARTHRITIS OF RIGHT KNEE: ICD-10-CM

## 2022-04-01 DIAGNOSIS — M19.90 ARTHRITIS: ICD-10-CM

## 2022-04-01 DIAGNOSIS — N18.30 STAGE 3 CHRONIC KIDNEY DISEASE, UNSPECIFIED WHETHER STAGE 3A OR 3B CKD: ICD-10-CM

## 2022-04-01 PROCEDURE — 73562 X-RAY EXAM OF KNEE 3: CPT | Mod: TC,50,PO

## 2022-04-01 PROCEDURE — 73562 XR KNEE ORTHO BILAT: ICD-10-PCS | Mod: 26,50,, | Performed by: RADIOLOGY

## 2022-04-01 PROCEDURE — 99214 PR OFFICE/OUTPT VISIT, EST, LEVL IV, 30-39 MIN: ICD-10-PCS | Mod: S$PBB,,, | Performed by: NURSE PRACTITIONER

## 2022-04-01 PROCEDURE — 73562 X-RAY EXAM OF KNEE 3: CPT | Mod: 26,50,, | Performed by: RADIOLOGY

## 2022-04-01 PROCEDURE — 99999 PR PBB SHADOW E&M-EST. PATIENT-LVL III: CPT | Mod: PBBFAC,,, | Performed by: NURSE PRACTITIONER

## 2022-04-01 PROCEDURE — 99213 OFFICE O/P EST LOW 20 MIN: CPT | Mod: PBBFAC,PN | Performed by: NURSE PRACTITIONER

## 2022-04-01 PROCEDURE — 99999 PR PBB SHADOW E&M-EST. PATIENT-LVL III: ICD-10-PCS | Mod: PBBFAC,,, | Performed by: NURSE PRACTITIONER

## 2022-04-01 PROCEDURE — 99214 OFFICE O/P EST MOD 30 MIN: CPT | Mod: S$PBB,,, | Performed by: NURSE PRACTITIONER

## 2022-04-01 RX ORDER — METHOCARBAMOL 750 MG/1
750 TABLET, FILM COATED ORAL 4 TIMES DAILY PRN
Qty: 40 TABLET | Refills: 2 | Status: SHIPPED | OUTPATIENT
Start: 2022-04-01 | End: 2022-04-11

## 2022-04-01 NOTE — PROGRESS NOTES
Chief Complaint   Patient presents with    Left Shoulder - Pain    Right Knee - Pain    Left Knee - Pain       HPI:    This is a 66 y.o. female who presents today complaining of left shoulder pain for 3 days after no known injury or trauma. Pain is aching; hurts to sleep on that side. +pain with certain movements and difficulty lifting her shoulder up.  No numbness or tingling. No associated signs or symptoms. She reports she saw primary care on Wednesday and was given steroids and tramadol. She feels like the steroid is what is helping with her shoulder. We discussed shoulder injection, but since improving with steroid pack, instructed her to finish the steroid pack first. If no relief after the steroid pack, then we can get her back in for injection. She also states she has bilateral knee pain, and we reviewed her bilateral knee xrays and she does have some joint narrowing to bilateral knees with right knee that is worse than the left knee. However at this time, she doesn't have much knee pain. Had bilateral knee swelling a few days ago, but this resolved without any intervention.        Past Medical History:   Diagnosis Date    Acute pancreatitis 3/21/2016    Breast cancer 2005    right side with lumpectomy, chemo and radiation    Closed fracture of ramus of right pubis 6/8/2016    Colon polyp     last scope 4/2012- repeat 10 y per pt- Dr. Johnson    GERD (gastroesophageal reflux disease) 5/29/2012    Hyperlipidemia 4/16/2013    Hypertension     Metabolic encephalopathy 4/20/2021    Osteopenia       Past Surgical History:   Procedure Laterality Date    BREAST LUMPECTOMY  2005    DILATION AND CURETTAGE OF UTERUS      ESOPHAGEAL DILATION      ESOPHAGOGASTRODUODENOSCOPY  6/1/2012    ROTATOR CUFF REPAIR      TEAR DUCT SURGERY      right rye      Current Outpatient Medications on File Prior to Visit   Medication Sig Dispense Refill    acetaminophen (TYLENOL) 650 MG TbSR Take 2 tablets (1,300 mg total)  by mouth every 8 (eight) hours.  0    amLODIPine (NORVASC) 2.5 MG tablet Take 1 tablet by mouth once daily 30 tablet 11    benazepriL (LOTENSIN) 10 MG tablet Take 1 tablet by mouth once daily 90 tablet 3    calcium carbonate (OS-NASEEM) 500 mg calcium (1,250 mg) tablet qid 120 tablet 0    ergocalciferol (ERGOCALCIFEROL) 50,000 unit Cap Once a week 4 capsule 5    folic acid (FOLVITE) 1 MG tablet Take 1 tablet by mouth once daily 90 tablet 3    magnesium oxide (MAG-OX) 400 mg (241.3 mg magnesium) tablet bid 60 tablet 1    melatonin 3 mg Cap Take 3 mg by mouth every evening.      methylPREDNISolone (MEDROL DOSEPACK) 4 mg tablet follow package directions 21 tablet 0    minocycline (DYNACIN) 100 MG tablet Take 90 mg by mouth every 12 (twelve) hours.      omeprazole (PRILOSEC) 20 MG capsule Take 1 capsule by mouth once daily 90 capsule 3    pravastatin (PRAVACHOL) 20 MG tablet Take 1 tablet by mouth once daily 90 tablet 2    traMADoL (ULTRAM) 50 mg tablet Take 1 tablet (50 mg total) by mouth every 6 (six) hours as needed for Pain. 20 tablet 0    metroNIDAZOLE (FLAGYL) 500 MG tablet Take 1 tablet (500 mg total) by mouth every 8 (eight) hours. for 7 days 21 tablet 0     Current Facility-Administered Medications on File Prior to Visit   Medication Dose Route Frequency Provider Last Rate Last Admin    cyanocobalamin injection 1,000 mcg  1,000 mcg Intramuscular Q30 Days Taqueria Melendrez MD        cyanocobalamin injection 1,000 mcg  1,000 mcg Intramuscular Q30 Days Taqueria Melendrez MD   1,000 mcg at 22 1023      Review of patient's allergies indicates:   Allergen Reactions    Hydrochlorothiazide Other (See Comments)     Multiple electrolyte abnormalities      Family History not pertinent   Social History     Socioeconomic History    Marital status:    Tobacco Use    Smoking status: Former Smoker     Packs/day: 0.30     Types: Cigarettes     Quit date: 2005     Years since quittin.8     Smokeless tobacco: Never Used   Substance and Sexual Activity    Alcohol use: Yes     Alcohol/week: 2.0 standard drinks     Types: 2 Standard drinks or equivalent per week     Comment: prior 6 pack/week    Drug use: Yes         Review of Systems:   Constitutional:  Denies fever or chills    Eyes:  Denies change in visual acuity    HENT:  Denies nasal congestion or sore throat    Respiratory:  Denies cough or shortness of breath    Cardiovascular:  Denies chest pain or edema    GI:  Denies abdominal pain, nausea, vomiting, bloody stools or diarrhea    :  Denies dysuria    Integument:  Denies rash    Neurologic:  Denies headache, focal weakness or sensory changes    Endocrine:  Denies polyuria or polydipsia    Lymphatic:  Denies swollen glands    Psychiatric:  Denies depression or anxiety       Physical Exam:    Constitutional:  Well developed, well nourished, no acute distress, non-toxic appearance    Integument:  Well hydrated, no rash    Lymphatic:  No lymphadenopathy noted    Neurologic:  Alert & oriented x 3,     Psychiatric:  Speech and behavior appropriate    Gi: abdomen soft  Eyes: EOMI     Bilateral Shoulder Exam    Right Shoulder Exam   Shoulder exam performed same as contralateral side and is normal.    Left Shoulder Exam   Tenderness   Shoulder tenderness location: diffusely about shoulder.    Range of Motion   Forward Flexion: abnormal   External Rotation: abnormal     Muscle Strength   Supraspinatus: 4/5     Tests   Hawkin's test: positive  Impingement: positive    Other   Erythema: absent  Sensation: normal  Pulse: present     Imaging   X-rays were performed today, personally reviewed by me and findings discussed with the patient.   3 views of the left shoulder show degenerative arthritic changes. No fractures or dislocation.         Assessment   Acute pain of left shoulder  -     Ambulatory referral/consult to Orthopedics  -     methocarbamoL (ROBAXIN) 750 MG Tab; Take 1 tablet (750 mg total) by  mouth 4 (four) times daily as needed.  Dispense: 40 tablet; Refill: 2    Acute pain of both knees  -     Ambulatory referral/consult to Orthopedics  -     methocarbamoL (ROBAXIN) 750 MG Tab; Take 1 tablet (750 mg total) by mouth 4 (four) times daily as needed.  Dispense: 40 tablet; Refill: 2    Primary osteoarthritis of right knee  -     Ambulatory referral/consult to Orthopedics    Stage 3 chronic kidney disease, unspecified whether stage 3a or 3b CKD  -     Ambulatory referral/consult to Orthopedics    Plan  Finish out steroid pack.   Take tramadol only when pain unrelieved by tylenol or ice/heat application.   Take robaxin qid as needed.   She will return to clinic if she doesn't get complete relief from oral steroid pack.

## 2022-04-01 NOTE — TELEPHONE ENCOUNTER
----- Message from Taqueria Melendrez MD sent at 3/31/2022  4:17 PM CDT -----  Blood test shows some evidence of inflammation.  Possibility of inflammatory arthritis.  Please refer her to Rheumatology. My nurse will contact you to arrange.  Thanks,  Dr. Melendrez

## 2022-04-01 NOTE — TELEPHONE ENCOUNTER
----- Message from Whitney Squires sent at 4/1/2022 10:17 AM CDT -----  Contact: Carla  .Type:  Patient Returning Call    Who Called: Carla  Who Left Message for Patient: unknown   Does the patient know what this is regarding?: results possibly  Would the patient rather a call back or a response via [facility]? call  Best Call Back Number: [phone]  Additional Information: reports missing a call, requests another call back  ThanksTALYA

## 2022-04-05 ENCOUNTER — PATIENT OUTREACH (OUTPATIENT)
Dept: ADMINISTRATIVE | Facility: HOSPITAL | Age: 67
End: 2022-04-05
Payer: COMMERCIAL

## 2022-04-05 NOTE — LETTER
AUTHORIZATION FOR RELEASE OF   CONFIDENTIAL INFORMATION        We are seeing Carla Dang, date of birth 1955, in the clinic at [facility] MED[facility]. Taqueria Melendrez MD is the patient's PCP. Carla Dang has an outstanding lab/procedure at the time we reviewed her chart. In order to help keep her health information updated, she has authorized us to request the following medical record(s):        (  )  MAMMOGRAM                                      ( X )  COLONOSCOPY      (  )  PAP SMEAR                                          (  )  OUTSIDE LAB RESULTS     (  )  DEXA SCAN                                          (  )  EYE EXAM            (  )  FOOT EXAM                                          (  )  ENTIRE RECORD     (  )  OUTSIDE IMMUNIZATIONS                 (  )  _______________         Please fax records to [facility], Gerald J Sparks, MD, [phone]     If you have any questions, please contact   [facility]  Care Coordination Department   [facility]   [phone]            Patient Name: Carla Dang  : 1955  Patient Phone #: [phone]

## 2022-04-05 NOTE — LETTER
AUTHORIZATION FOR RELEASE OF   CONFIDENTIAL INFORMATION      We are seeing Carla Dang, date of birth 1955, in the clinic at [facility] MEDICINE. Taqueria Melendrez MD is the patient's PCP. Carla Dang has an outstanding lab/procedure at the time we reviewed her chart. In order to help keep her health information updated, she has authorized us to request the following medical record(s):        (  )  MAMMOGRAM                                      ( X )  COLONOSCOPY      (  )  PAP SMEAR                                          (  )  OUTSIDE LAB RESULTS     (  )  DEXA SCAN                                          (  )  EYE EXAM            (  )  FOOT EXAM                                          (  )  ENTIRE RECORD     (  )  OUTSIDE IMMUNIZATIONS                 (  )  _______________         Please fax records to [facility], Gerald J Sparks, MD, [phone]     If you have any questions, please contact   [email or web address]  Care Coordination Department   [facility]   [phone]            Patient Name: Carla Dang  : 1955  Patient Phone #: [phone]

## 2022-04-21 NOTE — PROGRESS NOTES
I called Harry Johnson MD office 1x to request colonoscopy, lvm for someone to call me back. I will f/u in 1 week if no response

## 2022-04-22 ENCOUNTER — CLINICAL SUPPORT (OUTPATIENT)
Dept: FAMILY MEDICINE | Facility: CLINIC | Age: 67
End: 2022-04-22
Payer: MEDICARE

## 2022-04-22 DIAGNOSIS — E53.8 B12 DEFICIENCY: Primary | ICD-10-CM

## 2022-04-22 PROCEDURE — 96372 THER/PROPH/DIAG INJ SC/IM: CPT | Mod: PBBFAC,PO

## 2022-04-22 RX ADMIN — CYANOCOBALAMIN 1000 MCG: 1000 INJECTION, SOLUTION INTRAMUSCULAR at 10:04

## 2022-04-30 ENCOUNTER — EXTERNAL CHRONIC CARE MANAGEMENT (OUTPATIENT)
Dept: PRIMARY CARE CLINIC | Facility: CLINIC | Age: 67
End: 2022-04-30
Payer: MEDICARE

## 2022-04-30 PROCEDURE — 99490 PR CHRONIC CARE MGMT, 1ST 20 MIN: ICD-10-PCS | Mod: S$PBB,,, | Performed by: FAMILY MEDICINE

## 2022-04-30 PROCEDURE — 99490 CHRNC CARE MGMT STAFF 1ST 20: CPT | Mod: PBBFAC,PO | Performed by: FAMILY MEDICINE

## 2022-04-30 PROCEDURE — 99490 CHRNC CARE MGMT STAFF 1ST 20: CPT | Mod: S$PBB,,, | Performed by: FAMILY MEDICINE

## 2022-05-03 ENCOUNTER — INITIAL CONSULT (OUTPATIENT)
Dept: VASCULAR SURGERY | Facility: CLINIC | Age: 67
End: 2022-05-03
Payer: MEDICARE

## 2022-05-03 VITALS
HEART RATE: 81 BPM | SYSTOLIC BLOOD PRESSURE: 106 MMHG | WEIGHT: 166 LBS | DIASTOLIC BLOOD PRESSURE: 68 MMHG | BODY MASS INDEX: 27.62 KG/M2

## 2022-05-03 DIAGNOSIS — I70.1 RENAL ARTERY STENOSIS, NATIVE: Primary | ICD-10-CM

## 2022-05-03 DIAGNOSIS — R10.31 RIGHT LOWER QUADRANT ABDOMINAL PAIN: ICD-10-CM

## 2022-05-03 PROCEDURE — 99204 PR OFFICE/OUTPT VISIT, NEW, LEVL IV, 45-59 MIN: ICD-10-PCS | Mod: S$PBB,,, | Performed by: SURGERY

## 2022-05-03 PROCEDURE — 99999 PR PBB SHADOW E&M-EST. PATIENT-LVL III: ICD-10-PCS | Mod: PBBFAC,,, | Performed by: SURGERY

## 2022-05-03 PROCEDURE — 99999 PR PBB SHADOW E&M-EST. PATIENT-LVL III: CPT | Mod: PBBFAC,,, | Performed by: SURGERY

## 2022-05-03 PROCEDURE — 99213 OFFICE O/P EST LOW 20 MIN: CPT | Mod: PBBFAC,PN | Performed by: SURGERY

## 2022-05-03 PROCEDURE — 99204 OFFICE O/P NEW MOD 45 MIN: CPT | Mod: S$PBB,,, | Performed by: SURGERY

## 2022-05-03 NOTE — PROGRESS NOTES
[facility] - Vascular Surgery  Congenital Cardiovascular Surgery  Consult Note    Patient Name: Carla Dang  MRN: [id number]  Admission Date: (Not on file)  Hospital Length of Stay: 0 days   Attending Physician: No att. providers found  Primary Care Provider: Taqueria Melendrez MD    Patient information was obtained from patient and ER records.     Consults  Subjective:     Chief Complaint RIGHT GROIN PAIN    History of Present Illness:  58-year-old female presents today for evaluation of an incidentally right renal artery stenosis on CT angiogram.  Patient was being worked up for right lower quadrant pain.  This has now resolved.  Patient has been seen by OBGYN as well as Gastroenterology all of which have been negative.  Patient did have a CT angiogram of her abdomen pelvis showing incidentally found right renal artery stenosis.  Patient denies any abnormalities urine output or creatinine levels.    Current Outpatient Medications   Medication    acetaminophen (TYLENOL) 650 MG TbSR    amLODIPine (NORVASC) 2.5 MG tablet    benazepriL (LOTENSIN) 10 MG tablet    calcium carbonate (OS-NASEEM) 500 mg calcium (1,250 mg) tablet    ergocalciferol (ERGOCALCIFEROL) 50,000 unit Cap    folic acid (FOLVITE) 1 MG tablet    magnesium oxide (MAG-OX) 400 mg (241.3 mg magnesium) tablet    melatonin 3 mg Cap    methylPREDNISolone (MEDROL DOSEPACK) 4 mg tablet    minocycline (DYNACIN) 100 MG tablet    omeprazole (PRILOSEC) 20 MG capsule    pravastatin (PRAVACHOL) 20 MG tablet    metroNIDAZOLE (FLAGYL) 500 MG tablet     Current Facility-Administered Medications   Medication Frequency    cyanocobalamin injection 1,000 mcg Q30 Days    cyanocobalamin injection 1,000 mcg Q30 Days       Review of patient's allergies indicates:   Allergen Reactions    Hydrochlorothiazide Other (See Comments)     Multiple electrolyte abnormalities       Past Medical History:   Diagnosis Date    Acute pancreatitis 3/21/2016    Breast cancer 2005     right side with lumpectomy, chemo and radiation    Closed fracture of ramus of right pubis 2016    Colon polyp     last scope 2012- repeat 10 y per pt- Dr. Johnson    GERD (gastroesophageal reflux disease) 2012    Hyperlipidemia 2013    Hypertension     Metabolic encephalopathy 2021    Osteopenia      Past Surgical History:   Procedure Laterality Date    BREAST LUMPECTOMY  2005    DILATION AND CURETTAGE OF UTERUS      ESOPHAGEAL DILATION      ESOPHAGOGASTRODUODENOSCOPY  2012    ROTATOR CUFF REPAIR      TEAR DUCT SURGERY      right rye     Family History     Problem Relation (Age of Onset)    Breast cancer Mother (87)    Stroke Brother (57)        Tobacco Use    Smoking status: Former Smoker     Packs/day: 0.30     Types: Cigarettes     Quit date: 2005     Years since quittin.9    Smokeless tobacco: Never Used   Substance and Sexual Activity    Alcohol use: Yes     Alcohol/week: 2.0 standard drinks     Types: 2 Standard drinks or equivalent per week     Comment: prior 6 pack/week    Drug use: Yes    Sexual activity: Not on file     Review of Systems   Constitutional: Negative for activity change, appetite change, fatigue and fever.   HENT: Negative for congestion.    Eyes: Negative for photophobia, redness and visual disturbance.   Respiratory: Negative for apnea, cough, chest tightness and shortness of breath.    Cardiovascular: Negative for chest pain and leg swelling.   Gastrointestinal: Negative for abdominal pain, nausea and vomiting.   Genitourinary: Negative for difficulty urinating.   Musculoskeletal: Negative for gait problem and myalgias.   Skin: Negative for color change, rash and wound.   Neurological: Negative for syncope, facial asymmetry, speech difficulty, weakness and numbness.     Objective:     Vital Signs (Most Recent):  Pulse: 81 (22 1303)  BP: 106/68 (22 1303) Vital Signs (24h Range):  [unfilled]     Weight: 75.3 kg (166 lb 0.1  oz)  Body mass index is 27.62 kg/m².            [unfilled]    Physical Exam  Constitutional:       Appearance: Normal appearance. She is normal weight.   HENT:      Head: Normocephalic and atraumatic.      Mouth/Throat:      Mouth: Mucous membranes are moist.   Eyes:      Extraocular Movements: Extraocular movements intact.      Conjunctiva/sclera: Conjunctivae normal.      Pupils: Pupils are equal, round, and reactive to light.   Neck:      Vascular: No carotid bruit.   Cardiovascular:      Rate and Rhythm: Normal rate and regular rhythm.      Pulses: Normal pulses.   Pulmonary:      Effort: Pulmonary effort is normal.      Breath sounds: Normal breath sounds.   Abdominal:      General: Abdomen is flat. Bowel sounds are normal. There is no abdominal bruit.      Palpations: Abdomen is soft.   Musculoskeletal:      Cervical back: Neck supple.   Skin:     General: Skin is warm.      Capillary Refill: Capillary refill takes 2 to 3 seconds.   Neurological:      General: No focal deficit present.      Mental Status: She is alert and oriented to person, place, and time. Mental status is at baseline.      Cranial Nerves: No cranial nerve deficit.      Sensory: No sensory deficit.      Motor: No weakness.   Psychiatric:         Mood and Affect: Mood normal.         Behavior: Behavior normal.         Significant Labs:  I have reviewed all pertinent lab results within the past 24 hours.    Significant Diagnostics:  I have reviewed all pertinent imaging results/findings within the past 24 hours.  CT: I have reviewed all pertinent results/findings within the past 24 hours and my personal findings are:  Incidentally found right renal artery stenosis.  Less than 50%    Assessment/Plan:     There are no hospital problems to display for this patient.      Asymptomatic incidentally found right renal artery stenosis.  No need for surgical intervention.  Follow-up in 1 year with renal artery ultrasound    Thank you for your consult. I  will sign off. Please contact us if you have any additional questions.    Mark Martinez IV, MD  Vascular Surgery  [facility] - V[facility]

## 2022-05-24 ENCOUNTER — CLINICAL SUPPORT (OUTPATIENT)
Dept: FAMILY MEDICINE | Facility: CLINIC | Age: 67
End: 2022-05-24
Payer: MEDICARE

## 2022-05-24 DIAGNOSIS — E53.8 B12 DEFICIENCY: Primary | ICD-10-CM

## 2022-05-24 PROCEDURE — 99999 PR PBB SHADOW E&M-EST. PATIENT-LVL II: CPT | Mod: PBBFAC,,,

## 2022-05-24 PROCEDURE — 96372 THER/PROPH/DIAG INJ SC/IM: CPT | Mod: PBBFAC,PO

## 2022-05-24 PROCEDURE — 99999 PR PBB SHADOW E&M-EST. PATIENT-LVL II: ICD-10-PCS | Mod: PBBFAC,,,

## 2022-05-24 RX ADMIN — CYANOCOBALAMIN 1000 MCG: 1000 INJECTION, SOLUTION INTRAMUSCULAR at 09:05

## 2022-05-31 ENCOUNTER — EXTERNAL CHRONIC CARE MANAGEMENT (OUTPATIENT)
Dept: PRIMARY CARE CLINIC | Facility: CLINIC | Age: 67
End: 2022-05-31
Payer: MEDICARE

## 2022-05-31 PROCEDURE — 99490 CHRNC CARE MGMT STAFF 1ST 20: CPT | Mod: S$PBB,,, | Performed by: FAMILY MEDICINE

## 2022-05-31 PROCEDURE — 99490 CHRNC CARE MGMT STAFF 1ST 20: CPT | Mod: PBBFAC,PO | Performed by: FAMILY MEDICINE

## 2022-05-31 PROCEDURE — 99490 PR CHRONIC CARE MGMT, 1ST 20 MIN: ICD-10-PCS | Mod: S$PBB,,, | Performed by: FAMILY MEDICINE

## 2022-06-21 ENCOUNTER — CLINICAL SUPPORT (OUTPATIENT)
Dept: FAMILY MEDICINE | Facility: CLINIC | Age: 67
End: 2022-06-21
Payer: MEDICARE

## 2022-06-21 DIAGNOSIS — E53.8 B12 DEFICIENCY: Primary | ICD-10-CM

## 2022-06-21 PROCEDURE — 99999 PR PBB SHADOW E&M-EST. PATIENT-LVL II: CPT | Mod: PBBFAC,,,

## 2022-06-21 PROCEDURE — 96372 THER/PROPH/DIAG INJ SC/IM: CPT | Mod: PBBFAC,PO

## 2022-06-21 PROCEDURE — 99999 PR PBB SHADOW E&M-EST. PATIENT-LVL II: ICD-10-PCS | Mod: PBBFAC,,,

## 2022-06-21 RX ADMIN — CYANOCOBALAMIN 1000 MCG: 1000 INJECTION, SOLUTION INTRAMUSCULAR at 09:06

## 2022-06-30 ENCOUNTER — EXTERNAL CHRONIC CARE MANAGEMENT (OUTPATIENT)
Dept: PRIMARY CARE CLINIC | Facility: CLINIC | Age: 67
End: 2022-06-30
Payer: MEDICARE

## 2022-06-30 PROCEDURE — 99490 PR CHRONIC CARE MGMT, 1ST 20 MIN: ICD-10-PCS | Mod: S$PBB,,, | Performed by: FAMILY MEDICINE

## 2022-06-30 PROCEDURE — 99490 CHRNC CARE MGMT STAFF 1ST 20: CPT | Mod: S$PBB,,, | Performed by: FAMILY MEDICINE

## 2022-06-30 PROCEDURE — 99490 CHRNC CARE MGMT STAFF 1ST 20: CPT | Mod: PBBFAC,PO | Performed by: FAMILY MEDICINE

## 2022-07-21 ENCOUNTER — CLINICAL SUPPORT (OUTPATIENT)
Dept: FAMILY MEDICINE | Facility: CLINIC | Age: 67
End: 2022-07-21
Payer: MEDICARE

## 2022-07-21 ENCOUNTER — LAB VISIT (OUTPATIENT)
Dept: LAB | Facility: HOSPITAL | Age: 67
End: 2022-07-21
Attending: FAMILY MEDICINE
Payer: MEDICARE

## 2022-07-21 DIAGNOSIS — E53.8 B12 DEFICIENCY: Primary | ICD-10-CM

## 2022-07-21 DIAGNOSIS — E78.5 HYPERLIPIDEMIA, UNSPECIFIED HYPERLIPIDEMIA TYPE: ICD-10-CM

## 2022-07-21 LAB
ALBUMIN SERPL BCP-MCNC: 3.4 G/DL (ref 3.5–5.2)
ALP SERPL-CCNC: 99 U/L (ref 55–135)
ALT SERPL W/O P-5'-P-CCNC: 6 U/L (ref 10–44)
ANION GAP SERPL CALC-SCNC: 7 MMOL/L (ref 8–16)
AST SERPL-CCNC: 18 U/L (ref 10–40)
BILIRUB SERPL-MCNC: 0.3 MG/DL (ref 0.1–1)
BUN SERPL-MCNC: 11 MG/DL (ref 8–23)
CALCIUM SERPL-MCNC: 8.6 MG/DL (ref 8.7–10.5)
CHLORIDE SERPL-SCNC: 104 MMOL/L (ref 95–110)
CHOLEST SERPL-MCNC: 201 MG/DL (ref 120–199)
CHOLEST/HDLC SERPL: 2.1 {RATIO} (ref 2–5)
CO2 SERPL-SCNC: 27 MMOL/L (ref 23–29)
CREAT SERPL-MCNC: 1.1 MG/DL (ref 0.5–1.4)
EST. GFR  (AFRICAN AMERICAN): 59.2 ML/MIN/1.73 M^2
EST. GFR  (NON AFRICAN AMERICAN): 51.3 ML/MIN/1.73 M^2
GLUCOSE SERPL-MCNC: 114 MG/DL (ref 70–110)
HDLC SERPL-MCNC: 94 MG/DL (ref 40–75)
HDLC SERPL: 46.8 % (ref 20–50)
LDLC SERPL CALC-MCNC: 93.8 MG/DL (ref 63–159)
NONHDLC SERPL-MCNC: 107 MG/DL
POTASSIUM SERPL-SCNC: 3.9 MMOL/L (ref 3.5–5.1)
PROT SERPL-MCNC: 7.2 G/DL (ref 6–8.4)
SODIUM SERPL-SCNC: 138 MMOL/L (ref 136–145)
TRIGL SERPL-MCNC: 66 MG/DL (ref 30–150)

## 2022-07-21 PROCEDURE — 99999 PR PBB SHADOW E&M-EST. PATIENT-LVL II: CPT | Mod: PBBFAC,,,

## 2022-07-21 PROCEDURE — 80053 COMPREHEN METABOLIC PANEL: CPT | Performed by: FAMILY MEDICINE

## 2022-07-21 PROCEDURE — 36415 COLL VENOUS BLD VENIPUNCTURE: CPT | Mod: PO | Performed by: FAMILY MEDICINE

## 2022-07-21 PROCEDURE — 99999 PR PBB SHADOW E&M-EST. PATIENT-LVL II: ICD-10-PCS | Mod: PBBFAC,,,

## 2022-07-21 PROCEDURE — 80061 LIPID PANEL: CPT | Performed by: FAMILY MEDICINE

## 2022-07-21 PROCEDURE — 96372 THER/PROPH/DIAG INJ SC/IM: CPT | Mod: PBBFAC,PO

## 2022-07-21 RX ADMIN — CYANOCOBALAMIN 1000 MCG: 1000 INJECTION, SOLUTION INTRAMUSCULAR at 08:07

## 2022-07-31 ENCOUNTER — EXTERNAL CHRONIC CARE MANAGEMENT (OUTPATIENT)
Dept: PRIMARY CARE CLINIC | Facility: CLINIC | Age: 67
End: 2022-07-31
Payer: MEDICARE

## 2022-07-31 PROCEDURE — 99490 CHRNC CARE MGMT STAFF 1ST 20: CPT | Mod: PBBFAC,PO | Performed by: FAMILY MEDICINE

## 2022-07-31 PROCEDURE — 99490 CHRNC CARE MGMT STAFF 1ST 20: CPT | Mod: S$PBB,,, | Performed by: FAMILY MEDICINE

## 2022-07-31 PROCEDURE — 99490 PR CHRONIC CARE MGMT, 1ST 20 MIN: ICD-10-PCS | Mod: S$PBB,,, | Performed by: FAMILY MEDICINE

## 2022-08-19 ENCOUNTER — CLINICAL SUPPORT (OUTPATIENT)
Dept: FAMILY MEDICINE | Facility: CLINIC | Age: 67
End: 2022-08-19
Payer: MEDICARE

## 2022-08-19 DIAGNOSIS — E53.8 B12 DEFICIENCY: Primary | ICD-10-CM

## 2022-08-19 PROCEDURE — 99999 PR PBB SHADOW E&M-EST. PATIENT-LVL II: ICD-10-PCS | Mod: PBBFAC,,,

## 2022-08-19 PROCEDURE — 99999 PR PBB SHADOW E&M-EST. PATIENT-LVL II: CPT | Mod: PBBFAC,,,

## 2022-08-19 PROCEDURE — 96372 THER/PROPH/DIAG INJ SC/IM: CPT | Mod: PBBFAC,PO

## 2022-08-19 RX ADMIN — CYANOCOBALAMIN 1000 MCG: 1000 INJECTION, SOLUTION INTRAMUSCULAR at 09:08

## 2022-08-24 ENCOUNTER — OFFICE VISIT (OUTPATIENT)
Dept: FAMILY MEDICINE | Facility: CLINIC | Age: 67
End: 2022-08-24
Payer: MEDICARE

## 2022-08-24 ENCOUNTER — HOSPITAL ENCOUNTER (OUTPATIENT)
Dept: RADIOLOGY | Facility: HOSPITAL | Age: 67
Discharge: HOME | End: 2022-08-24
Attending: PHYSICIAN ASSISTANT
Payer: MEDICARE

## 2022-08-24 VITALS
HEIGHT: 65 IN | BODY MASS INDEX: 28.14 KG/M2 | WEIGHT: 168.88 LBS | SYSTOLIC BLOOD PRESSURE: 143 MMHG | HEART RATE: 91 BPM | DIASTOLIC BLOOD PRESSURE: 79 MMHG

## 2022-08-24 DIAGNOSIS — M25.511 ACUTE PAIN OF RIGHT SHOULDER: Primary | ICD-10-CM

## 2022-08-24 DIAGNOSIS — M25.511 ACUTE PAIN OF RIGHT SHOULDER: ICD-10-CM

## 2022-08-24 PROCEDURE — 99999 PR PBB SHADOW E&M-EST. PATIENT-LVL V: ICD-10-PCS | Mod: PBBFAC,,, | Performed by: PHYSICIAN ASSISTANT

## 2022-08-24 PROCEDURE — 99213 OFFICE O/P EST LOW 20 MIN: CPT | Mod: S$PBB,,, | Performed by: PHYSICIAN ASSISTANT

## 2022-08-24 PROCEDURE — 99999 PR PBB SHADOW E&M-EST. PATIENT-LVL V: CPT | Mod: PBBFAC,,, | Performed by: PHYSICIAN ASSISTANT

## 2022-08-24 PROCEDURE — 99213 PR OFFICE/OUTPT VISIT, EST, LEVL III, 20-29 MIN: ICD-10-PCS | Mod: S$PBB,,, | Performed by: PHYSICIAN ASSISTANT

## 2022-08-24 PROCEDURE — 73030 X-RAY EXAM OF SHOULDER: CPT | Mod: 26,RT,, | Performed by: RADIOLOGY

## 2022-08-24 PROCEDURE — 99215 OFFICE O/P EST HI 40 MIN: CPT | Mod: PBBFAC,PO | Performed by: PHYSICIAN ASSISTANT

## 2022-08-24 PROCEDURE — 73030 X-RAY EXAM OF SHOULDER: CPT | Mod: TC,PO,RT

## 2022-08-24 PROCEDURE — 73030 XR SHOULDER COMPLETE 2 OR MORE VIEWS RIGHT: ICD-10-PCS | Mod: 26,RT,, | Performed by: RADIOLOGY

## 2022-08-24 RX ORDER — HYDROCODONE BITARTRATE AND ACETAMINOPHEN 5; 325 MG/1; MG/1
1 TABLET ORAL EVERY 6 HOURS PRN
Qty: 20 TABLET | Refills: 0 | Status: SHIPPED | OUTPATIENT
Start: 2022-08-24 | End: 2022-09-13 | Stop reason: SDUPTHER

## 2022-08-24 NOTE — PROGRESS NOTES
Assessment/Plan:    Problem List Items Addressed This Visit    None     Visit Diagnoses     Acute pain of right shoulder    -  Primary    Relevant Medications    HYDROcodone-acetaminophen (NORCO) 5-325 mg per tablet    Other Relevant Orders    X-ray Shoulder 2 or More Views Right        -will obtain XR right shoulder  -recommend conservative treatment including ice/heat applications, PRN anti-inflammatory medication (avoid NSAIDs due to decreased kidney function)  -will prescribe short course of pain medication PRN. The patient was [place] in the [place] [employer]'s  Prescription Monitoring Program. There appears to be no incongruities with the patient's verbalized history.   -consider PT and/or ortho referral if symptoms persist    Follow up if symptoms worsen or fail to improve.    Umu Cole PA-C  _____________________________________________________________________________________________________________________________________________________    CC: right shoulder pain    HPI: Patient is in clinic today as an established patient here for right shoulder pain. Symptom onset was 1 week ago and has worsened since that time. She reports bumping her arm on the kitchen counter, however, she is unsure if that is the cause of her symptoms. She stated that the pain radiates down her right upper arm. She denies stiffness, weakness, swelling, or crepitus. She stated that the pain is worse with reaching and she has limited range of motion due to pain. She rates the pain a 10/10 and stated that she has been unable to sleep at night due to pain. She has tried lidocaine patches and tylenol with no relief. She reports history of right rotator cuff surgery several years ago, but has not had problems with her right shoulder since that time. No other complaints today.     Past Medical History:   Diagnosis Date    Acute pancreatitis 3/21/2016    Breast cancer 2005    right side with lumpectomy, chemo and  radiation    Closed fracture of ramus of right pubis 2016    Colon polyp     last scope 2012- repeat 10 y per pt- Dr. Johnson    GERD (gastroesophageal reflux disease) 2012    Hyperlipidemia 2013    Hypertension     Metabolic encephalopathy 2021    Osteopenia      Past Surgical History:   Procedure Laterality Date    BREAST LUMPECTOMY      DILATION AND CURETTAGE OF UTERUS      ESOPHAGEAL DILATION      ESOPHAGOGASTRODUODENOSCOPY  2012    ROTATOR CUFF REPAIR      TEAR DUCT SURGERY      right rye     Review of patient's allergies indicates:   Allergen Reactions    Hydrochlorothiazide Other (See Comments)     Multiple electrolyte abnormalities     Social History     Tobacco Use    Smoking status: Former Smoker     Packs/day: 0.30     Types: Cigarettes     Quit date: 2005     Years since quittin.2    Smokeless tobacco: Never Used   Substance Use Topics    Alcohol use: Yes     Alcohol/week: 2.0 standard drinks     Types: 2 Standard drinks or equivalent per week     Comment: prior 6 pack/week    Drug use: Yes     Family History   Problem Relation Age of Onset    Stroke Brother 57    Breast cancer Mother 87     Current Outpatient Medications on File Prior to Visit   Medication Sig Dispense Refill    amLODIPine (NORVASC) 2.5 MG tablet Take 1 tablet by mouth once daily 30 tablet 11    benazepriL (LOTENSIN) 10 MG tablet Take 1 tablet by mouth once daily 90 tablet 2    calcium carbonate (OS-NASEEM) 500 mg calcium (1,250 mg) tablet qid 120 tablet 0    ergocalciferol (ERGOCALCIFEROL) 50,000 unit Cap Once a week 4 capsule 5    folic acid (FOLVITE) 1 MG tablet Take 1 tablet by mouth once daily 90 tablet 0    magnesium oxide (MAG-OX) 400 mg (241.3 mg magnesium) tablet bid 60 tablet 1    minocycline (DYNACIN) 100 MG tablet Take 90 mg by mouth every 12 (twelve) hours.      omeprazole (PRILOSEC) 20 MG capsule Take 1 capsule by mouth once daily 90 capsule 3    pravastatin  "(PRAVACHOL) 20 MG tablet Take 1 tablet by mouth once daily 90 tablet 0    acetaminophen (TYLENOL) 650 MG TbSR Take 2 tablets (1,300 mg total) by mouth every 8 (eight) hours. (Patient not taking: Reported on 8/24/2022)  0    melatonin 3 mg Cap Take 3 mg by mouth every evening. (Patient not taking: Reported on 8/24/2022)      methylPREDNISolone (MEDROL DOSEPACK) 4 mg tablet follow package directions (Patient not taking: Reported on 8/24/2022) 21 tablet 0    metroNIDAZOLE (FLAGYL) 500 MG tablet Take 1 tablet (500 mg total) by mouth every 8 (eight) hours. for 7 days (Patient not taking: Reported on 8/24/2022) 21 tablet 0     Current Facility-Administered Medications on File Prior to Visit   Medication Dose Route Frequency Provider Last Rate Last Admin    cyanocobalamin injection 1,000 mcg  1,000 mcg Intramuscular Q30 Days Taqueria Melendrez MD        cyanocobalamin injection 1,000 mcg  1,000 mcg Intramuscular Q30 Days Taqueria Melendrez MD   1,000 mcg at 08/19/22 0918       Review of Systems   Constitutional: Negative for chills, diaphoresis, fatigue and fever.   HENT: Negative for congestion, ear pain, postnasal drip, sinus pain and sore throat.    Eyes: Negative for pain and redness.   Respiratory: Negative for cough, chest tightness and shortness of breath.    Cardiovascular: Negative for chest pain and leg swelling.   Gastrointestinal: Negative for abdominal pain, constipation, diarrhea, nausea and vomiting.   Genitourinary: Negative for dysuria and hematuria.   Musculoskeletal: Positive for arthralgias. Negative for joint swelling.   Skin: Negative for rash.   Neurological: Negative for dizziness, syncope and headaches.       Vitals:    08/24/22 1037 08/24/22 1114   BP: (!) 168/85 (!) 143/79   Pulse: 101 91   Weight: 76.6 kg (168 lb 14.4 oz)    Height: 5' 5" (1.651 m)        Wt Readings from Last 3 Encounters:   08/24/22 76.6 kg (168 lb 14.4 oz)   05/03/22 75.3 kg (166 lb 0.1 oz)   04/01/22 75.3 kg (166 lb) "       Physical Exam  Constitutional:       General: She is not in acute distress.     Appearance: Normal appearance. She is well-developed.   HENT:      Head: Normocephalic and atraumatic.   Eyes:      Conjunctiva/sclera: Conjunctivae normal.   Cardiovascular:      Rate and Rhythm: Normal rate and regular rhythm.      Pulses: Normal pulses.      Heart sounds: Normal heart sounds. No murmur heard.  Pulmonary:      Effort: Pulmonary effort is normal. No respiratory distress.      Breath sounds: Normal breath sounds.   Abdominal:      General: Bowel sounds are normal. There is no distension.      Palpations: Abdomen is soft.      Tenderness: There is no abdominal tenderness.   Musculoskeletal:      Right shoulder: Tenderness present. No swelling or deformity. Decreased range of motion.      Left shoulder: Normal.      Cervical back: Normal range of motion and neck supple.   Skin:     General: Skin is warm and dry.      Findings: No rash.   Neurological:      General: No focal deficit present.      Mental Status: She is alert and oriented to person, place, and time.         Health Maintenance   Topic Date Due    TETANUS VACCINE  Never done    DEXA Scan  07/25/2022    Mammogram  11/09/2022    Lipid Panel  07/21/2023    Hepatitis C Screening  Completed

## 2022-08-26 ENCOUNTER — TELEPHONE (OUTPATIENT)
Dept: FAMILY MEDICINE | Facility: CLINIC | Age: 67
End: 2022-08-26
Payer: MEDICARE

## 2022-08-26 DIAGNOSIS — M25.511 ACUTE PAIN OF RIGHT SHOULDER: Primary | ICD-10-CM

## 2022-08-26 NOTE — PROGRESS NOTES
Results have been released via [employer]. Please verify that these have been viewed by patient. If not, please call patient with results.     I have sent a message to them with the following interpretation (see below).    I have reviewed your recent right shoulder XR which showed degenerative changes. There were also concerns for postsurgical changes from your rotator cuff repair vs posttraumatic irregularities which could indicate a new shoulder injury. If your symptoms worsen or do not improve, I recommend that you follow up with orthopedics for further evaluation.    Please do not hesitate to call or message with any additional questions or concerns.    Umu Cole PA-C

## 2022-08-26 NOTE — TELEPHONE ENCOUNTER
I have signed for the following orders AND/OR meds.  Please call the patient and ask the patient to schedule the testing AND/OR inform about any medications that were sent. Medications have been sent to pharmacy listed below      Orders Placed This Encounter   Procedures    Ambulatory referral/consult to Orthopedics     Standing Status:   Future     Standing Expiration Date:   9/26/2023     Referral Priority:   Routine     Referral Type:   Consultation     Requested Specialty:   Orthopedic Surgery     Number of Visits Requested:   1              [id number]  [address]  [address]  [place] [address]  Phone: [phone] Fax: [phone]

## 2022-08-26 NOTE — TELEPHONE ENCOUNTER
----- Message from Dipika Hector sent at 8/26/2022  4:05 PM CDT -----  Contact: Carla  Patient is calling in regards to her xray results. Please give her a call back at [phone]  Thanks   JW

## 2022-08-31 ENCOUNTER — EXTERNAL CHRONIC CARE MANAGEMENT (OUTPATIENT)
Dept: PRIMARY CARE CLINIC | Facility: CLINIC | Age: 67
End: 2022-08-31
Payer: MEDICARE

## 2022-08-31 PROCEDURE — 99439 PR CHRONIC CARE MGMT, EA ADDTL 20 MIN: ICD-10-PCS | Mod: S$PBB,,, | Performed by: FAMILY MEDICINE

## 2022-08-31 PROCEDURE — 99439 CHRNC CARE MGMT STAF EA ADDL: CPT | Mod: S$PBB,,, | Performed by: FAMILY MEDICINE

## 2022-08-31 PROCEDURE — 99490 PR CHRONIC CARE MGMT, 1ST 20 MIN: ICD-10-PCS | Mod: S$PBB,,, | Performed by: FAMILY MEDICINE

## 2022-08-31 PROCEDURE — 99490 CHRNC CARE MGMT STAFF 1ST 20: CPT | Mod: S$PBB,,, | Performed by: FAMILY MEDICINE

## 2022-09-01 ENCOUNTER — OFFICE VISIT (OUTPATIENT)
Dept: ORTHOPEDICS | Facility: CLINIC | Age: 67
End: 2022-09-01
Payer: MEDICARE

## 2022-09-01 DIAGNOSIS — M25.511 ACUTE PAIN OF RIGHT SHOULDER: ICD-10-CM

## 2022-09-01 DIAGNOSIS — M75.41 IMPINGEMENT SYNDROME OF RIGHT SHOULDER: Primary | ICD-10-CM

## 2022-09-01 PROCEDURE — 99213 OFFICE O/P EST LOW 20 MIN: CPT | Mod: S$PBB,,, | Performed by: NURSE PRACTITIONER

## 2022-09-01 PROCEDURE — 99999 PR PBB SHADOW E&M-EST. PATIENT-LVL III: ICD-10-PCS | Mod: PBBFAC,,, | Performed by: NURSE PRACTITIONER

## 2022-09-01 PROCEDURE — 99999 PR PBB SHADOW E&M-EST. PATIENT-LVL III: CPT | Mod: PBBFAC,,, | Performed by: NURSE PRACTITIONER

## 2022-09-01 PROCEDURE — 99213 PR OFFICE/OUTPT VISIT, EST, LEVL III, 20-29 MIN: ICD-10-PCS | Mod: S$PBB,,, | Performed by: NURSE PRACTITIONER

## 2022-09-01 PROCEDURE — 99213 OFFICE O/P EST LOW 20 MIN: CPT | Mod: PBBFAC,PN | Performed by: NURSE PRACTITIONER

## 2022-09-01 RX ORDER — METHOCARBAMOL 750 MG/1
1500 TABLET, FILM COATED ORAL 3 TIMES DAILY PRN
Qty: 40 TABLET | Refills: 2 | Status: SHIPPED | OUTPATIENT
Start: 2022-09-01 | End: 2022-09-13

## 2022-09-01 RX ORDER — METHYLPREDNISOLONE 4 MG/1
TABLET ORAL
Qty: 21 EACH | Refills: 0 | Status: SHIPPED | OUTPATIENT
Start: 2022-09-01 | End: 2022-09-13

## 2022-09-01 NOTE — PROGRESS NOTES
Chief Complaint   Patient presents with    Right Shoulder - Pain, Injury       HPI:   This is a 67 y.o. who returns to clinic today in follow-up for right shoulder pain for the past 1 weeks after she twisted and hit her right shoulder on her kitchen cabinet; states this was on a Wednesday and she didn't have pain until Sunday. Pain is aching. No numbness or tingling. No associated signs or symptoms. States pain has improved since the injury.    Past Medical History:   Diagnosis Date    Acute pancreatitis 3/21/2016    Breast cancer 2005    right side with lumpectomy, chemo and radiation    Closed fracture of ramus of right pubis 6/8/2016    Colon polyp     last scope 4/2012- repeat 10 y per pt- Dr. Johnson    GERD (gastroesophageal reflux disease) 5/29/2012    Hyperlipidemia 4/16/2013    Hypertension     Metabolic encephalopathy 4/20/2021    Osteopenia      Past Surgical History:   Procedure Laterality Date    BREAST LUMPECTOMY  2005    DILATION AND CURETTAGE OF UTERUS      ESOPHAGEAL DILATION      ESOPHAGOGASTRODUODENOSCOPY  6/1/2012    ROTATOR CUFF REPAIR      TEAR DUCT SURGERY      right rye     Current Outpatient Medications on File Prior to Visit   Medication Sig Dispense Refill    amLODIPine (NORVASC) 2.5 MG tablet Take 1 tablet by mouth once daily 30 tablet 11    benazepriL (LOTENSIN) 10 MG tablet Take 1 tablet by mouth once daily 90 tablet 2    calcium carbonate (OS-NASEEM) 500 mg calcium (1,250 mg) tablet qid 120 tablet 0    ergocalciferol (ERGOCALCIFEROL) 50,000 unit Cap Once a week 4 capsule 5    folic acid (FOLVITE) 1 MG tablet Take 1 tablet by mouth once daily 90 tablet 0    magnesium oxide (MAG-OX) 400 mg (241.3 mg magnesium) tablet bid 60 tablet 1    melatonin 3 mg Cap Take 3 mg by mouth every evening.      minocycline (DYNACIN) 100 MG tablet Take 90 mg by mouth every 12 (twelve) hours.      omeprazole (PRILOSEC) 20 MG capsule Take 1 capsule by mouth once daily 90 capsule 3    pravastatin (PRAVACHOL) 20 MG  tablet Take 1 tablet by mouth once daily 90 tablet 0    acetaminophen (TYLENOL) 650 MG TbSR Take 2 tablets (1,300 mg total) by mouth every 8 (eight) hours. (Patient not taking: No sig reported)  0    metroNIDAZOLE (FLAGYL) 500 MG tablet Take 1 tablet (500 mg total) by mouth every 8 (eight) hours. for 7 days (Patient not taking: Reported on 2022) 21 tablet 0     Current Facility-Administered Medications on File Prior to Visit   Medication Dose Route Frequency Provider Last Rate Last Admin    cyanocobalamin injection 1,000 mcg  1,000 mcg Intramuscular Q30 Days Taqueria Melendrez MD        cyanocobalamin injection 1,000 mcg  1,000 mcg Intramuscular Q30 Days Taqueria Melendrez MD   1,000 mcg at 22 0918     Review of patient's allergies indicates:   Allergen Reactions    Hydrochlorothiazide Other (See Comments)     Multiple electrolyte abnormalities     Family History   Problem Relation Age of Onset    Stroke Brother 57    Breast cancer Mother 87     Social History     Socioeconomic History    Marital status:    Tobacco Use    Smoking status: Former     Packs/day: 0.30     Types: Cigarettes     Quit date: 2005     Years since quittin.2    Smokeless tobacco: Never   Substance and Sexual Activity    Alcohol use: Yes     Alcohol/week: 2.0 standard drinks     Types: 2 Standard drinks or equivalent per week     Comment: prior 6 pack/week    Drug use: Yes       Review of Systems:  Constitutional:  Denies fever or chills   Eyes:  Denies change in visual acuity   HENT:  Denies nasal congestion or sore throat   Respiratory:  Denies cough or shortness of breath   Cardiovascular:  Denies chest pain or edema   GI:  Denies abdominal pain, nausea, vomiting, bloody stools or diarrhea   :  Denies dysuria   Integument:  Denies rash   Neurologic:  Denies headache, focal weakness or sensory changes   Endocrine:  Denies polyuria or polydipsia   Lymphatic:  Denies swollen glands   Psychiatric:  Denies depression  or anxiety     Physical Exam:   Constitutional:  Well developed, well nourished, no acute distress, non-toxic appearance   Integument:  Well hydrated, no rash   Lymphatic:  No lymphadenopathy noted   Neurologic:  Alert & oriented x 3,    Psychiatric:  Speech and behavior appropriate     Bilateral Shoulder Exam    left Shoulder Exam   Shoulder exam performed same as contralateral side and is normal.    Right Shoulder Exam   Tenderness   Shoulder tenderness location: diffusely about shoulder.    Range of Motion   Forward Flexion: abnormal   External Rotation: abnormal     Muscle Strength   Supraspinatus: 3/5     Tests   Hawkin's test: positive  Impingement: positive    Other   Erythema: absent  Sensation: normal  Pulse: present      X-rays were performed 8 days ago, personally reviewed by me and findings discussed with the patient.  3 views of the right shoulder show some humeral head changes, previous rotator cuff surgery and AC joint degenerative changes.           Impingement syndrome of right shoulder    Acute pain of right shoulder  -     Ambulatory referral/consult to Orthopedics  -     methylPREDNISolone (MEDROL DOSEPACK) 4 mg tablet; use as directed  Dispense: 21 each; Refill: 0  -     methocarbamoL (ROBAXIN) 750 MG Tab; Take 2 tablets (1,500 mg total) by mouth 3 (three) times daily as needed (muscular tightness, spasms).  Dispense: 40 tablet; Refill: 2     Take medications as prescribed above.   Discussed with her that if no improvement in her shoulder pain with medication, we can consider physical therapy.   Pt verbalizes understanding.     RTC as needed

## 2022-09-07 DIAGNOSIS — Z78.0 MENOPAUSE: ICD-10-CM

## 2022-09-08 ENCOUNTER — PATIENT MESSAGE (OUTPATIENT)
Dept: FAMILY MEDICINE | Facility: CLINIC | Age: 67
End: 2022-09-08

## 2022-09-08 ENCOUNTER — CLINICAL SUPPORT (OUTPATIENT)
Dept: FAMILY MEDICINE | Facility: CLINIC | Age: 67
End: 2022-09-08
Payer: MEDICARE

## 2022-09-08 VITALS — SYSTOLIC BLOOD PRESSURE: 146 MMHG | DIASTOLIC BLOOD PRESSURE: 82 MMHG | HEART RATE: 82 BPM

## 2022-09-08 DIAGNOSIS — Z01.30 BP CHECK: Primary | ICD-10-CM

## 2022-09-08 PROCEDURE — 99213 OFFICE O/P EST LOW 20 MIN: CPT | Mod: PBBFAC,PO

## 2022-09-08 PROCEDURE — 99999 PR PBB SHADOW E&M-EST. PATIENT-LVL III: CPT | Mod: PBBFAC,,,

## 2022-09-08 PROCEDURE — 99999 PR PBB SHADOW E&M-EST. PATIENT-LVL III: ICD-10-PCS | Mod: PBBFAC,,,

## 2022-09-08 RX ORDER — HYDROCODONE BITARTRATE AND ACETAMINOPHEN 5; 325 MG/1; MG/1
1 TABLET ORAL EVERY 6 HOURS PRN
Refills: 0 | OUTPATIENT
Start: 2022-09-08

## 2022-09-08 NOTE — PROGRESS NOTES
Patient informed, she has plenty of the 2.5mg and will take 2 until she runs out, has appt with Dr Melendrez next week, will discuss follow up once she sees him.

## 2022-09-08 NOTE — Clinical Note
/92 HR 82. Pt asymptomatic. Pt states she has been having severe arm pain which she believes is causing elevated BP. Pt sat in clinic 5 minutes prior to rechecking BP. Manual /82

## 2022-09-08 NOTE — TELEPHONE ENCOUNTER
No new care gaps identified.  [facility] Embedded Care Gaps. Reference number: [id number]. 9/08/2022   9:54:08 AM ALEAHT

## 2022-09-08 NOTE — PROGRESS NOTES
Pt in clinic for BP check. /92 HR 82. Pt asymptomatic. Pt states she has been having severe arm pain which she believes is causing elevated BP. Pt sat in clinic 5 minutes prior to rechecking BP. Manual /82. PCP notified by Epic message.

## 2022-09-08 NOTE — PROGRESS NOTES
Blood pressure elevated.  Was elevated last check as well.  Recommend increase amlodipine 5 mg daily.  Would recheck in 4 weeks.  Please send prescription request if patient agreeable.

## 2022-09-13 ENCOUNTER — TELEPHONE (OUTPATIENT)
Dept: FAMILY MEDICINE | Facility: CLINIC | Age: 67
End: 2022-09-13

## 2022-09-13 ENCOUNTER — OFFICE VISIT (OUTPATIENT)
Dept: FAMILY MEDICINE | Facility: CLINIC | Age: 67
End: 2022-09-13
Payer: MEDICARE

## 2022-09-13 VITALS
WEIGHT: 166.44 LBS | OXYGEN SATURATION: 97 % | HEART RATE: 84 BPM | DIASTOLIC BLOOD PRESSURE: 68 MMHG | SYSTOLIC BLOOD PRESSURE: 118 MMHG | BODY MASS INDEX: 27.73 KG/M2 | HEIGHT: 65 IN

## 2022-09-13 DIAGNOSIS — E78.5 HYPERLIPIDEMIA, UNSPECIFIED HYPERLIPIDEMIA TYPE: ICD-10-CM

## 2022-09-13 DIAGNOSIS — Z78.0 ASYMPTOMATIC MENOPAUSE: ICD-10-CM

## 2022-09-13 DIAGNOSIS — M75.41 IMPINGEMENT SYNDROME OF RIGHT SHOULDER: Primary | ICD-10-CM

## 2022-09-13 DIAGNOSIS — M25.511 ACUTE PAIN OF RIGHT SHOULDER: ICD-10-CM

## 2022-09-13 DIAGNOSIS — M75.41 IMPINGEMENT SYNDROME OF RIGHT SHOULDER: ICD-10-CM

## 2022-09-13 DIAGNOSIS — I10 PRIMARY HYPERTENSION: Primary | ICD-10-CM

## 2022-09-13 DIAGNOSIS — M85.80 OSTEOPENIA, UNSPECIFIED LOCATION: ICD-10-CM

## 2022-09-13 DIAGNOSIS — N18.30 STAGE 3 CHRONIC KIDNEY DISEASE, UNSPECIFIED WHETHER STAGE 3A OR 3B CKD: ICD-10-CM

## 2022-09-13 PROCEDURE — 99214 PR OFFICE/OUTPT VISIT, EST, LEVL IV, 30-39 MIN: ICD-10-PCS | Mod: S$PBB,,, | Performed by: FAMILY MEDICINE

## 2022-09-13 PROCEDURE — 99999 PR PBB SHADOW E&M-EST. PATIENT-LVL V: ICD-10-PCS | Mod: PBBFAC,,, | Performed by: FAMILY MEDICINE

## 2022-09-13 PROCEDURE — 99214 OFFICE O/P EST MOD 30 MIN: CPT | Mod: S$PBB,,, | Performed by: FAMILY MEDICINE

## 2022-09-13 PROCEDURE — 99215 OFFICE O/P EST HI 40 MIN: CPT | Mod: PBBFAC,PO | Performed by: FAMILY MEDICINE

## 2022-09-13 PROCEDURE — G0008 ADMIN INFLUENZA VIRUS VAC: HCPCS | Mod: PBBFAC,PO

## 2022-09-13 PROCEDURE — 99999 PR PBB SHADOW E&M-EST. PATIENT-LVL V: CPT | Mod: PBBFAC,,, | Performed by: FAMILY MEDICINE

## 2022-09-13 RX ORDER — HYDROCODONE BITARTRATE AND ACETAMINOPHEN 5; 325 MG/1; MG/1
1 TABLET ORAL EVERY 6 HOURS PRN
Qty: 28 TABLET | Refills: 0 | Status: SHIPPED | OUTPATIENT
Start: 2022-09-13 | End: 2022-09-20

## 2022-09-13 RX ORDER — PRAVASTATIN SODIUM 20 MG/1
20 TABLET ORAL DAILY
Qty: 90 TABLET | Refills: 3 | Status: SHIPPED | OUTPATIENT
Start: 2022-09-13 | End: 2023-09-26

## 2022-09-13 RX ORDER — AMLODIPINE BESYLATE 5 MG/1
5 TABLET ORAL DAILY
Qty: 90 TABLET | Refills: 3 | Status: SHIPPED | OUTPATIENT
Start: 2022-09-13 | End: 2024-03-14

## 2022-09-13 NOTE — PROGRESS NOTES
Presents follow-up.  Blood pressure controlled.  Hyperlipidemia on statin.  Recent laboratory reviewed.  Osteopenia due for DEXA scan.  She does have right shoulder pain with impingement over the past several weeks.  She did see orthopedics.  She got a steroid pack and muscle relaxer without improvement.  They recommended physical therapy if no improvement.  Avoiding NSAIDs due to chronic kidney disease.  Significant pain particularly at night.  No recent injury.  She did have previous surgery on the right shoulder.  Request refill on hydrocodone that she got recently for this.    Carla was seen today for annual exam.    Diagnoses and all orders for this visit:    Primary hypertension    Hyperlipidemia, unspecified hyperlipidemia type    Impingement syndrome of right shoulder  -     Ambulatory referral/consult to Physical/Occupational Therapy; Future    Osteopenia, unspecified location  -     DXA Bone Density Spine And Hip; Future    Asymptomatic menopause  -     DXA Bone Density Spine And Hip; Future    Acute pain of right shoulder    Stage 3 chronic kidney disease, unspecified whether stage 3a or 3b CKD    Other orders  -     Influenza - Quadrivalent (Adjuvanted)  -     amLODIPine (NORVASC) 5 MG tablet; Take 1 tablet (5 mg total) by mouth once daily.  -     pravastatin (PRAVACHOL) 20 MG tablet; Take 1 tablet (20 mg total) by mouth once daily.  -     HYDROcodone-acetaminophen (NORCO) 5-325 mg per tablet; Take 1 tablet by mouth every 6 (six) hours as needed for Pain.    Advised that we could do a temporary supply on the hydrocodone.  Will have her start physical therapy.  If not improving with this then she will need to go back to orthopedics.  Continue other medications.  Getting mammogram done through breast surgeon.            Past Medical History:  Past Medical History:   Diagnosis Date    Acute pancreatitis 3/21/2016    Breast cancer 2005    right side with lumpectomy, chemo and radiation    Closed fracture  of ramus of right pubis 6/8/2016    Colon polyp     last scope 4/2012- repeat 10 y per pt- Dr. Johnson    GERD (gastroesophageal reflux disease) 5/29/2012    Hyperlipidemia 4/16/2013    Hypertension     Metabolic encephalopathy 4/20/2021    Osteopenia      Past Surgical History:   Procedure Laterality Date    BREAST LUMPECTOMY  2005    DILATION AND CURETTAGE OF UTERUS      ESOPHAGEAL DILATION      ESOPHAGOGASTRODUODENOSCOPY  6/1/2012    ROTATOR CUFF REPAIR      TEAR DUCT SURGERY      right rye     Review of patient's allergies indicates:   Allergen Reactions    Hydrochlorothiazide Other (See Comments)     Multiple electrolyte abnormalities     Current Outpatient Medications on File Prior to Visit   Medication Sig Dispense Refill    benazepriL (LOTENSIN) 10 MG tablet Take 1 tablet by mouth once daily 90 tablet 2    calcium carbonate (OS-NASEEM) 500 mg calcium (1,250 mg) tablet qid 120 tablet 0    ergocalciferol (ERGOCALCIFEROL) 50,000 unit Cap Once a week 4 capsule 5    folic acid (FOLVITE) 1 MG tablet Take 1 tablet by mouth once daily 90 tablet 0    magnesium oxide (MAG-OX) 400 mg (241.3 mg magnesium) tablet bid 60 tablet 1    omeprazole (PRILOSEC) 20 MG capsule Take 1 capsule by mouth once daily 90 capsule 3    [DISCONTINUED] amLODIPine (NORVASC) 2.5 MG tablet Take 1 tablet by mouth once daily 30 tablet 11    [DISCONTINUED] pravastatin (PRAVACHOL) 20 MG tablet Take 1 tablet by mouth once daily 90 tablet 0    acetaminophen (TYLENOL) 650 MG TbSR Take 2 tablets (1,300 mg total) by mouth every 8 (eight) hours. (Patient not taking: No sig reported)  0    minocycline (DYNACIN) 100 MG tablet Take 90 mg by mouth every 12 (twelve) hours.      [DISCONTINUED] HYDROcodone-acetaminophen (NORCO) 5-325 mg per tablet Take 1 tablet by mouth every 6 (six) hours as needed for Pain. 20 tablet 0    [DISCONTINUED] melatonin 3 mg Cap Take 3 mg by mouth every evening. (Patient not taking: Reported on 9/13/2022)      [DISCONTINUED] methocarbamoL  "(ROBAXIN) 750 MG Tab Take 2 tablets (1,500 mg total) by mouth 3 (three) times daily as needed (muscular tightness, spasms). (Patient not taking: Reported on 2022) 40 tablet 2    [DISCONTINUED] methylPREDNISolone (MEDROL DOSEPACK) 4 mg tablet use as directed (Patient not taking: Reported on 2022) 21 each 0    [DISCONTINUED] metroNIDAZOLE (FLAGYL) 500 MG tablet Take 1 tablet (500 mg total) by mouth every 8 (eight) hours. for 7 days (Patient not taking: Reported on 2022) 21 tablet 0     Current Facility-Administered Medications on File Prior to Visit   Medication Dose Route Frequency Provider Last Rate Last Admin    cyanocobalamin injection 1,000 mcg  1,000 mcg Intramuscular Q30 Days Taqueria Melendrez MD        cyanocobalamin injection 1,000 mcg  1,000 mcg Intramuscular Q30 Days Taqueria Melendrez MD   1,000 mcg at 22 0918     Social History     Socioeconomic History    Marital status:    Tobacco Use    Smoking status: Former     Packs/day: 0.30     Types: Cigarettes     Quit date: 2005     Years since quittin.3    Smokeless tobacco: Never   Substance and Sexual Activity    Alcohol use: Yes     Alcohol/week: 2.0 standard drinks     Types: 2 Standard drinks or equivalent per week     Comment: prior 6 pack/week    Drug use: Yes     Family History   Problem Relation Age of Onset    Stroke Brother 57    Breast cancer Mother 87           ROS:  GENERAL: No fever, chills,  or significant weight changes.   CARDIOVASCULAR: Denies chest pain, PND, orthopnea or reduced exercise tolerance.  ABDOMEN: Appetite fine. Denies diarrhea, abdominal pain, hematemesis or blood in stool.  URINARY: No flank pain, dysuria or hematuria.    Vitals:    22 1058   BP: 118/68   Pulse: 84   SpO2: 97%   Weight: 75.5 kg (166 lb 7.2 oz)   Height: 5' 5" (1.651 m)     Wt Readings from Last 3 Encounters:   22 75.5 kg (166 lb 7.2 oz)   22 76.6 kg (168 lb 14.4 oz)   22 75.3 kg (166 lb 0.1 oz) "       OBJECTIVE:   APPEARANCE: Well nourished, well developed, in no acute distress.    HEAD: Normocephalic.  Atraumatic.  No sinus tenderness.  EYES:   Right eye: Pupil reactive.  Conjunctiva clear.    Left eye: Pupil reactive.  Conjunctiva clear.  EOMI.    EARS: TM's intact. Light reflex normal. No retraction or perforation.    NOSE:  clear.  MOUTH & THROAT:  No pharyngeal erythema or exudate. No lesions.  NECK: Supple. No bruits.  No JVD.  No cervical lymphadenopathy.  No thyromegaly.    CHEST: Breath sounds clear bilaterally.  Normal respiratory effort  CARDIOVASCULAR: Normal rate.  Regular rhythm.  No murmurs.  No rub.  No gallops.  ABDOMEN: Bowel sounds normal.  Soft.  No tenderness.  No organomegaly.  PERIPHERAL VASCULAR: No cyanosis.  No clubbing.  No edema.  NEUROLOGIC: No focal findings.  MENTAL STATUS: Alert.  Oriented x 3.  The right shoulder has significantly decreased range of motion with internal external rotation as well as abduction.  She has tenderness palpation laterally.  Positive impingement.

## 2022-09-13 NOTE — TELEPHONE ENCOUNTER
1. Impingement syndrome of right shoulder  M75.41      2. Acute pain of right shoulder  M25.511

## 2022-09-13 NOTE — TELEPHONE ENCOUNTER
----- Message from Cami Esteban sent at 9/13/2022 11:54 AM CDT -----  Tavia with [facility] called regarding a diagnosis code for HYDROcodone-acetaminophen (NORCO) 5-325 mg per tablet. Call back number is [phone]. Thx .EL

## 2022-09-14 ENCOUNTER — CLINICAL SUPPORT (OUTPATIENT)
Dept: REHABILITATION | Facility: HOSPITAL | Age: 67
End: 2022-09-14
Attending: FAMILY MEDICINE
Payer: MEDICARE

## 2022-09-14 DIAGNOSIS — M75.41 IMPINGEMENT SYNDROME OF RIGHT SHOULDER: ICD-10-CM

## 2022-09-14 DIAGNOSIS — M25.611 DECREASED ROM OF RIGHT SHOULDER: ICD-10-CM

## 2022-09-14 DIAGNOSIS — R29.898 WEAKNESS OF RIGHT ARM: ICD-10-CM

## 2022-09-14 DIAGNOSIS — M25.511 ACUTE PAIN OF RIGHT SHOULDER: ICD-10-CM

## 2022-09-14 PROCEDURE — 97110 THERAPEUTIC EXERCISES: CPT | Mod: PN

## 2022-09-14 PROCEDURE — 97161 PT EVAL LOW COMPLEX 20 MIN: CPT | Mod: PN

## 2022-09-14 NOTE — PLAN OF CARE
[facility]   Physical Therapy Initial Evaluation     Date: 9/14/2022   Name: Carla HERRING [facility] Number: [id number]    Therapy Diagnosis:   Encounter Diagnoses   Name Primary?    Impingement syndrome of right shoulder     Acute pain of right shoulder     Decreased ROM of right shoulder     Weakness of right arm      Physician: Taqueria Melendrez MD    Physician Orders: PT Eval and Treat   Medical Diagnosis from Referral: M75.41 (ICD-10-CM) - Impingement syndrome of right shoulder  Evaluation Date: 9/14/2022  Authorization Period Expiration: 09/13/2023  Plan of Care Expiration: 11/14/2022  Progress Note Due: 10/14/2022  Visit # / Visits authorized: 1/ 1   FOTO: 1/ 3 (eval)     Precautions: Standard    Time In: 8:10 am  Time Out: 8:45 am  Total Appointment Time (timed & untimed codes): 35 minutes      SUBJECTIVE   Date of onset: August 17, 2022    History of current condition - Carla reports: on 8/17/202, she turned while in kitchen and fell onto her right arm (hitting counter).  She reports falling on Wednesday but pain did not start unitl that Sunday when she tried to reach overhead.  Patient reports pain progressively got worse and went to doctor, but has not had much relief with medication.  History of right rotator cuff surgery about 15 years ago without any problems.  She reports needing some assistance with dressing and getting out of bed.  She has pain and limited mobility causing inability to perform reaching overhead, out to the side, or behind her back, driving, performing house work, sleeping, and cooking.  She got some relief with using heating pad followed by cold pack.      Falls: just that one last month    Imaging, x-ray:   FINDINGS:  Right lung parenchyma demonstrates chronic interstitial coarsening without focal abnormality.  Glenohumeral joint space maintained with minimal degenerative findings.  Rotator cuff repair findings present with postsurgical versus  posttraumatic irregularity of the superolateral humeral head.  AC joint intact without significant degenerative findings.  Degenerative changes noted of the lower cervical spine.    Prior Therapy: yes  Social History:  lives with their family  Occupation: retired  Prior Level of Function: independent  Current Level of Function: requires assistance with ADL's    Pain:  Current 7/10, worst 10/10, best 5/10   Location: right shoulder  Description: constant, throbbing, sharp  Aggravating Factors: reaching, lifting, moving, weight bearing through arm  Easing Factors: heating pad, cold pack, and pain medication    Patients goals: decrease pain, improve mobility, and return to prior level of function     Medical History:   Past Medical History:   Diagnosis Date    Acute pancreatitis 3/21/2016    Breast cancer 2005    right side with lumpectomy, chemo and radiation    Closed fracture of ramus of right pubis 6/8/2016    Colon polyp     last scope 4/2012- repeat 10 y per pt- Dr. Johnson    GERD (gastroesophageal reflux disease) 5/29/2012    Hyperlipidemia 4/16/2013    Hypertension     Metabolic encephalopathy 4/20/2021    Osteopenia        Surgical History:   Carla Dang  has a past surgical history that includes Tear duct surgery; Dilation and curettage of uterus; Esophageal dilation; Rotator cuff repair; Esophagogastroduodenoscopy (6/1/2012); and Breast lumpectomy (2005).    Medications:   Carla has a current medication list which includes the following prescription(s): acetaminophen, amlodipine, benazepril, calcium carbonate, ergocalciferol, folic acid, hydrocodone-acetaminophen, magnesium oxide, minocycline, omeprazole, and pravastatin, and the following Facility-Administered Medications: cyanocobalamin and cyanocobalamin.    Allergies:   Review of patient's allergies indicates:   Allergen Reactions    Hydrochlorothiazide Other (See Comments)     Multiple electrolyte abnormalities          OBJECTIVE  "    Observation: pt holds right arm in guarded position, ambulates with decreased arm swing, apprehensive with AROM    Posture: rounded shoulder      Passive Range of Motion: supine  Shoulder Left Right   Flexion 160  140*   Abduction 160 115*   ER  95 60*   IR 80 48*      Active Range of Motion: standing  Shoulder Left Right   Flexion 160 deg 40 deg *   Abduction 160 deg 62 deg *   ER  Reach T3 Reach C1   IR Reach T4 Reach T10     Upper Extremity Strength   (L) UE (R) UE   Shoulder flexion: 4+/5 3-/5   Shoulder Abduction: 4+/5 3-/5   Shoulder ER 4+/5 3-/5   Shoulder IR 4+/5 4-/5   Lower Trap 3+/5 unable   Middle Trap 3+/5 unable   Rhomboids 3+/5 unable         Special Tests:  AC Joint Left Right   AC Joint Compression Test neg pos   Empty Can Test neg Pain and limited mobility   Drop Arm test neg neg       Joint Mobility: slight restrictions in posterior capsule    Palpation: tenderness noted over upper trapezius, AC joint, deltoids, bicep, pec major    Sensation: intact to light touch        Scapular Control/Dyskinesis:    Normal / Subtle / Obvious  Comments    Left  normal normal    Right  obvious Upper trap substitution          Limitation/Restriction for FOTO Shoulder Survey    Therapist reviewed FOTO scores for Carla Dang on 9/14/2022.   FOTO documents entered into [employer] - see Media section.    Limitation Score: 62%         TREATMENT     Total Treatment time (time-based codes) separate from Evaluation: 10 minutes     Carla received the treatments listed below:       therapeutic exercises to develop strength, endurance, ROM, flexibility, posture, and core stabilization for (10)  minutes including: instruction on HEP   - shoulder isometrics with 3" holds into flexion, extension, abduction, ER, and IR  - supine AAROM for shoulder flexion with 1# bar  - side lying (R) shoulder ER 1 x 10    Possible next visit: UBE, shoulder pulleys, (R) shoulder isometrics, table slides, codman's, supine AAROM, scapular " protractions, supine reverse codman's     manual therapy techniques applied for (0) minutes, including:      PATIENT EDUCATION AND HOME EXERCISES     Education provided:   - role of PT and goals for therapy  - HEP    Written Home Exercises Provided: yes. Exercises were reviewed and Carla was able to demonstrate them prior to the end of the session.  Carla demonstrated good  understanding of the education provided. See EMR under Patient Instructions for exercises provided during therapy sessions.    ASSESSMENT     Carla is a 67 y.o. female referred to outpatient Physical Therapy with a medical diagnosis of impingement syndrome right shoulder. Patient presents with signs and symptoms consistent with the diagnosis.  Pt is noted to have forward head, rounded shoulder posture, decreased ROM, decreased shoulder strength, decreased scapular stabilization strength, decreased GH joint mobility, and tight tender surrounding musculature.  Pt has limited tolerance to reaching overhead and behind back during ADL's, as well as lifting activities during normal house work.  Pt would benefit from manual therapy, UE stretching and strengthening, scapular stabilization exercises, posture exercises, and modalities to decrease pain, improve functional mobility, and return to prior level of function.     Patient prognosis is Good.   Patientt will benefit from skilled outpatient Physical Therapy to address the deficits stated above and in the chart below, provide patient /family education, and to maximize patientt's level of independence.     Plan of care discussed with patient: Yes  Patient's spiritual, cultural and educational needs considered and patient is agreeable to the plan of care and goals as stated below:     Anticipated Barriers for therapy: none    Medical Necessity is demonstrated by the following  History  Co-morbidities and personal factors that may impact the plan of care Co-morbidities:   coping style/mechanism,  high BMI, history of cancer, HTN, and prior shoulder surgery    Personal Factors:   coping style  character     moderate   Examination  Body Structures and Functions, activity limitations and participation restrictions that may impact the plan of care Body Regions:   upper extremities    Body Systems:    ROM  strength  transitions  motor control    Participation Restrictions:       Activity limitations:   Learning and applying knowledge  no deficits    General Tasks and Commands  no deficits    Communication  no deficits    Mobility  lifting and carrying objects  fine hand use (grasping/picking up)  driving (bike, car, motorcycle)    Self care  washing oneself (bathing, drying, washing hands)  caring for body parts (brushing teeth, shaving, grooming)  dressing    Domestic Life  shopping  cooking  doing house work (cleaning house, washing dishes, laundry)  assisting others    Interactions/Relationships  no deficits    Life Areas  no deficits    Community and Social Life  community life  recreation and leisure         moderate   Clinical Presentation stable and uncomplicated low   Decision Making/ Complexity Score: low     Goals:  Short Term Goals:  4 weeks  Patient will be compliant with HEP to promote the independent management of current diagnosis.  Patient will increase right shoulder flexion to 110 degrees to improve tolerance to overhead activities.  Patient will increase right shoulder functiona ER to reach C7 for function of grooming.  Patient will report a decrease in complaints of right shoulder pain to 4/10 during performance of ADL's for independence of self care activities.       Long Term Goals:  8 weeks  Patient will increase right shoulder strength to 4+/5 in order to return to normal house work activities.  Patient will increase right shoulder flexion to 160 degrees in order to place dishes in overhead cabinets independently for self care independence.   Patient will increase right shoulder functional  IR to reach T7 for function of dressing.  Patient will increase scapular stabilization strength to 4/5 to improve tolerance to normal lifting.  Patient will improve FOTO limitation status from 62% to 39% placing the patient in the 20-40% impaired, limited, or restricted category indicating increased functional mobility.     PLAN   Plan of care Certification: 9/14/2022 to 11/14/2022.    Outpatient Physical Therapy 2 times weekly for 8 weeks to include the following interventions: Electrical Stimulation IFC, Manual Therapy, Moist Heat/ Ice, Neuromuscular Re-ed, Patient Education, Therapeutic Activities, Therapeutic Exercise, and IASTM, vacuum cupping, dry needling, and kinesiotaping .     Ryan Oconnor, PT      I CERTIFY THE NEED FOR THESE SERVICES FURNISHED UNDER THIS PLAN OF TREATMENT AND WHILE UNDER MY CARE   Physician's comments:     Physician's Signature: ___________________________________________________

## 2022-09-16 ENCOUNTER — HOSPITAL ENCOUNTER (OUTPATIENT)
Dept: RADIOLOGY | Facility: HOSPITAL | Age: 67
Discharge: HOME | End: 2022-09-16
Attending: FAMILY MEDICINE
Payer: MEDICARE

## 2022-09-16 DIAGNOSIS — Z78.0 ASYMPTOMATIC MENOPAUSE: ICD-10-CM

## 2022-09-16 DIAGNOSIS — M85.80 OSTEOPENIA, UNSPECIFIED LOCATION: ICD-10-CM

## 2022-09-16 PROCEDURE — 77080 DXA BONE DENSITY AXIAL: CPT | Mod: TC,PO

## 2022-09-16 PROCEDURE — 77080 DXA BONE DENSITY AXIAL: CPT | Mod: 26,,, | Performed by: RADIOLOGY

## 2022-09-16 PROCEDURE — 77080 DEXA BONE DENSITY SPINE HIP: ICD-10-PCS | Mod: 26,,, | Performed by: RADIOLOGY

## 2022-09-19 ENCOUNTER — CLINICAL SUPPORT (OUTPATIENT)
Dept: REHABILITATION | Facility: HOSPITAL | Age: 67
End: 2022-09-19
Attending: FAMILY MEDICINE
Payer: MEDICARE

## 2022-09-19 DIAGNOSIS — R29.898 WEAKNESS OF RIGHT ARM: ICD-10-CM

## 2022-09-19 DIAGNOSIS — M25.611 DECREASED ROM OF RIGHT SHOULDER: ICD-10-CM

## 2022-09-19 DIAGNOSIS — M25.511 ACUTE PAIN OF RIGHT SHOULDER: Primary | ICD-10-CM

## 2022-09-19 PROCEDURE — 97140 MANUAL THERAPY 1/> REGIONS: CPT | Mod: PN

## 2022-09-19 PROCEDURE — 97110 THERAPEUTIC EXERCISES: CPT | Mod: PN

## 2022-09-19 NOTE — PROGRESS NOTES
[facility]   Physical Therapy Treatment Note     Name: Carla HERRING [facility] Number: [id number]    Therapy Diagnosis:   Encounter Diagnoses   Name Primary?    Acute pain of right shoulder Yes    Decreased ROM of right shoulder     Weakness of right arm      Physician: Taqueria Melendrez MD    Visit Date: 9/19/2022    Physician Orders: PT Eval and Treat   Medical Diagnosis from Referral: M75.41 (ICD-10-CM) - Impingement syndrome of right shoulder  Evaluation Date: 9/14/2022  Authorization Period Expiration: 12/31/2022  Plan of Care Expiration: 11/14/2022  Progress Note Due: 10/14/2022  Visit # / Visits authorized: 1/ 20 (1/1eval)   FOTO: 1/ 3 (eval)   PTA Visit #: 0/5     Precautions: Standard    Time In: 10:45 am  Time Out: 11:30 am  Total Billable Time: 38 minutes      SUBJECTIVE     Pt reports: improving mobility of shoulder since beginning exercises.  She was able to reach up and pull ceiling fan string.  She was compliant with home exercise program.  Response to previous treatment: initial evaluation  Functional change: able to reach up and pull ceiling fan string    Pain: 5/10  Location: right shoulder     OBJECTIVE     Objective Measures updated at progress report unless specified.       TREATMENT       Carla received the treatments listed below:        therapeutic exercises to develop strength, endurance, ROM, flexibility, posture, and core stabilization for (30)  minutes including:   - UBE x 4 minutes   - shoulder pulleys into flexion and abduction x 2 minutes each  - wall slides 1 x 10  - scapular protractions 2 x 10  - supine reverse Codman's 1 x 20 cw/ccw  - supine AAROM for shoulder flexion with 1# bar 1 x 10  - side lying (R) shoulder ER 2 x 10     Possible next visit: UBE, shoulder pulleys, (R) shoulder isometrics, table slides, codman's, supine AAROM, scapular protractions, supine reverse codman's      manual therapy techniques applied for (8) minutes, including:   -  PROM to right shoulder in all planes  - IASTM to right deltoid    PATIENT EDUCATION AND HOME EXERCISES     Home Exercises Provided and Patient Education Provided     Education/Self-Care provided:  Role of PT and goals for therapy.   - HEP    Written Home Exercises Provided: Patient instructed to cont prior HEP. Exercises were reviewed and Carla was able to demonstrate them prior to the end of the session.  Carla demonstrated good  understanding of the education provided. See EMR under Patient Instructions for exercises provided during therapy sessions    ASSESSMENT     Carla is able to begin progression towards goals with addition of therapeutic exercises to improve strength and ROM.  She is noted to have tenderness and increased tissue tension noted in right anterior and middle deltoid and receives IASTM to decrease pain and improve mobility.  Patient continues to have difficulty with side lying shoulder ER and required verbal and tactile cues for proper form and technique during scapular protractions and reverse Codman's.  She will begin progression to AROM and rotator cuff strengthening as tolerated.     Carla Is progressing well towards her goals.   Pt prognosis is Good.     Pt will continue to benefit from skilled outpatient physical therapy to address the deficits listed in the problem list box on initial evaluation, provide pt/family education and to maximize pt's level of independence in the home and community environment.     Pt's spiritual, cultural and educational needs considered and pt agreeable to plan of care and goals.     Anticipated barriers to physical therapy: none    Goals:   Short Term Goals:  4 weeks  Patient will be compliant with HEP to promote the independent management of current diagnosis. In Progress, Not Met  Patient will increase right shoulder flexion to 110 degrees to improve tolerance to overhead activities.  In Progress, Not Met  Patient will increase right shoulder  functiona ER to reach C7 for function of grooming.  In Progress, Not Met  Patient will report a decrease in complaints of right shoulder pain to 4/10 during performance of ADL's for independence of self care activities.  In Progress, Not Met        Long Term Goals:  8 weeks  Patient will increase right shoulder strength to 4+/5 in order to return to normal house work activities.  In Progress, Not Met  Patient will increase right shoulder flexion to 160 degrees in order to place dishes in overhead cabinets independently for self care independence.   In Progress, Not Met  Patient will increase right shoulder functional IR to reach T7 for function of dressing.  In Progress, Not Met  Patient will increase scapular stabilization strength to 4/5 to improve tolerance to normal lifting.  In Progress, Not Met  Patient will improve FOTO limitation status from 62% to 39% placing the patient in the 20-40% impaired, limited, or restricted category indicating increased functional mobility.   In Progress, Not Met    PLAN     Continue with PT POC and progress as tolerated.     Ryan Oconnor, PT

## 2022-09-21 ENCOUNTER — CLINICAL SUPPORT (OUTPATIENT)
Dept: REHABILITATION | Facility: HOSPITAL | Age: 67
End: 2022-09-21
Attending: FAMILY MEDICINE
Payer: MEDICARE

## 2022-09-21 DIAGNOSIS — M25.611 DECREASED ROM OF RIGHT SHOULDER: ICD-10-CM

## 2022-09-21 DIAGNOSIS — R29.898 WEAKNESS OF RIGHT ARM: ICD-10-CM

## 2022-09-21 DIAGNOSIS — M25.511 ACUTE PAIN OF RIGHT SHOULDER: Primary | ICD-10-CM

## 2022-09-21 PROCEDURE — 97140 MANUAL THERAPY 1/> REGIONS: CPT | Mod: PN

## 2022-09-21 PROCEDURE — 97110 THERAPEUTIC EXERCISES: CPT | Mod: PN

## 2022-09-21 NOTE — PROGRESS NOTES
[facility]   Physical Therapy Treatment Note     Name: Carla HERRING [facility] Number: [id number]    Therapy Diagnosis:   Encounter Diagnoses   Name Primary?    Acute pain of right shoulder Yes    Decreased ROM of right shoulder     Weakness of right arm      Physician: Taqueria Melendrez MD    Visit Date: 9/21/2022    Physician Orders: PT Eval and Treat   Medical Diagnosis from Referral: M75.41 (ICD-10-CM) - Impingement syndrome of right shoulder  Evaluation Date: 9/14/2022  Authorization Period Expiration: 12/31/2022  Plan of Care Expiration: 11/14/2022  Progress Note Due: 10/14/2022  Visit # / Visits authorized: 2/ 20 (1/1eval)   FOTO: 1/ 3 (eval)   PTA Visit #: 0/5     Precautions: Standard    Time In: 10:45 am  Time Out: 11:30 am  Total Billable Time: 38 minutes      SUBJECTIVE     Pt reports: she had some muscle soreness following last visit, but no significant change in pain levels.  She has continued using arm more with reaching activities, and has been able to dress herself without assistance.    She was compliant with home exercise program.  Response to previous treatment: some muscle soreness  Functional change: able to reach up and pull ceiling fan string, able to dress independently    Pain: 5/10  Location: right shoulder     OBJECTIVE     Objective Measures updated at progress report unless specified.       TREATMENT       Carla received the treatments listed below:        therapeutic exercises to develop strength, endurance, ROM, flexibility, posture, and core stabilization for (30)  minutes including:   - UBE x 4 minutes   - shoulder pulleys into flexion and abduction x 2 minutes each  - (R) sh ER 2 x 10 YTB  - (R) sh IR 2 x 10, YTB  - rows 2 x 10, YTB  - wall slides 2 x 10  - scapular protractions 2 x 10, 1#  - supine reverse Codman's 1 x 20 cw/ccw, 1#  - supine AAROM for shoulder flexion with 1# bar 1 x 10  - side lying (R) shoulder ER 2 x 10     Possible next visit:  UBE, shoulder pulleys, (R) shoulder isometrics, table slides, codman's, supine AAROM, scapular protractions, supine reverse codman's      manual therapy techniques applied for (8) minutes, including:   - PROM to right shoulder in all planes  - IASTM to right deltoid    PATIENT EDUCATION AND HOME EXERCISES     Home Exercises Provided and Patient Education Provided     Education/Self-Care provided:  Role of PT and goals for therapy.   - HEP    Written Home Exercises Provided: Patient instructed to cont prior HEP. Exercises were reviewed and Carla was able to demonstrate them prior to the end of the session.  Carla demonstrated good  understanding of the education provided. See EMR under Patient Instructions for exercises provided during therapy sessions    ASSESSMENT     Carla is reporting improved functional mobility with dressing and has been using right arm to reach and turn on light switches.  She is progressed with shoulder strengthening exercises, but has limited tolerance to resisted shoulder ER.  Patient remains tender over middle and anterior deltoids, but overall improved right shoulder ROM both passive and active.      Carla Is progressing well towards her goals.   Pt prognosis is Good.     Pt will continue to benefit from skilled outpatient physical therapy to address the deficits listed in the problem list box on initial evaluation, provide pt/family education and to maximize pt's level of independence in the home and community environment.     Pt's spiritual, cultural and educational needs considered and pt agreeable to plan of care and goals.     Anticipated barriers to physical therapy: none    Goals:   Short Term Goals:  4 weeks  Patient will be compliant with HEP to promote the independent management of current diagnosis. In Progress, Not Met  Patient will increase right shoulder flexion to 110 degrees to improve tolerance to overhead activities.  In Progress, Not Met  Patient will increase  right shoulder functiona ER to reach C7 for function of grooming.  In Progress, Not Met  Patient will report a decrease in complaints of right shoulder pain to 4/10 during performance of ADL's for independence of self care activities.  In Progress, Not Met        Long Term Goals:  8 weeks  Patient will increase right shoulder strength to 4+/5 in order to return to normal house work activities.  In Progress, Not Met  Patient will increase right shoulder flexion to 160 degrees in order to place dishes in overhead cabinets independently for self care independence.   In Progress, Not Met  Patient will increase right shoulder functional IR to reach T7 for function of dressing.  In Progress, Not Met  Patient will increase scapular stabilization strength to 4/5 to improve tolerance to normal lifting.  In Progress, Not Met  Patient will improve FOTO limitation status from 62% to 39% placing the patient in the 20-40% impaired, limited, or restricted category indicating increased functional mobility.   In Progress, Not Met    PLAN     Continue with PT POC and progress as tolerated.     Ryan Oconnor, PT

## 2022-09-23 ENCOUNTER — CLINICAL SUPPORT (OUTPATIENT)
Dept: FAMILY MEDICINE | Facility: CLINIC | Age: 67
End: 2022-09-23
Payer: MEDICARE

## 2022-09-23 DIAGNOSIS — E53.8 B12 DEFICIENCY: Primary | ICD-10-CM

## 2022-09-23 PROCEDURE — 96372 THER/PROPH/DIAG INJ SC/IM: CPT | Mod: PBBFAC,PO

## 2022-09-23 PROCEDURE — 99999 PR PBB SHADOW E&M-EST. PATIENT-LVL II: ICD-10-PCS | Mod: PBBFAC,,,

## 2022-09-23 PROCEDURE — 99999 PR PBB SHADOW E&M-EST. PATIENT-LVL II: CPT | Mod: PBBFAC,,,

## 2022-09-23 RX ADMIN — CYANOCOBALAMIN 1000 MCG: 1000 INJECTION, SOLUTION INTRAMUSCULAR at 09:09

## 2022-09-26 ENCOUNTER — CLINICAL SUPPORT (OUTPATIENT)
Dept: REHABILITATION | Facility: HOSPITAL | Age: 67
End: 2022-09-26
Attending: FAMILY MEDICINE
Payer: MEDICARE

## 2022-09-26 DIAGNOSIS — M25.611 DECREASED ROM OF RIGHT SHOULDER: ICD-10-CM

## 2022-09-26 DIAGNOSIS — R29.898 WEAKNESS OF RIGHT ARM: ICD-10-CM

## 2022-09-26 DIAGNOSIS — M25.511 ACUTE PAIN OF RIGHT SHOULDER: Primary | ICD-10-CM

## 2022-09-26 PROCEDURE — 97110 THERAPEUTIC EXERCISES: CPT | Mod: PN

## 2022-09-26 PROCEDURE — 97140 MANUAL THERAPY 1/> REGIONS: CPT | Mod: PN

## 2022-09-26 NOTE — PROGRESS NOTES
"Subjective:       Patient ID: Carla Dang is a 67 y.o. female.    Chief Complaint: Disease Management    HPI    This is a 67-year-old woman with PMH of HTN, HLD, CKD, normocytic anemia, infiltrating ductal carcinoma of the right breast s/p lumpectomy, chemotherapy and radiation (2005), right rotator cuff surgery 20 years ago, vitamin B12 deficiency, hyperparathyroidism, osteoarthritis, and GERD.  She was referred to Rheumatology for elevated inflammatory markers and positive low titer CCP. Per family medicine notes, she had sudden discomfort in her left shoulder in 3/2022 which prompted lab workup. The patient states that a few months ago, she had pain in her left shoulder that resolved spontaneously. Subsequently, she fell and hit her right arm. Since then, she has had pain on ROM of her right arm. She has been doing physical therapy which has been helping. The patient denies fevers, patchy alopecia, headaches, oral/nasal ulcers, sicca symptoms, rashes, photosensitivity, swelling, pleuritic chest pain, shortness of breath, cough, abdominal pain, diarrhea, bloody stool, weakness, numbness/tingling, and Raynaud's.     Gyn History: No pregnancies    Social History:  Former 1 PPD smoker, quit in 2005    Objective:   BP (!) 163/84 Comment: medication taken this AM-asymptomatic  Pulse 87   Ht 5' 5" (1.651 m)   Wt 73.4 kg (161 lb 11.3 oz)   BMI 26.91 kg/m²      Physical Exam   HENT:   Head: Normocephalic and atraumatic.   Cardiovascular: Normal rate and regular rhythm.   Pulmonary/Chest: Effort normal and breath sounds normal.   Abdominal: Normal appearance.   Musculoskeletal:      Comments: Pain on palpation of right bicep tendon  + Right empty can sign, Hawkin's, Yergason's   degrees right shoulder, slightly reduced and slow right shoulder internal and external rotation  No synovitis, dactylitis, enthesitis   Neurological: She is alert.   Skin: Skin is warm and dry. No rash noted.      No data to " display     Labs reviewed by me:  Positive CCP (5.3), negative RF   CBC (3/30/22): Hgb 10.7, otherwise WNL   CMP: GFR 59.2, albumin 3.4, otherwise WNL   Uric acid 6.5  ESR 73  CRP 60.1    Right shoulder Xray (8/2022)  FINDINGS:  Right lung parenchyma demonstrates chronic interstitial coarsening without focal abnormality.  Glenohumeral joint space maintained with minimal degenerative findings.  Rotator cuff repair findings present with postsurgical versus posttraumatic irregularity of the superolateral humeral head.  AC joint intact without significant degenerative findings.  Degenerative changes noted of the lower cervical spine.    Xray knees (4/2022):  Osteoarthritis    Assessment:       1. Shoulder pain, unspecified chronicity, unspecified laterality    2. Arthritis    3. Elevated sedimentation rate    4. Cyclic citrullinated peptide (CCP) antibody positive        This is a 67-year-old woman with PMH of HTN, HLD, CKD, normocytic anemia, infiltrating ductal carcinoma of the right breast s/p lumpectomy, chemotherapy and radiation (2005), right rotator cuff surgery 20 years ago, vitamin B12 deficiency, hyperparathyroidism, osteoarthritis, and GERD.  She was referred to Rheumatology for elevated inflammatory markers and positive low titer CCP. Per family medicine notes, she had sudden discomfort in her left shoulder in 3/2022 which prompted lab workup. The patient states that a few months ago, she had pain in her left shoulder that resolved spontaneously. Subsequently, she fell and hit her right arm. Since then, she has had pain on ROM of her right arm. She has been doing physical therapy which has been helping.  And said rheumatologic ROS is otherwise negative.  Physical examination is suggestive of bicipital tendinitis on the right as well impingement.  I do not find evidence of inflammatory arthritis.  I suspect that the CCP is false-positive.  Though her inflammatory markers were elevated in March 2022, the  patient states that she was admitted in the hospital at this time for electrolyte imbalances of unknown etiology.  Less likely PMR given involvement of only one joint. Will repeat these today, and obtain right shoulder MRI.     Plan:       Problem List Items Addressed This Visit    None  Visit Diagnoses       Shoulder pain, unspecified chronicity, unspecified laterality    -  Primary    Relevant Orders    Sedimentation rate    C-REACTIVE PROTEIN    MRI Shoulder Without Contrast Right    Arthritis        Elevated sedimentation rate        Relevant Orders    Sedimentation rate    C-REACTIVE PROTEIN    MRI Shoulder Without Contrast Right    Cyclic citrullinated peptide (CCP) antibody positive        Relevant Orders    Sedimentation rate    C-REACTIVE PROTEIN    MRI Shoulder Without Contrast Right          - Repeat inflammatory markers  - Right shoulder MRI (open MRI per patient request)    Addendum:  MRI right shoulder (10/06/2022): Status post rotator cuff repair.  Supraspinatus calcific tendinosis with acute complete full-thickness full width tear with tendon retraction and severe muscle atrophy.  Infraspinatus calcific tendinosis with acute complete full-thickness full width tear with tendon retraction and severe muscle atrophy.  Subscapularis tendinosis with acute complete full-thickness tear of the proximal fibers.  Proximal biceps tendon tear with distal retraction.  Glenohumeral osteoarthrosis with a joint effusion.  Superior anterior posterior glenoid labral tear.    Follow up in 4 weeks    45 minutes of total time spent on the encounter, which includes face to face time and non-face to face time preparing to see the patient (eg, review of tests), Obtaining and/or reviewing separately obtained history, Documenting clinical information in the electronic or other health record, Independently interpreting results (not separately reported) and communicating results to the patient/family/caregiver, or Care  coordination (not separately reported).       Ivet Miramontes M.D.  Rheumatology Dept  [place], [place]

## 2022-09-26 NOTE — PROGRESS NOTES
[facility]   Physical Therapy Treatment Note     Name: Carla HERRING [facility] Number: [id number]    Therapy Diagnosis:   Encounter Diagnoses   Name Primary?    Acute pain of right shoulder Yes    Decreased ROM of right shoulder     Weakness of right arm      Physician: Taqueria Melendrez MD    Visit Date: 9/26/2022    Physician Orders: PT Eval and Treat   Medical Diagnosis from Referral: M75.41 (ICD-10-CM) - Impingement syndrome of right shoulder  Evaluation Date: 9/14/2022  Authorization Period Expiration: 12/31/2022  Plan of Care Expiration: 11/14/2022  Progress Note Due: 10/14/2022  Visit # / Visits authorized: 3/ 20 (1/1eval)   FOTO: 1/ 3 (eval)   PTA Visit #: 0/5     Precautions: Standard    Time In: 10:40 am  Time Out: 11:25 am  Total Billable Time: 40 minutes      SUBJECTIVE     Pt reports: she had a busy weekend and was able to attend [employer] and baby shower without complaint.  She had increased soreness following last visit that lasted a day.    She was compliant with home exercise program.  Response to previous treatment: some muscle soreness  Functional change: able to reach up and pull ceiling fan string, able to dress independently    Pain: 5/10  Location: right shoulder     OBJECTIVE     Objective Measures updated at progress report unless specified.       TREATMENT       Carla received the treatments listed below:        therapeutic exercises to develop strength, endurance, ROM, flexibility, posture, and core stabilization for (35)  minutes including:   - UBE x 4 minutes   - shoulder pulleys into flexion x 2 minutes  - (R) sh ER 2 x 10 YTB  - (R) sh IR 2 x 10, RTB  - rows 2 x 10, RTB  - standing (R) shoulder flexion, scaption, abduction to 90deg 1 x 10 each  - wall slides 2 x 10  - scapular protractions 2 x 10, 1#  - supine reverse Codman's 1 x 30 cw/ccw, 1#  - supine AAROM for shoulder flexion with 2# bar 1 x 10  - side lying (R) shoulder ER 3 x 10     Possible next  visit: UBE, shoulder pulleys, (R) shoulder isometrics, table slides, codman's, supine AAROM, scapular protractions, supine reverse codman's      manual therapy techniques applied for (5) minutes, including:   -- IASTM to right deltoid    PATIENT EDUCATION AND HOME EXERCISES     Home Exercises Provided and Patient Education Provided     Education/Self-Care provided:  Role of PT and goals for therapy.   - HEP    Written Home Exercises Provided: Patient instructed to cont prior HEP. Exercises were reviewed and Carla was able to demonstrate them prior to the end of the session.  Carla demonstrated good  understanding of the education provided. See EMR under Patient Instructions for exercises provided during therapy sessions    ASSESSMENT     Carla is progressed with shoulder strengthening and AROM exercises on this date.  She is noted to fatigue quickly during active shoulder flexion and scaption exercise, but has improved side lying ER.  She continues to have difficulty with reaching activities, but overall progressing towards goals.        Carla Is progressing well towards her goals.   Pt prognosis is Good.     Pt will continue to benefit from skilled outpatient physical therapy to address the deficits listed in the problem list box on initial evaluation, provide pt/family education and to maximize pt's level of independence in the home and community environment.     Pt's spiritual, cultural and educational needs considered and pt agreeable to plan of care and goals.     Anticipated barriers to physical therapy: none    Goals:   Short Term Goals:  4 weeks  Patient will be compliant with HEP to promote the independent management of current diagnosis. In Progress, Not Met  Patient will increase right shoulder flexion to 110 degrees to improve tolerance to overhead activities.  In Progress, Not Met  Patient will increase right shoulder functiona ER to reach C7 for function of grooming.  In Progress, Not  Met  Patient will report a decrease in complaints of right shoulder pain to 4/10 during performance of ADL's for independence of self care activities.  In Progress, Not Met        Long Term Goals:  8 weeks  Patient will increase right shoulder strength to 4+/5 in order to return to normal house work activities.  In Progress, Not Met  Patient will increase right shoulder flexion to 160 degrees in order to place dishes in overhead cabinets independently for self care independence.   In Progress, Not Met  Patient will increase right shoulder functional IR to reach T7 for function of dressing.  In Progress, Not Met  Patient will increase scapular stabilization strength to 4/5 to improve tolerance to normal lifting.  In Progress, Not Met  Patient will improve FOTO limitation status from 62% to 39% placing the patient in the 20-40% impaired, limited, or restricted category indicating increased functional mobility.   In Progress, Not Met    PLAN     Continue with PT POC and progress as tolerated.     Ryan Oconnor, PT

## 2022-09-27 ENCOUNTER — OFFICE VISIT (OUTPATIENT)
Dept: RHEUMATOLOGY | Facility: CLINIC | Age: 67
End: 2022-09-27
Payer: MEDICARE

## 2022-09-27 VITALS
WEIGHT: 161.69 LBS | DIASTOLIC BLOOD PRESSURE: 84 MMHG | HEART RATE: 87 BPM | BODY MASS INDEX: 26.94 KG/M2 | SYSTOLIC BLOOD PRESSURE: 163 MMHG | HEIGHT: 65 IN

## 2022-09-27 DIAGNOSIS — R76.8 CYCLIC CITRULLINATED PEPTIDE (CCP) ANTIBODY POSITIVE: ICD-10-CM

## 2022-09-27 DIAGNOSIS — M75.100 TEAR OF ROTATOR CUFF, UNSPECIFIED LATERALITY, UNSPECIFIED TEAR EXTENT, UNSPECIFIED WHETHER TRAUMATIC: ICD-10-CM

## 2022-09-27 DIAGNOSIS — M25.519 SHOULDER PAIN, UNSPECIFIED CHRONICITY, UNSPECIFIED LATERALITY: Primary | ICD-10-CM

## 2022-09-27 DIAGNOSIS — R70.0 ELEVATED SEDIMENTATION RATE: ICD-10-CM

## 2022-09-27 DIAGNOSIS — S43.431A SUPERIOR GLENOID LABRUM LESION OF RIGHT SHOULDER, INITIAL ENCOUNTER: ICD-10-CM

## 2022-09-27 DIAGNOSIS — M19.90 ARTHRITIS: ICD-10-CM

## 2022-09-27 PROCEDURE — 99214 OFFICE O/P EST MOD 30 MIN: CPT | Mod: PBBFAC,PN | Performed by: STUDENT IN AN ORGANIZED HEALTH CARE EDUCATION/TRAINING PROGRAM

## 2022-09-27 PROCEDURE — 99999 PR PBB SHADOW E&M-EST. PATIENT-LVL IV: ICD-10-PCS | Mod: PBBFAC,,, | Performed by: STUDENT IN AN ORGANIZED HEALTH CARE EDUCATION/TRAINING PROGRAM

## 2022-09-27 PROCEDURE — 99204 OFFICE O/P NEW MOD 45 MIN: CPT | Mod: S$PBB,,, | Performed by: STUDENT IN AN ORGANIZED HEALTH CARE EDUCATION/TRAINING PROGRAM

## 2022-09-27 PROCEDURE — 99999 PR PBB SHADOW E&M-EST. PATIENT-LVL IV: CPT | Mod: PBBFAC,,, | Performed by: STUDENT IN AN ORGANIZED HEALTH CARE EDUCATION/TRAINING PROGRAM

## 2022-09-27 PROCEDURE — 99204 PR OFFICE/OUTPT VISIT, NEW, LEVL IV, 45-59 MIN: ICD-10-PCS | Mod: S$PBB,,, | Performed by: STUDENT IN AN ORGANIZED HEALTH CARE EDUCATION/TRAINING PROGRAM

## 2022-09-27 ASSESSMENT — ROUTINE ASSESSMENT OF PATIENT INDEX DATA (RAPID3)
PAIN SCORE: 5.5
PSYCHOLOGICAL DISTRESS SCORE: 2.2
FATIGUE SCORE: 1.1
PATIENT GLOBAL ASSESSMENT SCORE: 5.5
TOTAL RAPID3 SCORE: 4.78
MDHAQ FUNCTION SCORE: 1
AM STIFFNESS SCORE: 0, NO

## 2022-09-28 ENCOUNTER — LAB VISIT (OUTPATIENT)
Dept: LAB | Facility: HOSPITAL | Age: 67
End: 2022-09-28
Attending: STUDENT IN AN ORGANIZED HEALTH CARE EDUCATION/TRAINING PROGRAM
Payer: MEDICARE

## 2022-09-28 ENCOUNTER — CLINICAL SUPPORT (OUTPATIENT)
Dept: REHABILITATION | Facility: HOSPITAL | Age: 67
End: 2022-09-28
Attending: FAMILY MEDICINE
Payer: MEDICARE

## 2022-09-28 ENCOUNTER — TELEPHONE (OUTPATIENT)
Dept: RHEUMATOLOGY | Facility: CLINIC | Age: 67
End: 2022-09-28
Payer: MEDICARE

## 2022-09-28 DIAGNOSIS — R76.8 CYCLIC CITRULLINATED PEPTIDE (CCP) ANTIBODY POSITIVE: ICD-10-CM

## 2022-09-28 DIAGNOSIS — M25.611 DECREASED ROM OF RIGHT SHOULDER: ICD-10-CM

## 2022-09-28 DIAGNOSIS — M25.511 ACUTE PAIN OF RIGHT SHOULDER: Primary | ICD-10-CM

## 2022-09-28 DIAGNOSIS — M25.519 SHOULDER PAIN, UNSPECIFIED CHRONICITY, UNSPECIFIED LATERALITY: ICD-10-CM

## 2022-09-28 DIAGNOSIS — R29.898 WEAKNESS OF RIGHT ARM: ICD-10-CM

## 2022-09-28 DIAGNOSIS — R70.0 ELEVATED SEDIMENTATION RATE: ICD-10-CM

## 2022-09-28 LAB
CRP SERPL-MCNC: 4.5 MG/L (ref 0–8.2)
ERYTHROCYTE [SEDIMENTATION RATE] IN BLOOD BY WESTERGREN METHOD: 43 MM/HR (ref 0–20)

## 2022-09-28 PROCEDURE — 97140 MANUAL THERAPY 1/> REGIONS: CPT | Mod: PN

## 2022-09-28 PROCEDURE — 36415 COLL VENOUS BLD VENIPUNCTURE: CPT | Mod: PO | Performed by: STUDENT IN AN ORGANIZED HEALTH CARE EDUCATION/TRAINING PROGRAM

## 2022-09-28 PROCEDURE — 85651 RBC SED RATE NONAUTOMATED: CPT | Mod: PO | Performed by: STUDENT IN AN ORGANIZED HEALTH CARE EDUCATION/TRAINING PROGRAM

## 2022-09-28 PROCEDURE — 97110 THERAPEUTIC EXERCISES: CPT | Mod: PN

## 2022-09-28 PROCEDURE — 86140 C-REACTIVE PROTEIN: CPT | Performed by: STUDENT IN AN ORGANIZED HEALTH CARE EDUCATION/TRAINING PROGRAM

## 2022-09-28 NOTE — PROGRESS NOTES
[facility]   Physical Therapy Treatment Note     Name: Carla HERRING [facility] Number: [id number]    Therapy Diagnosis:   Encounter Diagnoses   Name Primary?    Acute pain of right shoulder Yes    Decreased ROM of right shoulder     Weakness of right arm      Physician: Taqueria Melendrez MD    Visit Date: 9/28/2022    Physician Orders: PT Eval and Treat   Medical Diagnosis from Referral: M75.41 (ICD-10-CM) - Impingement syndrome of right shoulder  Evaluation Date: 9/14/2022  Authorization Period Expiration: 12/31/2022  Plan of Care Expiration: 11/14/2022  Progress Note Due: 10/14/2022  Visit # / Visits authorized: 4/ 20 (1/1eval)   FOTO: 1/ 3 (eval)   PTA Visit #: 0/5     Precautions: Standard    Time In: 10:45 am  Time Out: 11:30 am  Total Billable Time: 40 minutes      SUBJECTIVE     Pt reports: she had an appointment with rheumatologist yesterday, blood work today, and is going to schedule MRI of shoulder.    She was compliant with home exercise program.  Response to previous treatment: some muscle soreness  Functional change: able to reach up and pull ceiling fan string, able to dress independently    Pain: 5/10  Location: right shoulder     OBJECTIVE     Objective Measures updated at progress report unless specified.       TREATMENT       Carla received the treatments listed below:        therapeutic exercises to develop strength, endurance, ROM, flexibility, posture, and core stabilization for (35)  minutes including:   - UBE x 4 minutes   - shoulder pulleys into flexion x 2 minutes  - (R) sh ER 2 x 10 YTB  - (R) sh IR 2 x 10, RTB  - rows 2 x 10, RTB  - standing (R) shoulder flexion, scaption, abduction to 90deg 1 x 10 each  - wall slides 2 x 10  - scapular protractions 2 x 10, 2#  - supine reverse Codman's 1 x 30 cw/ccw, 2#  - supine chest press and AAROM for shoulder flexion with 2# bar 1 x 15  - side lying (R) shoulder ER 3 x 10     Possible next visit: prone horizontal  abduction, prone extension, prone scaption      manual therapy techniques applied for (5) minutes, including:   - PROM to right shoulder in all planes  - I  PATIENT EDUCATION AND HOME EXERCISES     Home Exercises Provided and Patient Education Provided     Education/Self-Care provided:  Role of PT and goals for therapy.   - HEP    Written Home Exercises Provided: Patient instructed to cont prior HEP. Exercises were reviewed and Carla was able to demonstrate them prior to the end of the session.  Carla demonstrated good  understanding of the education provided. See EMR under Patient Instructions for exercises provided during therapy sessions    ASSESSMENT     Carla has improving ROM during side lying ER with first set focusing on eccentric contraction from full range to belly.  She has full PROM with some tightness and pain noted at end range flexion and abduction.  Patient will continue with progression of strengthening and stabilization exercises with attempt prone scapular exercises next visit.         Carla Is progressing well towards her goals.   Pt prognosis is Good.     Pt will continue to benefit from skilled outpatient physical therapy to address the deficits listed in the problem list box on initial evaluation, provide pt/family education and to maximize pt's level of independence in the home and community environment.     Pt's spiritual, cultural and educational needs considered and pt agreeable to plan of care and goals.     Anticipated barriers to physical therapy: none    Goals:   Short Term Goals:  4 weeks  Patient will be compliant with HEP to promote the independent management of current diagnosis. In Progress, Not Met  Patient will increase right shoulder flexion to 110 degrees to improve tolerance to overhead activities.  In Progress, Not Met  Patient will increase right shoulder functiona ER to reach C7 for function of grooming.  In Progress, Not Met  Patient will report a decrease in  complaints of right shoulder pain to 4/10 during performance of ADL's for independence of self care activities.  In Progress, Not Met        Long Term Goals:  8 weeks  Patient will increase right shoulder strength to 4+/5 in order to return to normal house work activities.  In Progress, Not Met  Patient will increase right shoulder flexion to 160 degrees in order to place dishes in overhead cabinets independently for self care independence.   In Progress, Not Met  Patient will increase right shoulder functional IR to reach T7 for function of dressing.  In Progress, Not Met  Patient will increase scapular stabilization strength to 4/5 to improve tolerance to normal lifting.  In Progress, Not Met  Patient will improve FOTO limitation status from 62% to 39% placing the patient in the 20-40% impaired, limited, or restricted category indicating increased functional mobility.   In Progress, Not Met    PLAN     Continue with PT POC and progress as tolerated.     Ryan Oconnor, PT

## 2022-09-29 NOTE — PROGRESS NOTES
[facility]   Physical Therapy Treatment Note     Name: Carla HERRING [facility] Number: [id number]    Therapy Diagnosis:   Encounter Diagnoses   Name Primary?    Acute pain of right shoulder Yes    Decreased ROM of right shoulder     Weakness of right arm      Physician: Taqueria Melendrez MD    Visit Date: 10/3/2022    Physician Orders: PT Eval and Treat   Medical Diagnosis from Referral: M75.41 (ICD-10-CM) - Impingement syndrome of right shoulder  Evaluation Date: 9/14/2022  Authorization Period Expiration: 12/31/2022  Plan of Care Expiration: 11/14/2022  Progress Note Due: 10/14/2022  Visit # / Visits authorized: 5/ 20 (1/1eval)   FOTO: 1/ 3 (eval)   PTA Visit #: 1/5     Precautions: Standard    Time In: 1045 am  Time Out: 1125 am  Total Billable Time: 40 minutes    SUBJECTIVE     Pt reports: she feels like she over did it over the weekend some but not too bad. She is scheduled to get her MRI on Thursday.    She was compliant with home exercise program.  Response to previous treatment: some muscle soreness  Functional change: able to reach up and pull ceiling fan string, able to dress independently    Pain: 5/10  Location: right shoulder     OBJECTIVE     Objective Measures updated at progress report unless specified.     TREATMENT     Carla received the treatments listed below:      Therapeutic exercises to develop strength, endurance, ROM, flexibility, posture, and core stabilization for 35 minutes including:   UBE x 4 minutes   Shoulder pulleys into flexion x 2 minutes  Wall slides 2 x 10  Sh ER 2 x 10 YTB (R)   Sh IR 2 x 10 RTB (R)   Rows 2 x 10 RTB  Standing (R) shoulder flexion, scaption, abduction to 90deg 1 x 10 each  Scapular protractions 2# 2 x 10  Supine reverse Codman's 2# 1 x 30 cw/ccw  Supine chest press and AAROM for shoulder flexion with 2# bar 1 x 15  Side lying (R) shoulder ER 3 x 10     Possible next visit: prone horizontal abduction, prone extension, prone  scaption     Manual therapy techniques applied for 5 minutes, including:  PROM to right shoulder in all planes  I    PATIENT EDUCATION AND HOME EXERCISES     Home Exercises Provided and Patient Education Provided     Education/Self-Care provided:  Role of PT and goals for therapy.   HEP    Written Home Exercises Provided: Patient instructed to cont prior HEP. Exercises were reviewed and Carla was able to demonstrate them prior to the end of the session. Carla demonstrated good  understanding of the education provided. See EMR under Patient Instructions for exercises provided during therapy sessions.    ASSESSMENT     Patient with muscular fatigue noted throughout session, especially with supine overhead exercises and external rotation. Shoulder exercises to 90 degrees were done in mirror this date for visual feedback for prevention of compensation.    Carla Is progressing well towards her goals.   Pt prognosis is Good.     Pt will continue to benefit from skilled outpatient physical therapy to address the deficits listed in the problem list box on initial evaluation, provide pt/family education and to maximize pt's level of independence in the home and community environment.     Pt's spiritual, cultural and educational needs considered and pt agreeable to plan of care and goals.     Anticipated barriers to physical therapy: none    Goals:   Short Term Goals:  4 weeks  Patient will be compliant with HEP to promote the independent management of current diagnosis. In Progress, Not Met  Patient will increase right shoulder flexion to 110 degrees to improve tolerance to overhead activities.  In Progress, Not Met  Patient will increase right shoulder functiona ER to reach C7 for function of grooming.  In Progress, Not Met  Patient will report a decrease in complaints of right shoulder pain to 4/10 during performance of ADL's for independence of self care activities.  In Progress, Not Met        Long Term Goals:  8  weeks  Patient will increase right shoulder strength to 4+/5 in order to return to normal house work activities.  In Progress, Not Met  Patient will increase right shoulder flexion to 160 degrees in order to place dishes in overhead cabinets independently for self care independence.   In Progress, Not Met  Patient will increase right shoulder functional IR to reach T7 for function of dressing.  In Progress, Not Met  Patient will increase scapular stabilization strength to 4/5 to improve tolerance to normal lifting.  In Progress, Not Met  Patient will improve FOTO limitation status from 62% to 39% placing the patient in the 20-40% impaired, limited, or restricted category indicating increased functional mobility.   In Progress, Not Met    PLAN     Continue with PT POC and progress as tolerated.     Lacho Medley, PTA

## 2022-09-30 ENCOUNTER — EXTERNAL CHRONIC CARE MANAGEMENT (OUTPATIENT)
Dept: PRIMARY CARE CLINIC | Facility: CLINIC | Age: 67
End: 2022-09-30
Payer: MEDICARE

## 2022-09-30 PROCEDURE — 99439 PR CHRONIC CARE MGMT, EA ADDTL 20 MIN: ICD-10-PCS | Mod: S$PBB,,, | Performed by: FAMILY MEDICINE

## 2022-09-30 PROCEDURE — 99490 CHRNC CARE MGMT STAFF 1ST 20: CPT | Mod: S$PBB,,, | Performed by: FAMILY MEDICINE

## 2022-09-30 PROCEDURE — 99439 CHRNC CARE MGMT STAF EA ADDL: CPT | Mod: S$PBB,,, | Performed by: FAMILY MEDICINE

## 2022-09-30 PROCEDURE — 99490 PR CHRONIC CARE MGMT, 1ST 20 MIN: ICD-10-PCS | Mod: S$PBB,,, | Performed by: FAMILY MEDICINE

## 2022-10-03 ENCOUNTER — CLINICAL SUPPORT (OUTPATIENT)
Dept: REHABILITATION | Facility: HOSPITAL | Age: 67
End: 2022-10-03
Attending: FAMILY MEDICINE
Payer: MEDICARE

## 2022-10-03 DIAGNOSIS — R29.898 WEAKNESS OF RIGHT ARM: ICD-10-CM

## 2022-10-03 DIAGNOSIS — M25.611 DECREASED ROM OF RIGHT SHOULDER: ICD-10-CM

## 2022-10-03 DIAGNOSIS — M25.511 ACUTE PAIN OF RIGHT SHOULDER: Primary | ICD-10-CM

## 2022-10-03 PROCEDURE — 97110 THERAPEUTIC EXERCISES: CPT | Mod: PN,CQ

## 2022-10-05 ENCOUNTER — TELEPHONE (OUTPATIENT)
Dept: FAMILY MEDICINE | Facility: CLINIC | Age: 67
End: 2022-10-05
Payer: MEDICARE

## 2022-10-05 ENCOUNTER — CLINICAL SUPPORT (OUTPATIENT)
Dept: REHABILITATION | Facility: HOSPITAL | Age: 67
End: 2022-10-05
Attending: FAMILY MEDICINE
Payer: MEDICARE

## 2022-10-05 DIAGNOSIS — R29.898 WEAKNESS OF RIGHT ARM: ICD-10-CM

## 2022-10-05 DIAGNOSIS — M25.511 ACUTE PAIN OF RIGHT SHOULDER: Primary | ICD-10-CM

## 2022-10-05 DIAGNOSIS — M25.611 DECREASED ROM OF RIGHT SHOULDER: ICD-10-CM

## 2022-10-05 PROCEDURE — 97140 MANUAL THERAPY 1/> REGIONS: CPT | Mod: PN

## 2022-10-05 PROCEDURE — 97110 THERAPEUTIC EXERCISES: CPT | Mod: PN

## 2022-10-05 RX ORDER — LORAZEPAM 0.5 MG/1
0.5 TABLET ORAL ONCE
Qty: 1 TABLET | Refills: 0 | Status: SHIPPED | OUTPATIENT
Start: 2022-10-05 | End: 2023-08-30

## 2022-10-05 NOTE — PROGRESS NOTES
[facility]   Physical Therapy Treatment Note     Name: Carla HERRING [facility] Number: [id number]    Therapy Diagnosis:   Encounter Diagnoses   Name Primary?    Acute pain of right shoulder Yes    Decreased ROM of right shoulder     Weakness of right arm      Physician: Taqueria Melendrez MD    Visit Date: 10/5/2022    Physician Orders: PT Eval and Treat   Medical Diagnosis from Referral: M75.41 (ICD-10-CM) - Impingement syndrome of right shoulder  Evaluation Date: 9/14/2022  Authorization Period Expiration: 12/31/2022  Plan of Care Expiration: 11/14/2022  Progress Note Due: 10/14/2022  Visit # / Visits authorized: 6/ 20 (1/1eval)   FOTO: 1/ 3 (eval)   PTA Visit #: 0/5     Precautions: Standard    Time In: 1045 am  Time Out: 1125 am  Total Billable Time: 40 minutes    SUBJECTIVE     Pt reports: she had some soreness following last treatment, but no increase in pain.    She was compliant with home exercise program.  Response to previous treatment: some muscle soreness  Functional change: able to reach up and pull ceiling fan string, able to dress independently    Pain: 4/10  Location: right shoulder     OBJECTIVE     Objective Measures updated at progress report unless specified.     TREATMENT     Carla received the treatments listed below:      Therapeutic exercises to develop strength, endurance, ROM, flexibility, posture, and core stabilization for 35 minutes including:   UBE x 4 minutes   Shoulder pulleys into flexion x 2 minutes  Wall slides 2 x 10  Sh ER 3 x 10 YTB (R)   Sh IR 3 x 10 RTB (R)   Rows 3 x 10 RTB  Overhead press (R) 1 x 10  Standing (R) shoulder flexion, scaption, abduction to 90deg 1 x 10 each  Scapular protractions 2# 3 x 10  Supine reverse Codman's 2# 1 x 30 cw/ccw  Supine chest press and AAROM for shoulder flexion with 2# bar 1 x 15  Side lying (R) shoulder ER 3 x 10  Side lying (R) shoulder abduction 1 x 10     Possible next visit: prone horizontal abduction,  prone extension, prone scaption     Manual therapy techniques applied for 5 minutes, including:  PROM to right shoulder in all planes  I    PATIENT EDUCATION AND HOME EXERCISES     Home Exercises Provided and Patient Education Provided     Education/Self-Care provided:  Role of PT and goals for therapy.   HEP    Written Home Exercises Provided: Patient instructed to cont prior HEP. Exercises were reviewed and Carla was able to demonstrate them prior to the end of the session. Carla demonstrated good  understanding of the education provided. See EMR under Patient Instructions for exercises provided during therapy sessions.    ASSESSMENT     Carla requires min assist at 80-90 degrees of flexion in order to perform active shoulder flexion overhead, but able to perform overhead press without difficulty.  She continues to have weakness in shoulder external rotators but noted to have improved ROM during side lying ER and able to add side lying abduction.      Carla Is progressing well towards her goals.   Pt prognosis is Good.     Pt will continue to benefit from skilled outpatient physical therapy to address the deficits listed in the problem list box on initial evaluation, provide pt/family education and to maximize pt's level of independence in the home and community environment.     Pt's spiritual, cultural and educational needs considered and pt agreeable to plan of care and goals.     Anticipated barriers to physical therapy: none    Goals:   Short Term Goals:  4 weeks  Patient will be compliant with HEP to promote the independent management of current diagnosis. In Progress, Not Met  Patient will increase right shoulder flexion to 110 degrees to improve tolerance to overhead activities.  In Progress, Not Met  Patient will increase right shoulder functiona ER to reach C7 for function of grooming.  In Progress, Not Met  Patient will report a decrease in complaints of right shoulder pain to 4/10 during  performance of ADL's for independence of self care activities.  In Progress, Not Met        Long Term Goals:  8 weeks  Patient will increase right shoulder strength to 4+/5 in order to return to normal house work activities.  In Progress, Not Met  Patient will increase right shoulder flexion to 160 degrees in order to place dishes in overhead cabinets independently for self care independence.   In Progress, Not Met  Patient will increase right shoulder functional IR to reach T7 for function of dressing.  In Progress, Not Met  Patient will increase scapular stabilization strength to 4/5 to improve tolerance to normal lifting.  In Progress, Not Met  Patient will improve FOTO limitation status from 62% to 39% placing the patient in the 20-40% impaired, limited, or restricted category indicating increased functional mobility.   In Progress, Not Met    PLAN     Continue with PT POC and progress as tolerated.     Ryan Oconnor, PT

## 2022-10-05 NOTE — TELEPHONE ENCOUNTER
----- Message from Nisa Wheeler sent at 10/5/2022 10:16 AM CDT -----  Contact: ANA BELL [[id number]]@ [phone]  Patient is requesting an Rx to be called in to help her with the MRI tomorrow.  [facility]  @  [phone]

## 2022-10-10 ENCOUNTER — CLINICAL SUPPORT (OUTPATIENT)
Dept: REHABILITATION | Facility: HOSPITAL | Age: 67
End: 2022-10-10
Attending: FAMILY MEDICINE
Payer: MEDICARE

## 2022-10-10 DIAGNOSIS — R29.898 WEAKNESS OF RIGHT ARM: ICD-10-CM

## 2022-10-10 DIAGNOSIS — M25.511 ACUTE PAIN OF RIGHT SHOULDER: Primary | ICD-10-CM

## 2022-10-10 DIAGNOSIS — M25.611 DECREASED ROM OF RIGHT SHOULDER: ICD-10-CM

## 2022-10-10 PROCEDURE — 97140 MANUAL THERAPY 1/> REGIONS: CPT | Mod: PN

## 2022-10-10 PROCEDURE — 97110 THERAPEUTIC EXERCISES: CPT | Mod: PN

## 2022-10-10 NOTE — PROGRESS NOTES
[facility]   Physical Therapy Treatment Note     Name: Carla HERRING [facility] Number: [id number]    Therapy Diagnosis:   Encounter Diagnoses   Name Primary?    Acute pain of right shoulder Yes    Decreased ROM of right shoulder     Weakness of right arm      Physician: Taqueria Melendrez MD    Visit Date: 10/10/2022    Physician Orders: PT Eval and Treat   Medical Diagnosis from Referral: M75.41 (ICD-10-CM) - Impingement syndrome of right shoulder  Evaluation Date: 9/14/2022  Authorization Period Expiration: 12/31/2022  Plan of Care Expiration: 11/14/2022  Progress Note Due: 10/14/2022  Visit # / Visits authorized: 7/ 20 (1/1eval)   FOTO: 1/ 3 (eval)   PTA Visit #: 0/5     Precautions: Standard    Time In: 10:45 am  Time Out: 11:25 am  Total Billable Time: 40 minutes    SUBJECTIVE     Pt reports: she bumped into some folding chairs last night and increased her right shoulder pain.  She rates her pain at 8/10 this morning.  She had MRI this past Thursday.      She was compliant with home exercise program.  Response to previous treatment: some muscle soreness  Functional change: able to reach up and pull ceiling fan string, able to dress independently    Pain: 8/10  Location: right shoulder     OBJECTIVE     Objective Measures updated at progress report unless specified.     TREATMENT     Carla received the treatments listed below:      Therapeutic exercises to develop strength, endurance, ROM, flexibility, posture, and core stabilization for 30 minutes including:   UBE x 4 minutes   Shoulder pulleys into flexion x 2 minutes  Wall slides 2 x 10  Sh ER 3 x 10 YTB (R) (not today, pain)  Sh IR 3 x 10 RTB (R) (not today, pain)  Rows 2 x 10 RTB  Overhead press (R) 1 x 10 (not today, pain)  Standing (R) shoulder flexion, scaption, abduction to 90deg 1 x 10 each (not today, pain)  Scapular protractions 2# 3 x 10  Supine reverse Codman's 2# 1 x 30 cw/ccw  Supine chest press and AAROM for  shoulder flexion with 1# bar 1 x 15  Side lying (R) shoulder ER 3 x 10  Side lying (R) shoulder abduction 1 x 10 (not today, pain)     Possible next visit: prone horizontal abduction, prone extension, prone scaption     Manual therapy techniques applied for 10 minutes, including:  PROM to right shoulder in all planes  I    PATIENT EDUCATION AND HOME EXERCISES     Home Exercises Provided and Patient Education Provided     Education/Self-Care provided:  Role of PT and goals for therapy.   HEP    Written Home Exercises Provided: Patient instructed to cont prior HEP. Exercises were reviewed and Carla was able to demonstrate them prior to the end of the session. Carla demonstrated good  understanding of the education provided. See EMR under Patient Instructions for exercises provided during therapy sessions.    ASSESSMENT     Carla returns to therapy with increased pain due to bumping into a stack of folding chairs and hitting the wall, but did report decreased intensity of shoulder pain following treatment.  Patient was able to perform most exercises, but decreased intensity of today's working and increased manual therapy.  She will attempt to progress exercises next visit depending on pain levels.        Carla Is progressing well towards her goals.   Pt prognosis is Good.     Pt will continue to benefit from skilled outpatient physical therapy to address the deficits listed in the problem list box on initial evaluation, provide pt/family education and to maximize pt's level of independence in the home and community environment.     Pt's spiritual, cultural and educational needs considered and pt agreeable to plan of care and goals.     Anticipated barriers to physical therapy: none    Goals:   Short Term Goals:  4 weeks  Patient will be compliant with HEP to promote the independent management of current diagnosis. In Progress, Not Met  Patient will increase right shoulder flexion to 110 degrees to improve  tolerance to overhead activities.  In Progress, Not Met  Patient will increase right shoulder functiona ER to reach C7 for function of grooming.  In Progress, Not Met  Patient will report a decrease in complaints of right shoulder pain to 4/10 during performance of ADL's for independence of self care activities.  In Progress, Not Met        Long Term Goals:  8 weeks  Patient will increase right shoulder strength to 4+/5 in order to return to normal house work activities.  In Progress, Not Met  Patient will increase right shoulder flexion to 160 degrees in order to place dishes in overhead cabinets independently for self care independence.   In Progress, Not Met  Patient will increase right shoulder functional IR to reach T7 for function of dressing.  In Progress, Not Met  Patient will increase scapular stabilization strength to 4/5 to improve tolerance to normal lifting.  In Progress, Not Met  Patient will improve FOTO limitation status from 62% to 39% placing the patient in the 20-40% impaired, limited, or restricted category indicating increased functional mobility.   In Progress, Not Met    PLAN     Continue with PT POC and progress as tolerated.     Ryan Oconnor, PT

## 2022-10-12 ENCOUNTER — CLINICAL SUPPORT (OUTPATIENT)
Dept: REHABILITATION | Facility: HOSPITAL | Age: 67
End: 2022-10-12
Attending: FAMILY MEDICINE
Payer: MEDICARE

## 2022-10-12 DIAGNOSIS — M25.511 ACUTE PAIN OF RIGHT SHOULDER: Primary | ICD-10-CM

## 2022-10-12 DIAGNOSIS — M25.611 DECREASED ROM OF RIGHT SHOULDER: ICD-10-CM

## 2022-10-12 DIAGNOSIS — R29.898 WEAKNESS OF RIGHT ARM: ICD-10-CM

## 2022-10-12 PROCEDURE — 97110 THERAPEUTIC EXERCISES: CPT | Mod: PN

## 2022-10-12 PROCEDURE — 97140 MANUAL THERAPY 1/> REGIONS: CPT | Mod: PN

## 2022-10-12 NOTE — PROGRESS NOTES
[facility]   Physical Therapy Treatment Note     Name: Carla HERRING [facility] Number: [id number]    Therapy Diagnosis:   Encounter Diagnoses   Name Primary?    Acute pain of right shoulder Yes    Decreased ROM of right shoulder     Weakness of right arm      Physician: Taqueria Melendrez MD    Visit Date: 10/12/2022    Physician Orders: PT Eval and Treat   Medical Diagnosis from Referral: M75.41 (ICD-10-CM) - Impingement syndrome of right shoulder  Evaluation Date: 9/14/2022  Authorization Period Expiration: 12/31/2022  Plan of Care Expiration: 11/14/2022  Progress Note Due: 10/14/2022  Visit # / Visits authorized: 8/ 20 (1/1eval)   FOTO: 1/ 3 (eval)   PTA Visit #: 0/5     Precautions: Standard    Time In: 11:30 am  Time Out: 12:15 pm  Total Billable Time: 40 minutes    SUBJECTIVE     Pt reports: she continues to have increased shoulder pain since she bumped into the folding chairs.  She had temporary relief following last visit.       She was compliant with home exercise program.  Response to previous treatment: temporary relief of shoulder pain  Functional change: able to reach up and pull ceiling fan string, able to dress independently    Pain: 7/10  Location: right shoulder     OBJECTIVE     Objective Measures updated at progress report unless specified.     TREATMENT     Carla received the treatments listed below:      Therapeutic exercises to develop strength, endurance, ROM, flexibility, posture, and core stabilization for 30 minutes including:   UBE x 4 minutes   Shoulder pulleys into flexion x 2 minutes  Wall slides 2 x 10  Sh ER 3 x 10 YTB (R)  (not today)  Sh IR 3 x 10 RTB (R)   Rows 3 x 10 RTB  Overhead press (R) 1 x 10   Standing (R) shoulder flexion, scaption, abduction to 90deg 1 x 10 each (not today, pain)  Scapular protractions 2# 3 x 10  Supine reverse Codman's 2# 1 x 30 cw/ccw  Supine chest press and AAROM for shoulder flexion with 2# bar 2 x 10  Side lying (R)  shoulder ER 3 x 10  Side lying (R) shoulder abduction 1 x 10 (not today, pain)     Possible next visit: prone horizontal abduction, prone extension, prone scaption     Manual therapy techniques applied for 10 minutes, including:  PROM to right shoulder in all planes  Grade 3 inferior and posterior glides of right GH joint  STM to right deltoid    PATIENT EDUCATION AND HOME EXERCISES     Home Exercises Provided and Patient Education Provided     Education/Self-Care provided:  Role of PT and goals for therapy.   HEP    Written Home Exercises Provided: Patient instructed to cont prior HEP. Exercises were reviewed and Carla was able to demonstrate them prior to the end of the session. Carla demonstrated good  understanding of the education provided. See EMR under Patient Instructions for exercises provided during therapy sessions.    ASSESSMENT     Carla continued to have weakness and pain when attempting standing AROM or resisted shoulder ER exercises and requires min assist to perform side lying shoulder ER through full ROM.  She has been experiencing more pain and limited functional mobility of shoulder since bumping it this past weekend, but has maintained full PROM of shoulder.  Patient will continue to attempt progression of exercises to return to normal exercises.        Carla Is progressing well towards her goals.   Pt prognosis is Good.     Pt will continue to benefit from skilled outpatient physical therapy to address the deficits listed in the problem list box on initial evaluation, provide pt/family education and to maximize pt's level of independence in the home and community environment.     Pt's spiritual, cultural and educational needs considered and pt agreeable to plan of care and goals.     Anticipated barriers to physical therapy: none    Goals:   Short Term Goals:  4 weeks  Patient will be compliant with HEP to promote the independent management of current diagnosis. In Progress, Not  Met  Patient will increase right shoulder flexion to 110 degrees to improve tolerance to overhead activities.  In Progress, Not Met  Patient will increase right shoulder functiona ER to reach C7 for function of grooming.  In Progress, Not Met  Patient will report a decrease in complaints of right shoulder pain to 4/10 during performance of ADL's for independence of self care activities.  In Progress, Not Met        Long Term Goals:  8 weeks  Patient will increase right shoulder strength to 4+/5 in order to return to normal house work activities.  In Progress, Not Met  Patient will increase right shoulder flexion to 160 degrees in order to place dishes in overhead cabinets independently for self care independence.   In Progress, Not Met  Patient will increase right shoulder functional IR to reach T7 for function of dressing.  In Progress, Not Met  Patient will increase scapular stabilization strength to 4/5 to improve tolerance to normal lifting.  In Progress, Not Met  Patient will improve FOTO limitation status from 62% to 39% placing the patient in the 20-40% impaired, limited, or restricted category indicating increased functional mobility.   In Progress, Not Met    PLAN     Continue with PT POC and progress as tolerated.     Ryan Oconnor, PT

## 2022-10-17 ENCOUNTER — CLINICAL SUPPORT (OUTPATIENT)
Dept: REHABILITATION | Facility: HOSPITAL | Age: 67
End: 2022-10-17
Attending: FAMILY MEDICINE
Payer: MEDICARE

## 2022-10-17 DIAGNOSIS — M25.511 ACUTE PAIN OF RIGHT SHOULDER: Primary | ICD-10-CM

## 2022-10-17 DIAGNOSIS — M25.611 DECREASED ROM OF RIGHT SHOULDER: ICD-10-CM

## 2022-10-17 DIAGNOSIS — R29.898 WEAKNESS OF RIGHT ARM: ICD-10-CM

## 2022-10-17 PROCEDURE — 97110 THERAPEUTIC EXERCISES: CPT | Mod: PN

## 2022-10-17 PROCEDURE — 97140 MANUAL THERAPY 1/> REGIONS: CPT | Mod: PN

## 2022-10-17 NOTE — PROGRESS NOTES
[facility]   Physical Therapy Treatment Note     Name: Carla HERRING [facility] Number: [id number]    Therapy Diagnosis:   Encounter Diagnoses   Name Primary?    Acute pain of right shoulder Yes    Decreased ROM of right shoulder     Weakness of right arm        Physician: Taqueria Melendrez MD    Visit Date: 10/17/2022    Physician Orders: PT Eval and Treat   Medical Diagnosis from Referral: M75.41 (ICD-10-CM) - Impingement syndrome of right shoulder  Evaluation Date: 9/14/2022  Authorization Period Expiration: 12/31/2022  Plan of Care Expiration: 11/14/2022  Progress Note Due: 10/14/2022  Visit # / Visits authorized: 9/ 20 (1/1eval)   FOTO: 1/ 3 (eval)   PTA Visit #: 0/5     Precautions: Standard    Time In: 12:45 pm  Time Out: 1:30 pm  Total Billable Time: 40 minutes    SUBJECTIVE     Pt reports: she had decreased shoulder pain over the weekend.      She was compliant with home exercise program.  Response to previous treatment: temporary relief of shoulder pain  Functional change: able to reach up and pull ceiling fan string, able to dress independently    Pain: 4/10  Location: right shoulder     OBJECTIVE     Objective Measures updated at progress report unless specified.     TREATMENT     Carla received the treatments listed below:      Therapeutic exercises to develop strength, endurance, ROM, flexibility, posture, and core stabilization for 30 minutes including:   UBE x 4 minutes   Shoulder pulleys into flexion x 2 minutes  Wall slides 2 x 10  (not today)  Sh IR 3 x 10 GTB (R)   Rows 3 x 10 GTB  Overhead press (R) 1 x 10, and 1 x 5  Standing (R) shoulder flexion, scaption, abduction to 90deg 1 x 10 each   Scapular protractions 2# 3 x 10  Supine reverse Codman's 2# 1 x 30 cw/ccw  Supine chest press and AAROM for shoulder flexion with 2# bar 2 x 10  Side lying (R) shoulder ER 3 x 10  Side lying (R) shoulder abduction 1 x 10 (not today, pain)     Possible next visit: prone  horizontal abduction, prone extension, prone scaption     Manual therapy techniques applied for 10 minutes, including:  PROM to right shoulder in all planes  Grade 3 inferior and posterior glides of right GH joint  STM to right deltoid    PATIENT EDUCATION AND HOME EXERCISES     Home Exercises Provided and Patient Education Provided     Education/Self-Care provided:  Role of PT and goals for therapy.   HEP    Written Home Exercises Provided: Patient instructed to cont prior HEP. Exercises were reviewed and Carla was able to demonstrate them prior to the end of the session. Carla demonstrated good  understanding of the education provided. See EMR under Patient Instructions for exercises provided during therapy sessions.    ASSESSMENT     Carla has decreased shoulder pain as compared to previous visits and able to progress strengthening exercises today.  She continues to report improvement in functional mobility with dressing activities.      Carla Is progressing well towards her goals.   Pt prognosis is Good.     Pt will continue to benefit from skilled outpatient physical therapy to address the deficits listed in the problem list box on initial evaluation, provide pt/family education and to maximize pt's level of independence in the home and community environment.     Pt's spiritual, cultural and educational needs considered and pt agreeable to plan of care and goals.     Anticipated barriers to physical therapy: none    Goals:   Short Term Goals:  4 weeks  Patient will be compliant with HEP to promote the independent management of current diagnosis. In Progress, Not Met  Patient will increase right shoulder flexion to 110 degrees to improve tolerance to overhead activities.  In Progress, Not Met  Patient will increase right shoulder functiona ER to reach C7 for function of grooming.  In Progress, Not Met  Patient will report a decrease in complaints of right shoulder pain to 4/10 during performance of ADL's  for independence of self care activities.  In Progress, Not Met        Long Term Goals:  8 weeks  Patient will increase right shoulder strength to 4+/5 in order to return to normal house work activities.  In Progress, Not Met  Patient will increase right shoulder flexion to 160 degrees in order to place dishes in overhead cabinets independently for self care independence.   In Progress, Not Met  Patient will increase right shoulder functional IR to reach T7 for function of dressing.  In Progress, Not Met  Patient will increase scapular stabilization strength to 4/5 to improve tolerance to normal lifting.  In Progress, Not Met  Patient will improve FOTO limitation status from 62% to 39% placing the patient in the 20-40% impaired, limited, or restricted category indicating increased functional mobility.   In Progress, Not Met    PLAN     Continue with PT POC and progress as tolerated.     Ryan Oconnor, PT

## 2022-10-19 ENCOUNTER — CLINICAL SUPPORT (OUTPATIENT)
Dept: REHABILITATION | Facility: HOSPITAL | Age: 67
End: 2022-10-19
Attending: FAMILY MEDICINE
Payer: MEDICARE

## 2022-10-19 DIAGNOSIS — R29.898 WEAKNESS OF RIGHT ARM: ICD-10-CM

## 2022-10-19 DIAGNOSIS — M25.511 ACUTE PAIN OF RIGHT SHOULDER: Primary | ICD-10-CM

## 2022-10-19 DIAGNOSIS — M25.611 DECREASED ROM OF RIGHT SHOULDER: ICD-10-CM

## 2022-10-19 PROCEDURE — 97110 THERAPEUTIC EXERCISES: CPT | Mod: PN

## 2022-10-19 PROCEDURE — 97140 MANUAL THERAPY 1/> REGIONS: CPT | Mod: PN

## 2022-10-19 NOTE — PROGRESS NOTES
[facility]   Physical Therapy Treatment Note     Name: Carla HERRING [facility] Number: [id number]    Therapy Diagnosis:   Encounter Diagnoses   Name Primary?    Acute pain of right shoulder Yes    Decreased ROM of right shoulder     Weakness of right arm        Physician: Taqueria Melendrez MD    Visit Date: 10/19/2022    Physician Orders: PT Eval and Treat   Medical Diagnosis from Referral: M75.41 (ICD-10-CM) - Impingement syndrome of right shoulder  Evaluation Date: 9/14/2022  Authorization Period Expiration: 12/31/2022  Plan of Care Expiration: 11/14/2022  Progress Note Due: 10/14/2022  Visit # / Visits authorized: 10/ 20 (1/1eval)   FOTO: 2/ 3 (eval, 10/19/22)   PTA Visit #: 0/5     Precautions: Standard    Time In: 10:35 am  Time Out: 11:30 am  Total Billable Time: 45 minutes    SUBJECTIVE     Pt reports: she is not able to scratch her back between her shoulder blades using her right arm.  She has decreased pain as compared to last few visits.     She was compliant with home exercise program.  Response to previous treatment: temporary relief of shoulder pain  Functional change: able to reach up and pull ceiling fan string, able to dress independently    Pain: 2/10  Location: right shoulder     OBJECTIVE     Objective Measures updated at progress report unless specified.     TREATMENT     Carla received the treatments listed below:      Therapeutic exercises to develop strength, endurance, ROM, flexibility, posture, and core stabilization for 35 minutes including:   UBE x 4 minutes   Shoulder pulleys into flexion x 2 minutes  Wall slides 2 x 10  (not today)  Sh IR 3 x 10 GTB (R)   Rows 3 x 10 GTB  Lat pull down 2 x 10, RTB  Bicep curl 2 x 10, 2# db  Tricep extension 2 x 10, YTB  Overhead press (R) 2 x 10  Standing (R) shoulder flexion, scaption, abduction to 90deg 1 x 10 each (not today)  Scapular protractions 2# 3 x 10  Supine reverse Codman's 2# 1 x 30 cw/ccw  Supine chest press  and MAYR for shoulder flexion with 2# bar 2 x 10  Side lying (R) shoulder ER 3 x 10  Side lying (R) shoulder abduction 1 x 10 (not today, pain)     Possible next visit: prone horizontal abduction, prone extension, prone scaption     Manual therapy techniques applied for 10 minutes, including:  PROM to right shoulder in all planes  Grade 3 inferior and posterior glides of right GH joint  STM to right deltoid, upper trap, infraspinatus, teres minor, and lat dorsi    PATIENT EDUCATION AND HOME EXERCISES     Home Exercises Provided and Patient Education Provided     Education/Self-Care provided:  Role of PT and goals for therapy.   HEP    Written Home Exercises Provided: Patient instructed to cont prior HEP. Exercises were reviewed and Calra was able to demonstrate them prior to the end of the session. Carla demonstrated good  understanding of the education provided. See EMR under Patient Instructions for exercises provided during therapy sessions.    ASSESSMENT     Carla responded well to increased STM with improved ROM while reaching across her body and over left shoulder to scratch her back following treatment.  She continues with progression of strengthening exercises, but requires assistance to perform side lying ER through full range.       Carla Is progressing well towards her goals.   Pt prognosis is Good.     Pt will continue to benefit from skilled outpatient physical therapy to address the deficits listed in the problem list box on initial evaluation, provide pt/family education and to maximize pt's level of independence in the home and community environment.     Pt's spiritual, cultural and educational needs considered and pt agreeable to plan of care and goals.     Anticipated barriers to physical therapy: none    Goals:   Short Term Goals:  4 weeks  Patient will be compliant with HEP to promote the independent management of current diagnosis. In Progress, Not Met  Patient will increase right  shoulder flexion to 110 degrees to improve tolerance to overhead activities.  In Progress, Not Met  Patient will increase right shoulder functiona ER to reach C7 for function of grooming.  In Progress, Not Met  Patient will report a decrease in complaints of right shoulder pain to 4/10 during performance of ADL's for independence of self care activities.  In Progress, Not Met        Long Term Goals:  8 weeks  Patient will increase right shoulder strength to 4+/5 in order to return to normal house work activities.  In Progress, Not Met  Patient will increase right shoulder flexion to 160 degrees in order to place dishes in overhead cabinets independently for self care independence.   In Progress, Not Met  Patient will increase right shoulder functional IR to reach T7 for function of dressing.  In Progress, Not Met  Patient will increase scapular stabilization strength to 4/5 to improve tolerance to normal lifting.  In Progress, Not Met  Patient will improve FOTO limitation status from 62% to 39% placing the patient in the 20-40% impaired, limited, or restricted category indicating increased functional mobility.   In Progress, Not Met    PLAN     Continue with PT POC and progress as tolerated.     Ryan Oconnor, PT

## 2022-10-21 ENCOUNTER — TELEPHONE (OUTPATIENT)
Dept: RHEUMATOLOGY | Facility: CLINIC | Age: 67
End: 2022-10-21
Payer: MEDICARE

## 2022-10-21 ENCOUNTER — CLINICAL SUPPORT (OUTPATIENT)
Dept: FAMILY MEDICINE | Facility: CLINIC | Age: 67
End: 2022-10-21
Payer: MEDICARE

## 2022-10-21 DIAGNOSIS — E53.8 B12 DEFICIENCY: Primary | ICD-10-CM

## 2022-10-21 PROCEDURE — 99999 PR PBB SHADOW E&M-EST. PATIENT-LVL II: ICD-10-PCS | Mod: PBBFAC,,,

## 2022-10-21 PROCEDURE — 99999 PR PBB SHADOW E&M-EST. PATIENT-LVL II: CPT | Mod: PBBFAC,,,

## 2022-10-21 PROCEDURE — 96372 THER/PROPH/DIAG INJ SC/IM: CPT | Mod: PBBFAC,PO

## 2022-10-21 RX ADMIN — CYANOCOBALAMIN 1000 MCG: 1000 INJECTION, SOLUTION INTRAMUSCULAR at 10:10

## 2022-10-21 NOTE — TELEPHONE ENCOUNTER
Ms Aguirre has been contacted by Ortho but states she did not answer the phone. She will call them back and discuss referral.

## 2022-10-21 NOTE — TELEPHONE ENCOUNTER
----- Message from Ivet Miramontes MD sent at 10/21/2022 11:40 AM CDT -----  Please call the patient and let her know that MRI showed complete tears of her rotator cuff and that she will need referral to Orthopedic surgery.  I put this order in.  Thank you

## 2022-10-24 ENCOUNTER — CLINICAL SUPPORT (OUTPATIENT)
Dept: REHABILITATION | Facility: HOSPITAL | Age: 67
End: 2022-10-24
Attending: FAMILY MEDICINE
Payer: MEDICARE

## 2022-10-24 DIAGNOSIS — R29.898 WEAKNESS OF RIGHT ARM: ICD-10-CM

## 2022-10-24 DIAGNOSIS — M25.511 ACUTE PAIN OF RIGHT SHOULDER: Primary | ICD-10-CM

## 2022-10-24 DIAGNOSIS — M25.611 DECREASED ROM OF RIGHT SHOULDER: ICD-10-CM

## 2022-10-24 PROCEDURE — 97110 THERAPEUTIC EXERCISES: CPT | Mod: PN

## 2022-10-24 NOTE — PROGRESS NOTES
[facility]   Physical Therapy Treatment Note     Name: Carla HERRING [facility] Number: [id number]    Therapy Diagnosis:   Encounter Diagnoses   Name Primary?    Acute pain of right shoulder Yes    Decreased ROM of right shoulder     Weakness of right arm        Physician: Taqueria Melendrez MD    Visit Date: 10/24/2022    Physician Orders: PT Eval and Treat   Medical Diagnosis from Referral: M75.41 (ICD-10-CM) - Impingement syndrome of right shoulder  Evaluation Date: 9/14/2022  Authorization Period Expiration: 12/31/2022  Plan of Care Expiration: 11/14/2022  Progress Note Due: 10/14/2022  Visit # / Visits authorized: 11/ 20 (1/1eval)   FOTO: 2/ 3 (eval, 10/19/22)   PTA Visit #: 0/5     Precautions: Standard    Time In: 9:15 am  Time Out: 10:00 am  Total Billable Time: 38 minutes    SUBJECTIVE     Pt reports: she has been referred to orthopedic surgeon due to MRI results.  She has appointment Friday morning with Dr. Garcia.      She was compliant with home exercise program.  Response to previous treatment: temporary relief of shoulder pain  Functional change: able to reach up and pull ceiling fan string, able to dress independently    Pain: 1/10  Location: right shoulder     OBJECTIVE     Objective Measures updated at progress report unless specified.     TREATMENT     Carla received the treatments listed below:      Therapeutic exercises to develop strength, endurance, ROM, flexibility, posture, and core stabilization for 38 minutes including:   UBE x 4 minutes   Shoulder pulleys into flexion x 2 minutes  Wall slides 2 x 10  Sh IR 3 x 10 GTB (R)   Rows 3 x 10 GTB  Lat pull down 2 x 10, RTB  Bicep curl 2 x 10, 2# db  Tricep extension 2 x 10, YTB  Overhead press (R) 2 x 10  Standing (R) shoulder flexion, scaption, abduction to 90deg 1 x 10 each (not today)  Scapular protractions 2# 3 x 10  Supine reverse Codman's 2# 1 x 30 cw/ccw  Supine chest press and AAROM for shoulder flexion with 2#  bar 2 x 10  Side lying (R) shoulder ER 3 x 10  Side lying (R) shoulder abduction 1 x 10 (not today, pain)     Possible next visit: prone horizontal abduction, prone extension, prone scaption     Manual therapy techniques applied for 0 minutes, including:  PATIENT EDUCATION AND HOME EXERCISES     Home Exercises Provided and Patient Education Provided     Education/Self-Care provided:  Role of PT and goals for therapy.   HEP    Written Home Exercises Provided: Patient instructed to cont prior HEP. Exercises were reviewed and Carla was able to demonstrate them prior to the end of the session. Carla demonstrated good  understanding of the education provided. See EMR under Patient Instructions for exercises provided during therapy sessions.    ASSESSMENT     Carla continues to have limited active external rotation and tolerance to lifting activities.  She is unable to tolerate standing active shoulder flexion, scaption, abduction this morning with reports of pain in bicep region.  Patient will continue with shoulder strengthening and AROM exercises to improve functional mobility during ADL's.       Carla Is progressing well towards her goals.   Pt prognosis is Good.     Pt will continue to benefit from skilled outpatient physical therapy to address the deficits listed in the problem list box on initial evaluation, provide pt/family education and to maximize pt's level of independence in the home and community environment.     Pt's spiritual, cultural and educational needs considered and pt agreeable to plan of care and goals.     Anticipated barriers to physical therapy: none    Goals:   Short Term Goals:  4 weeks  Patient will be compliant with HEP to promote the independent management of current diagnosis. In Progress, Not Met  Patient will increase right shoulder flexion to 110 degrees to improve tolerance to overhead activities.  In Progress, Not Met  Patient will increase right shoulder functiona ER to reach  C7 for function of grooming.  In Progress, Not Met  Patient will report a decrease in complaints of right shoulder pain to 4/10 during performance of ADL's for independence of self care activities.  In Progress, Not Met        Long Term Goals:  8 weeks  Patient will increase right shoulder strength to 4+/5 in order to return to normal house work activities.  In Progress, Not Met  Patient will increase right shoulder flexion to 160 degrees in order to place dishes in overhead cabinets independently for self care independence.   In Progress, Not Met  Patient will increase right shoulder functional IR to reach T7 for function of dressing.  In Progress, Not Met  Patient will increase scapular stabilization strength to 4/5 to improve tolerance to normal lifting.  In Progress, Not Met  Patient will improve FOTO limitation status from 62% to 39% placing the patient in the 20-40% impaired, limited, or restricted category indicating increased functional mobility.   In Progress, Not Met    PLAN     Continue with PT POC and progress as tolerated.     Ryan Oconnor, PT

## 2022-10-26 ENCOUNTER — CLINICAL SUPPORT (OUTPATIENT)
Dept: REHABILITATION | Facility: HOSPITAL | Age: 67
End: 2022-10-26
Attending: FAMILY MEDICINE
Payer: MEDICARE

## 2022-10-26 DIAGNOSIS — M25.511 ACUTE PAIN OF RIGHT SHOULDER: Primary | ICD-10-CM

## 2022-10-26 DIAGNOSIS — R29.898 WEAKNESS OF RIGHT ARM: ICD-10-CM

## 2022-10-26 DIAGNOSIS — M25.611 DECREASED ROM OF RIGHT SHOULDER: ICD-10-CM

## 2022-10-26 PROCEDURE — 97110 THERAPEUTIC EXERCISES: CPT | Mod: PN

## 2022-10-26 NOTE — PROGRESS NOTES
[facility]   Physical Therapy Treatment Note     Name: Carla HERRING [facility] Number: [id number]    Therapy Diagnosis:   Encounter Diagnoses   Name Primary?    Acute pain of right shoulder Yes    Decreased ROM of right shoulder     Weakness of right arm        Physician: Taqueria Melendrez MD    Visit Date: 10/26/2022    Physician Orders: PT Eval and Treat   Medical Diagnosis from Referral: M75.41 (ICD-10-CM) - Impingement syndrome of right shoulder  Evaluation Date: 9/14/2022  Authorization Period Expiration: 12/31/2022  Plan of Care Expiration: 11/14/2022  Progress Note Due: 10/14/2022  Visit # / Visits authorized: 12/ 20 (1/1eval)   FOTO: 2/ 3 (eval, 10/19/22)   PTA Visit #: 0/5     Precautions: Standard    Time In: 1:30 pm  Time Out: 2:15 pm  Total Billable Time: 38 minutes    SUBJECTIVE     Pt reports: she is not having much pain in shoulder but continues to have limitations with reaching and lifting activities.        She was compliant with home exercise program.  Response to previous treatment: temporary relief of shoulder pain  Functional change: able to reach up and pull ceiling fan string, able to dress independently    Pain: 1/10  Location: right shoulder     OBJECTIVE     Objective Measures updated at progress report unless specified.     TREATMENT     Carla received the treatments listed below:      Therapeutic exercises to develop strength, endurance, ROM, flexibility, posture, and core stabilization for 38 minutes including:   UBE x 4 minutes   Shoulder pulleys into flexion and abduction x 2 minutes each  Wall slides 2 x 10  Sh IR 3 x 10 GTB (R)   Rows 3 x 10 GTB  Lat pull down 2 x 10, GTB  Bicep curl 3 x 10, 2# db  Tricep extension 3 x 10, YTB  Overhead press (R) 2 x 10  Standing (R) shoulder flexion, scaption, abduction to 90deg 1 x 10 each (not today)  Scapular protractions 2# 3 x 10  Supine reverse Codman's 2# 1 x 30 cw/ccw  Supine chest press and AAROM for  shoulder flexion with 2# bar 2 x 10  Side lying (R) shoulder ER 3 x 10  Side lying (R) shoulder abduction 1 x 10 (not today)     Possible next visit: prone horizontal abduction, prone extension, prone scaption     Manual therapy techniques applied for 0 minutes, including:  PATIENT EDUCATION AND HOME EXERCISES     Home Exercises Provided and Patient Education Provided     Education/Self-Care provided:  Role of PT and goals for therapy.   HEP    Written Home Exercises Provided: Patient instructed to cont prior HEP. Exercises were reviewed and Carla was able to demonstrate them prior to the end of the session. Carla demonstrated good  understanding of the education provided. See EMR under Patient Instructions for exercises provided during therapy sessions.    ASSESSMENT     Carla continues to progress exercises but is unable to perform side lying ER throughout full range.  She performs side lying ER with assistance and concentrates on eccentric phase of exercise.  Patient is noted to have improved endurance during scapular protractions and reverse Codman's.         Carla Is progressing well towards her goals.   Pt prognosis is Good.     Pt will continue to benefit from skilled outpatient physical therapy to address the deficits listed in the problem list box on initial evaluation, provide pt/family education and to maximize pt's level of independence in the home and community environment.     Pt's spiritual, cultural and educational needs considered and pt agreeable to plan of care and goals.     Anticipated barriers to physical therapy: none    Goals:   Short Term Goals:  4 weeks  Patient will be compliant with HEP to promote the independent management of current diagnosis. In Progress, Not Met  Patient will increase right shoulder flexion to 110 degrees to improve tolerance to overhead activities.  In Progress, Not Met  Patient will increase right shoulder functiona ER to reach C7 for function of grooming.   In Progress, Not Met  Patient will report a decrease in complaints of right shoulder pain to 4/10 during performance of ADL's for independence of self care activities.  In Progress, Not Met        Long Term Goals:  8 weeks  Patient will increase right shoulder strength to 4+/5 in order to return to normal house work activities.  In Progress, Not Met  Patient will increase right shoulder flexion to 160 degrees in order to place dishes in overhead cabinets independently for self care independence.   In Progress, Not Met  Patient will increase right shoulder functional IR to reach T7 for function of dressing.  In Progress, Not Met  Patient will increase scapular stabilization strength to 4/5 to improve tolerance to normal lifting.  In Progress, Not Met  Patient will improve FOTO limitation status from 62% to 39% placing the patient in the 20-40% impaired, limited, or restricted category indicating increased functional mobility.   In Progress, Not Met    PLAN     Continue with PT POC and progress as tolerated.     Ryan Oconnor, PT

## 2022-10-31 ENCOUNTER — CLINICAL SUPPORT (OUTPATIENT)
Dept: REHABILITATION | Facility: HOSPITAL | Age: 67
End: 2022-10-31
Attending: FAMILY MEDICINE
Payer: MEDICARE

## 2022-10-31 ENCOUNTER — EXTERNAL CHRONIC CARE MANAGEMENT (OUTPATIENT)
Dept: PRIMARY CARE CLINIC | Facility: CLINIC | Age: 67
End: 2022-10-31
Payer: MEDICARE

## 2022-10-31 DIAGNOSIS — M25.611 DECREASED ROM OF RIGHT SHOULDER: ICD-10-CM

## 2022-10-31 DIAGNOSIS — M25.511 ACUTE PAIN OF RIGHT SHOULDER: Primary | ICD-10-CM

## 2022-10-31 DIAGNOSIS — R29.898 WEAKNESS OF RIGHT ARM: ICD-10-CM

## 2022-10-31 PROCEDURE — 99490 PR CHRONIC CARE MGMT, 1ST 20 MIN: ICD-10-PCS | Mod: S$PBB,,, | Performed by: FAMILY MEDICINE

## 2022-10-31 PROCEDURE — 99439 CHRNC CARE MGMT STAF EA ADDL: CPT | Mod: S$PBB,,, | Performed by: FAMILY MEDICINE

## 2022-10-31 PROCEDURE — 99490 CHRNC CARE MGMT STAFF 1ST 20: CPT | Mod: PBBFAC,PO | Performed by: FAMILY MEDICINE

## 2022-10-31 PROCEDURE — 97110 THERAPEUTIC EXERCISES: CPT | Mod: PN

## 2022-10-31 PROCEDURE — 99490 CHRNC CARE MGMT STAFF 1ST 20: CPT | Mod: S$PBB,,, | Performed by: FAMILY MEDICINE

## 2022-10-31 PROCEDURE — 99439 PR CHRONIC CARE MGMT, EA ADDTL 20 MIN: ICD-10-PCS | Mod: S$PBB,,, | Performed by: FAMILY MEDICINE

## 2022-10-31 PROCEDURE — 99439 CHRNC CARE MGMT STAF EA ADDL: CPT | Mod: PBBFAC,PO | Performed by: FAMILY MEDICINE

## 2022-10-31 NOTE — PROGRESS NOTES
[facility]   Physical Therapy Treatment Note     Name: Carla HERRING [facility] Number: [id number]    Therapy Diagnosis:   Encounter Diagnoses   Name Primary?    Acute pain of right shoulder Yes    Decreased ROM of right shoulder     Weakness of right arm          Physician: Taqueria Melendrez MD    Visit Date: 10/31/2022    Physician Orders: PT Eval and Treat   Medical Diagnosis from Referral: M75.41 (ICD-10-CM) - Impingement syndrome of right shoulder  Evaluation Date: 9/14/2022  Authorization Period Expiration: 12/31/2022  Plan of Care Expiration: 11/14/2022  Progress Note Due: 10/14/2022  Visit # / Visits authorized: 13/ 20 (1/1eval)   FOTO: 2/ 3 (eval, 10/19/22)   PTA Visit #: 0/5     Precautions: Standard    Time In: 9:15 am  Time Out: 10:00 am  Total Billable Time: 38 minutes    SUBJECTIVE     Pt reports: she is having increased shoulder pain this morning due to activity level this past weekend.  She had to reschedule her MD follow up to tomorrow.      She was compliant with home exercise program.  Response to previous treatment: temporary relief of shoulder pain  Functional change: able to reach up and pull ceiling fan string, able to dress independently    Pain: 7/10  Location: right shoulder     OBJECTIVE     Objective Measures updated at progress report unless specified.     Observation: pt holds right arm in guarded position, ambulates with decreased arm swing     Posture: rounded shoulder        Passive Range of Motion: supine  Shoulder Left Right   Flexion 160  155*   Abduction 160 155*   ER  95 90   IR 80 70*      Active Range of Motion: standing  Shoulder Left Right   Flexion 160 deg 75 deg *   Abduction 160 deg 70 deg *   ER  Reach T3 Reach C4   IR Reach T4 Reach T10      Upper Extremity Strength    (L) UE (R) UE   Shoulder flexion: 4+/5 3/5   Shoulder Abduction: 4+/5 3/5   Shoulder ER 4+/5 3-/5   Shoulder IR 4+/5 4/5   Lower Trap 3+/5 3/5   Middle Trap 3+/5 3/5    Rhomboids 3+/5 3/5            Special Tests:  AC Joint Left Right   AC Joint Compression Test neg pos   Empty Can Test neg Pain and limited mobility   Drop Arm test neg neg         Joint Mobility: slight restrictions in posterior capsule     Palpation: tenderness noted over upper trapezius, AC joint, deltoids, bicep, pec major     Sensation: intact to light touch           Scapular Control/Dyskinesis:     Normal / Subtle / Obvious  Comments    Left  normal normal    Right  obvious Upper trap substitution           TREATMENT     Carla received the treatments listed below:      Therapeutic exercises to develop strength, endurance, ROM, flexibility, posture, and core stabilization for 38 minutes including:   UBE x 4 minutes   Shoulder pulleys into flexion and abduction x 2 minutes each  Wall slides 2 x 10  Sh IR 3 x 10 GTB (R)   Rows 3 x 10 GTB  Lat pull down 2 x 10, GTB  Bicep curl 3 x 10, 2# db  Tricep extension 3 x 10, YTB  Overhead press (R) 2 x 10 (not today)  Standing (R) shoulder flexion, scaption, abduction to 90deg 1 x 10 each (not today)  Scapular protractions 2# 3 x 10  Supine reverse Codman's 2# 1 x 30 cw/ccw  Supine chest press and AAROM for shoulder flexion with 2# bar 2 x 10  Side lying (R) shoulder ER 3 x 10  Side lying (R) shoulder abduction 1 x 10 (not today)     Possible next visit: prone horizontal abduction, prone extension, prone scaption     Manual therapy techniques applied for 0 minutes, including:  PATIENT EDUCATION AND HOME EXERCISES     Home Exercises Provided and Patient Education Provided     Education/Self-Care provided:  Role of PT and goals for therapy.   HEP    Written Home Exercises Provided: Patient instructed to cont prior HEP. Exercises were reviewed and Carla was able to demonstrate them prior to the end of the session. Carla demonstrated good  understanding of the education provided. See EMR under Patient Instructions for exercises provided during therapy  sessions.    ASSESSMENT     Carla returns to therapy on this date with increased pain and noted to have decreased AROM as compared to last visit.  She has improvement in ROM and strength as compared to evaluation, but continues to be most limited with ER strength.  Patient is reporting improved functional mobility during dressing and reaching for objects, but continues to have difficulty with ADL's.  Patient will continue with current plan of care and attempt progression of shoulder strengthening and stabilization to improve functional mobility.         Carla Is progressing well towards her goals.   Pt prognosis is Good.     Pt will continue to benefit from skilled outpatient physical therapy to address the deficits listed in the problem list box on initial evaluation, provide pt/family education and to maximize pt's level of independence in the home and community environment.     Pt's spiritual, cultural and educational needs considered and pt agreeable to plan of care and goals.     Anticipated barriers to physical therapy: none    Goals:   Short Term Goals:  4 weeks  Patient will be compliant with HEP to promote the independent management of current diagnosis. Met  Patient will increase right shoulder flexion to 110 degrees to improve tolerance to overhead activities.  In Progress, Not Met  Patient will increase right shoulder functiona ER to reach C7 for function of grooming.  In Progress, Not Met  Patient will report a decrease in complaints of right shoulder pain to 4/10 during performance of ADL's for independence of self care activities.  In Progress, Not Met        Long Term Goals:  8 weeks  Patient will increase right shoulder strength to 4+/5 in order to return to normal house work activities.  In Progress, Not Met  Patient will increase right shoulder flexion to 160 degrees in order to place dishes in overhead cabinets independently for self care independence.   In Progress, Not Met  Patient will  increase right shoulder functional IR to reach T7 for function of dressing.  In Progress, Not Met  Patient will increase scapular stabilization strength to 4/5 to improve tolerance to normal lifting.  In Progress, Not Met  Patient will improve FOTO limitation status from 62% to 39% placing the patient in the 20-40% impaired, limited, or restricted category indicating increased functional mobility.   In Progress, Not Met    PLAN     Continue with PT POC and progress as tolerated.     Ryan Oconnor, PT

## 2022-10-31 NOTE — PLAN OF CARE
Outpatient Therapy Updated Plan of Care     Visit Date: 10/31/2022  [id number]    Therapy Diagnosis:   Encounter Diagnoses   Name Primary?    Acute pain of right shoulder Yes    Decreased ROM of right shoulder     Weakness of right arm      Physician: Taqueria Melendrez MD    Physician Orders: PT Eval and Treat   Medical Diagnosis from Referral: M75.41 (ICD-10-CM) - Impingement syndrome of right shoulder  Evaluation Date: 9/14/2022    Total Visits Received: 13  Cancelled Visits: 0  No Show Visits: 0    Current Certification Period:  9/14/22 to 11/14/22  Precautions:  standard  Visits from Evaluation Date:  12  Functional Level Prior to Evaluation:  independent    Subjective     Update: she is having increased shoulder pain this morning due to activity level this past weekend.  She had to reschedule her MD follow up to tomorrow.      Objective     Update:   Observation: pt holds right arm in guarded position, ambulates with decreased arm swing     Posture: rounded shoulder        Passive Range of Motion: supine  Shoulder Left Right   Flexion 160  155*   Abduction 160 155*   ER  95 90   IR 80 70*      Active Range of Motion: standing  Shoulder Left Right   Flexion 160 deg 75 deg *   Abduction 160 deg 70 deg *   ER  Reach T3 Reach C4   IR Reach T4 Reach T10      Upper Extremity Strength    (L) UE (R) UE   Shoulder flexion: 4+/5 3/5   Shoulder Abduction: 4+/5 3/5   Shoulder ER 4+/5 3-/5   Shoulder IR 4+/5 4/5   Lower Trap 3+/5 3/5   Middle Trap 3+/5 3/5   Rhomboids 3+/5 3/5            Special Tests:  AC Joint Left Right   AC Joint Compression Test neg pos   Empty Can Test neg Pain and limited mobility   Drop Arm test neg neg         Joint Mobility: slight restrictions in posterior capsule     Palpation: tenderness noted over upper trapezius, AC joint, deltoids, bicep, pec major     Sensation: intact to light touch           Scapular Control/Dyskinesis:     Normal / Subtle /  Obvious  Comments    Left  normal normal    Right  obvious Upper trap substitution           Assessment     Update: Carla returns to therapy on this date with increased pain and noted to have decreased AROM as compared to last visit.  She has improvement in ROM and strength as compared to evaluation, but continues to be most limited with ER strength.  Patient is reporting improved functional mobility during dressing and reaching for objects, but continues to have difficulty with ADL's.  Patient will continue with current plan of care and attempt progression of shoulder strengthening and stabilization to improve functional mobility.     Previous Short Term Goals Status:   initial evaluation  New Short Term Goals Status:     Short Term Goals:  4 weeks  Patient will be compliant with HEP to promote the independent management of current diagnosis. Met  Patient will increase right shoulder flexion to 110 degrees to improve tolerance to overhead activities.  In Progress, Not Met  Patient will increase right shoulder functiona ER to reach C7 for function of grooming.  In Progress, Not Met  Patient will report a decrease in complaints of right shoulder pain to 4/10 during performance of ADL's for independence of self care activities.  In Progress, Not Met        Long Term Goals:  8 weeks  Patient will increase right shoulder strength to 4+/5 in order to return to normal house work activities.  In Progress, Not Met  Patient will increase right shoulder flexion to 160 degrees in order to place dishes in overhead cabinets independently for self care independence.   In Progress, Not Met  Patient will increase right shoulder functional IR to reach T7 for function of dressing.  In Progress, Not Met  Patient will increase scapular stabilization strength to 4/5 to improve tolerance to normal lifting.  In Progress, Not Met  Patient will improve FOTO limitation status from 62% to 39% placing the patient in the 20-40% impaired,  limited, or restricted category indicating increased functional mobility.   In Progress, Not Met  Long Term Goal Status:   continue per initial plan of care.  Reasons for Recertification of Therapy:   continued limitations in ROM and strength    Plan     Updated Certification Period: 10/31/2022 to 12/31/2022  Recommended Treatment Plan: 2 times per week for 8 weeks: Electrical Stimulation IFC, Manual Therapy, Moist Heat/ Ice, Neuromuscular Re-ed, Patient Education, Therapeutic Activities, Therapeutic Exercise, and IASTM, vacuum cupping, dry needling, and kinesiotaping  Other Recommendations: none    Ryan Oconnor, PT  10/31/2022      I CERTIFY THE NEED FOR THESE SERVICES FURNISHED UNDER THIS PLAN OF TREATMENT AND WHILE UNDER MY CARE    Physician's comments:        Physician's Signature: ___________________________________________________

## 2022-11-01 ENCOUNTER — OFFICE VISIT (OUTPATIENT)
Dept: ORTHOPEDICS | Facility: CLINIC | Age: 67
End: 2022-11-01
Payer: MEDICARE

## 2022-11-01 VITALS — BODY MASS INDEX: 26.96 KG/M2 | WEIGHT: 161.81 LBS | HEIGHT: 65 IN

## 2022-11-01 DIAGNOSIS — M75.100 TEAR OF ROTATOR CUFF, UNSPECIFIED LATERALITY, UNSPECIFIED TEAR EXTENT, UNSPECIFIED WHETHER TRAUMATIC: ICD-10-CM

## 2022-11-01 DIAGNOSIS — M75.41 IMPINGEMENT SYNDROME OF RIGHT SHOULDER: Primary | ICD-10-CM

## 2022-11-01 DIAGNOSIS — S43.431A SUPERIOR GLENOID LABRUM LESION OF RIGHT SHOULDER, INITIAL ENCOUNTER: ICD-10-CM

## 2022-11-01 DIAGNOSIS — M25.519 SHOULDER PAIN, UNSPECIFIED CHRONICITY, UNSPECIFIED LATERALITY: ICD-10-CM

## 2022-11-01 PROCEDURE — 20610 DRAIN/INJ JOINT/BURSA W/O US: CPT | Mod: S$PBB,RT,, | Performed by: NURSE PRACTITIONER

## 2022-11-01 PROCEDURE — 99999 PR PBB SHADOW E&M-EST. PATIENT-LVL IV: CPT | Mod: PBBFAC,,, | Performed by: NURSE PRACTITIONER

## 2022-11-01 PROCEDURE — 99213 OFFICE O/P EST LOW 20 MIN: CPT | Mod: S$PBB,25,, | Performed by: NURSE PRACTITIONER

## 2022-11-01 PROCEDURE — 99214 OFFICE O/P EST MOD 30 MIN: CPT | Mod: PBBFAC,PN,25 | Performed by: NURSE PRACTITIONER

## 2022-11-01 PROCEDURE — 99999 PR PBB SHADOW E&M-EST. PATIENT-LVL IV: ICD-10-PCS | Mod: PBBFAC,,, | Performed by: NURSE PRACTITIONER

## 2022-11-01 PROCEDURE — 20610 DRAIN/INJ JOINT/BURSA W/O US: CPT | Mod: PBBFAC,PN | Performed by: NURSE PRACTITIONER

## 2022-11-01 PROCEDURE — 99213 PR OFFICE/OUTPT VISIT, EST, LEVL III, 20-29 MIN: ICD-10-PCS | Mod: S$PBB,25,, | Performed by: NURSE PRACTITIONER

## 2022-11-01 PROCEDURE — 20610 LARGE JOINT ASPIRATION/INJECTION: R SUBACROMIAL BURSA: ICD-10-PCS | Mod: S$PBB,RT,, | Performed by: NURSE PRACTITIONER

## 2022-11-01 RX ORDER — TRIAMCINOLONE ACETONIDE 40 MG/ML
40 INJECTION, SUSPENSION INTRA-ARTICULAR; INTRAMUSCULAR
Status: DISCONTINUED | OUTPATIENT
Start: 2022-11-01 | End: 2022-11-01 | Stop reason: HOSPADM

## 2022-11-01 RX ADMIN — TRIAMCINOLONE ACETONIDE 40 MG: 40 INJECTION, SUSPENSION INTRA-ARTICULAR; INTRAMUSCULAR at 11:11

## 2022-11-01 NOTE — PROCEDURES
Large Joint Aspiration/Injection: R subacromial bursa    Date/Time: 11/1/2022 11:00 AM  Performed by: MICHELINE Banuelos  Authorized by: MICHELINE Banuelos     Consent Done?:  Yes (Verbal)  Indications:  Pain  Timeout: prior to procedure the correct patient, procedure, and site was verified    Prep: patient was prepped and draped in usual sterile fashion      Local anesthesia used?: Yes    Local anesthetic:  Lidocaine 1% without epinephrine  Anesthetic total (ml):  5      Details:  Needle Size:  22 G  Ultrasonic Guidance for needle placement?: No    Approach:  Posterior  Location:  Shoulder  Site:  R subacromial bursa  Medications:  40 mg triamcinolone acetonide 40 mg/mL  Patient tolerance:  Patient tolerated the procedure well with no immediate complications

## 2022-11-01 NOTE — PROGRESS NOTES
Chief Complaint   Patient presents with    Right Shoulder - Pain       HPI:   This is a 67 y.o. female who returns to clinic today in follow-up for right shoulder ongoing. She recently underwent MRI that was done at [facility] and was ordered by rheumatology who referred her here. She is doing PT currently, and right shoulder pain has improved with PT. She isn't interested in shoulder surgery as she has had this done prior in the past. We discussed steroid injection to her shoulder bursa and she is agreeable to trying this to help increase her ROM and provide her with some pain relief.  Denies any numbness or tingling.       Past Medical History:   Diagnosis Date    Acute pancreatitis 3/21/2016    Breast cancer 2005    right side with lumpectomy, chemo and radiation    Closed fracture of ramus of right pubis 6/8/2016    Colon polyp     last scope 4/2012- repeat 10 y per pt- Dr. Johnson    GERD (gastroesophageal reflux disease) 5/29/2012    Hyperlipidemia 4/16/2013    Hypertension     Metabolic encephalopathy 4/20/2021    Osteopenia      Past Surgical History:   Procedure Laterality Date    BREAST LUMPECTOMY  2005    DILATION AND CURETTAGE OF UTERUS      ESOPHAGEAL DILATION      ESOPHAGOGASTRODUODENOSCOPY  6/1/2012    ROTATOR CUFF REPAIR      TEAR DUCT SURGERY      right rye     Current Outpatient Medications on File Prior to Visit   Medication Sig Dispense Refill    acetaminophen (TYLENOL) 650 MG TbSR Take 2 tablets (1,300 mg total) by mouth every 8 (eight) hours. (Patient not taking: No sig reported)  0    amLODIPine (NORVASC) 5 MG tablet Take 1 tablet (5 mg total) by mouth once daily. 90 tablet 3    benazepriL (LOTENSIN) 10 MG tablet Take 1 tablet by mouth once daily 90 tablet 2    calcium carbonate (OS-NASEEM) 500 mg calcium (1,250 mg) tablet qid 120 tablet 0    ergocalciferol (ERGOCALCIFEROL) 50,000 unit Cap Once a week 4 capsule 5    folic acid (FOLVITE) 1 MG tablet Take 1 tablet by mouth once daily 90 tablet 3    LORazepam  (ATIVAN) 0.5 MG tablet Take 1 tablet (0.5 mg total) by mouth once. Take 30-60 minutes prior to MRI for 1 dose 1 tablet 0    magnesium oxide (MAG-OX) 400 mg (241.3 mg magnesium) tablet bid 60 tablet 1    minocycline (DYNACIN) 100 MG tablet Take 90 mg by mouth every 12 (twelve) hours.      omeprazole (PRILOSEC) 20 MG capsule Take 1 capsule by mouth once daily 90 capsule 3    pravastatin (PRAVACHOL) 20 MG tablet Take 1 tablet (20 mg total) by mouth once daily. 90 tablet 3     Current Facility-Administered Medications on File Prior to Visit   Medication Dose Route Frequency Provider Last Rate Last Admin    cyanocobalamin injection 1,000 mcg  1,000 mcg Intramuscular Q30 Days Taqueria Melendrez MD        cyanocobalamin injection 1,000 mcg  1,000 mcg Intramuscular Q30 Days Taqueria Melendrez MD   1,000 mcg at 10/21/22 1047     Review of patient's allergies indicates:   Allergen Reactions    Hydrochlorothiazide Other (See Comments)     Multiple electrolyte abnormalities     Family History   Problem Relation Age of Onset    Stroke Brother 57    Breast cancer Mother 87     Social History     Socioeconomic History    Marital status:    Tobacco Use    Smoking status: Former     Packs/day: 0.30     Types: Cigarettes     Quit date: 2005     Years since quittin.4    Smokeless tobacco: Never   Substance and Sexual Activity    Alcohol use: Yes     Alcohol/week: 2.0 standard drinks     Types: 2 Standard drinks or equivalent per week     Comment: prior 6 pack/week    Drug use: Yes       Review of Systems:  Constitutional:  Denies fever or chills   Eyes:  Denies change in visual acuity   HENT:  Denies nasal congestion or sore throat   Respiratory:  Denies cough or shortness of breath   Cardiovascular:  Denies chest pain or edema   GI:  Denies abdominal pain, nausea, vomiting, bloody stools or diarrhea   :  Denies dysuria   Integument:  Denies rash   Neurologic:  Denies headache, focal weakness or sensory changes    Endocrine:  Denies polyuria or polydipsia   Lymphatic:  Denies swollen glands   Psychiatric:  Denies depression or anxiety     Physical Exam:   Constitutional:  Well developed, well nourished, no acute distress, non-toxic appearance   Integument:  Well hydrated  Neurologic:  Alert & oriented x 3  Psychiatric:  Speech and behavior appropriate       Bilateral Shoulder Exam    left Shoulder Exam   Shoulder exam performed same as contralateral side and is normal.    right Shoulder Exam   Tenderness   Shoulder tenderness location: diffusely about shoulder.    Range of Motion   Forward Flexion: abnormal   External Rotation: abnormal     Muscle Strength   Supraspinatus: 4/5     Tests   Hawkin's test: positive  Impingement: positive    Other   Erythema: absent  Sensation: normal  Pulse: present           Impingement syndrome of right shoulder  -     Large Joint Aspiration/Injection: R subacromial bursa    Shoulder pain, unspecified chronicity, unspecified laterality  -     Ambulatory referral/consult to Orthopedics    Superior glenoid labrum lesion of right shoulder, initial encounter  -     Ambulatory referral/consult to Orthopedics    Tear of rotator cuff, unspecified laterality, unspecified tear extent, unspecified whether traumatic  -     Ambulatory referral/consult to Orthopedics       Using an aseptic technique, I injected 5 cc of lidocaine 1% without and 1 cc of kenalog 40mg into the right Shoulder. The patient tolerated this well. I will have them return to clinic in  6 weeks or as needed.

## 2022-11-02 ENCOUNTER — CLINICAL SUPPORT (OUTPATIENT)
Dept: REHABILITATION | Facility: HOSPITAL | Age: 67
End: 2022-11-02
Payer: MEDICARE

## 2022-11-02 DIAGNOSIS — M25.611 DECREASED ROM OF RIGHT SHOULDER: ICD-10-CM

## 2022-11-02 DIAGNOSIS — R29.898 WEAKNESS OF RIGHT ARM: ICD-10-CM

## 2022-11-02 DIAGNOSIS — M25.511 ACUTE PAIN OF RIGHT SHOULDER: Primary | ICD-10-CM

## 2022-11-02 PROCEDURE — 97110 THERAPEUTIC EXERCISES: CPT | Mod: PN

## 2022-11-02 NOTE — PROGRESS NOTES
[facility]   Physical Therapy Treatment Note     Name: Carla HERRING [facility] Number: [id number]    Therapy Diagnosis:   Encounter Diagnoses   Name Primary?    Acute pain of right shoulder Yes    Decreased ROM of right shoulder     Weakness of right arm          Physician: Taqueria Melendrez MD    Visit Date: 11/2/2022    Physician Orders: PT Eval and Treat   Medical Diagnosis from Referral: M75.41 (ICD-10-CM) - Impingement syndrome of right shoulder  Evaluation Date: 9/14/2022  Authorization Period Expiration: 12/31/2022  Plan of Care Expiration: 11/14/2022  Progress Note Due: 10/14/2022  Visit # / Visits authorized: 14/ 20 (1/1eval)   FOTO: 2/ 3 (eval, 10/19/22)   PTA Visit #: 0/5     Precautions: Standard    Time In: 9:15 am  Time Out: 10:00 am  Total Billable Time: 38 minutes    SUBJECTIVE     Pt reports: she received injection in shoulder yesterday that has significantly helped with pain.      She was compliant with home exercise program.  Response to previous treatment: temporary relief of shoulder pain  Functional change: able to reach up and pull ceiling fan string, able to dress independently    Pain: 3/10  Location: right shoulder     OBJECTIVE     Objective Measures updated at progress report unless specified.        TREATMENT     Carla received the treatments listed below:      Therapeutic exercises to develop strength, endurance, ROM, flexibility, posture, and core stabilization for 38 minutes including:   UBE x 4 minutes   Shoulder pulleys into flexion and abduction x 2 minutes each  Wall slides 2 x 10  Sh IR 3 x 10 GTB (R)   Rows 3 x 10 GTB  Lat pull down 3 x 10, GTB  Bicep curl 3 x 10, 2# db  Tricep extension 3 x 10, YTB  Overhead press (R) 2 x 10 (not today)  Standing (R) shoulder flexion, scaption, abduction to 90deg 1 x 10 each   Scapular protractions 2# 3 x 10  Supine reverse Codman's 2# 1 x 30 cw/ccw  Supine chest press and AAROM for shoulder flexion with 2# bar  2 x 10  Side lying (R) shoulder ER 3 x 10  Side lying (R) shoulder abduction 1 x 10 (not today)     Possible next visit: prone horizontal abduction, prone extension, prone scaption     Manual therapy techniques applied for 0 minutes, including:  PATIENT EDUCATION AND HOME EXERCISES     Home Exercises Provided and Patient Education Provided     Education/Self-Care provided:  Role of PT and goals for therapy.   HEP    Written Home Exercises Provided: Patient instructed to cont prior HEP. Exercises were reviewed and Carla was able to demonstrate them prior to the end of the session. Carla demonstrated good  understanding of the education provided. See EMR under Patient Instructions for exercises provided during therapy sessions.    ASSESSMENT     Carla received injection in shoulder yesterday that has decreased pain levels significantly, and has improved tolerance to exercises.  She is able to resume standing AROM into flexion, scaption, and abduction with improved quality of motion.  She will begin progression of strengthening exercises next visit if symptoms remain decreased following injection.           Carla Is progressing well towards her goals.   Pt prognosis is Good.     Pt will continue to benefit from skilled outpatient physical therapy to address the deficits listed in the problem list box on initial evaluation, provide pt/family education and to maximize pt's level of independence in the home and community environment.     Pt's spiritual, cultural and educational needs considered and pt agreeable to plan of care and goals.     Anticipated barriers to physical therapy: none    Goals:   Short Term Goals:  4 weeks  Patient will be compliant with HEP to promote the independent management of current diagnosis. Met  Patient will increase right shoulder flexion to 110 degrees to improve tolerance to overhead activities.  In Progress, Not Met  Patient will increase right shoulder functiona ER to reach C7  for function of grooming.  In Progress, Not Met  Patient will report a decrease in complaints of right shoulder pain to 4/10 during performance of ADL's for independence of self care activities.  In Progress, Not Met        Long Term Goals:  8 weeks  Patient will increase right shoulder strength to 4+/5 in order to return to normal house work activities.  In Progress, Not Met  Patient will increase right shoulder flexion to 160 degrees in order to place dishes in overhead cabinets independently for self care independence.   In Progress, Not Met  Patient will increase right shoulder functional IR to reach T7 for function of dressing.  In Progress, Not Met  Patient will increase scapular stabilization strength to 4/5 to improve tolerance to normal lifting.  In Progress, Not Met  Patient will improve FOTO limitation status from 62% to 39% placing the patient in the 20-40% impaired, limited, or restricted category indicating increased functional mobility.   In Progress, Not Met    PLAN     Continue with PT POC and progress as tolerated.     Ryan Oconnor, PT

## 2022-11-08 ENCOUNTER — CLINICAL SUPPORT (OUTPATIENT)
Dept: REHABILITATION | Facility: HOSPITAL | Age: 67
End: 2022-11-08
Payer: MEDICARE

## 2022-11-08 DIAGNOSIS — R29.898 WEAKNESS OF RIGHT ARM: ICD-10-CM

## 2022-11-08 DIAGNOSIS — M25.611 DECREASED ROM OF RIGHT SHOULDER: ICD-10-CM

## 2022-11-08 DIAGNOSIS — M25.511 ACUTE PAIN OF RIGHT SHOULDER: Primary | ICD-10-CM

## 2022-11-08 PROCEDURE — 97110 THERAPEUTIC EXERCISES: CPT | Mod: PN

## 2022-11-08 NOTE — PROGRESS NOTES
[facility]   Physical Therapy Treatment Note     Name: Carla HERRING [facility] Number: [id number]    Therapy Diagnosis:   Encounter Diagnoses   Name Primary?    Acute pain of right shoulder Yes    Decreased ROM of right shoulder     Weakness of right arm          Physician: Taqueria Melendrez MD    Visit Date: 11/8/2022    Physician Orders: PT Eval and Treat   Medical Diagnosis from Referral: M75.41 (ICD-10-CM) - Impingement syndrome of right shoulder  Evaluation Date: 9/14/2022  Authorization Period Expiration: 12/31/2022  Plan of Care Expiration: 11/14/2022  Progress Note Due: 10/14/2022  Visit # / Visits authorized: 15/ 20 (1/1eval)   FOTO: 2/ 3 (eval, 10/19/22)   PTA Visit #: 0/5     Precautions: Standard    Time In: 2:15 pm  Time Out: 3:00 pm  Total Billable Time: 38 minutes    SUBJECTIVE     Pt reports: she continues to have relief from injection.       She was compliant with home exercise program.  Response to previous treatment: temporary relief of shoulder pain  Functional change: able to reach up and pull ceiling fan string, able to dress independently    Pain: 3/10  Location: right shoulder     OBJECTIVE     Objective Measures updated at progress report unless specified.        TREATMENT     Carla received the treatments listed below:      Therapeutic exercises to develop strength, endurance, ROM, flexibility, posture, and core stabilization for 38 minutes including:   UBE x 4 minutes   Shoulder pulleys into flexion and abduction x 2 minutes each  Wall slides 2 x 10 (not today)  Sh IR 3 x 10 GTB (R)   Rows 3 x 10 GTB  Lat pull down 3 x 10, GTB  Bicep curl 3 x 10, 2# db  Tricep extension 3 x 10, YTB  Overhead press (R) 1 x 10   Standing (R) shoulder flexion, scaption, abduction to 90deg 1 x 10 each   Scapular protractions 2# 3 x 10  Supine reverse Codman's 2# 1 x 30 cw/ccw  Supine chest press and AAROM for shoulder flexion with 2# bar 2 x 10 (not today)  Side lying (R)  shoulder ER 3 x 10  Side lying (R) shoulder abduction 1 x 10 (not today)     Possible next visit: prone horizontal abduction, prone extension, prone scaption     Manual therapy techniques applied for 0 minutes, including:  PATIENT EDUCATION AND HOME EXERCISES     Home Exercises Provided and Patient Education Provided     Education/Self-Care provided:  Role of PT and goals for therapy.   HEP    Written Home Exercises Provided: Patient instructed to cont prior HEP. Exercises were reviewed and Carla was able to demonstrate them prior to the end of the session. Carla demonstrated good  understanding of the education provided. See EMR under Patient Instructions for exercises provided during therapy sessions.    ASSESSMENT     Carla continues to report an overall decrease in shoulder pain since her injection last week.  She is able to perform active scaption and abduction with improved quality of motion, but continues to have limited range into flexion.  Patient fatigues during supine reverse codman's and was unable to perform supine AAROM with bar on this date.             Carla Is progressing well towards her goals.   Pt prognosis is Good.     Pt will continue to benefit from skilled outpatient physical therapy to address the deficits listed in the problem list box on initial evaluation, provide pt/family education and to maximize pt's level of independence in the home and community environment.     Pt's spiritual, cultural and educational needs considered and pt agreeable to plan of care and goals.     Anticipated barriers to physical therapy: none    Goals:   Short Term Goals:  4 weeks  Patient will be compliant with HEP to promote the independent management of current diagnosis. Met  Patient will increase right shoulder flexion to 110 degrees to improve tolerance to overhead activities.  In Progress, Not Met  Patient will increase right shoulder functiona ER to reach C7 for function of grooming.  In Progress,  Not Met  Patient will report a decrease in complaints of right shoulder pain to 4/10 during performance of ADL's for independence of self care activities.  In Progress, Not Met        Long Term Goals:  8 weeks  Patient will increase right shoulder strength to 4+/5 in order to return to normal house work activities.  In Progress, Not Met  Patient will increase right shoulder flexion to 160 degrees in order to place dishes in overhead cabinets independently for self care independence.   In Progress, Not Met  Patient will increase right shoulder functional IR to reach T7 for function of dressing.  In Progress, Not Met  Patient will increase scapular stabilization strength to 4/5 to improve tolerance to normal lifting.  In Progress, Not Met  Patient will improve FOTO limitation status from 62% to 39% placing the patient in the 20-40% impaired, limited, or restricted category indicating increased functional mobility.   In Progress, Not Met    PLAN     Continue with PT POC and progress as tolerated.     Ryan Oconnor, PT

## 2022-11-09 ENCOUNTER — CLINICAL SUPPORT (OUTPATIENT)
Dept: REHABILITATION | Facility: HOSPITAL | Age: 67
End: 2022-11-09
Payer: MEDICARE

## 2022-11-09 DIAGNOSIS — M25.511 ACUTE PAIN OF RIGHT SHOULDER: Primary | ICD-10-CM

## 2022-11-09 DIAGNOSIS — R29.898 WEAKNESS OF RIGHT ARM: ICD-10-CM

## 2022-11-09 DIAGNOSIS — M25.611 DECREASED ROM OF RIGHT SHOULDER: ICD-10-CM

## 2022-11-09 PROCEDURE — 97110 THERAPEUTIC EXERCISES: CPT | Mod: PN

## 2022-11-09 NOTE — PROGRESS NOTES
[facility]   Physical Therapy Treatment Note     Name: Carla HERRING [facility] Number: [id number]    Therapy Diagnosis:   Encounter Diagnoses   Name Primary?    Acute pain of right shoulder Yes    Decreased ROM of right shoulder     Weakness of right arm          Physician: Taqueria Melendrez MD    Visit Date: 11/9/2022    Physician Orders: PT Eval and Treat   Medical Diagnosis from Referral: M75.41 (ICD-10-CM) - Impingement syndrome of right shoulder  Evaluation Date: 9/14/2022  Authorization Period Expiration: 12/31/2022  Plan of Care Expiration: 11/14/2022  Progress Note Due: 10/14/2022  Visit # / Visits authorized: 16/ 20 (1/1eval)   FOTO: 2/ 3 (eval, 10/19/22)   PTA Visit #: 0/5     Precautions: Standard    Time In: 9:15 am  Time Out: 10:00 am  Total Billable Time: 40 minutes    SUBJECTIVE     Pt reports: she had no increase in shoulder pain or discomfort following yesterday's treatment.      She was compliant with home exercise program.  Response to previous treatment: temporary relief of shoulder pain  Functional change: able to reach up and pull ceiling fan string, able to dress independently    Pain: 3/10  Location: right shoulder     OBJECTIVE     Objective Measures updated at progress report unless specified.        TREATMENT     Carla received the treatments listed below:      Therapeutic exercises to develop strength, endurance, ROM, flexibility, posture, and core stabilization for 40 minutes including:   UBE x 4 minutes   Shoulder pulleys into flexion and abduction x 2 minutes each  Wall slides 2 x 10 (not today)  Standing dowel functional IR x 1 minute  Sh IR 3 x 10 BTB (R)   Rows 3 x 10 BTB  Lat pull down 3 x 10, GTB  Bicep curl 3 x 10, 3# db  Tricep extension 3 x 10, YTB  Overhead press (R) 1 x 10   Standing (R) shoulder flexion, scaption, abduction to 90deg 1 x 10 each   Scapular protractions 3# 3 x 10  Supine reverse Codman's 2# 1 x 30 cw/ccw  Supine chest press and  AAROM for shoulder flexion with 2# bar 2 x 10   Side lying (R) shoulder ER 3 x 10  Side lying (R) shoulder abduction 1 x 10 (not today)  Prone horizontal abduction 1 x 10  Prone shoulder extension 1 x 10     Possible next visit: prone horizontal abduction, prone extension, prone scaption, wall push up     Manual therapy techniques applied for 0 minutes, including:  PATIENT EDUCATION AND HOME EXERCISES     Home Exercises Provided and Patient Education Provided     Education/Self-Care provided:  Role of PT and goals for therapy.   HEP    Written Home Exercises Provided: Patient instructed to cont prior HEP. Exercises were reviewed and Carla was able to demonstrate them prior to the end of the session. Carla demonstrated good  understanding of the education provided. See EMR under Patient Instructions for exercises provided during therapy sessions.    ASSESSMENT     Carla is progressed with strengthening exercises as well as adding prone scapular exercises.  She continues to have weakness in ER and difficulty performing active shoulder flexion in standing.  Patient will require continued progression in order to improve functional mobility of right shoulder.             Carla Is progressing well towards her goals.   Pt prognosis is Good.     Pt will continue to benefit from skilled outpatient physical therapy to address the deficits listed in the problem list box on initial evaluation, provide pt/family education and to maximize pt's level of independence in the home and community environment.     Pt's spiritual, cultural and educational needs considered and pt agreeable to plan of care and goals.     Anticipated barriers to physical therapy: none    Goals:   Short Term Goals:  4 weeks  Patient will be compliant with HEP to promote the independent management of current diagnosis. Met  Patient will increase right shoulder flexion to 110 degrees to improve tolerance to overhead activities.  In Progress, Not  Met  Patient will increase right shoulder functiona ER to reach C7 for function of grooming.  In Progress, Not Met  Patient will report a decrease in complaints of right shoulder pain to 4/10 during performance of ADL's for independence of self care activities.  In Progress, Not Met        Long Term Goals:  8 weeks  Patient will increase right shoulder strength to 4+/5 in order to return to normal house work activities.  In Progress, Not Met  Patient will increase right shoulder flexion to 160 degrees in order to place dishes in overhead cabinets independently for self care independence.   In Progress, Not Met  Patient will increase right shoulder functional IR to reach T7 for function of dressing.  In Progress, Not Met  Patient will increase scapular stabilization strength to 4/5 to improve tolerance to normal lifting.  In Progress, Not Met  Patient will improve FOTO limitation status from 62% to 39% placing the patient in the 20-40% impaired, limited, or restricted category indicating increased functional mobility.   In Progress, Not Met    PLAN     Continue with PT POC and progress as tolerated.     Ryan Oconnor, PT

## 2022-11-14 ENCOUNTER — CLINICAL SUPPORT (OUTPATIENT)
Dept: REHABILITATION | Facility: HOSPITAL | Age: 67
End: 2022-11-14
Payer: MEDICARE

## 2022-11-14 DIAGNOSIS — R29.898 WEAKNESS OF RIGHT ARM: ICD-10-CM

## 2022-11-14 DIAGNOSIS — M25.511 ACUTE PAIN OF RIGHT SHOULDER: Primary | ICD-10-CM

## 2022-11-14 DIAGNOSIS — M25.611 DECREASED ROM OF RIGHT SHOULDER: ICD-10-CM

## 2022-11-14 PROCEDURE — 97110 THERAPEUTIC EXERCISES: CPT | Mod: PN

## 2022-11-14 NOTE — PROGRESS NOTES
[facility]   Physical Therapy Treatment Note     Name: Carla HERRING [facility] Number: [id number]    Therapy Diagnosis:   Encounter Diagnoses   Name Primary?    Acute pain of right shoulder Yes    Decreased ROM of right shoulder     Weakness of right arm          Physician: Taqueria Melendrez MD    Visit Date: 11/14/2022    Physician Orders: PT Eval and Treat   Medical Diagnosis from Referral: M75.41 (ICD-10-CM) - Impingement syndrome of right shoulder  Evaluation Date: 9/14/2022  Authorization Period Expiration: 12/31/2022  Plan of Care Expiration: 11/14/2022  Progress Note Due: 10/14/2022  Visit # / Visits authorized: 17/ 20 (1/1eval)   FOTO: 2/ 3 (eval, 10/19/22)   PTA Visit #: 0/5     Precautions: Standard    Time In: 9:15 am  Time Out: 10:00 am  Total Billable Time: 40 minutes    SUBJECTIVE     Pt reports: she has some discomfort in shoulder this morning, but took it easy over the weekend.  She continues to have difficulty reaching behind her back.     She was compliant with home exercise program.  Response to previous treatment: temporary relief of shoulder pain  Functional change: able to reach up and pull ceiling fan string, able to dress independently    Pain: 4/10  Location: right shoulder     OBJECTIVE     Objective Measures updated at progress report unless specified.        TREATMENT     Carla received the treatments listed below:      Therapeutic exercises to develop strength, endurance, ROM, flexibility, posture, and core stabilization for 40 minutes including:   UBE x 4 minutes   Shoulder pulleys into flexion and functional IR x 2 minutes each  Wall slides 2 x 10   Standing dowel functional IR x 1 minute (not today)  Sh IR 3 x 10 BTB (R)   Rows 3 x 10 BTB  Lat pull down 3 x 10, BTB  Bicep curl 3 x 10, 3# db  Tricep extension 3 x 10, RTB  Overhead press (R) 1 x 10   Standing (R) shoulder flexion, scaption, abduction to 90deg 2 x 10 each   Scapular protractions 3# 3 x  10  Supine reverse Codman's 3# 1 x 30 cw/ccw  Supine chest press and AAROM for shoulder flexion with 2# bar 2 x 10 (not today)  Side lying (R) shoulder ER 3 x 10  Side lying (R) shoulder abduction 1 x 10 (not today)  Prone horizontal abduction 2 x 10  Prone shoulder extension 2 x 10  Prone scaption 2 x 10     Possible next visit: prone horizontal abduction, prone extension, prone scaption, wall push up     Manual therapy techniques applied for 0 minutes, including:  PATIENT EDUCATION AND HOME EXERCISES     Home Exercises Provided and Patient Education Provided     Education/Self-Care provided:  Role of PT and goals for therapy.   HEP    Written Home Exercises Provided: Patient instructed to cont prior HEP. Exercises were reviewed and Carla was able to demonstrate them prior to the end of the session. Carla demonstrated good  understanding of the education provided. See EMR under Patient Instructions for exercises provided during therapy sessions.    ASSESSMENT     Carla added functional IR stretch during pulley exercise to improve mobility. She continues to progress with strengthening exercises and added prone shoulder scaption as well as progressing reps during standing AROM exercises.  Patient continues to be unable to actively flex shoulder past 90 degrees without assistance, but able to perform overhead press without difficulty.               Carla Is progressing well towards her goals.   Pt prognosis is Good.     Pt will continue to benefit from skilled outpatient physical therapy to address the deficits listed in the problem list box on initial evaluation, provide pt/family education and to maximize pt's level of independence in the home and community environment.     Pt's spiritual, cultural and educational needs considered and pt agreeable to plan of care and goals.     Anticipated barriers to physical therapy: none    Goals:   Short Term Goals:  4 weeks  Patient will be compliant with HEP to promote  the independent management of current diagnosis. Met  Patient will increase right shoulder flexion to 110 degrees to improve tolerance to overhead activities.  In Progress, Not Met  Patient will increase right shoulder functiona ER to reach C7 for function of grooming.  In Progress, Not Met  Patient will report a decrease in complaints of right shoulder pain to 4/10 during performance of ADL's for independence of self care activities.  In Progress, Not Met        Long Term Goals:  8 weeks  Patient will increase right shoulder strength to 4+/5 in order to return to normal house work activities.  In Progress, Not Met  Patient will increase right shoulder flexion to 160 degrees in order to place dishes in overhead cabinets independently for self care independence.   In Progress, Not Met  Patient will increase right shoulder functional IR to reach T7 for function of dressing.  In Progress, Not Met  Patient will increase scapular stabilization strength to 4/5 to improve tolerance to normal lifting.  In Progress, Not Met  Patient will improve FOTO limitation status from 62% to 39% placing the patient in the 20-40% impaired, limited, or restricted category indicating increased functional mobility.   In Progress, Not Met    PLAN     Continue with PT POC and progress as tolerated.     Ryan Oconnor, PT

## 2022-11-16 ENCOUNTER — CLINICAL SUPPORT (OUTPATIENT)
Dept: REHABILITATION | Facility: HOSPITAL | Age: 67
End: 2022-11-16
Payer: MEDICARE

## 2022-11-16 DIAGNOSIS — R29.898 WEAKNESS OF RIGHT ARM: ICD-10-CM

## 2022-11-16 DIAGNOSIS — M25.511 ACUTE PAIN OF RIGHT SHOULDER: Primary | ICD-10-CM

## 2022-11-16 DIAGNOSIS — M25.611 DECREASED ROM OF RIGHT SHOULDER: ICD-10-CM

## 2022-11-16 PROCEDURE — 97110 THERAPEUTIC EXERCISES: CPT | Mod: PN

## 2022-11-16 NOTE — PROGRESS NOTES
[facility]   Physical Therapy Treatment Note     Name: Carla HERRING [facility] Number: [id number]    Therapy Diagnosis:   Encounter Diagnoses   Name Primary?    Acute pain of right shoulder Yes    Decreased ROM of right shoulder     Weakness of right arm          Physician: Taqueria Melendrez MD    Visit Date: 11/16/2022    Physician Orders: PT Eval and Treat   Medical Diagnosis from Referral: M75.41 (ICD-10-CM) - Impingement syndrome of right shoulder  Evaluation Date: 9/14/2022  Authorization Period Expiration: 12/31/2022  Plan of Care Expiration: 12/31/2022  Progress Note Due: 11/30/2022  Visit # / Visits authorized: 18/ 20 (1/1eval)   FOTO: 2/ 3 (eval, 10/19/22)   PTA Visit #: 0/5     Precautions: Standard    Time In: 9:15 am  Time Out: 10:00 am  Total Billable Time: 40 minutes    SUBJECTIVE     Pt reports: she has weakness in shoulder and difficulty reaching overhead or behind her back.    She was compliant with home exercise program.  Response to previous treatment: temporary relief of shoulder pain  Functional change: able to reach up and pull ceiling fan string, able to dress independently    Pain: 3/10  Location: right shoulder     OBJECTIVE     Objective Measures updated at progress report unless specified.        TREATMENT     Carla received the treatments listed below:      Therapeutic exercises to develop strength, endurance, ROM, flexibility, posture, and core stabilization for 40 minutes including:   UBE x 4 minutes , level 2  Shoulder pulleys into flexion and functional IR x 2 minutes each  Wall slides 2 x 10   Standing dowel functional IR x 1 minute (not today)  Sh IR 3 x 10 BTB (R)   Rows 3 x 10 BTB  Lat pull down 3 x 10, BTB  Bicep curl 3 x 10, 3# db  Tricep extension 3 x 10, RTB  Overhead press (R) 1 x 10   Standing (R) shoulder flexion, scaption, abduction to 90deg 2 x 10 each   Scapular protractions 3# 3 x 10  Supine reverse Codman's 3# 1 x 30 cw/ccw  Supine  chest press and AAROM for shoulder flexion with 2# bar 2 x 10   Side lying (R) shoulder ER 3 x 10  Side lying (R) shoulder abduction 1 x 10 (not today)  Prone horizontal abduction 2 x 10  Prone shoulder extension 2 x 10  Prone scaption 2 x 10     Possible next visit: prone horizontal abduction, prone extension, prone scaption, wall push up     Manual therapy techniques applied for 0 minutes, including:  PATIENT EDUCATION AND HOME EXERCISES     Home Exercises Provided and Patient Education Provided     Education/Self-Care provided:  Role of PT and goals for therapy.   HEP    Written Home Exercises Provided: Patient instructed to cont prior HEP. Exercises were reviewed and Carla was able to demonstrate them prior to the end of the session. Carla demonstrated good  understanding of the education provided. See EMR under Patient Instructions for exercises provided during therapy sessions.    ASSESSMENT     Carla continues to have weakness in external rotators causing difficulty with attempted progression of overhead press.  She received assistance during concentric phase of standing shoulder flexion and focussed on eccentric control, but fatigued quickly.  Patient has improved passive functional IR noted during pulley exercise.                  Carla Is progressing well towards her goals.   Pt prognosis is Good.     Pt will continue to benefit from skilled outpatient physical therapy to address the deficits listed in the problem list box on initial evaluation, provide pt/family education and to maximize pt's level of independence in the home and community environment.     Pt's spiritual, cultural and educational needs considered and pt agreeable to plan of care and goals.     Anticipated barriers to physical therapy: none    Goals:   Short Term Goals:  4 weeks  Patient will be compliant with HEP to promote the independent management of current diagnosis. Met  Patient will increase right shoulder flexion to 110  degrees to improve tolerance to overhead activities.  In Progress, Not Met  Patient will increase right shoulder functiona ER to reach C7 for function of grooming.  In Progress, Not Met  Patient will report a decrease in complaints of right shoulder pain to 4/10 during performance of ADL's for independence of self care activities.  In Progress, Not Met        Long Term Goals:  8 weeks  Patient will increase right shoulder strength to 4+/5 in order to return to normal house work activities.  In Progress, Not Met  Patient will increase right shoulder flexion to 160 degrees in order to place dishes in overhead cabinets independently for self care independence.   In Progress, Not Met  Patient will increase right shoulder functional IR to reach T7 for function of dressing.  In Progress, Not Met  Patient will increase scapular stabilization strength to 4/5 to improve tolerance to normal lifting.  In Progress, Not Met  Patient will improve FOTO limitation status from 62% to 39% placing the patient in the 20-40% impaired, limited, or restricted category indicating increased functional mobility.   In Progress, Not Met    PLAN     Continue with PT POC and progress as tolerated.     Ryan Oconnor, PT

## 2022-11-18 ENCOUNTER — CLINICAL SUPPORT (OUTPATIENT)
Dept: FAMILY MEDICINE | Facility: CLINIC | Age: 67
End: 2022-11-18
Payer: MEDICARE

## 2022-11-18 ENCOUNTER — TELEPHONE (OUTPATIENT)
Dept: FAMILY MEDICINE | Facility: CLINIC | Age: 67
End: 2022-11-18

## 2022-11-18 DIAGNOSIS — E53.8 VITAMIN B12 DEFICIENCY: ICD-10-CM

## 2022-11-18 DIAGNOSIS — E53.8 B12 DEFICIENCY: Primary | ICD-10-CM

## 2022-11-18 PROCEDURE — 99999 PR PBB SHADOW E&M-EST. PATIENT-LVL II: ICD-10-PCS | Mod: PBBFAC,,,

## 2022-11-18 PROCEDURE — 96372 THER/PROPH/DIAG INJ SC/IM: CPT | Mod: PBBFAC,PO

## 2022-11-18 PROCEDURE — 99999 PR PBB SHADOW E&M-EST. PATIENT-LVL II: CPT | Mod: PBBFAC,,,

## 2022-11-18 RX ORDER — CYANOCOBALAMIN 1000 UG/ML
1000 INJECTION, SOLUTION INTRAMUSCULAR; SUBCUTANEOUS
Status: COMPLETED | OUTPATIENT
Start: 2022-12-18 | End: 2023-11-17

## 2022-11-18 RX ADMIN — CYANOCOBALAMIN 1000 MCG: 1000 INJECTION, SOLUTION INTRAMUSCULAR at 09:11

## 2022-11-18 NOTE — TELEPHONE ENCOUNTER
We could possibly try to change B12 to oral 1000 mcg daily.  See if she wants to try that instead of the shots.  If not then would just continue with shots and would need new order.  If she does change the oral then we would likely want to check the B12 level in 3 months.

## 2022-11-18 NOTE — TELEPHONE ENCOUNTER
Pt received last ordered dose of B12 today 11/18/22. Please advise if lab work is needed at this time or if injections should be continued. Thank you.

## 2022-11-18 NOTE — TELEPHONE ENCOUNTER
Patient informed, she prefers to continue the shots, is lab needed?  Will need orders and new med orders, please advise.

## 2022-11-21 ENCOUNTER — CLINICAL SUPPORT (OUTPATIENT)
Dept: REHABILITATION | Facility: HOSPITAL | Age: 67
End: 2022-11-21
Payer: MEDICARE

## 2022-11-21 DIAGNOSIS — M25.511 ACUTE PAIN OF RIGHT SHOULDER: Primary | ICD-10-CM

## 2022-11-21 DIAGNOSIS — M25.611 DECREASED ROM OF RIGHT SHOULDER: ICD-10-CM

## 2022-11-21 DIAGNOSIS — R29.898 WEAKNESS OF RIGHT ARM: ICD-10-CM

## 2022-11-21 PROCEDURE — 97140 MANUAL THERAPY 1/> REGIONS: CPT | Mod: PN

## 2022-11-21 PROCEDURE — 97110 THERAPEUTIC EXERCISES: CPT | Mod: PN

## 2022-11-21 NOTE — PROGRESS NOTES
[facility]   Physical Therapy Treatment Note     Name: Carla HERRING [facility] Number: [id number]    Therapy Diagnosis:   Encounter Diagnoses   Name Primary?    Acute pain of right shoulder Yes    Decreased ROM of right shoulder     Weakness of right arm          Physician: Taqueria Melendrez MD    Visit Date: 11/21/2022    Physician Orders: PT Eval and Treat   Medical Diagnosis from Referral: M75.41 (ICD-10-CM) - Impingement syndrome of right shoulder  Evaluation Date: 9/14/2022  Authorization Period Expiration: 12/31/2022  Plan of Care Expiration: 12/31/2022  Progress Note Due: 11/30/2022  Visit # / Visits authorized: 19/ 20 (1/1eval)   FOTO: 2/ 3 (eval, 10/19/22)   PTA Visit #: 0/5     Precautions: Standard    Time In: 9:15 am  Time Out: 10:00 am  Total Billable Time: 40 minutes    SUBJECTIVE     Pt reports: she has been having increased shoulder pain over the weekend and today.    She was compliant with home exercise program.  Response to previous treatment: temporary relief of shoulder pain  Functional change: able to reach up and pull ceiling fan string, able to dress independently    Pain: 6/10  Location: right shoulder     OBJECTIVE     Objective Measures updated at progress report unless specified.        TREATMENT     Carla received the treatments listed below:      Therapeutic exercises to develop strength, endurance, ROM, flexibility, posture, and core stabilization for 35 minutes including:   UBE x 4 minutes , level 2  Shoulder pulleys into flexion and functional IR x 2 minutes each  Wall slides 2 x 10   Standing 2# dowel functional IR x 15 reps  Sh IR 3 x 10 BTB (R)   Rows 3 x 10 BTB  Lat pull down 3 x 10, BTB  Bicep curl 3 x 10, 3# db  Tricep extension 3 x 10, RTB  Overhead press (R) 1 x 10   Standing (R) shoulder (, scaption, abduction to 90deg 2 x 10 each (unable to perform flexion)  Scapular protractions 3# 3 x 10  Supine reverse Codman's 3# 1 x 30 cw/ccw (not  today)  Supine chest press and AAROM for shoulder flexion with 2# bar 2 x 10   Side lying (R) shoulder ER 3 x 10  Prone horizontal abduction 2 x 10 (not today)  Prone shoulder extension 2 x 10 (not today)  Prone scaption 2 x 10 (not today)     Possible next visit: prone horizontal abduction, prone extension, prone scaption, wall push up     Manual therapy techniques applied for 5 minutes, including:  PROM to right shoulder in all planes  Grade 3 inferior and posterior glides of right GH joint  PATIENT EDUCATION AND HOME EXERCISES     Home Exercises Provided and Patient Education Provided     Education/Self-Care provided:  Role of PT and goals for therapy.   HEP    Written Home Exercises Provided: Patient instructed to cont prior HEP. Exercises were reviewed and Carla was able to demonstrate them prior to the end of the session. Carla demonstrated good  understanding of the education provided. See EMR under Patient Instructions for exercises provided during therapy sessions.    ASSESSMENT     Carla returns to therapy with increase in shoulder pain and is unable to perform active shoulder flexion due to pain and weakness.  She did not perform all exercises on this date due to increased pain and received manual therapy in attempt to decrease pain and muscle guarding.  Patient continues to have decreased proximal stability of right shoulder complex causing difficulty with reaching and lifting activities.                   Carla Is progressing well towards her goals.   Pt prognosis is Good.     Pt will continue to benefit from skilled outpatient physical therapy to address the deficits listed in the problem list box on initial evaluation, provide pt/family education and to maximize pt's level of independence in the home and community environment.     Pt's spiritual, cultural and educational needs considered and pt agreeable to plan of care and goals.     Anticipated barriers to physical therapy: none    Goals:    Short Term Goals:  4 weeks  Patient will be compliant with HEP to promote the independent management of current diagnosis. Met  Patient will increase right shoulder flexion to 110 degrees to improve tolerance to overhead activities.  In Progress, Not Met  Patient will increase right shoulder functiona ER to reach C7 for function of grooming.  In Progress, Not Met  Patient will report a decrease in complaints of right shoulder pain to 4/10 during performance of ADL's for independence of self care activities.  In Progress, Not Met        Long Term Goals:  8 weeks  Patient will increase right shoulder strength to 4+/5 in order to return to normal house work activities.  In Progress, Not Met  Patient will increase right shoulder flexion to 160 degrees in order to place dishes in overhead cabinets independently for self care independence.   In Progress, Not Met  Patient will increase right shoulder functional IR to reach T7 for function of dressing.  In Progress, Not Met  Patient will increase scapular stabilization strength to 4/5 to improve tolerance to normal lifting.  In Progress, Not Met  Patient will improve FOTO limitation status from 62% to 39% placing the patient in the 20-40% impaired, limited, or restricted category indicating increased functional mobility.   In Progress, Not Met    PLAN     Continue with PT POC and progress as tolerated.     Ryan Oconnor, PT

## 2022-11-30 ENCOUNTER — CLINICAL SUPPORT (OUTPATIENT)
Dept: REHABILITATION | Facility: HOSPITAL | Age: 67
End: 2022-11-30
Payer: MEDICARE

## 2022-11-30 ENCOUNTER — EXTERNAL CHRONIC CARE MANAGEMENT (OUTPATIENT)
Dept: PRIMARY CARE CLINIC | Facility: CLINIC | Age: 67
End: 2022-11-30
Payer: MEDICARE

## 2022-11-30 DIAGNOSIS — M25.611 DECREASED ROM OF RIGHT SHOULDER: ICD-10-CM

## 2022-11-30 DIAGNOSIS — R29.898 WEAKNESS OF RIGHT ARM: ICD-10-CM

## 2022-11-30 DIAGNOSIS — M25.511 ACUTE PAIN OF RIGHT SHOULDER: Primary | ICD-10-CM

## 2022-11-30 PROCEDURE — 99490 CHRNC CARE MGMT STAFF 1ST 20: CPT | Mod: S$PBB,,, | Performed by: FAMILY MEDICINE

## 2022-11-30 PROCEDURE — 99490 CHRNC CARE MGMT STAFF 1ST 20: CPT | Mod: PBBFAC,PO | Performed by: FAMILY MEDICINE

## 2022-11-30 PROCEDURE — 97110 THERAPEUTIC EXERCISES: CPT | Mod: PN

## 2022-11-30 PROCEDURE — 97140 MANUAL THERAPY 1/> REGIONS: CPT | Mod: PN

## 2022-11-30 PROCEDURE — 99490 PR CHRONIC CARE MGMT, 1ST 20 MIN: ICD-10-PCS | Mod: S$PBB,,, | Performed by: FAMILY MEDICINE

## 2022-11-30 NOTE — PROGRESS NOTES
[facility]   Physical Therapy Treatment Note     Name: Crala HERRING [facility] Number: [id number]    Therapy Diagnosis:   Encounter Diagnoses   Name Primary?    Acute pain of right shoulder Yes    Decreased ROM of right shoulder     Weakness of right arm          Physician: Taqueria Melendrez MD    Visit Date: 11/30/2022    Physician Orders: PT Eval and Treat   Medical Diagnosis from Referral: M75.41 (ICD-10-CM) - Impingement syndrome of right shoulder  Evaluation Date: 9/14/2022  Authorization Period Expiration: 12/31/2022  Plan of Care Expiration: 12/31/2022  Progress Note Due: 11/30/2022  Visit # / Visits authorized: 19/ 20 (1/1eval)   FOTO: 2/ 3 (eval, 10/19/22)   PTA Visit #: 0/5     Precautions: Standard    Time In: 12:45 pm  Time Out: 1:30 pm  Total Billable Time: 40 minutes    SUBJECTIVE     Pt reports: she has decreased shoulder pain as compared to last visit, but has not been very active.     She was compliant with home exercise program.  Response to previous treatment: temporary relief of shoulder pain  Functional change: able to reach up and pull ceiling fan string, able to dress independently    Pain: 0/10  Location: right shoulder     OBJECTIVE     Objective Measures updated at progress report unless specified.        TREATMENT     Carla received the treatments listed below:      Therapeutic exercises to develop strength, endurance, ROM, flexibility, posture, and core stabilization for 35 minutes including:   UBE x 4 minutes , level 2  Shoulder pulleys into flexion and functional IR x 2 minutes each  Wall slides 2 x 10   Standing 2# dowel functional IR x 15 reps  Sh IR 3 x 10 BTB (R)   Rows 3 x 10 BTB  Lat pull down 3 x 10, BTB  Bicep curl 3 x 10, 3# db  Tricep extension 3 x 10, RTB  Overhead press (R) 1 x 10   Standing (R) shoulder (, scaption, abduction to 90deg 2 x 10 each (unable to perform flexion)  Scapular protractions 3# 3 x 10  Supine reverse Codman's 3# 1 x 30  cw/ccw (not today)  Supine chest press and AAROM for shoulder flexion with 2# bar 2 x 10   Side lying (R) shoulder ER 3 x 10  Prone horizontal abduction 2 x 10 (not today)  Prone shoulder extension 2 x 10 (not today)  Prone scaption 2 x 10 (not today)     Possible next visit: prone horizontal abduction, prone extension, prone scaption, wall push up     Manual therapy techniques applied for 5 minutes, including:  PROM to right shoulder in all planes  Grade 3 inferior and posterior glides of right GH joint  PATIENT EDUCATION AND HOME EXERCISES     Home Exercises Provided and Patient Education Provided     Education/Self-Care provided:  Role of PT and goals for therapy.   HEP    Written Home Exercises Provided: Patient instructed to cont prior HEP. Exercises were reviewed and Carla was able to demonstrate them prior to the end of the session. Carla demonstrated good  understanding of the education provided. See EMR under Patient Instructions for exercises provided during therapy sessions.    ASSESSMENT     Carla has decreased shoulder pain since last visit and is noted to have improved active shoulder flexion during standing AROM exercises.  She continues to have weakness in shoulder external rotation and has not been able to progress resistance of side lying exercise.  Patient will attempt prone exercises next visit to resume scapular stabilization strengthening.                    Carla Is progressing well towards her goals.   Pt prognosis is Good.     Pt will continue to benefit from skilled outpatient physical therapy to address the deficits listed in the problem list box on initial evaluation, provide pt/family education and to maximize pt's level of independence in the home and community environment.     Pt's spiritual, cultural and educational needs considered and pt agreeable to plan of care and goals.     Anticipated barriers to physical therapy: none    Goals:   Short Term Goals:  4 weeks  Patient  will be compliant with HEP to promote the independent management of current diagnosis. Met  Patient will increase right shoulder flexion to 110 degrees to improve tolerance to overhead activities.  In Progress, Not Met  Patient will increase right shoulder functiona ER to reach C7 for function of grooming.  In Progress, Not Met  Patient will report a decrease in complaints of right shoulder pain to 4/10 during performance of ADL's for independence of self care activities.  In Progress, Not Met        Long Term Goals:  8 weeks  Patient will increase right shoulder strength to 4+/5 in order to return to normal house work activities.  In Progress, Not Met  Patient will increase right shoulder flexion to 160 degrees in order to place dishes in overhead cabinets independently for self care independence.   In Progress, Not Met  Patient will increase right shoulder functional IR to reach T7 for function of dressing.  In Progress, Not Met  Patient will increase scapular stabilization strength to 4/5 to improve tolerance to normal lifting.  In Progress, Not Met  Patient will improve FOTO limitation status from 62% to 39% placing the patient in the 20-40% impaired, limited, or restricted category indicating increased functional mobility.   In Progress, Not Met    PLAN     Continue with PT POC and progress as tolerated.     Ryan Oconnor, PT

## 2022-12-06 NOTE — TELEPHONE ENCOUNTER
Contacted pt. Scheduled next injection appointment. Confirmed appointment date and time. Pt verbalized understanding.

## 2022-12-07 ENCOUNTER — CLINICAL SUPPORT (OUTPATIENT)
Dept: REHABILITATION | Facility: HOSPITAL | Age: 67
End: 2022-12-07
Attending: FAMILY MEDICINE
Payer: MEDICARE

## 2022-12-07 DIAGNOSIS — R29.898 WEAKNESS OF RIGHT ARM: ICD-10-CM

## 2022-12-07 DIAGNOSIS — M25.611 DECREASED ROM OF RIGHT SHOULDER: ICD-10-CM

## 2022-12-07 DIAGNOSIS — M25.511 ACUTE PAIN OF RIGHT SHOULDER: Primary | ICD-10-CM

## 2022-12-07 PROCEDURE — 97110 THERAPEUTIC EXERCISES: CPT | Mod: PN

## 2022-12-07 PROCEDURE — 97140 MANUAL THERAPY 1/> REGIONS: CPT | Mod: PN

## 2022-12-07 NOTE — PROGRESS NOTES
[facility]   Physical Therapy Treatment Note     Name: Carla HERRING [facility] Number: [id number]    Therapy Diagnosis:   Encounter Diagnoses   Name Primary?    Acute pain of right shoulder Yes    Decreased ROM of right shoulder     Weakness of right arm          Physician: Taqueria Melendrez MD    Visit Date: 12/7/2022    Physician Orders: PT Eval and Treat   Medical Diagnosis from Referral: M75.41 (ICD-10-CM) - Impingement syndrome of right shoulder  Evaluation Date: 9/14/2022  Authorization Period Expiration: 12/31/2022  Plan of Care Expiration: 12/31/20  Progress Note Due: 11/30/2022  Visit # / Visits authorized: 20/ 20 (1/1eval)   FOTO: 2/ 3 (eval, 10/19/22)   PTA Visit #: 0/5     Precautions: Standard    Time In: 10:00 am  Time Out: 10:45 am  Total Billable Time: 40 minutes    SUBJECTIVE     Pt reports: she continues to be limited with reaching overhead, scratching her back, and lifting activities with right arm.     She was compliant with home exercise program.  Response to previous treatment: temporary relief of shoulder pain  Functional change: able to reach up and pull ceiling fan string, able to dress independently    Pain: 3/10  Location: right shoulder     OBJECTIVE     Objective Measures updated at progress report unless specified.        TREATMENT     Carla received the treatments listed below:      Therapeutic exercises to develop strength, endurance, ROM, flexibility, posture, and core stabilization for 35 minutes including:   UBE x 4 minutes , level 2  Shoulder pulleys into flexion x 2 minutes   Sh IR 3 x 10 BTB (R)   Rows 3 x 10 BTB  Lat pull down 3 x 10, BTB  Bicep curl 3 x 10, 3# db  Tricep extension 3 x 10, RTB  Overhead press (R) 2 x 10   Scapular protractions 3# 3 x 10  Supine reverse Codman's 3# 1 x 30 cw/ccw   Supine chest press and AAROM for shoulder flexion with 2# bar 2 x 10   Supine active (R) shoulder flexion 1 x 10  Wall push up 2 x 10  Overhead carry  1#db x 2 laps  Side lying (R) shoulder ER 3 x 10  Prone horizontal abduction 2 x 10 (not today)  Prone shoulder extension 2 x 10 (not today)  Prone scaption 2 x 10 (not today)     Possible next visit: prone horizontal abduction, prone extension, prone scaption, wall push up     Manual therapy techniques applied for 5 minutes, including:  PROM to right shoulder in all planes  Grade 3 inferior and posterior glides of right GH joint  IASTM to right upper trapezius using massage gun  PATIENT EDUCATION AND HOME EXERCISES     Home Exercises Provided and Patient Education Provided     Education/Self-Care provided:  Role of PT and goals for therapy.   HEP    Written Home Exercises Provided: Patient instructed to cont prior HEP. Exercises were reviewed and Carla was able to demonstrate them prior to the end of the session. Carla demonstrated good  understanding of the education provided. See EMR under Patient Instructions for exercises provided during therapy sessions.    ASSESSMENT     Carla continues to have weakness in right shoulder causing difficulty with reaching overhead and with lifting and carrying activities.  She is progressed with today's exercises to improve AROM and overhead strengthening.  Patient has full PROM, but continued tightness during end range IR and abduction.      Carla Is progressing well towards her goals.   Pt prognosis is Good.     Pt will continue to benefit from skilled outpatient physical therapy to address the deficits listed in the problem list box on initial evaluation, provide pt/family education and to maximize pt's level of independence in the home and community environment.     Pt's spiritual, cultural and educational needs considered and pt agreeable to plan of care and goals.     Anticipated barriers to physical therapy: none    Goals:   Short Term Goals:  4 weeks  Patient will be compliant with HEP to promote the independent management of current diagnosis. Met  Patient will  increase right shoulder flexion to 110 degrees to improve tolerance to overhead activities.  In Progress, Not Met  Patient will increase right shoulder functiona ER to reach C7 for function of grooming.  In Progress, Not Met  Patient will report a decrease in complaints of right shoulder pain to 4/10 during performance of ADL's for independence of self care activities.  In Progress, Not Met        Long Term Goals:  8 weeks  Patient will increase right shoulder strength to 4+/5 in order to return to normal house work activities.  In Progress, Not Met  Patient will increase right shoulder flexion to 160 degrees in order to place dishes in overhead cabinets independently for self care independence.   In Progress, Not Met  Patient will increase right shoulder functional IR to reach T7 for function of dressing.  In Progress, Not Met  Patient will increase scapular stabilization strength to 4/5 to improve tolerance to normal lifting.  In Progress, Not Met  Patient will improve FOTO limitation status from 62% to 39% placing the patient in the 20-40% impaired, limited, or restricted category indicating increased functional mobility.   In Progress, Not Met    PLAN     Continue with PT POC and progress as tolerated.     Ryan Oconnor, PT

## 2022-12-14 ENCOUNTER — CLINICAL SUPPORT (OUTPATIENT)
Dept: REHABILITATION | Facility: HOSPITAL | Age: 67
End: 2022-12-14
Attending: FAMILY MEDICINE
Payer: MEDICARE

## 2022-12-14 DIAGNOSIS — M25.611 DECREASED ROM OF RIGHT SHOULDER: ICD-10-CM

## 2022-12-14 DIAGNOSIS — R29.898 WEAKNESS OF RIGHT ARM: ICD-10-CM

## 2022-12-14 DIAGNOSIS — M25.511 ACUTE PAIN OF RIGHT SHOULDER: Primary | ICD-10-CM

## 2022-12-14 PROCEDURE — 97140 MANUAL THERAPY 1/> REGIONS: CPT | Mod: PN

## 2022-12-14 PROCEDURE — 97110 THERAPEUTIC EXERCISES: CPT | Mod: PN

## 2022-12-14 NOTE — PROGRESS NOTES
[facility]   Physical Therapy Treatment Note     Name: Carla HERRING [facility] Number: [id number]    Therapy Diagnosis:   Encounter Diagnoses   Name Primary?    Acute pain of right shoulder Yes    Decreased ROM of right shoulder     Weakness of right arm          Physician: Taqueria Melendrez MD    Visit Date: 12/14/2022    Physician Orders: PT Eval and Treat   Medical Diagnosis from Referral: M75.41 (ICD-10-CM) - Impingement syndrome of right shoulder  Evaluation Date: 9/14/2022  Authorization Period Expiration: 12/31/2022  Plan of Care Expiration: 12/31/22  Progress Note Due: 11/30/2022  Visit # / Visits authorized: 20/ 20 (1/1eval)   FOTO: 2/ 3 (eval, 10/19/22)   PTA Visit #: 0/5     Precautions: Standard    Time In: 10:45 am  Time Out: 11:30 am  Total Billable Time: 40 minutes    SUBJECTIVE     Pt reports: she had increased pain in shoulder yesterday that may be attributed to bagging about 75 juliane presents Monday night.      She was compliant with home exercise program.  Response to previous treatment: temporary relief of shoulder pain  Functional change: able to reach up and pull ceiling fan string, able to dress independently    Pain: 4/10  Location: right shoulder     OBJECTIVE     Objective Measures updated at progress report unless specified.        TREATMENT     Carla received the treatments listed below:      Therapeutic exercises to develop strength, endurance, ROM, flexibility, posture, and core stabilization for 35 minutes including:   UBE x 4 minutes , level 2  Shoulder pulleys into flexion x 2 minutes   Sh IR 3 x 10 BTB (R)   Rows 2 x 10, 7# freemotion machine  Lat pull down 2 x 10, 7# freemotion machine  Overhead press (R) 2 x 10   Scapular protractions 3# 3 x 10  Supine reverse Codman's 3# 1 x 30 cw/ccw   Supine chest press and AAROM for shoulder flexion with 2# bar 2 x 10   Supine active (R) shoulder flexion 1 x 10  Wall push up 2 x 10  Side lying (R) shoulder  ER 3 x 10  Prone horizontal abduction 2 x 10   Prone shoulder extension 2 x 10   Prone scaption 2 x 10      Possible next visit: prone horizontal abduction, prone extension, prone scaption, wall push up     Manual therapy techniques applied for 5 minutes, including:  PROM to right shoulder in all planes  Grade 3 inferior and posterior glides of right GH joint  PATIENT EDUCATION AND HOME EXERCISES     Home Exercises Provided and Patient Education Provided     Education/Self-Care provided:  Role of PT and goals for therapy.   HEP    Written Home Exercises Provided: Patient instructed to cont prior HEP. Exercises were reviewed and Carla was able to demonstrate them prior to the end of the session. Carla demonstrated good  understanding of the education provided. See EMR under Patient Instructions for exercises provided during therapy sessions.    ASSESSMENT     Carla has increased shoulder pain due to repetitive lifting yesterday and has some increased tenderness noted in deltoids and posterior rotator cuff musculature.  Patient has difficulty performing active shoulder flexion in supine and unable to tolerate overhead activities on this date.       Carla Is progressing well towards her goals.   Pt prognosis is Good.     Pt will continue to benefit from skilled outpatient physical therapy to address the deficits listed in the problem list box on initial evaluation, provide pt/family education and to maximize pt's level of independence in the home and community environment.     Pt's spiritual, cultural and educational needs considered and pt agreeable to plan of care and goals.     Anticipated barriers to physical therapy: none    Goals:   Short Term Goals:  4 weeks  Patient will be compliant with HEP to promote the independent management of current diagnosis. Met  Patient will increase right shoulder flexion to 110 degrees to improve tolerance to overhead activities.  In Progress, Not Met  Patient will increase  right shoulder functiona ER to reach C7 for function of grooming.  In Progress, Not Met  Patient will report a decrease in complaints of right shoulder pain to 4/10 during performance of ADL's for independence of self care activities.  In Progress, Not Met        Long Term Goals:  8 weeks  Patient will increase right shoulder strength to 4+/5 in order to return to normal house work activities.  In Progress, Not Met  Patient will increase right shoulder flexion to 160 degrees in order to place dishes in overhead cabinets independently for self care independence.   In Progress, Not Met  Patient will increase right shoulder functional IR to reach T7 for function of dressing.  In Progress, Not Met  Patient will increase scapular stabilization strength to 4/5 to improve tolerance to normal lifting.  In Progress, Not Met  Patient will improve FOTO limitation status from 62% to 39% placing the patient in the 20-40% impaired, limited, or restricted category indicating increased functional mobility.   In Progress, Not Met    PLAN     Continue with PT POC and progress as tolerated.     Ryan Oconnor, PT

## 2022-12-16 ENCOUNTER — CLINICAL SUPPORT (OUTPATIENT)
Dept: REHABILITATION | Facility: HOSPITAL | Age: 67
End: 2022-12-16
Attending: FAMILY MEDICINE
Payer: MEDICARE

## 2022-12-16 DIAGNOSIS — M25.611 DECREASED ROM OF RIGHT SHOULDER: ICD-10-CM

## 2022-12-16 DIAGNOSIS — R29.898 WEAKNESS OF RIGHT ARM: ICD-10-CM

## 2022-12-16 DIAGNOSIS — M25.511 ACUTE PAIN OF RIGHT SHOULDER: Primary | ICD-10-CM

## 2022-12-16 PROCEDURE — 97110 THERAPEUTIC EXERCISES: CPT | Mod: PN

## 2022-12-16 NOTE — PROGRESS NOTES
[facility]   Physical Therapy Treatment Note     Name: Carla HERRING [facility] Number: [id number]    Therapy Diagnosis:   Encounter Diagnoses   Name Primary?    Acute pain of right shoulder Yes    Decreased ROM of right shoulder     Weakness of right arm          Physician: Taqueria Melendrez MD    Visit Date: 12/16/2022    Physician Orders: PT Eval and Treat   Medical Diagnosis from Referral: M75.41 (ICD-10-CM) - Impingement syndrome of right shoulder  Evaluation Date: 9/14/2022  Authorization Period Expiration: 12/31/2022  Plan of Care Expiration: 12/31/22  Progress Note Due: 11/30/2022  Visit # / Visits authorized: 21/ 20 (pending)  FOTO: 2/ 3 (eval, 10/19/22)   PTA Visit #: 0/5     Precautions: Standard    Time In: 8:45 am  Time Out: 9:15 am  Total Billable Time: 30 minutes    SUBJECTIVE     Pt reports: she continues to have slight increase in her shoulder pain.      She was compliant with home exercise program.  Response to previous treatment: temporary relief of shoulder pain  Functional change: able to reach up and pull ceiling fan string, able to dress independently    Pain: 4/10  Location: right shoulder     OBJECTIVE     Objective Measures updated at progress report unless specified.        TREATMENT     Carla received the treatments listed below:      Therapeutic exercises to develop strength, endurance, ROM, flexibility, posture, and core stabilization for 30 minutes including:   UBE x 4 minutes , level 2  Shoulder pulleys into flexion x 2 minutes   Sh IR 3 x 10 BTB (R)   Overhead press (R) 2 x 10   Scapular protractions 3# 3 x 10  Supine reverse Codman's 3# 1 x 30 cw/ccw   Supine chest press and AAROM for shoulder flexion with 2# bar 2 x 10   Supine active (R) shoulder flexion 1 x 10  Side lying (R) shoulder ER 3 x 10  Prone horizontal abduction 2 x 10   Prone shoulder extension 2 x 10   Prone scaption 2 x 10      Possible next visit: prone horizontal abduction, prone  extension, prone scaption, wall push up     Manual therapy techniques applied for 5 minutes, including:  PROM to right shoulder in all planes  Grade 3 inferior and posterior glides of right GH joint  PATIENT EDUCATION AND HOME EXERCISES     Home Exercises Provided and Patient Education Provided     Education/Self-Care provided:  Role of PT and goals for therapy.   HEP    Written Home Exercises Provided: Patient instructed to cont prior HEP. Exercises were reviewed and Carla was able to demonstrate them prior to the end of the session. Carla demonstrated good  understanding of the education provided. See EMR under Patient Instructions for exercises provided during therapy sessions.    ASSESSMENT     Carla had limited time for today's treatment and unable to perform full exercise routine.  She continues to have weakness in shoulder external rotation and difficulty performing active shoulder flexion over 90 degrees.  Patient continues with scapular stabilization and rotator cuff strengthening as tolerated.       Carla Is progressing well towards her goals.   Pt prognosis is Good.     Pt will continue to benefit from skilled outpatient physical therapy to address the deficits listed in the problem list box on initial evaluation, provide pt/family education and to maximize pt's level of independence in the home and community environment.     Pt's spiritual, cultural and educational needs considered and pt agreeable to plan of care and goals.     Anticipated barriers to physical therapy: none    Goals:   Short Term Goals:  4 weeks  Patient will be compliant with HEP to promote the independent management of current diagnosis. Met  Patient will increase right shoulder flexion to 110 degrees to improve tolerance to overhead activities.  In Progress, Not Met  Patient will increase right shoulder functiona ER to reach C7 for function of grooming.  In Progress, Not Met  Patient will report a decrease in complaints of  right shoulder pain to 4/10 during performance of ADL's for independence of self care activities.  In Progress, Not Met        Long Term Goals:  8 weeks  Patient will increase right shoulder strength to 4+/5 in order to return to normal house work activities.  In Progress, Not Met  Patient will increase right shoulder flexion to 160 degrees in order to place dishes in overhead cabinets independently for self care independence.   In Progress, Not Met  Patient will increase right shoulder functional IR to reach T7 for function of dressing.  In Progress, Not Met  Patient will increase scapular stabilization strength to 4/5 to improve tolerance to normal lifting.  In Progress, Not Met  Patient will improve FOTO limitation status from 62% to 39% placing the patient in the 20-40% impaired, limited, or restricted category indicating increased functional mobility.   In Progress, Not Met    PLAN     Continue with PT POC and progress as tolerated.     Ryan Oconnor, PT

## 2022-12-20 ENCOUNTER — CLINICAL SUPPORT (OUTPATIENT)
Dept: FAMILY MEDICINE | Facility: CLINIC | Age: 67
End: 2022-12-20
Payer: MEDICARE

## 2022-12-20 DIAGNOSIS — E53.8 B12 DEFICIENCY: Primary | ICD-10-CM

## 2022-12-20 PROCEDURE — 99212 OFFICE O/P EST SF 10 MIN: CPT | Mod: PBBFAC,PO

## 2022-12-20 PROCEDURE — 99999 PR PBB SHADOW E&M-EST. PATIENT-LVL II: ICD-10-PCS | Mod: PBBFAC,,,

## 2022-12-20 PROCEDURE — 99999 PR PBB SHADOW E&M-EST. PATIENT-LVL II: CPT | Mod: PBBFAC,,,

## 2022-12-20 PROCEDURE — 96372 THER/PROPH/DIAG INJ SC/IM: CPT | Mod: PBBFAC,PO

## 2022-12-20 RX ADMIN — CYANOCOBALAMIN 1000 MCG: 1000 INJECTION, SOLUTION INTRAMUSCULAR at 10:12

## 2022-12-20 NOTE — PROGRESS NOTES
Administered B12 1000mg injection to left upper outer gluteal. Tolerated well.  
Detail Level: Zone
Plan: Will start Accutane, questions were welcomed and answered, she will sustain to absence she has failed minocycline, doxycycline, targadox with no improvement, Neg UPT in office today. Will continue targadox until start

## 2022-12-21 ENCOUNTER — CLINICAL SUPPORT (OUTPATIENT)
Dept: REHABILITATION | Facility: HOSPITAL | Age: 67
End: 2022-12-21
Attending: FAMILY MEDICINE
Payer: MEDICARE

## 2022-12-21 DIAGNOSIS — R29.898 WEAKNESS OF RIGHT ARM: ICD-10-CM

## 2022-12-21 DIAGNOSIS — M25.511 ACUTE PAIN OF RIGHT SHOULDER: Primary | ICD-10-CM

## 2022-12-21 DIAGNOSIS — M25.611 DECREASED ROM OF RIGHT SHOULDER: ICD-10-CM

## 2022-12-21 PROCEDURE — 97110 THERAPEUTIC EXERCISES: CPT | Mod: PN

## 2022-12-21 NOTE — PROGRESS NOTES
[facility]   Physical Therapy Treatment Note     Name: Carla HERRING [facility] Number: [id number]    Therapy Diagnosis:   Encounter Diagnoses   Name Primary?    Acute pain of right shoulder Yes    Decreased ROM of right shoulder     Weakness of right arm          Physician: Taqueria Melendrez MD    Visit Date: 12/21/2022    Physician Orders: PT Eval and Treat   Medical Diagnosis from Referral: M75.41 (ICD-10-CM) - Impingement syndrome of right shoulder  Evaluation Date: 9/14/2022  Authorization Period Expiration: 12/31/2022  Plan of Care Expiration: 12/31/22  Progress Note Due: 11/30/2022  Visit # / Visits authorized: 21/ 20 (pending)  FOTO: 2/ 3 (eval, 10/19/22)   PTA Visit #: 0/5     Precautions: Standard    Time In: 1:30 pm  Time Out: 2:15 pm  Total Billable Time: 40 minutes    SUBJECTIVE     Pt reports: she did not get her injection this morning due to her doctor having the flu.  She has continued shoulder pain.    She was compliant with home exercise program.  Response to previous treatment: temporary relief of shoulder pain  Functional change: able to reach up and pull ceiling fan string, able to dress independently    Pain: 4/10  Location: right shoulder     OBJECTIVE     Objective Measures updated at progress report unless specified.        TREATMENT     Carla received the treatments listed below:      Therapeutic exercises to develop strength, endurance, ROM, flexibility, posture, and core stabilization for 40 minutes including:   UBE x 4 minutes , level 2  Shoulder pulleys into flexion x 2 minutes   Sh IR 3 x 10 BTB (R)   Rows 2 x 10, 7# freemotion machine  Bicep curl 3 x 10, 3# db  Tricep extension 3 x 10, GTB  Overhead press (R) 2 x 10   Scapular protractions 3# 3 x 10  Supine reverse Codman's 3# 1 x 30 cw/ccw   Supine chest press and AAROM for shoulder flexion with 2# bar 2 x 10   Supine active (R) shoulder flexion 1 x 10  Wall push up 2 x 10  Side lying (R) shoulder ER 3  x 10  Prone horizontal abduction 2 x 10   Prone shoulder extension 2 x 10   Prone scaption 2 x 10      Possible next visit: prone horizontal abduction, prone extension, prone scaption, wall push up     Manual therapy techniques applied for 0 minutes, including:  PATIENT EDUCATION AND HOME EXERCISES     Home Exercises Provided and Patient Education Provided     Education/Self-Care provided:  Role of PT and goals for therapy.   HEP    Written Home Exercises Provided: Patient instructed to cont prior HEP. Exercises were reviewed and Carla was able to demonstrate them prior to the end of the session. Carla demonstrated good  understanding of the education provided. See EMR under Patient Instructions for exercises provided during therapy sessions.    ASSESSMENT     Carla continues to have limited active shoulder flexion against gravity but is noted to have improved ROM during supine shoulder flexion.  She requires assistance while performing side lying shoulder ER and concentrates on eccentric phase.  Patient will continue with strengthening to improve functional mobility of shoulder during ADL's       Carla Is progressing well towards her goals.   Pt prognosis is Good.     Pt will continue to benefit from skilled outpatient physical therapy to address the deficits listed in the problem list box on initial evaluation, provide pt/family education and to maximize pt's level of independence in the home and community environment.     Pt's spiritual, cultural and educational needs considered and pt agreeable to plan of care and goals.     Anticipated barriers to physical therapy: none    Goals:   Short Term Goals:  4 weeks  Patient will be compliant with HEP to promote the independent management of current diagnosis. Met  Patient will increase right shoulder flexion to 110 degrees to improve tolerance to overhead activities.  In Progress, Not Met  Patient will increase right shoulder functiona ER to reach C7 for  function of grooming.  In Progress, Not Met  Patient will report a decrease in complaints of right shoulder pain to 4/10 during performance of ADL's for independence of self care activities.  In Progress, Not Met        Long Term Goals:  8 weeks  Patient will increase right shoulder strength to 4+/5 in order to return to normal house work activities.  In Progress, Not Met  Patient will increase right shoulder flexion to 160 degrees in order to place dishes in overhead cabinets independently for self care independence.   In Progress, Not Met  Patient will increase right shoulder functional IR to reach T7 for function of dressing.  In Progress, Not Met  Patient will increase scapular stabilization strength to 4/5 to improve tolerance to normal lifting.  In Progress, Not Met  Patient will improve FOTO limitation status from 62% to 39% placing the patient in the 20-40% impaired, limited, or restricted category indicating increased functional mobility.   In Progress, Not Met    PLAN     Continue with PT POC and progress as tolerated.     Ryan Oconnor, PT

## 2022-12-27 ENCOUNTER — OFFICE VISIT (OUTPATIENT)
Dept: ORTHOPEDICS | Facility: CLINIC | Age: 67
End: 2022-12-27
Payer: MEDICARE

## 2022-12-27 VITALS — BODY MASS INDEX: 26.82 KG/M2 | HEIGHT: 65 IN | WEIGHT: 161 LBS

## 2022-12-27 DIAGNOSIS — M75.41 IMPINGEMENT SYNDROME OF RIGHT SHOULDER: Primary | ICD-10-CM

## 2022-12-27 PROCEDURE — 99213 OFFICE O/P EST LOW 20 MIN: CPT | Mod: S$PBB,25,, | Performed by: NURSE PRACTITIONER

## 2022-12-27 PROCEDURE — 99213 PR OFFICE/OUTPT VISIT, EST, LEVL III, 20-29 MIN: ICD-10-PCS | Mod: S$PBB,25,, | Performed by: NURSE PRACTITIONER

## 2022-12-27 PROCEDURE — 20610 LARGE JOINT ASPIRATION/INJECTION: R SUBACROMIAL BURSA: ICD-10-PCS | Mod: S$PBB,RT,, | Performed by: NURSE PRACTITIONER

## 2022-12-27 PROCEDURE — 99999 PR PBB SHADOW E&M-EST. PATIENT-LVL III: CPT | Mod: PBBFAC,,, | Performed by: NURSE PRACTITIONER

## 2022-12-27 PROCEDURE — 99999 PR PBB SHADOW E&M-EST. PATIENT-LVL III: ICD-10-PCS | Mod: PBBFAC,,, | Performed by: NURSE PRACTITIONER

## 2022-12-27 PROCEDURE — 99213 OFFICE O/P EST LOW 20 MIN: CPT | Mod: PBBFAC,PN,25 | Performed by: NURSE PRACTITIONER

## 2022-12-27 PROCEDURE — 20610 DRAIN/INJ JOINT/BURSA W/O US: CPT | Mod: PBBFAC,PN | Performed by: NURSE PRACTITIONER

## 2022-12-27 PROCEDURE — 20610 DRAIN/INJ JOINT/BURSA W/O US: CPT | Mod: S$PBB,RT,, | Performed by: NURSE PRACTITIONER

## 2022-12-27 RX ORDER — TRIAMCINOLONE ACETONIDE 40 MG/ML
40 INJECTION, SUSPENSION INTRA-ARTICULAR; INTRAMUSCULAR
Status: DISCONTINUED | OUTPATIENT
Start: 2022-12-27 | End: 2022-12-27 | Stop reason: HOSPADM

## 2022-12-27 RX ADMIN — TRIAMCINOLONE ACETONIDE 40 MG: 40 INJECTION, SUSPENSION INTRA-ARTICULAR; INTRAMUSCULAR at 10:12

## 2022-12-27 NOTE — PROGRESS NOTES
Chief Complaint   Patient presents with    Right Shoulder - Pain       HPI:   This is a 67 y.o. who returns to clinic today in follow-up for right shoulder for the past 1 weeks after no known injury or trauma.  Pt received injection to her right shoulder 6 weeks ago and did well with this and returned for this again today. Pain is aching. No numbness or tingling. No associated signs or symptoms. She is doing P.T with [facility] in [place].     Past Medical History:   Diagnosis Date    Acute pancreatitis 3/21/2016    Breast cancer 2005    right side with lumpectomy, chemo and radiation    Closed fracture of ramus of right pubis 6/8/2016    Colon polyp     last scope 4/2012- repeat 10 y per pt- Dr. Johnson    GERD (gastroesophageal reflux disease) 5/29/2012    Hyperlipidemia 4/16/2013    Hypertension     Metabolic encephalopathy 4/20/2021    Osteopenia      Past Surgical History:   Procedure Laterality Date    BREAST LUMPECTOMY  2005    DILATION AND CURETTAGE OF UTERUS      ESOPHAGEAL DILATION      ESOPHAGOGASTRODUODENOSCOPY  6/1/2012    ROTATOR CUFF REPAIR      TEAR DUCT SURGERY      right rye     Current Outpatient Medications on File Prior to Visit   Medication Sig Dispense Refill    acetaminophen (TYLENOL) 650 MG TbSR Take 2 tablets (1,300 mg total) by mouth every 8 (eight) hours.  0    amLODIPine (NORVASC) 5 MG tablet Take 1 tablet (5 mg total) by mouth once daily. 90 tablet 3    benazepriL (LOTENSIN) 10 MG tablet Take 1 tablet by mouth once daily 90 tablet 2    calcium carbonate (OS-NASEEM) 500 mg calcium (1,250 mg) tablet qid 120 tablet 0    ergocalciferol (ERGOCALCIFEROL) 50,000 unit Cap Once a week 4 capsule 5    folic acid (FOLVITE) 1 MG tablet Take 1 tablet by mouth once daily 90 tablet 3    LORazepam (ATIVAN) 0.5 MG tablet Take 1 tablet (0.5 mg total) by mouth once. Take 30-60 minutes prior to MRI for 1 dose 1 tablet 0    magnesium oxide (MAG-OX) 400 mg (241.3 mg magnesium) tablet bid 60 tablet 1    minocycline  (DYNACIN) 100 MG tablet Take 90 mg by mouth every 12 (twelve) hours.      omeprazole (PRILOSEC) 20 MG capsule Take 1 capsule by mouth once daily 90 capsule 3    pravastatin (PRAVACHOL) 20 MG tablet Take 1 tablet (20 mg total) by mouth once daily. 90 tablet 3     Current Facility-Administered Medications on File Prior to Visit   Medication Dose Route Frequency Provider Last Rate Last Admin    cyanocobalamin injection 1,000 mcg  1,000 mcg Intramuscular Q30 Days Taqueria Melendrez MD   1,000 mcg at 22 1024     Review of patient's allergies indicates:   Allergen Reactions    Hydrochlorothiazide Other (See Comments)     Multiple electrolyte abnormalities     Family History   Problem Relation Age of Onset    Stroke Brother 57    Breast cancer Mother 87     Social History     Socioeconomic History    Marital status:    Tobacco Use    Smoking status: Former     Packs/day: 0.30     Types: Cigarettes     Quit date: 2005     Years since quittin.5    Smokeless tobacco: Never   Substance and Sexual Activity    Alcohol use: Yes     Alcohol/week: 2.0 standard drinks     Types: 2 Standard drinks or equivalent per week     Comment: prior 6 pack/week    Drug use: Yes       Review of Systems:  Constitutional:  Denies fever or chills   Eyes:  Denies change in visual acuity   HENT:  Denies nasal congestion or sore throat   Respiratory:  Denies cough or shortness of breath   Cardiovascular:  Denies chest pain or edema   GI:  Denies abdominal pain, nausea, vomiting, bloody stools or diarrhea   :  Denies dysuria   Integument:  Denies rash   Neurologic:  Denies headache, focal weakness or sensory changes   Endocrine:  Denies polyuria or polydipsia   Lymphatic:  Denies swollen glands   Psychiatric:  Denies depression or anxiety     Physical Exam:   Constitutional:  Well developed, well nourished, no acute distress, non-toxic appearance   Integument:  Well hydrated, no rash   Lymphatic:  No lymphadenopathy noted    Neurologic:  Alert & oriented x 3,    Psychiatric:  Speech and behavior appropriate     Bilateral Shoulder Exam    Left Shoulder Exam   Shoulder exam performed same as contralateral side and is normal.    Right Shoulder Exam   Tenderness   Shoulder tenderness location: diffusely about shoulder.    Range of Motion   Forward Flexion: abnormal   External Rotation: abnormal     Muscle Strength   Supraspinatus: 4/5     Tests   Hawkin's test: positive  Impingement: positive    Other   Erythema: absent  Sensation: normal  Pulse: present         Impingement syndrome of right shoulder  -     Large Joint Aspiration/Injection: R subacromial bursa         Using an aseptic technique, I injected 5 cc of lidocaine 1% without and 1 cc of kenalog 40mg into the right shoulder. The patient tolerated this well. I will have them return to clinic in 6 weeks.

## 2022-12-27 NOTE — PROCEDURES
Large Joint Aspiration/Injection: R subacromial bursa    Date/Time: 12/27/2022 10:00 AM  Performed by: MICHELINE Banuelos  Authorized by: MICHELINE Banuelos     Consent Done?:  Yes (Verbal)  Indications:  Pain  Timeout: prior to procedure the correct patient, procedure, and site was verified    Prep: patient was prepped and draped in usual sterile fashion      Local anesthesia used?: Yes    Local anesthetic:  Lidocaine 1% without epinephrine  Anesthetic total (ml):  5      Details:  Needle Size:  22 G  Ultrasonic Guidance for needle placement?: No    Approach:  Posterior  Location:  Shoulder  Site:  R subacromial bursa  Medications:  40 mg triamcinolone acetonide 40 mg/mL  Patient tolerance:  Patient tolerated the procedure well with no immediate complications

## 2022-12-31 ENCOUNTER — EXTERNAL CHRONIC CARE MANAGEMENT (OUTPATIENT)
Dept: PRIMARY CARE CLINIC | Facility: CLINIC | Age: 67
End: 2022-12-31
Payer: MEDICARE

## 2022-12-31 PROCEDURE — 99439 PR CHRONIC CARE MGMT, EA ADDTL 20 MIN: ICD-10-PCS | Mod: S$PBB,,, | Performed by: FAMILY MEDICINE

## 2022-12-31 PROCEDURE — 99439 CHRNC CARE MGMT STAF EA ADDL: CPT | Mod: PBBFAC,PO | Performed by: FAMILY MEDICINE

## 2022-12-31 PROCEDURE — 99490 CHRNC CARE MGMT STAFF 1ST 20: CPT | Mod: PBBFAC,PO | Performed by: FAMILY MEDICINE

## 2022-12-31 PROCEDURE — 99490 CHRNC CARE MGMT STAFF 1ST 20: CPT | Mod: S$PBB,,, | Performed by: FAMILY MEDICINE

## 2022-12-31 PROCEDURE — 99490 PR CHRONIC CARE MGMT, 1ST 20 MIN: ICD-10-PCS | Mod: S$PBB,,, | Performed by: FAMILY MEDICINE

## 2022-12-31 PROCEDURE — 99439 CHRNC CARE MGMT STAF EA ADDL: CPT | Mod: S$PBB,,, | Performed by: FAMILY MEDICINE

## 2023-01-09 ENCOUNTER — CLINICAL SUPPORT (OUTPATIENT)
Dept: REHABILITATION | Facility: HOSPITAL | Age: 68
End: 2023-01-09
Attending: FAMILY MEDICINE
Payer: MEDICARE

## 2023-01-09 DIAGNOSIS — M25.511 ACUTE PAIN OF RIGHT SHOULDER: Primary | ICD-10-CM

## 2023-01-09 DIAGNOSIS — R29.898 WEAKNESS OF RIGHT ARM: ICD-10-CM

## 2023-01-09 DIAGNOSIS — M25.611 DECREASED ROM OF RIGHT SHOULDER: ICD-10-CM

## 2023-01-09 PROCEDURE — 97110 THERAPEUTIC EXERCISES: CPT | Mod: PN

## 2023-01-09 NOTE — PROGRESS NOTES
[facility]   Physical Therapy Treatment Note     Name: Carla HERRING [facility] Number: [id number]    Therapy Diagnosis:   Encounter Diagnoses   Name Primary?    Acute pain of right shoulder Yes    Decreased ROM of right shoulder     Weakness of right arm          Physician: Taqueria Melendrez MD    Visit Date: 1/9/2023    Physician Orders: PT Eval and Treat   Medical Diagnosis from Referral: M75.41 (ICD-10-CM) - Impingement syndrome of right shoulder  Evaluation Date: 9/14/2022  Authorization Period Expiration: 12/31/2023  Plan of Care Expiration: 12/31/22  Progress Note Due: 11/30/2022  Visit # / Visits authorized: 21/ 20 (pending)  FOTO: 2/ 3 (eval, 10/19/22)   PTA Visit #: 0/5     Precautions: Standard    Time In: 9:15 am  Time Out: 10:00 am  Total Billable Time: 40 minutes    SUBJECTIVE     Pt reports: she has not been able to attend therapy due to taking care of her  following his hip surgery.  She did have injection in shoulder since her last visit that helped with pain levels.  She has not been performing HEP.    She was compliant with home exercise program.  Response to previous treatment: temporary relief of shoulder pain  Functional change: able to reach up and pull ceiling fan string, able to dress independently    Pain: 3/10  Location: right shoulder     OBJECTIVE     Objective Measures updated at progress report unless specified.     Update:   Observation: pt holds right arm in guarded position, ambulates with decreased arm swing     Posture: rounded shoulder        Passive Range of Motion: supine  Shoulder Left Right   Flexion 160  155*   Abduction 160 155*   ER  95 90   IR 80 65*      Active Range of Motion: standing  Shoulder Left Right   Flexion 160 deg 65 deg *   Abduction 160 deg 98 deg *   ER  Reach T3 Reach C7*   IR Reach T4 Reach T9      Upper Extremity Strength    (L) UE (R) UE   Shoulder flexion: 4+/5 3-/5   Shoulder Abduction: 4+/5 3/5   Shoulder ER 4+/5  3-/5   Shoulder IR 4+/5 4/5   Lower Trap 3+/5 3/5   Middle Trap 4-/5 3+/5   Rhomboids 4-/5 3+/5            Special Tests:  AC Joint Left Right   AC Joint Compression Test neg pos   Empty Can Test neg Pain and limited mobility   Drop Arm test neg neg         Joint Mobility: slight restrictions in posterior capsule     Palpation: tenderness noted over upper trapezius, AC joint, deltoids, bicep, pec major     Sensation: intact to light touch           Scapular Control/Dyskinesis:     Normal / Subtle / Obvious  Comments    Left  normal normal    Right  obvious Upper trap substitution         TREATMENT     Carla received the treatments listed below:      Therapeutic exercises to develop strength, endurance, ROM, flexibility, posture, and core stabilization for 40 minutes including:   UBE x 4 minutes , level 2  Shoulder pulleys into flexion x 2 minutes   Sh IR 3 x 10 GTB (R)   Rows 2 x 10, GTB  Bicep curl 3 x 10, 3# db  Tricep extension 3 x 10, GTB  (R) wall slides 2 x 10   Scapular protractions  3 x 10  Supine reverse Codman's 1 x 30 cw/ccw   Supine chest press and AAROM for shoulder flexion with 2# bar 2 x 10   Supine active (R) shoulder flexion 1 x 5  Side lying (R) shoulder ER 3 x 10  Prone horizontal abduction 2 x 10   Prone shoulder extension 2 x 10   Prone scaption 2 x 10      Possible next visit: prone horizontal abduction, prone extension, prone scaption, wall push up     Manual therapy techniques applied for 0 minutes, including:  PATIENT EDUCATION AND HOME EXERCISES     Home Exercises Provided and Patient Education Provided     Education/Self-Care provided:  Role of PT and goals for therapy.   HEP    Written Home Exercises Provided: Patient instructed to cont prior HEP. Exercises were reviewed and Carla was able to demonstrate them prior to the end of the session. Carla demonstrated good  understanding of the education provided. See EMR under Patient Instructions for exercises provided during therapy  sessions.    ASSESSMENT     Carla returns to therapy following 2 weeks absence, and reports not performing home exercises as well as having to take care of her  following hip surgery.  She is noted to have decreased AROM and strength of right shoulder as compared to previous measurements.  Patient has the most limitations in active shoulder flexion and external rotation against gravity causing difficulty with overhead activities.  Her exercises were adjusted today to decrease resistance and focussed on eccentric phase of ER and flexion.  Patient will continue with above exercises and progress as tolerated to improve strength and functional mobility of shoulder during ADL's.    Carla Is progressing well towards her goals.   Pt prognosis is Good.     Pt will continue to benefit from skilled outpatient physical therapy to address the deficits listed in the problem list box on initial evaluation, provide pt/family education and to maximize pt's level of independence in the home and community environment.     Pt's spiritual, cultural and educational needs considered and pt agreeable to plan of care and goals.     Anticipated barriers to physical therapy: none    Goals:   Short Term Goals:  4 weeks  Patient will be compliant with HEP to promote the independent management of current diagnosis. In Progress Not Met  Patient will increase right shoulder flexion to 110 degrees to improve tolerance to overhead activities.  In Progress, Not Met  Patient will increase right shoulder functiona ER to reach C7 for function of grooming.  Met  Patient will report a decrease in complaints of right shoulder pain to 4/10 during performance of ADL's for independence of self care activities.  In Progress, Not Met        Long Term Goals:  8 weeks  Patient will increase right shoulder strength to 4+/5 in order to return to normal house work activities.  In Progress, Not Met  Patient will increase right shoulder flexion to 160  degrees in order to place dishes in overhead cabinets independently for self care independence.   In Progress, Not Met  Patient will increase right shoulder functional IR to reach T7 for function of dressing.  In Progress, Not Met  Patient will increase scapular stabilization strength to 4/5 to improve tolerance to normal lifting.  In Progress, Not Met  Patient will improve FOTO limitation status from 62% to 39% placing the patient in the 20-40% impaired, limited, or restricted category indicating increased functional mobility.   In Progress, Not Met    PLAN     Continue with PT POC and progress as tolerated.     Ryan Oconnor, PT

## 2023-01-11 ENCOUNTER — CLINICAL SUPPORT (OUTPATIENT)
Dept: REHABILITATION | Facility: HOSPITAL | Age: 68
End: 2023-01-11
Attending: FAMILY MEDICINE
Payer: MEDICARE

## 2023-01-11 DIAGNOSIS — M25.511 ACUTE PAIN OF RIGHT SHOULDER: Primary | ICD-10-CM

## 2023-01-11 DIAGNOSIS — R29.898 WEAKNESS OF RIGHT ARM: ICD-10-CM

## 2023-01-11 DIAGNOSIS — M25.611 DECREASED ROM OF RIGHT SHOULDER: ICD-10-CM

## 2023-01-11 PROCEDURE — 97110 THERAPEUTIC EXERCISES: CPT | Mod: PN

## 2023-01-11 NOTE — PLAN OF CARE
Outpatient Therapy Updated Plan of Care     Visit Date: 1/11/2023  Name: Carla Ontiver[place]field  Clinic Number: [id number]    Therapy Diagnosis:   Encounter Diagnoses   Name Primary?    Acute pain of right shoulder Yes    Decreased ROM of right shoulder     Weakness of right arm      Physician: Taqueria Melendrez MD    Physician Orders: PT Eval and Treat   Medical Diagnosis from Referral: M75.41 (ICD-10-CM) - Impingement syndrome of right shoulder  Evaluation Date: 9/14/2022    Total Visits Received: 27  Cancelled Visits: 4  No Show Visits: 0    Current Certification Period:  10/31/22 to 12/31/22  Precautions:  standard  Visits from Evaluation Date:  26  Functional Level Prior to Evaluation:  independent    Subjective     Update: she continues to have shoulder pain and weakness that is limiting tolerance to ADL's and performing normal house work.  She has not been able to attend therapy recently due to taking care of her .  Patient reports she has not been compliant with home exercises    Objective     Update:   Observation: pt holds right arm in guarded position, ambulates with decreased arm swing     Posture: rounded shoulder        Passive Range of Motion: supine  Shoulder Left Right   Flexion 160  155*   Abduction 160 155*   ER  95 90   IR 80 65*      Active Range of Motion: standing  Shoulder Left Right   Flexion 160 deg 88 deg *   Abduction 160 deg 98 deg *   ER  Reach T3 Reach C7*   IR Reach T4 Reach T9      Upper Extremity Strength    (L) UE (R) UE   Shoulder flexion: 4+/5 3-/5   Shoulder Abduction: 4+/5 3/5   Shoulder ER 4+/5 3-/5   Shoulder IR 4+/5 4/5   Lower Trap 3+/5 3/5   Middle Trap 4-/5 3+/5   Rhomboids 4-/5 3+/5            Special Tests:  AC Joint Left Right   AC Joint Compression Test neg pos   Empty Can Test neg Pos   Drop Arm test neg neg         Joint Mobility: slight restrictions in posterior capsule     Palpation: tenderness noted over upper trapezius, AC joint, deltoids, bicep, pec major      Sensation: intact to light touch           Scapular Control/Dyskinesis:     Normal / Subtle / Obvious  Comments    Left  normal normal    Right  obvious Upper trap substitution        Assessment     Update: Carla is noted to have decrease in AROM and continued weakness of right shoulder, but has not been able to attend therapy recently as well as not performing home exercises due to taking care of  following his hip surgery.  She continues to have difficulty with reaching and lifting activities causing difficulty with performing normal house work.  Patient is progressed with exercises on this date to focus on AROM and proximal stability strengthening.  She will require continued therapy in order to improve strength and functional mobility of right shoulder during ADL's.      New Short Term Goals Status:     Short Term Goals:  4 weeks  Patient will be compliant with HEP to promote the independent management of current diagnosis. Partially Met  Patient will increase right shoulder flexion to 110 degrees to improve tolerance to overhead activities.  In Progress, Not Met  Patient will increase right shoulder functiona ER to reach C7 for function of grooming.  Met  Patient will report a decrease in complaints of right shoulder pain to 4/10 during performance of ADL's for independence of self care activities.  In Progress, Not Met        Long Term Goals:  8 weeks  Patient will increase right shoulder strength to 4+/5 in order to return to normal house work activities.  In Progress, Not Met  Patient will increase right shoulder flexion to 160 degrees in order to place dishes in overhead cabinets independently for self care independence.   In Progress, Not Met  Patient will increase right shoulder functional IR to reach T7 for function of dressing.  In Progress, Not Met  Patient will increase scapular stabilization strength to 4/5 to improve tolerance to normal lifting.  In Progress, Not Met  Patient will improve  FOTO limitation status from 62% to 39% placing the patient in the 20-40% impaired, limited, or restricted category indicating increased functional mobility.   In Progress, Not Met     Long Term Goal Status:   continue per initial plan of care.  Reasons for Recertification of Therapy:   continued strength and mobility deficits causing difficulty performing ADL's and normal house work    Plan     Updated Certification Period: 1/11/2023 to 3/11/2023  Recommended Treatment Plan: 2 times per week for 8 weeks: Manual Therapy, Moist Heat/ Ice, Neuromuscular Re-ed, Patient Education, Therapeutic Activities, Therapeutic Exercise, and IASTM, vacuum cupping, dry needling, and kinesiotaping  Other Recommendations: none    Ryan Oconnor, PT  1/11/2023      I CERTIFY THE NEED FOR THESE SERVICES FURNISHED UNDER THIS PLAN OF TREATMENT AND WHILE UNDER MY CARE    Physician's comments:        Physician's Signature: ___________________________________________________

## 2023-01-11 NOTE — PROGRESS NOTES
[facility]   Physical Therapy Treatment Note     Name: Carla HERRING [facility] Number: [id number]    Therapy Diagnosis:   Encounter Diagnoses   Name Primary?    Acute pain of right shoulder Yes    Decreased ROM of right shoulder     Weakness of right arm          Physician: Taqueria Melendrez MD    Visit Date: 1/11/2023    Physician Orders: PT Eval and Treat   Medical Diagnosis from Referral: M75.41 (ICD-10-CM) - Impingement syndrome of right shoulder  Evaluation Date: 9/14/2022  Authorization Period Expiration: 12/31/2023  Plan of Care Expiration: 3/11/2023  Progress Note Due: 2/11/2023  Visit # / Visits authorized: 2/ 20   FOTO: 2/ 3 (eval, 10/19/22)     Precautions: Standard    Time In: 9:10 am  Time Out: 10:00 am  Total Billable Time: 40 minutes    SUBJECTIVE     Pt reports: she continues to have shoulder pain and weakness that is limiting tolerance to ADL's and performing normal house work.  She has not been able to attend therapy recently due to taking care of her .  Patient reports she has not been compliant with home exercises.     She was not compliant with home exercise program.  Response to previous treatment: temporary relief of shoulder pain  Functional change: able to reach up and pull ceiling fan string, able to dress independently    Pain: 3/10  Location: right shoulder     OBJECTIVE     Objective Measures updated at progress report unless specified.     Update:   Observation: pt holds right arm in guarded position, ambulates with decreased arm swing     Posture: rounded shoulder        Passive Range of Motion: supine  Shoulder Left Right   Flexion 160  155*   Abduction 160 155*   ER  95 90   IR 80 65*      Active Range of Motion: standing  Shoulder Left Right   Flexion 160 deg 88 deg *   Abduction 160 deg 98 deg *   ER  Reach T3 Reach C7*   IR Reach T4 Reach T9      Upper Extremity Strength    (L) UE (R) UE   Shoulder flexion: 4+/5 3-/5   Shoulder Abduction: 4+/5  3/5   Shoulder ER 4+/5 3-/5   Shoulder IR 4+/5 4/5   Lower Trap 3+/5 3/5   Middle Trap 4-/5 3+/5   Rhomboids 4-/5 3+/5            Special Tests:  AC Joint Left Right   AC Joint Compression Test neg pos   Empty Can Test neg Pos   Drop Arm test neg neg         Joint Mobility: slight restrictions in posterior capsule     Palpation: tenderness noted over upper trapezius, AC joint, deltoids, bicep, pec major     Sensation: intact to light touch           Scapular Control/Dyskinesis:     Normal / Subtle / Obvious  Comments    Left  normal normal    Right  obvious Upper trap substitution         TREATMENT     Carla received the treatments listed below:      Therapeutic exercises to develop strength, endurance, ROM, flexibility, posture, and core stabilization for 40 minutes including:   UBE x 4 minutes , level 2  Shoulder pulleys into flexion x 2 minutes   Sh IR 3 x 10 GTB (R)   Rows 2 x 10, GTB  Bicep curl 3 x 10, 3# db  Tricep extension 3 x 10, GTB  (R) wall slides 2 x 10   Standing (R) shoulder flexion, scaption, abduction to 90deg 1 x 10 each   Scapular protractions  3 x 10, 2#db  Standing closed chain Codman's with ball on wall 1 x 30 cw/ccw   Supine chest press and AAROM for shoulder flexion with 2# bar 2 x 10   Wall push up 2 x 10  Side lying (R) shoulder ER 3 x 10  Side lying (R) shoulder abduction 2 x 10  Prone horizontal abduction 2 x 10   Prone shoulder extension 2 x 10   Prone scaption 2 x 10      Possible next visit: prone horizontal abduction, prone extension, prone scaption, wall push up     Manual therapy techniques applied for 0 minutes, including:  PATIENT EDUCATION AND HOME EXERCISES     Home Exercises Provided and Patient Education Provided     Education/Self-Care provided:  Role of PT and goals for therapy.   HEP    Written Home Exercises Provided: Patient instructed to cont prior HEP. Exercises were reviewed and Carla was able to demonstrate them prior to the end of the session. Carla  demonstrated good  understanding of the education provided. See EMR under Patient Instructions for exercises provided during therapy sessions.    ASSESSMENT     Carla is noted to have decrease in AROM and continued weakness of right shoulder, but has not been able to attend therapy recently as well as not performing home exercises due to taking care of  following his hip surgery.  She continues to have difficulty with reaching and lifting activities causing difficulty with performing normal house work.  Patient is progressed with exercises on this date to focus on AROM and proximal stability strengthening.  She will require continued therapy in order to improve strength and functional mobility of right shoulder during ADL's.    Carla Is progressing well towards her goals.   Pt prognosis is Good.     Pt will continue to benefit from skilled outpatient physical therapy to address the deficits listed in the problem list box on initial evaluation, provide pt/family education and to maximize pt's level of independence in the home and community environment.     Pt's spiritual, cultural and educational needs considered and pt agreeable to plan of care and goals.     Anticipated barriers to physical therapy: none    Goals:   Short Term Goals:  4 weeks  Patient will be compliant with HEP to promote the independent management of current diagnosis. Partially Met  Patient will increase right shoulder flexion to 110 degrees to improve tolerance to overhead activities.  In Progress, Not Met  Patient will increase right shoulder functiona ER to reach C7 for function of grooming.  Met  Patient will report a decrease in complaints of right shoulder pain to 4/10 during performance of ADL's for independence of self care activities.  In Progress, Not Met        Long Term Goals:  8 weeks  Patient will increase right shoulder strength to 4+/5 in order to return to normal house work activities.  In Progress, Not Met  Patient  will increase right shoulder flexion to 160 degrees in order to place dishes in overhead cabinets independently for self care independence.   In Progress, Not Met  Patient will increase right shoulder functional IR to reach T7 for function of dressing.  In Progress, Not Met  Patient will increase scapular stabilization strength to 4/5 to improve tolerance to normal lifting.  In Progress, Not Met  Patient will improve FOTO limitation status from 62% to 39% placing the patient in the 20-40% impaired, limited, or restricted category indicating increased functional mobility.   In Progress, Not Met    PLAN     Continue with PT POC and progress as tolerated.     Ryan Oconnor, PT

## 2023-01-17 ENCOUNTER — CLINICAL SUPPORT (OUTPATIENT)
Dept: FAMILY MEDICINE | Facility: CLINIC | Age: 68
End: 2023-01-17
Payer: MEDICARE

## 2023-01-17 DIAGNOSIS — E53.8 B12 DEFICIENCY: Primary | ICD-10-CM

## 2023-01-17 PROCEDURE — 99212 OFFICE O/P EST SF 10 MIN: CPT | Mod: PBBFAC,25,PO

## 2023-01-17 PROCEDURE — 96372 THER/PROPH/DIAG INJ SC/IM: CPT | Mod: PBBFAC,PO

## 2023-01-17 PROCEDURE — 99999 PR PBB SHADOW E&M-EST. PATIENT-LVL II: CPT | Mod: PBBFAC,,,

## 2023-01-17 PROCEDURE — 99999 PR PBB SHADOW E&M-EST. PATIENT-LVL II: ICD-10-PCS | Mod: PBBFAC,,,

## 2023-01-17 RX ADMIN — CYANOCOBALAMIN 1000 MCG: 1000 INJECTION, SOLUTION INTRAMUSCULAR at 09:01

## 2023-01-18 ENCOUNTER — CLINICAL SUPPORT (OUTPATIENT)
Dept: REHABILITATION | Facility: HOSPITAL | Age: 68
End: 2023-01-18
Payer: MEDICARE

## 2023-01-18 DIAGNOSIS — M25.511 ACUTE PAIN OF RIGHT SHOULDER: Primary | ICD-10-CM

## 2023-01-18 DIAGNOSIS — R29.898 WEAKNESS OF RIGHT ARM: ICD-10-CM

## 2023-01-18 DIAGNOSIS — M25.611 DECREASED ROM OF RIGHT SHOULDER: ICD-10-CM

## 2023-01-18 PROCEDURE — 97110 THERAPEUTIC EXERCISES: CPT | Mod: PN

## 2023-01-18 NOTE — PROGRESS NOTES
[facility]   Physical Therapy Treatment Note     Name: Carla HERRING [facility] Number: [id number]    Therapy Diagnosis:   Encounter Diagnoses   Name Primary?    Acute pain of right shoulder Yes    Decreased ROM of right shoulder     Weakness of right arm          Physician: Taqueria Melendrez MD    Visit Date: 1/18/2023    Physician Orders: PT Eval and Treat   Medical Diagnosis from Referral: M75.41 (ICD-10-CM) - Impingement syndrome of right shoulder  Evaluation Date: 9/14/2022  Authorization Period Expiration: 12/31/2023  Plan of Care Expiration: 3/11/2023  Progress Note Due: 2/11/2023  Visit # / Visits authorized: 3/ 20   FOTO: 2/ 3 (eval, 10/19/22)     Precautions: Standard    Time In: 9:20 am  Time Out: 10:05 am  Total Billable Time: 45 minutes    SUBJECTIVE     Pt reports: she has difficulty reaching overhead or behind her back as well as continued pain during end range movements.      She was not compliant with home exercise program.  Response to previous treatment: muscle soreness  Functional change: able to reach up and pull ceiling fan string, able to dress independently    Pain: 5/10  Location: right shoulder     OBJECTIVE     Objective Measures updated at progress report unless specified.        TREATMENT     Carla received the treatments listed below:      Therapeutic exercises to develop strength, endurance, ROM, flexibility, posture, and core stabilization for 40 minutes including:   UBE x 4 minutes , level 2  Sh IR 3 x 10 GTB (R)   Rows 3 x 10, GTB  Bicep curl 3 x 10, 3# db  Tricep extension 3 x 10, GTB  (R) wall slides 2 x 10   Standing (R) shoulder flexion, scaption, abduction to 90deg 1 x 10 each   Scapular protractions  3 x 10, 2#db  Supine reverse Codman's 2#db 1 x 30 cw/ccw   Supine chest press and AAROM for shoulder flexion with 2# bar 2 x 10   Supine active (R) shoulder flexion 1 x 5  Wall push up 2 x 10  Side lying (R) shoulder ER 3 x 10  Side lying (R)  shoulder abduction 2 x 10  Prone horizontal abduction 2 x 10, 1#  Prone shoulder extension 2 x 10, 1#   Prone scaption 2 x 10, 1#      Possible next visit: prone horizontal abduction, prone extension, prone scaption, wall push up     Manual therapy techniques applied for 5 minutes, including:  PROM to right shoulder in all planes  PATIENT EDUCATION AND HOME EXERCISES     Home Exercises Provided and Patient Education Provided     Education/Self-Care provided:  Role of PT and goals for therapy.   HEP    Written Home Exercises Provided: Patient instructed to cont prior HEP. Exercises were reviewed and Carla was able to demonstrate them prior to the end of the session. Carla demonstrated good  understanding of the education provided. See EMR under Patient Instructions for exercises provided during therapy sessions.    ASSESSMENT     Carla continues to have difficulty with reaching overhead and behind her back with pain and weakness limiting motion.  She has improved mobility during supine active shoulder flexion, but continues to be unable to perform side lying ER through full ROM.  Patient noted to have increased muscle guarding and receives light manual stretching through all ROM.     Carla Is progressing well towards her goals.   Pt prognosis is Good.     Pt will continue to benefit from skilled outpatient physical therapy to address the deficits listed in the problem list box on initial evaluation, provide pt/family education and to maximize pt's level of independence in the home and community environment.     Pt's spiritual, cultural and educational needs considered and pt agreeable to plan of care and goals.     Anticipated barriers to physical therapy: none    Goals:   Short Term Goals:  4 weeks  Patient will be compliant with HEP to promote the independent management of current diagnosis. Partially Met  Patient will increase right shoulder flexion to 110 degrees to improve tolerance to overhead  activities.  In Progress, Not Met  Patient will increase right shoulder functiona ER to reach C7 for function of grooming.  Met  Patient will report a decrease in complaints of right shoulder pain to 4/10 during performance of ADL's for independence of self care activities.  In Progress, Not Met        Long Term Goals:  8 weeks  Patient will increase right shoulder strength to 4+/5 in order to return to normal house work activities.  In Progress, Not Met  Patient will increase right shoulder flexion to 160 degrees in order to place dishes in overhead cabinets independently for self care independence.   In Progress, Not Met  Patient will increase right shoulder functional IR to reach T7 for function of dressing.  In Progress, Not Met  Patient will increase scapular stabilization strength to 4/5 to improve tolerance to normal lifting.  In Progress, Not Met  Patient will improve FOTO limitation status from 62% to 39% placing the patient in the 20-40% impaired, limited, or restricted category indicating increased functional mobility.   In Progress, Not Met    PLAN     Continue with PT POC and progress as tolerated.     Ryan Oconnor, PT

## 2023-01-23 ENCOUNTER — CLINICAL SUPPORT (OUTPATIENT)
Dept: REHABILITATION | Facility: HOSPITAL | Age: 68
End: 2023-01-23
Payer: MEDICARE

## 2023-01-23 DIAGNOSIS — R29.898 WEAKNESS OF RIGHT ARM: ICD-10-CM

## 2023-01-23 DIAGNOSIS — M25.611 DECREASED ROM OF RIGHT SHOULDER: ICD-10-CM

## 2023-01-23 DIAGNOSIS — M25.511 ACUTE PAIN OF RIGHT SHOULDER: Primary | ICD-10-CM

## 2023-01-23 PROCEDURE — 97110 THERAPEUTIC EXERCISES: CPT | Mod: PN

## 2023-01-23 NOTE — PROGRESS NOTES
[facility]   Physical Therapy Treatment Note     Name: Carla HERRING [facility] Number: [id number]    Therapy Diagnosis:   Encounter Diagnoses   Name Primary?    Acute pain of right shoulder Yes    Decreased ROM of right shoulder     Weakness of right arm          Physician: Taqueria Melendrez MD    Visit Date: 1/23/2023    Physician Orders: PT Eval and Treat   Medical Diagnosis from Referral: M75.41 (ICD-10-CM) - Impingement syndrome of right shoulder  Evaluation Date: 9/14/2022  Authorization Period Expiration: 12/31/2023  Plan of Care Expiration: 3/11/2023  Progress Note Due: 2/11/2023  Visit # / Visits authorized: 3/ 20   FOTO: 2/ 3 (eval, 10/19/22)     Precautions: Standard    Time In: 9:15 am  Time Out: 10:00 am  Total Billable Time: 45 minutes    SUBJECTIVE     Pt reports: complaints of 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    She was not compliant with home exercise program.  Response to previous treatment: muscle soreness  Functional change: able to reach up and pull ceiling fan string, able to dress independently    Pain: 5/10  Location: right shoulder     OBJECTIVE     Objective Measures updated at progress report unless specified.        TREATMENT     Carla received the treatments listed below:      Therapeutic exercises to develop strength, endurance, ROM, flexibility, posture, and core stabilization for 40 minutes including:   UBE x 4 minutes , level 2  Sh IR 3 x 10 GTB (R)   Rows 3 x 10, GTB  Bicep curl 3 x 10, 3# db  Tricep extension 3 x 10, GTB  (R) wall slides 2 x 10   Standing (R) shoulder flexion, scaption, abduction to 90deg 1 x 10 each   Scapular protractions  3 x 10, 2#db  Supine reverse Codman's 2#db 1 x 30 cw/ccw   Supine chest press and AAROM for shoulder flexion with 2# bar 2 x 10   Supine active (R) shoulder flexion 1 x 5  Wall push up  3 x 10  Side lying (R) shoulder ER 3 x 10  Side lying (R) shoulder abduction 2 x  10  Prone horizontal abduction 2 x 10, 1#  Prone shoulder extension 2 x 10, 1#   Prone scaption 2 x 10, 1#      Possible next visit: prone horizontal abduction, prone extension, prone scaption, wall push up     Manual therapy techniques applied for 5 minutes, including:  PROM to right shoulder in all planes  PATIENT EDUCATION AND HOME EXERCISES     Home Exercises Provided and Patient Education Provided     Education/Self-Care provided:  Role of PT and goals for therapy.   HEP    Written Home Exercises Provided: Patient instructed to cont prior HEP. Exercises were reviewed and Carla was able to demonstrate them prior to the end of the session. Carla demonstrated good  understanding of the education provided. See EMR under Patient Instructions for exercises provided during therapy sessions.    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  She has increased tenderness to palpation noted in anterior deltoid, long head of bicep, and pec major, but improved mobility following manual therapy.  Patient is unable to progress strengthening and had decreased AROM noted during standing exercises.       Carla Is progressing well towards her goals.   Pt prognosis is Good.     Pt will continue to benefit from skilled outpatient physical therapy to address the deficits listed in the problem list box on initial evaluation, provide pt/family education and to maximize pt's level of independence in the home and community environment.     Pt's spiritual, cultural and educational needs considered and pt agreeable to plan of care and goals.     Anticipated barriers to physical therapy: none    Goals:   Short Term Goals:  4 weeks  Patient will be compliant with HEP to promote the independent management of current diagnosis. Partially Met  Patient will increase right shoulder flexion to 110 degrees to improve tolerance to overhead activities.   In Progress, Not Met  Patient will increase right shoulder functiona ER to reach C7 for function of grooming.  Met  Patient will report a decrease in complaints of right shoulder pain to 4/10 during performance of ADL's for independence of self care activities.  In Progress, Not Met        Long Term Goals:  8 weeks  Patient will increase right shoulder strength to 4+/5 in order to return to normal house work activities.  In Progress, Not Met  Patient will increase right shoulder flexion to 160 degrees in order to place dishes in overhead cabinets independently for self care independence.   In Progress, Not Met  Patient will increase right shoulder functional IR to reach T7 for function of dressing.  In Progress, Not Met  Patient will increase scapular stabilization strength to 4/5 to improve tolerance to normal lifting.  In Progress, Not Met  Patient will improve FOTO limitation status from 62% to 39% placing the patient in the 20-40% impaired, limited, or restricted category indicating increased functional mobility.   In Progress, Not Met    PLAN     Continue with PT POC and progress as tolerated.     Ryan Oconnor, PT

## 2023-01-30 ENCOUNTER — CLINICAL SUPPORT (OUTPATIENT)
Dept: REHABILITATION | Facility: HOSPITAL | Age: 68
End: 2023-01-30
Payer: MEDICARE

## 2023-01-30 DIAGNOSIS — R29.898 WEAKNESS OF RIGHT ARM: ICD-10-CM

## 2023-01-30 DIAGNOSIS — M25.611 DECREASED ROM OF RIGHT SHOULDER: ICD-10-CM

## 2023-01-30 DIAGNOSIS — M25.511 ACUTE PAIN OF RIGHT SHOULDER: Primary | ICD-10-CM

## 2023-01-30 PROCEDURE — 97110 THERAPEUTIC EXERCISES: CPT | Mod: PN

## 2023-01-30 NOTE — PROGRESS NOTES
[facility]   Physical Therapy Treatment Note     Name: Carla HERRING [facility] Number: [id number]    Therapy Diagnosis:   Encounter Diagnoses   Name Primary?    Acute pain of right shoulder Yes    Decreased ROM of right shoulder     Weakness of right arm          Physician: Taqueria Melendrez MD    Visit Date: 1/30/2023    Physician Orders: PT Eval and Treat   Medical Diagnosis from Referral: M75.41 (ICD-10-CM) - Impingement syndrome of right shoulder  Evaluation Date: 9/14/2022  Authorization Period Expiration: 12/31/2023  Plan of Care Expiration: 3/11/2023  Progress Note Due: 2/11/2023  Visit # / Visits authorized: 4/ 20   FOTO: 2/ 3 (eval, 10/19/22)     Precautions: Standard    Time In: 9:15 am  Time Out: 10:00 am  Total Billable Time: 45 minutes    SUBJECTIVE     Pt reports: her shoulder (deltoid region) has been sore since Friday.  She reports feeling like she got a shot.      She was not compliant with home exercise program.  Response to previous treatment: muscle soreness  Functional change: able to reach up and pull ceiling fan string, able to dress independently    Pain: 7/10  Location: right shoulder     OBJECTIVE     Objective Measures updated at progress report unless specified.        TREATMENT     Carla received the treatments listed below:      Therapeutic exercises to develop strength, endurance, ROM, flexibility, posture, and core stabilization for 40 minutes including:   UBE x 4 minutes , level 2  Shoulder pulleys into flexion and abduction x 2 minutes each  Sh IR 3 x 10 GTB (R)   Rows 3 x 10, GTB  Bicep curl 2 x 10, 4# db  Tricep extension 2 x 10, BTB  Standing (R) shoulder flexion, scaption, abduction to 90deg 1 x 10 each   Scapular protractions  3 x 10, 2#db  Supine reverse Codman's 2#db 1 x 30 cw/ccw   Supine chest press and AAROM for shoulder flexion with 2# bar 2 x 10   Supine active (R) shoulder flexion 1 x 5  Wall push up  3 x 10  Side lying (R) shoulder ER 3  x 10  Side lying (R) shoulder abduction 2 x 10  Side lying (R) shoulder flexion 1 x 10  Prone horizontal abduction 2 x 10, 1#  Prone shoulder extension 2 x 10, 1#   Prone scaption 2 x 10, 1#      Possible next visit: prone horizontal abduction, prone extension, prone scaption, wall push up     Manual therapy techniques applied for 5 minutes, including:  PROM to right shoulder in all planes  PATIENT EDUCATION AND HOME EXERCISES     Home Exercises Provided and Patient Education Provided     Education/Self-Care provided:  Role of PT and goals for therapy.   HEP    Written Home Exercises Provided: Patient instructed to cont prior HEP. Exercises were reviewed and Carla was able to demonstrate them prior to the end of the session. Carla demonstrated good  understanding of the education provided. See EMR under Patient Instructions for exercises provided during therapy sessions.    ASSESSMENT     Carla continues to have limited AROM in flexion while standing and progressed to side lying shoulder flexion on this date.  She is able to control eccentric phase of side lying ER, but unable to perform full ROM during concentric phase.  Patient will continue to progress proximal stabilization exercises and attempt progression of shoulder strengthening and AROM exercises as tolerated to improve functional mobility of shoulder while reaching overhead or behind her back.    Carla Is progressing well towards her goals.   Pt prognosis is Good.     Pt will continue to benefit from skilled outpatient physical therapy to address the deficits listed in the problem list box on initial evaluation, provide pt/family education and to maximize pt's level of independence in the home and community environment.     Pt's spiritual, cultural and educational needs considered and pt agreeable to plan of care and goals.     Anticipated barriers to physical therapy: none    Goals:   Short Term Goals:  4 weeks  Patient will be compliant with HEP  to promote the independent management of current diagnosis. Partially Met  Patient will increase right shoulder flexion to 110 degrees to improve tolerance to overhead activities.  In Progress, Not Met  Patient will increase right shoulder functiona ER to reach C7 for function of grooming.  Met  Patient will report a decrease in complaints of right shoulder pain to 4/10 during performance of ADL's for independence of self care activities.  In Progress, Not Met        Long Term Goals:  8 weeks  Patient will increase right shoulder strength to 4+/5 in order to return to normal house work activities.  In Progress, Not Met  Patient will increase right shoulder flexion to 160 degrees in order to place dishes in overhead cabinets independently for self care independence.   In Progress, Not Met  Patient will increase right shoulder functional IR to reach T7 for function of dressing.  In Progress, Not Met  Patient will increase scapular stabilization strength to 4/5 to improve tolerance to normal lifting.  In Progress, Not Met  Patient will improve FOTO limitation status from 62% to 39% placing the patient in the 20-40% impaired, limited, or restricted category indicating increased functional mobility.   In Progress, Not Met    PLAN     Continue with PT POC and progress as tolerated.     Ryan Oconnor, PT

## 2023-01-31 ENCOUNTER — EXTERNAL CHRONIC CARE MANAGEMENT (OUTPATIENT)
Dept: PRIMARY CARE CLINIC | Facility: CLINIC | Age: 68
End: 2023-01-31
Payer: MEDICARE

## 2023-01-31 PROCEDURE — 99490 CHRNC CARE MGMT STAFF 1ST 20: CPT | Mod: PBBFAC,PO | Performed by: FAMILY MEDICINE

## 2023-01-31 PROCEDURE — 99490 PR CHRONIC CARE MGMT, 1ST 20 MIN: ICD-10-PCS | Mod: S$PBB,,, | Performed by: FAMILY MEDICINE

## 2023-01-31 PROCEDURE — 99490 CHRNC CARE MGMT STAFF 1ST 20: CPT | Mod: S$PBB,,, | Performed by: FAMILY MEDICINE

## 2023-02-01 ENCOUNTER — CLINICAL SUPPORT (OUTPATIENT)
Dept: REHABILITATION | Facility: HOSPITAL | Age: 68
End: 2023-02-01
Payer: MEDICARE

## 2023-02-01 DIAGNOSIS — R29.898 WEAKNESS OF RIGHT ARM: ICD-10-CM

## 2023-02-01 DIAGNOSIS — M25.511 ACUTE PAIN OF RIGHT SHOULDER: Primary | ICD-10-CM

## 2023-02-01 DIAGNOSIS — M25.611 DECREASED ROM OF RIGHT SHOULDER: ICD-10-CM

## 2023-02-01 PROCEDURE — 97140 MANUAL THERAPY 1/> REGIONS: CPT | Mod: PN

## 2023-02-01 PROCEDURE — 97110 THERAPEUTIC EXERCISES: CPT | Mod: PN

## 2023-02-01 NOTE — PROGRESS NOTES
[facility]   Physical Therapy Treatment Note     Name: Carla HERRING [facility] Number: [id number]    Therapy Diagnosis:   Encounter Diagnoses   Name Primary?    Acute pain of right shoulder Yes    Decreased ROM of right shoulder     Weakness of right arm          Physician: Taqueria Melendrez MD    Visit Date: 2/1/2023    Physician Orders: PT Eval and Treat   Medical Diagnosis from Referral: M75.41 (ICD-10-CM) - Impingement syndrome of right shoulder  Evaluation Date: 9/14/2022  Authorization Period Expiration: 12/31/2023  Plan of Care Expiration: 3/11/2023  Progress Note Due: 2/11/2023  Visit # / Visits authorized: 4/ 20   FOTO: 2/ 3 (eval, 10/19/22)     Precautions: Standard    Time In: 9:15 am  Time Out: 10:00 am  Total Billable Time: 45 minutes    SUBJECTIVE     Pt reports: she is having soreness in shoulder since last treatment, but pain is not as intense as last visit.     She was not compliant with home exercise program.  Response to previous treatment: muscle soreness  Functional change: able to reach up and pull ceiling fan string, able to dress independently    Pain: 5/10  Location: right shoulder     OBJECTIVE     Objective Measures updated at progress report unless specified.        TREATMENT     Carla received the treatments listed below:      Therapeutic exercises to develop strength, endurance, ROM, flexibility, posture, and core stabilization for 40 minutes including:   UBE x 4 minutes , level 2  Shoulder pulleys into flexion x 2 minutes  Sh IR 3 x 10 GTB (R)   Rows 3 x 10, BTB  Bicep curl 2 x 10, 4# db  Tricep extension 3 x 10, BTB  Standing (R) shoulder flexion, scaption, abduction to 90deg 1 x 10 each   Scapular protractions  3 x 10, 3#db  Supine reverse Codman's 3#db 1 x 30 cw/ccw   Supine active (R) shoulder flexion 1 x 10  Wall push up  3 x 10  Side lying (R) shoulder ER 3 x 10  Side lying (R) shoulder abduction 2 x 10  Side lying (R) shoulder flexion 2 x  10  Prone horizontal abduction 2 x 10, 1#  Prone shoulder extension 2 x 10, 1#   Prone scaption 2 x 10, 1#      Possible next visit: prone horizontal abduction, prone extension, prone scaption, wall push up     Manual therapy techniques applied for 5 minutes, including:  PROM to right shoulder in all planes  STM to infraspinatus and teres minor  PATIENT EDUCATION AND HOME EXERCISES     Home Exercises Provided and Patient Education Provided     Education/Self-Care provided:  Role of PT and goals for therapy.   HEP    Written Home Exercises Provided: Patient instructed to cont prior HEP. Exercises were reviewed and Carla was able to demonstrate them prior to the end of the session. Carla demonstrated good  understanding of the education provided. See EMR under Patient Instructions for exercises provided during therapy sessions.    ASSESSMENT     Carla continues to have limited active flexion against gravity, but improved supine and side lying active shoulder flexion during today's treatment.  She is progressed with strengthening exercises and will continue with progression of active shoulder flexion in gravity eliminated position.    Carla Is progressing well towards her goals.   Pt prognosis is Good.     Pt will continue to benefit from skilled outpatient physical therapy to address the deficits listed in the problem list box on initial evaluation, provide pt/family education and to maximize pt's level of independence in the home and community environment.     Pt's spiritual, cultural and educational needs considered and pt agreeable to plan of care and goals.     Anticipated barriers to physical therapy: none    Goals:   Short Term Goals:  4 weeks  Patient will be compliant with HEP to promote the independent management of current diagnosis. Partially Met  Patient will increase right shoulder flexion to 110 degrees to improve tolerance to overhead activities.  In Progress, Not Met  Patient will increase  right shoulder functiona ER to reach C7 for function of grooming.  Met  Patient will report a decrease in complaints of right shoulder pain to 4/10 during performance of ADL's for independence of self care activities.  In Progress, Not Met        Long Term Goals:  8 weeks  Patient will increase right shoulder strength to 4+/5 in order to return to normal house work activities.  In Progress, Not Met  Patient will increase right shoulder flexion to 160 degrees in order to place dishes in overhead cabinets independently for self care independence.   In Progress, Not Met  Patient will increase right shoulder functional IR to reach T7 for function of dressing.  In Progress, Not Met  Patient will increase scapular stabilization strength to 4/5 to improve tolerance to normal lifting.  In Progress, Not Met  Patient will improve FOTO limitation status from 62% to 39% placing the patient in the 20-40% impaired, limited, or restricted category indicating increased functional mobility.   In Progress, Not Met    PLAN     Continue with PT POC and progress as tolerated.     Ryan Oconnor, PT

## 2023-02-07 ENCOUNTER — CLINICAL SUPPORT (OUTPATIENT)
Dept: REHABILITATION | Facility: HOSPITAL | Age: 68
End: 2023-02-07
Attending: SPECIALIST
Payer: MEDICARE

## 2023-02-07 DIAGNOSIS — M25.611 DECREASED ROM OF RIGHT SHOULDER: ICD-10-CM

## 2023-02-07 DIAGNOSIS — R29.898 WEAKNESS OF RIGHT ARM: ICD-10-CM

## 2023-02-07 DIAGNOSIS — M25.511 ACUTE PAIN OF RIGHT SHOULDER: Primary | ICD-10-CM

## 2023-02-07 PROCEDURE — 97110 THERAPEUTIC EXERCISES: CPT | Mod: HCNC,PN

## 2023-02-07 PROCEDURE — 97530 THERAPEUTIC ACTIVITIES: CPT | Mod: HCNC,PN

## 2023-02-07 NOTE — PROGRESS NOTES
[facility]   Physical Therapy Treatment Note     Name: Carla HERRING [facility] Number: [id number]    Therapy Diagnosis:   Encounter Diagnoses   Name Primary?    Acute pain of right shoulder Yes    Decreased ROM of right shoulder     Weakness of right arm          Physician: Taqueria Melendrez MD    Visit Date: 2/7/2023    Physician Orders: PT Eval and Treat   Medical Diagnosis from Referral: M75.41 (ICD-10-CM) - Impingement syndrome of right shoulder  Evaluation Date: 9/14/2022  Authorization Period Expiration: 12/31/2023  Plan of Care Expiration: 3/11/2023  Progress Note Due: 2/11/2023  Visit # / Visits authorized: 5/ 20   FOTO: 2/ 3 (eval, 10/19/22)     Precautions: Standard    Time In: 9:25 am  Time Out: 10:10 am  Total Billable Time: 45 minutes    SUBJECTIVE     Pt reports: she went to [place] this weekend and has been having increased pain due to increased activity and movement.      She was not compliant with home exercise program.  Response to previous treatment: muscle soreness  Functional change: able to reach up and pull ceiling fan string, able to dress independently    Pain: 6/10  Location: right shoulder     OBJECTIVE     Objective Measures updated at progress report unless specified.        TREATMENT     Carla received the treatments listed below:      Therapeutic exercises to develop strength, endurance, ROM, flexibility, posture, and core stabilization for 32 minutes including:   UBE x 4 minutes , level 2  Standing functional IR with sliding bar behind back x 2 minutes  Sh IR 3 x 10 GTB (R)   Bicep curl 2 x 10, 4# db  Tricep extension 3 x 10, BTB  Standing (R) shoulderscaption, abduction to 90deg 1 x 10 each   Scapular protractions  3 x 10, 3#db  Supine reverse Codman's 3#db 1 x 30 cw/ccw   Supine active (R) shoulder flexion 1 x 10  Side lying (R) shoulder ER 3 x 10 (first set performed eccentrically with help from PT)  Side lying (R) shoulder abduction 2 x  10  Side lying (R) shoulder flexion 2 x 10  Prone horizontal abduction 2 x 10, 1#  Prone shoulder extension 2 x 10, 1#   Prone scaption 2 x 10, 1#      Possible next visit: prone horizontal abduction, prone extension, prone scaption, wall push up     Manual therapy techniques applied for 5 minutes, including:  PROM to right shoulder in all planes  STM to infraspinatus and teres minor  dynamic functional therapeutic activities to improve functional performance for (8)  minutes, including:  - Rows 3 x 10, BTB  - wall push ups 3 x 10  - overhead press 1 x 10    PATIENT EDUCATION AND HOME EXERCISES     Home Exercises Provided and Patient Education Provided     Education/Self-Care provided:  Role of PT and goals for therapy.   HEP    Written Home Exercises Provided: Patient instructed to cont prior HEP. Exercises were reviewed and Carla was able to demonstrate them prior to the end of the session. Carla demonstrated good  understanding of the education provided. See EMR under Patient Instructions for exercises provided during therapy sessions.    ASSESSMENT     Carla returns to therapy with increase in shoulder pain due to attending parade and increased usage of her arm.  She continues to have the most limitations functionally with reaching behind her back and overhead.  Standing functional IR AAROM with bar added today as well as overhead press.  Patient continues to require verbal cues to perform exercise through full available ROM and to keep her elbow strait during side lying shoulder flexion.      Carla Is progressing well towards her goals.   Pt prognosis is Good.     Pt will continue to benefit from skilled outpatient physical therapy to address the deficits listed in the problem list box on initial evaluation, provide pt/family education and to maximize pt's level of independence in the home and community environment.     Pt's spiritual, cultural and educational needs considered and pt agreeable to plan  of care and goals.     Anticipated barriers to physical therapy: none    Goals:   Short Term Goals:  4 weeks  Patient will be compliant with HEP to promote the independent management of current diagnosis. Partially Met  Patient will increase right shoulder flexion to 110 degrees to improve tolerance to overhead activities.  In Progress, Not Met  Patient will increase right shoulder functiona ER to reach C7 for function of grooming.  Met  Patient will report a decrease in complaints of right shoulder pain to 4/10 during performance of ADL's for independence of self care activities.  In Progress, Not Met        Long Term Goals:  8 weeks  Patient will increase right shoulder strength to 4+/5 in order to return to normal house work activities.  In Progress, Not Met  Patient will increase right shoulder flexion to 160 degrees in order to place dishes in overhead cabinets independently for self care independence.   In Progress, Not Met  Patient will increase right shoulder functional IR to reach T7 for function of dressing.  In Progress, Not Met  Patient will increase scapular stabilization strength to 4/5 to improve tolerance to normal lifting.  In Progress, Not Met  Patient will improve FOTO limitation status from 62% to 39% placing the patient in the 20-40% impaired, limited, or restricted category indicating increased functional mobility.   In Progress, Not Met    PLAN     Continue with PT POC and progress as tolerated.     Ryan Oconnor, PT

## 2023-02-09 ENCOUNTER — CLINICAL SUPPORT (OUTPATIENT)
Dept: REHABILITATION | Facility: HOSPITAL | Age: 68
End: 2023-02-09
Payer: MEDICARE

## 2023-02-09 DIAGNOSIS — M25.611 DECREASED ROM OF RIGHT SHOULDER: ICD-10-CM

## 2023-02-09 DIAGNOSIS — M25.511 ACUTE PAIN OF RIGHT SHOULDER: Primary | ICD-10-CM

## 2023-02-09 DIAGNOSIS — R29.898 WEAKNESS OF RIGHT ARM: ICD-10-CM

## 2023-02-09 PROCEDURE — 97530 THERAPEUTIC ACTIVITIES: CPT | Mod: HCNC,PN

## 2023-02-09 PROCEDURE — 97110 THERAPEUTIC EXERCISES: CPT | Mod: HCNC,PN

## 2023-02-09 NOTE — PROGRESS NOTES
[facility]   Physical Therapy Treatment Note     Name: Carla HERRING [facility] Number: [id number]    Therapy Diagnosis:   Encounter Diagnoses   Name Primary?    Acute pain of right shoulder Yes    Decreased ROM of right shoulder     Weakness of right arm          Physician: Taqueria Melendrez MD    Visit Date: 2/9/2023    Physician Orders: PT Eval and Treat   Medical Diagnosis from Referral: M75.41 (ICD-10-CM) - Impingement syndrome of right shoulder  Evaluation Date: 9/14/2022  Authorization Period Expiration: 12/31/2023  Plan of Care Expiration: 3/11/2023  Progress Note Due: 2/11/2023  Visit # / Visits authorized: 6/ 20   FOTO: 2/ 3 (eval, 10/19/22)     Precautions: Standard    Time In: 9:20 am  Time Out: 10:00 am  Total Billable Time: 40 minutes    SUBJECTIVE     Pt reports: she had increased pain following last visit that lasted that night, but normal pain the following day.     She was not compliant with home exercise program.  Response to previous treatment: muscle soreness for remainder of day  Functional change: able to reach up and pull ceiling fan string, able to dress independently    Pain: 5/10  Location: right shoulder     OBJECTIVE     Objective Measures updated at progress report unless specified.        TREATMENT     Carla received the treatments listed below:      Therapeutic exercises to develop strength, endurance, ROM, flexibility, posture, and core stabilization for 32 minutes including:   UBE x 4 minutes , level 2  Standing functional IR with sliding bar behind back x 2 minutes  Sh IR 3 x 10 GTB (R)   Bicep curl 2 x 10, 4# db  Tricep extension 3 x 10, BTB  Standing (R) shoulderscaption, abduction to 90deg 2 x 10 each   Scapular protractions  3 x 10, 3#db  Supine reverse Codman's 3#db 1 x 30 cw/ccw   Supine active (R) shoulder flexion 1 x 15  Side lying (R) shoulder ER 3 x 10 (first set performed eccentrically with help from PT)  Side lying (R) shoulder abduction  2 x 10  Side lying (R) shoulder flexion 2 x 10  Prone horizontal abduction 2 x 10, 1#  Prone shoulder extension 2 x 10, 1#   Prone scaption 2 x 10, 1#      Possible next visit: prone horizontal abduction, prone extension, prone scaption, wall push up     Manual therapy techniques applied for 0 minutes, including:  PROM to right shoulder in all planes  STM to infraspinatus and teres minor  dynamic functional therapeutic activities to improve functional performance for (8)  minutes, including:  - Rows 3 x 10, BTB  - wall push ups 3 x 10  - overhead press 1 x 10    PATIENT EDUCATION AND HOME EXERCISES     Home Exercises Provided and Patient Education Provided     Education/Self-Care provided:  Role of PT and goals for therapy.   HEP    Written Home Exercises Provided: Patient instructed to cont prior HEP. Exercises were reviewed and Carla was able to demonstrate them prior to the end of the session. Carla demonstrated good  understanding of the education provided. See EMR under Patient Instructions for exercises provided during therapy sessions.    ASSESSMENT     Carla had some muscle soreness following last visit, but only lasted the remainder of the day.  She was able to progress supine active shoulder flexion to 15 reps with improved ability to maintain elbow straight.  She will continue with gradual progression of proximal stability exercises to improve functional mobility during ADL's.      Carla Is progressing well towards her goals.   Pt prognosis is Good.     Pt will continue to benefit from skilled outpatient physical therapy to address the deficits listed in the problem list box on initial evaluation, provide pt/family education and to maximize pt's level of independence in the home and community environment.     Pt's spiritual, cultural and educational needs considered and pt agreeable to plan of care and goals.     Anticipated barriers to physical therapy: none    Goals:   Short Term Goals:  4  weeks  Patient will be compliant with HEP to promote the independent management of current diagnosis. Partially Met  Patient will increase right shoulder flexion to 110 degrees to improve tolerance to overhead activities.  In Progress, Not Met  Patient will increase right shoulder functiona ER to reach C7 for function of grooming.  Met  Patient will report a decrease in complaints of right shoulder pain to 4/10 during performance of ADL's for independence of self care activities.  In Progress, Not Met        Long Term Goals:  8 weeks  Patient will increase right shoulder strength to 4+/5 in order to return to normal house work activities.  In Progress, Not Met  Patient will increase right shoulder flexion to 160 degrees in order to place dishes in overhead cabinets independently for self care independence.   In Progress, Not Met  Patient will increase right shoulder functional IR to reach T7 for function of dressing.  In Progress, Not Met  Patient will increase scapular stabilization strength to 4/5 to improve tolerance to normal lifting.  In Progress, Not Met  Patient will improve FOTO limitation status from 62% to 39% placing the patient in the 20-40% impaired, limited, or restricted category indicating increased functional mobility.   In Progress, Not Met    PLAN     Continue with PT POC and progress as tolerated.     Ryan Oconnor, PT

## 2023-02-14 ENCOUNTER — CLINICAL SUPPORT (OUTPATIENT)
Dept: REHABILITATION | Facility: HOSPITAL | Age: 68
End: 2023-02-14
Payer: MEDICARE

## 2023-02-14 DIAGNOSIS — M25.511 ACUTE PAIN OF RIGHT SHOULDER: Primary | ICD-10-CM

## 2023-02-14 DIAGNOSIS — R29.898 WEAKNESS OF RIGHT ARM: ICD-10-CM

## 2023-02-14 DIAGNOSIS — M25.611 DECREASED ROM OF RIGHT SHOULDER: ICD-10-CM

## 2023-02-14 PROCEDURE — 97110 THERAPEUTIC EXERCISES: CPT | Mod: HCNC,PN

## 2023-02-14 PROCEDURE — 97140 MANUAL THERAPY 1/> REGIONS: CPT | Mod: HCNC,PN

## 2023-02-14 NOTE — PROGRESS NOTES
[facility]   Physical Therapy Treatment and Progress Note     Name: Carla HERRING [facility] Number: [id number]    Therapy Diagnosis:   Encounter Diagnoses   Name Primary?    Acute pain of right shoulder Yes    Decreased ROM of right shoulder     Weakness of right arm          Physician: Taqueria Melendrez MD    Visit Date: 2/14/2023    Physician Orders: PT Eval and Treat   Medical Diagnosis from Referral: M75.41 (ICD-10-CM) - Impingement syndrome of right shoulder  Evaluation Date: 9/14/2022  Authorization Period Expiration: 12/31/2023  Plan of Care Expiration: 3/11/2023  Progress Note Due: 2/11/2023  Visit # / Visits authorized: 7/ 20   FOTO: 2/ 3 (eval, 10/19/22)     Precautions: Standard    Time In: 9:15 am  Time Out: 10:00 am  Total Billable Time: 45 minutes    SUBJECTIVE     Pt reports: she had normal soreness following last treatment session, but yesterday began having severe pain in right shoulder (deltoid) region.  She cannot think of any action or incident that occurred over the weekend that would have triggered shoulder pain.  Patient was not able to use her arm yesterday and has slight improvement today, but remains very limited.    She was not compliant with home exercise program.  Response to previous treatment: muscle soreness for remainder of day  Functional change: able to reach up and pull ceiling fan string, able to dress independently    Pain: 9/10  Location: right shoulder     OBJECTIVE     Objective Measures updated at progress report unless specified.      Update:   Observation: pt holds right arm in guarded position, ambulates with decreased arm swing     Posture: rounded shoulder        Passive Range of Motion: supine  Shoulder Left Right   Flexion 160  155*   Abduction 160 155*   ER  95 90   IR 80 70*      Active Range of Motion: standing  Shoulder Left Right   Flexion 160 deg 35 deg *   Abduction 160 deg 60 deg *   ER  Reach T3 Reach C7*   IR Reach T4 Reach T9*       Upper Extremity Strength    (L) UE (R) UE   Shoulder flexion: 4+/5 3-/5*   Shoulder Abduction: 4+/5 3-/5*   Shoulder ER 4+/5 3-/5*   Shoulder IR 4+/5 4-/5*   Lower Trap Not tested Not tested   Middle Trap Not tested Not tested   Rhomboids Not tested Not tested            Special Tests:  AC Joint Left Right   AC Joint Compression Test neg pos   Empty Can Test neg Pos   Drop Arm test neg Pos         Joint Mobility: slight restrictions in posterior capsule     Palpation: tenderness noted over upper trapezius, AC joint, deltoids, bicep, pec major     Sensation: intact to light touch           Scapular Control/Dyskinesis:     Normal / Subtle / Obvious  Comments    Left  normal normal    Right  obvious Upper trap substitution      TREATMENT     Carla received the treatments listed below:      Therapeutic exercises to develop strength, endurance, ROM, flexibility, posture, and core stabilization for 20 minutes including: re-assessment time included  UBE x 4 minutes , level 1  Shoulder pulleys into flexion and abduction  x 2 minutes each     Bicep curl 2 x 10, 3# db  Tricep extension 2 x 10, GTB  Scapular protractions  3 x 10  Supine reverse Codman's 1 x 30 cw/ccw         Possible next visit: prone horizontal abduction, prone extension, prone scaption, wall push up     Manual therapy techniques applied for 25 minutes, including:  PROM to right shoulder in all planes  IASTM to infraspinatus, teres minor, and deltoids  Grade 3 inferior and posterior glides of right GH joint  STM to right deltoid, upper trap, infraspinatus, teres minor, and lat dorsi  Kinesiotaping for deltoid inhibition:   Patient was screened for use of kinesiotape and was cleared for use.  1. Has the patient ever had a reaction to the adhesive in bandaids? No  2. Has the patient ever uses athletic/kinesiotape in the past? No  3. Is the patient hemodynamically impaired (PE, DVT, CHF, Kidney failure)? No  4. Can the PT/PTA apply the tape to your skin?  Yes  Patient was instructed on duration to wear the tape, proper drying techniques for the tape, and to remove the tape if he/she had any issues with it.  Patient verbalized understanding of these instructions. Kinesiotaping for right deltoid inhibition  dynamic functional therapeutic activities to improve functional performance for (0)  minutes, including:  PATIENT EDUCATION AND HOME EXERCISES     Home Exercises Provided and Patient Education Provided     Education/Self-Care provided:  Role of PT and goals for therapy.   HEP    Written Home Exercises Provided: Patient instructed to cont prior HEP. Exercises were reviewed and Carla was able to demonstrate them prior to the end of the session. Carla demonstrated good  understanding of the education provided. See EMR under Patient Instructions for exercises provided during therapy sessions.    ASSESSMENT     Carla returns to therapy on this date with increase in the intensity of right shoulder pain that started yesterday.  She reports no unusual activity or accident that occurred over the weekend, but having intense pain in shoulder.  Patient was re-assessed on this date and was limited with examination due to pain.  She has significant decrease in AROM and strength as compared to previous visit, but able to maintain PROM.  She is noted to have increased tenderness and tissue tension in deltoid and receives increased manual therapy on this date in attempt to decrease pain and improve mobility.  Treatment had been attempting to progress active shoulder flexion and ER which may have increased pain levels and patient remains very limited with ER and shoulder flexion strength.  Patient advised to contact MD and schedule follow up for further assessment.       Carla Is progressing well towards her goals.   Pt prognosis is Good.     Pt will continue to benefit from skilled outpatient physical therapy to address the deficits listed in the problem list box on initial  evaluation, provide pt/family education and to maximize pt's level of independence in the home and community environment.     Pt's spiritual, cultural and educational needs considered and pt agreeable to plan of care and goals.     Anticipated barriers to physical therapy: none    Goals:   Short Term Goals:  4 weeks  Patient will be compliant with HEP to promote the independent management of current diagnosis. Partially Met  Patient will increase right shoulder flexion to 110 degrees to improve tolerance to overhead activities.  In Progress, Not Met  Patient will increase right shoulder functiona ER to reach C7 for function of grooming.  Met  Patient will report a decrease in complaints of right shoulder pain to 4/10 during performance of ADL's for independence of self care activities.  In Progress, Not Met        Long Term Goals:  8 weeks  Patient will increase right shoulder strength to 4+/5 in order to return to normal house work activities.  In Progress, Not Met  Patient will increase right shoulder flexion to 160 degrees in order to place dishes in overhead cabinets independently for self care independence.   In Progress, Not Met  Patient will increase right shoulder functional IR to reach T7 for function of dressing.  In Progress, Not Met  Patient will increase scapular stabilization strength to 4/5 to improve tolerance to normal lifting.  In Progress, Not Met  Patient will improve FOTO limitation status from 62% to 39% placing the patient in the 20-40% impaired, limited, or restricted category indicating increased functional mobility.   In Progress, Not Met    PLAN     Continue with PT POC and progress as tolerated.     Ryan Oconnor, PT

## 2023-02-16 ENCOUNTER — CLINICAL SUPPORT (OUTPATIENT)
Dept: FAMILY MEDICINE | Facility: CLINIC | Age: 68
End: 2023-02-16
Payer: MEDICARE

## 2023-02-16 DIAGNOSIS — E53.8 B12 DEFICIENCY: Primary | ICD-10-CM

## 2023-02-16 PROCEDURE — 96372 THER/PROPH/DIAG INJ SC/IM: CPT | Mod: HCNC,S$GLB,, | Performed by: FAMILY MEDICINE

## 2023-02-16 PROCEDURE — 99999 PR PBB SHADOW E&M-EST. PATIENT-LVL II: ICD-10-PCS | Mod: PBBFAC,HCNC,,

## 2023-02-16 PROCEDURE — 99999 PR PBB SHADOW E&M-EST. PATIENT-LVL II: CPT | Mod: PBBFAC,HCNC,,

## 2023-02-16 PROCEDURE — 96372 PR INJECTION,THERAP/PROPH/DIAG2ST, IM OR SUBCUT: ICD-10-PCS | Mod: HCNC,S$GLB,, | Performed by: FAMILY MEDICINE

## 2023-02-16 RX ADMIN — CYANOCOBALAMIN 1000 MCG: 1000 INJECTION, SOLUTION INTRAMUSCULAR at 09:02

## 2023-02-21 ENCOUNTER — CLINICAL SUPPORT (OUTPATIENT)
Dept: REHABILITATION | Facility: HOSPITAL | Age: 68
End: 2023-02-21
Payer: MEDICARE

## 2023-02-21 DIAGNOSIS — M25.611 DECREASED ROM OF RIGHT SHOULDER: ICD-10-CM

## 2023-02-21 DIAGNOSIS — R29.898 WEAKNESS OF RIGHT ARM: ICD-10-CM

## 2023-02-21 DIAGNOSIS — M25.511 ACUTE PAIN OF RIGHT SHOULDER: Primary | ICD-10-CM

## 2023-02-21 PROCEDURE — 97110 THERAPEUTIC EXERCISES: CPT | Mod: HCNC,PN

## 2023-02-21 PROCEDURE — 97140 MANUAL THERAPY 1/> REGIONS: CPT | Mod: HCNC,PN

## 2023-02-21 NOTE — PROGRESS NOTES
[facility]   Physical Therapy Treatment and Progress Note     Name: Carla HERRING [facility] Number: [id number]    Therapy Diagnosis:   Encounter Diagnoses   Name Primary?    Acute pain of right shoulder Yes    Decreased ROM of right shoulder     Weakness of right arm          Physician: Taqueria Melendrez MD    Visit Date: 2/21/2023    Physician Orders: PT Eval and Treat   Medical Diagnosis from Referral: M75.41 (ICD-10-CM) - Impingement syndrome of right shoulder  Evaluation Date: 9/14/2022  Authorization Period Expiration: 12/31/2023  Plan of Care Expiration: 3/11/2023  Progress Note Due: 2/11/2023  Visit # / Visits authorized: 10/ 20   FOTO: 2/ 3 (eval, 10/19/22)     Precautions: Standard    Time In: 9:15 am  Time Out: 10:00 am  Total Billable Time: 38 minutes    SUBJECTIVE     Pt reports: she had decreased pain following last treatment and taping seemed to help a lot.  She is scheduled for injection tomorrow morning.    She was not compliant with home exercise program.  Response to previous treatment: decreased pain  Functional change: able to reach up and pull ceiling fan string, able to dress independently    Pain: 5/10  Location: right shoulder     OBJECTIVE     Objective Measures updated at progress report unless specified.         TREATMENT     Carla received the treatments listed below:      Therapeutic exercises to develop strength, endurance, ROM, flexibility, posture, and core stabilization for 30 minutes including:   UBE x 4 minutes , level 1  Standing functional IR with sliding bar behind back x 2 minutes  Shoulder pulleys into flexion and abduction  x 2 minutes each     Bicep curl 2 x 10, 3# db  Tricep extension 2 x 10, GTB  Scapular protractions  3 x 10  Supine reverse Codman's 1 x 30 cw/ccw   Supine chest press and AAROM for shoulder flexion with 2# bar 2 x 10   Supine active (R) shoulder flexion 1 x 15  Side lying (R) shoulder abduction 2 x 10        Possible next  visit: prone horizontal abduction, prone extension, prone scaption, wall push up     Manual therapy techniques applied for 8 minutes, including:  PROM to right shoulder in all planes  STM to right deltoid, upper trap, infraspinatus, teres minor, and lat dorsi  dynamic functional therapeutic activities to improve functional performance for (0)  minutes, including:  PATIENT EDUCATION AND HOME EXERCISES     Home Exercises Provided and Patient Education Provided     Education/Self-Care provided:  Role of PT and goals for therapy.   HEP    Written Home Exercises Provided: Patient instructed to cont prior HEP. Exercises were reviewed and Carla was able to demonstrate them prior to the end of the session. Carla demonstrated good  understanding of the education provided. See EMR under Patient Instructions for exercises provided during therapy sessions.    ASSESSMENT     Carla has decreased pain levels today and was able to resume some more exercises.  She continues to have tenderness and increased tissue tension noted in deltoids and did not perform side lying flexion.  Patient had improved quality of movement noted during supine shoulder flexion.  She is scheduled for injection tomorrow morning and will attempt progression of rotator cuff strengthening next visit.        Carla Is progressing well towards her goals.   Pt prognosis is Good.     Pt will continue to benefit from skilled outpatient physical therapy to address the deficits listed in the problem list box on initial evaluation, provide pt/family education and to maximize pt's level of independence in the home and community environment.     Pt's spiritual, cultural and educational needs considered and pt agreeable to plan of care and goals.     Anticipated barriers to physical therapy: none    Goals:   Short Term Goals:  4 weeks  Patient will be compliant with HEP to promote the independent management of current diagnosis. Partially Met  Patient will  increase right shoulder flexion to 110 degrees to improve tolerance to overhead activities.  In Progress, Not Met  Patient will increase right shoulder functiona ER to reach C7 for function of grooming.  Met  Patient will report a decrease in complaints of right shoulder pain to 4/10 during performance of ADL's for independence of self care activities.  In Progress, Not Met        Long Term Goals:  8 weeks  Patient will increase right shoulder strength to 4+/5 in order to return to normal house work activities.  In Progress, Not Met  Patient will increase right shoulder flexion to 160 degrees in order to place dishes in overhead cabinets independently for self care independence.   In Progress, Not Met  Patient will increase right shoulder functional IR to reach T7 for function of dressing.  In Progress, Not Met  Patient will increase scapular stabilization strength to 4/5 to improve tolerance to normal lifting.  In Progress, Not Met  Patient will improve FOTO limitation status from 62% to 39% placing the patient in the 20-40% impaired, limited, or restricted category indicating increased functional mobility.   In Progress, Not Met    PLAN     Continue with PT POC and progress as tolerated.     Ryan Oconnor, PT

## 2023-02-22 ENCOUNTER — OFFICE VISIT (OUTPATIENT)
Dept: ORTHOPEDICS | Facility: CLINIC | Age: 68
End: 2023-02-22
Payer: MEDICARE

## 2023-02-22 DIAGNOSIS — M75.41 IMPINGEMENT SYNDROME OF RIGHT SHOULDER: Primary | ICD-10-CM

## 2023-02-22 PROCEDURE — 1160F PR REVIEW ALL MEDS BY PRESCRIBER/CLIN PHARMACIST DOCUMENTED: ICD-10-PCS | Mod: HCNC,CPTII,S$GLB, | Performed by: NURSE PRACTITIONER

## 2023-02-22 PROCEDURE — 4010F PR ACE/ARB THEARPY RXD/TAKEN: ICD-10-PCS | Mod: HCNC,CPTII,S$GLB, | Performed by: NURSE PRACTITIONER

## 2023-02-22 PROCEDURE — 20610 LARGE JOINT ASPIRATION/INJECTION: R SUBACROMIAL BURSA: ICD-10-PCS | Mod: HCNC,RT,S$GLB, | Performed by: NURSE PRACTITIONER

## 2023-02-22 PROCEDURE — 99999 PR PBB SHADOW E&M-EST. PATIENT-LVL III: ICD-10-PCS | Mod: PBBFAC,HCNC,, | Performed by: NURSE PRACTITIONER

## 2023-02-22 PROCEDURE — 99499 NO LOS: ICD-10-PCS | Mod: HCNC,S$GLB,, | Performed by: NURSE PRACTITIONER

## 2023-02-22 PROCEDURE — 1159F PR MEDICATION LIST DOCUMENTED IN MEDICAL RECORD: ICD-10-PCS | Mod: HCNC,CPTII,S$GLB, | Performed by: NURSE PRACTITIONER

## 2023-02-22 PROCEDURE — 1125F AMNT PAIN NOTED PAIN PRSNT: CPT | Mod: HCNC,CPTII,S$GLB, | Performed by: NURSE PRACTITIONER

## 2023-02-22 PROCEDURE — 1125F PR PAIN SEVERITY QUANTIFIED, PAIN PRESENT: ICD-10-PCS | Mod: HCNC,CPTII,S$GLB, | Performed by: NURSE PRACTITIONER

## 2023-02-22 PROCEDURE — 20610 DRAIN/INJ JOINT/BURSA W/O US: CPT | Mod: HCNC,RT,S$GLB, | Performed by: NURSE PRACTITIONER

## 2023-02-22 PROCEDURE — 4010F ACE/ARB THERAPY RXD/TAKEN: CPT | Mod: HCNC,CPTII,S$GLB, | Performed by: NURSE PRACTITIONER

## 2023-02-22 PROCEDURE — 1159F MED LIST DOCD IN RCRD: CPT | Mod: HCNC,CPTII,S$GLB, | Performed by: NURSE PRACTITIONER

## 2023-02-22 PROCEDURE — 99499 UNLISTED E&M SERVICE: CPT | Mod: HCNC,S$GLB,, | Performed by: NURSE PRACTITIONER

## 2023-02-22 PROCEDURE — 1160F RVW MEDS BY RX/DR IN RCRD: CPT | Mod: HCNC,CPTII,S$GLB, | Performed by: NURSE PRACTITIONER

## 2023-02-22 PROCEDURE — 99999 PR PBB SHADOW E&M-EST. PATIENT-LVL III: CPT | Mod: PBBFAC,HCNC,, | Performed by: NURSE PRACTITIONER

## 2023-02-22 RX ORDER — TRIAMCINOLONE ACETONIDE 40 MG/ML
40 INJECTION, SUSPENSION INTRA-ARTICULAR; INTRAMUSCULAR
Status: DISCONTINUED | OUTPATIENT
Start: 2023-02-22 | End: 2023-02-23 | Stop reason: HOSPADM

## 2023-02-22 RX ADMIN — TRIAMCINOLONE ACETONIDE 40 MG: 40 INJECTION, SUSPENSION INTRA-ARTICULAR; INTRAMUSCULAR at 08:02

## 2023-02-23 NOTE — PROGRESS NOTES
Chief Complaint   Patient presents with    Right Shoulder - Pain       HPI:   This is a 67 y.o. who returns to clinic today in follow-up for right shoulder pain for the past 1 weeks after no trauma or injury. Pain is aching, sharp, and throbbing. No numbness or tingling. No associated signs or symptoms.    Past Medical History:   Diagnosis Date    Acute pancreatitis 3/21/2016    Breast cancer 2005    right side with lumpectomy, chemo and radiation    Closed fracture of ramus of right pubis 6/8/2016    Colon polyp     last scope 4/2012- repeat 10 y per pt- Dr. Johnson    GERD (gastroesophageal reflux disease) 5/29/2012    Hyperlipidemia 4/16/2013    Hypertension     Metabolic encephalopathy 4/20/2021    Osteopenia      Past Surgical History:   Procedure Laterality Date    BREAST LUMPECTOMY  2005    DILATION AND CURETTAGE OF UTERUS      ESOPHAGEAL DILATION      ESOPHAGOGASTRODUODENOSCOPY  6/1/2012    ROTATOR CUFF REPAIR      TEAR DUCT SURGERY      right rye     Current Outpatient Medications on File Prior to Visit   Medication Sig Dispense Refill    acetaminophen (TYLENOL) 650 MG TbSR Take 2 tablets (1,300 mg total) by mouth every 8 (eight) hours.  0    amLODIPine (NORVASC) 5 MG tablet Take 1 tablet (5 mg total) by mouth once daily. 90 tablet 3    benazepriL (LOTENSIN) 10 MG tablet Take 1 tablet by mouth once daily 90 tablet 2    calcium carbonate (OS-NASEEM) 500 mg calcium (1,250 mg) tablet qid 120 tablet 0    ergocalciferol (ERGOCALCIFEROL) 50,000 unit Cap Once a week 4 capsule 5    folic acid (FOLVITE) 1 MG tablet Take 1 tablet by mouth once daily 90 tablet 3    magnesium oxide (MAG-OX) 400 mg (241.3 mg magnesium) tablet bid 60 tablet 1    minocycline (DYNACIN) 100 MG tablet Take 90 mg by mouth every 12 (twelve) hours.      omeprazole (PRILOSEC) 20 MG capsule Take 1 capsule by mouth once daily 90 capsule 3    pravastatin (PRAVACHOL) 20 MG tablet Take 1 tablet (20 mg total) by mouth once daily. 90 tablet 3    LORazepam  (ATIVAN) 0.5 MG tablet Take 1 tablet (0.5 mg total) by mouth once. Take 30-60 minutes prior to MRI for 1 dose 1 tablet 0     Current Facility-Administered Medications on File Prior to Visit   Medication Dose Route Frequency Provider Last Rate Last Admin    cyanocobalamin injection 1,000 mcg  1,000 mcg Intramuscular Q30 Days Taqueria Melendrez MD   1,000 mcg at 23 0924     Review of patient's allergies indicates:   Allergen Reactions    Hydrochlorothiazide Other (See Comments)     Multiple electrolyte abnormalities     Family History   Problem Relation Age of Onset    Stroke Brother 57    Breast cancer Mother 87     Social History     Socioeconomic History    Marital status:    Tobacco Use    Smoking status: Former     Packs/day: 0.30     Types: Cigarettes     Quit date: 2005     Years since quittin.7    Smokeless tobacco: Never   Substance and Sexual Activity    Alcohol use: Yes     Alcohol/week: 2.0 standard drinks     Types: 2 Standard drinks or equivalent per week     Comment: prior 6 pack/week    Drug use: Yes       Review of Systems:  Constitutional:  Denies fever or chills   Eyes:  Denies change in visual acuity   HENT:  Denies nasal congestion or sore throat   Respiratory:  Denies cough or shortness of breath   Cardiovascular:  Denies chest pain or edema   GI:  Denies abdominal pain, nausea, vomiting, bloody stools or diarrhea   :  Denies dysuria   Integument:  Denies rash   Neurologic:  Denies headache, focal weakness or sensory changes   Endocrine:  Denies polyuria or polydipsia   Lymphatic:  Denies swollen glands   Psychiatric:  Denies depression or anxiety     Physical Exam:   Constitutional:  Well developed, well nourished, no acute distress, non-toxic appearance   Integument:  Well hydrated, no rash   Lymphatic:  No lymphadenopathy noted   Neurologic:  Alert & oriented x 3,    Psychiatric:  Speech and behavior appropriate     Bilateral Shoulder Exam    Left Shoulder Exam    Shoulder exam performed same as contralateral side and is normal.    Right Shoulder Exam   Tenderness   Shoulder tenderness location: diffusely about shoulder.    Range of Motion   Forward Flexion: abnormal   External Rotation: abnormal     Muscle Strength   Supraspinatus: 4/5     Tests   Hawkin's test: positive  Impingement: positive    Other   Erythema: absent  Sensation: normal  Pulse: present             Impingement syndrome of right shoulder  -     Large Joint Aspiration/Injection: R subacromial bursa         Using an aseptic technique, I injected 5 cc of lidocaine 1% without and 1 cc of kenalog 40mg into the right Shoulder. The patient tolerated this well.     Rtc 6 wk

## 2023-02-23 NOTE — PROCEDURES
Large Joint Aspiration/Injection: R subacromial bursa    Date/Time: 2/22/2023 8:40 AM  Performed by: MICHELINE Banuelos  Authorized by: MICHELINE Banuelos     Consent Done?:  Yes (Verbal)  Indications:  Pain  Timeout: prior to procedure the correct patient, procedure, and site was verified    Prep: patient was prepped and draped in usual sterile fashion      Local anesthesia used?: Yes    Local anesthetic:  Lidocaine 1% without epinephrine  Anesthetic total (ml):  5      Details:  Needle Size:  22 G  Ultrasonic Guidance for needle placement?: No    Approach:  Posterior  Location:  Shoulder  Site:  R subacromial bursa  Medications:  40 mg triamcinolone acetonide 40 mg/mL  Patient tolerance:  Patient tolerated the procedure well with no immediate complications

## 2023-02-28 ENCOUNTER — EXTERNAL CHRONIC CARE MANAGEMENT (OUTPATIENT)
Dept: PRIMARY CARE CLINIC | Facility: CLINIC | Age: 68
End: 2023-02-28
Payer: MEDICARE

## 2023-02-28 PROCEDURE — 99490 PR CHRONIC CARE MGMT, 1ST 20 MIN: ICD-10-PCS | Mod: S$GLB,,, | Performed by: FAMILY MEDICINE

## 2023-02-28 PROCEDURE — 99490 CHRNC CARE MGMT STAFF 1ST 20: CPT | Mod: S$GLB,,, | Performed by: FAMILY MEDICINE

## 2023-03-02 ENCOUNTER — CLINICAL SUPPORT (OUTPATIENT)
Dept: REHABILITATION | Facility: HOSPITAL | Age: 68
End: 2023-03-02
Payer: MEDICARE

## 2023-03-02 DIAGNOSIS — M25.611 DECREASED ROM OF RIGHT SHOULDER: ICD-10-CM

## 2023-03-02 DIAGNOSIS — M25.511 ACUTE PAIN OF RIGHT SHOULDER: Primary | ICD-10-CM

## 2023-03-02 DIAGNOSIS — R29.898 WEAKNESS OF RIGHT ARM: ICD-10-CM

## 2023-03-02 PROCEDURE — 97110 THERAPEUTIC EXERCISES: CPT | Mod: HCNC,PN

## 2023-03-02 NOTE — PROGRESS NOTES
[facility]   Physical Therapy Treatment and Progress Note     Name: Carla HERRING [facility] Number: [id number]    Therapy Diagnosis:   Encounter Diagnoses   Name Primary?    Acute pain of right shoulder Yes    Decreased ROM of right shoulder     Weakness of right arm          Physician: Taqueria Melendrez MD    Visit Date: 3/2/2023    Physician Orders: PT Eval and Treat   Medical Diagnosis from Referral: M75.41 (ICD-10-CM) - Impingement syndrome of right shoulder  Evaluation Date: 9/14/2022  Authorization Period Expiration: 12/31/2023  Plan of Care Expiration: 3/11/2023  Progress Note Due: 2/11/2023  Visit # / Visits authorized: 11/ 20   FOTO: 2/ 3 (eval, 10/19/22)     Precautions: Standard    Time In: 9:15 am  Time Out: 10:00 am  Total Billable Time: 38 minutes    SUBJECTIVE     Pt reports: she noticed a bruise around her bicep this past Sunday but does not know what caused it.  She reports normal shoulder pain this morning.    She was not compliant with home exercise program.  Response to previous treatment: decreased pain  Functional change: able to reach up and pull ceiling fan string, able to dress independently    Pain: 5/10  Location: right shoulder     OBJECTIVE     Objective Measures updated at progress report unless specified.         TREATMENT     Carla received the treatments listed below:      Therapeutic exercises to develop strength, endurance, ROM, flexibility, posture, and core stabilization for 38 minutes including:   UBE x 4 minutes , level 1  Standing functional IR with sliding bar behind back x 2 minutes     Scapular protractions  3 x 10 2#db  Supine reverse Codman's 1 x 30 cw/ccw 2#db  Supine chest press and AAROM for shoulder flexion with 2# bar 2 x 10   Supine active (R) shoulder flexion 2 x 10  Side lying (R) shoulder abduction 2 x 10  Side lying (R) shoulder flexion 1 x 10  Prone horizontal abduction 2 x 10, 1#  Prone shoulder extension 2 x 10, 1#   Prone  scaption 2 x 10, 1#      Possible next visit: prone horizontal abduction, prone extension, prone scaption, wall push up     Manual therapy techniques applied for 0 minutes, including:  dynamic functional therapeutic activities to improve functional performance for (0)  minutes, including:  PATIENT EDUCATION AND HOME EXERCISES     Home Exercises Provided and Patient Education Provided     Education/Self-Care provided:  Role of PT and goals for therapy.   HEP    Written Home Exercises Provided: Patient instructed to cont prior HEP. Exercises were reviewed and Carla was able to demonstrate them prior to the end of the session. Carla demonstrated good  understanding of the education provided. See EMR under Patient Instructions for exercises provided during therapy sessions.    ASSESSMENT     Carla is unable to perform active shoulder flexion to 90 deg in standing, but full AROM in supine and side lying.  She will continue with gradual progression of gravity eliminated exercises and proximal stabilization exercises in attempt to improve functional mobility during ADL's.  She is noted to have bruise over right bicep, but reporting no pain associated with bruise.  She had injection in shoulder since last visit that has helped with pain levels.     Carla Is progressing well towards her goals.   Pt prognosis is Good.     Pt will continue to benefit from skilled outpatient physical therapy to address the deficits listed in the problem list box on initial evaluation, provide pt/family education and to maximize pt's level of independence in the home and community environment.     Pt's spiritual, cultural and educational needs considered and pt agreeable to plan of care and goals.     Anticipated barriers to physical therapy: none    Goals:   Short Term Goals:  4 weeks  Patient will be compliant with HEP to promote the independent management of current diagnosis. Partially Met  Patient will increase right shoulder  flexion to 110 degrees to improve tolerance to overhead activities.  In Progress, Not Met  Patient will increase right shoulder functiona ER to reach C7 for function of grooming.  Met  Patient will report a decrease in complaints of right shoulder pain to 4/10 during performance of ADL's for independence of self care activities.  In Progress, Not Met        Long Term Goals:  8 weeks  Patient will increase right shoulder strength to 4+/5 in order to return to normal house work activities.  In Progress, Not Met  Patient will increase right shoulder flexion to 160 degrees in order to place dishes in overhead cabinets independently for self care independence.   In Progress, Not Met  Patient will increase right shoulder functional IR to reach T7 for function of dressing.  In Progress, Not Met  Patient will increase scapular stabilization strength to 4/5 to improve tolerance to normal lifting.  In Progress, Not Met  Patient will improve FOTO limitation status from 62% to 39% placing the patient in the 20-40% impaired, limited, or restricted category indicating increased functional mobility.   In Progress, Not Met    PLAN     Continue with PT POC and progress as tolerated.     Ryan Oconnor, PT

## 2023-03-07 ENCOUNTER — CLINICAL SUPPORT (OUTPATIENT)
Dept: REHABILITATION | Facility: HOSPITAL | Age: 68
End: 2023-03-07
Payer: MEDICARE

## 2023-03-07 DIAGNOSIS — M25.611 DECREASED ROM OF RIGHT SHOULDER: ICD-10-CM

## 2023-03-07 DIAGNOSIS — R29.898 WEAKNESS OF RIGHT ARM: ICD-10-CM

## 2023-03-07 DIAGNOSIS — M25.511 ACUTE PAIN OF RIGHT SHOULDER: Primary | ICD-10-CM

## 2023-03-07 PROCEDURE — 97110 THERAPEUTIC EXERCISES: CPT | Mod: HCNC,PN

## 2023-03-07 NOTE — PROGRESS NOTES
[facility]   Physical Therapy Treatment and Progress Note     Name: Carla HERRING [facility] Number: [id number]    Therapy Diagnosis:   Encounter Diagnoses   Name Primary?    Acute pain of right shoulder Yes    Decreased ROM of right shoulder     Weakness of right arm          Physician: Taqueria Melendrez MD    Visit Date: 3/7/2023    Physician Orders: PT Eval and Treat   Medical Diagnosis from Referral: M75.41 (ICD-10-CM) - Impingement syndrome of right shoulder  Evaluation Date: 9/14/2022  Authorization Period Expiration: 12/31/2023  Plan of Care Expiration: 3/11/2023  Progress Note Due: 2/11/2023  Visit # / Visits authorized: 12/ 20   FOTO: 2/ 3 (eval, 10/19/22)     Precautions: Standard    Time In: 10:45 am  Time Out: 11:25 am  Total Billable Time: 38 minutes    SUBJECTIVE     Pt reports: continued shoulder pain.  She went shopping this weekend and was carrying things with right arm.      She was not compliant with home exercise program.  Response to previous treatment: decreased pain  Functional change: able to reach up and pull ceiling fan string, able to dress independently    Pain: 5/10  Location: right shoulder     OBJECTIVE     Objective Measures updated at progress report unless specified.         TREATMENT     Carla received the treatments listed below:      Therapeutic exercises to develop strength, endurance, ROM, flexibility, posture, and core stabilization for 38 minutes including:   UBE x 4 minutes , level 1  Standing functional IR with sliding bar behind back x 2 minutes  Shoulder pulleys into flexion x 2 minutes      Standing (R) shoulder flexion, scaption, abduction to 90deg 2 x 10 each (focussed eccentric phase of flexion)  Scapular protractions  3 x 10 2#db  Supine reverse Codman's 1 x 30 cw/ccw 2#db  Supine chest press and AAROM for shoulder flexion with 2# bar 2 x 10   Supine active (R) shoulder flexion 2 x 10  Side lying (R) shoulder abduction 2 x 10, 1#  Side  lying (R) shoulder flexion 2 x 10  Prone horizontal abduction 2 x 10, 1#  Prone shoulder extension 2 x 10, 1#   Prone scaption 2 x 10, 1#      Possible next visit: prone horizontal abduction, prone extension, prone scaption, wall push up     Manual therapy techniques applied for 0 minutes, including:  dynamic functional therapeutic activities to improve functional performance for (0)  minutes, including:  PATIENT EDUCATION AND HOME EXERCISES     Home Exercises Provided and Patient Education Provided     Education/Self-Care provided:  Role of PT and goals for therapy.   HEP    Written Home Exercises Provided: Patient instructed to cont prior HEP. Exercises were reviewed and Carla was able to demonstrate them prior to the end of the session. Carla demonstrated good  understanding of the education provided. See EMR under Patient Instructions for exercises provided during therapy sessions.    ASSESSMENT     Carla was noted to have improved active shoulder flexion in standing as compared to previous visits and able to achieve 90 degrees.  She is gradually progressing with strengthening and AROM exercises, but fatigues quickly and required frequent rest breaks.      Carla Is progressing well towards her goals.   Pt prognosis is Good.     Pt will continue to benefit from skilled outpatient physical therapy to address the deficits listed in the problem list box on initial evaluation, provide pt/family education and to maximize pt's level of independence in the home and community environment.     Pt's spiritual, cultural and educational needs considered and pt agreeable to plan of care and goals.     Anticipated barriers to physical therapy: none    Goals:   Short Term Goals:  4 weeks  Patient will be compliant with HEP to promote the independent management of current diagnosis. Partially Met  Patient will increase right shoulder flexion to 110 degrees to improve tolerance to overhead activities.  In Progress, Not  Met  Patient will increase right shoulder functiona ER to reach C7 for function of grooming.  Met  Patient will report a decrease in complaints of right shoulder pain to 4/10 during performance of ADL's for independence of self care activities.  In Progress, Not Met        Long Term Goals:  8 weeks  Patient will increase right shoulder strength to 4+/5 in order to return to normal house work activities.  In Progress, Not Met  Patient will increase right shoulder flexion to 160 degrees in order to place dishes in overhead cabinets independently for self care independence.   In Progress, Not Met  Patient will increase right shoulder functional IR to reach T7 for function of dressing.  In Progress, Not Met  Patient will increase scapular stabilization strength to 4/5 to improve tolerance to normal lifting.  In Progress, Not Met  Patient will improve FOTO limitation status from 62% to 39% placing the patient in the 20-40% impaired, limited, or restricted category indicating increased functional mobility.   In Progress, Not Met    PLAN     Continue with PT POC and progress as tolerated.     Ryan Oconnor, PT

## 2023-03-09 ENCOUNTER — CLINICAL SUPPORT (OUTPATIENT)
Dept: REHABILITATION | Facility: HOSPITAL | Age: 68
End: 2023-03-09
Payer: MEDICARE

## 2023-03-09 DIAGNOSIS — R29.898 WEAKNESS OF RIGHT ARM: ICD-10-CM

## 2023-03-09 DIAGNOSIS — M25.511 ACUTE PAIN OF RIGHT SHOULDER: Primary | ICD-10-CM

## 2023-03-09 DIAGNOSIS — M25.611 DECREASED ROM OF RIGHT SHOULDER: ICD-10-CM

## 2023-03-09 PROCEDURE — 97110 THERAPEUTIC EXERCISES: CPT | Mod: HCNC,PN

## 2023-03-09 NOTE — PROGRESS NOTES
[facility]   Physical Therapy Treatment and Progress Note     Name: Carla HERRING [facility] Number: [id number]    Therapy Diagnosis:   Encounter Diagnoses   Name Primary?    Acute pain of right shoulder Yes    Decreased ROM of right shoulder     Weakness of right arm          Physician: Taqueria Melendrez MD    Visit Date: 3/9/2023    Physician Orders: PT Eval and Treat   Medical Diagnosis from Referral: M75.41 (ICD-10-CM) - Impingement syndrome of right shoulder  Evaluation Date: 9/14/2022  Authorization Period Expiration: 12/31/2023  Plan of Care Expiration: 3/11/2023  Progress Note Due: 2/11/2023  Visit # / Visits authorized: 13/ 20   FOTO: 2/ 3 (eval, 10/19/22)     Precautions: Standard    Time In: 10:00 am  Time Out: 10:35 am  Total Billable Time: 35 minutes    SUBJECTIVE     Pt reports: continues to have daily shoulder pain/stiffness with intensity of symptoms depending on activity level.  She continues to have soreness following therapy, but usually only lasts the remainder of the day.      She was not compliant with home exercise program.  Response to previous treatment: decreased pain  Functional change: able to reach up and pull ceiling fan string, able to dress independently    Pain: 5/10  Location: right shoulder     OBJECTIVE     Objective Measures updated at progress report unless specified.      Update:   Observation: pt holds right arm in guarded position, ambulates with decreased arm swing     Posture: rounded shoulder        Passive Range of Motion: supine  Shoulder Left Right   Flexion 160  155*   Abduction 160 155*   ER  95 90   IR 80 70*      Active Range of Motion: standing  Shoulder Left Right   Flexion 160 deg 100 deg *   Abduction 160 deg 92 deg *   ER  Reach T3 Reach C7*   IR Reach T4 Reach T7      Upper Extremity Strength    (L) UE (R) UE   Shoulder flexion: 4+/5 3-/5   Shoulder Abduction: 4+/5 3/5   Shoulder ER 4+/5 3-/5   Shoulder IR 4+/5 4/5   Lower Trap  3+/5 3+/5   Middle Trap 4-/5 3+/5   Rhomboids 4-/5 3+/5            Special Tests:  AC Joint Left Right   AC Joint Compression Test neg pos   Empty Can Test neg Pos   Drop Arm test neg neg         Joint Mobility: slight restrictions in posterior capsule     Palpation: tenderness noted over upper trapezius, AC joint, deltoids, bicep, pec major     Sensation: intact to light touch           Scapular Control/Dyskinesis:     Normal / Subtle / Obvious  Comments    Left  normal normal    Right  obvious Upper trap substitution         TREATMENT     Carla received the treatments listed below:      Therapeutic exercises to develop strength, endurance, ROM, flexibility, posture, and core stabilization for 35 minutes including:   UBE x 4 minutes , level 1  Scapular protractions  3 x 10 2#db  Supine reverse Codman's 1 x 30 cw/ccw 2#db  Supine chest press and AAROM for shoulder flexion with 2# bar 2 x 10   Supine active (R) shoulder flexion 2 x 10  Side lying (R) shoulder abduction 2 x 10, 1#  Side lying (R) shoulder flexion 2 x 10  Prone horizontal abduction 2 x 10, 1#  Prone shoulder extension 2 x 10, 1#   Prone scaption 2 x 10, 1#      Possible next visit: prone horizontal abduction, prone extension, prone scaption, wall push up     Manual therapy techniques applied for 0 minutes, including:  dynamic functional therapeutic activities to improve functional performance for (0)  minutes, including:  PATIENT EDUCATION AND HOME EXERCISES     Home Exercises Provided and Patient Education Provided     Education/Self-Care provided:  Role of PT and goals for therapy.   HEP    Written Home Exercises Provided: Patient instructed to cont prior HEP. Exercises were reviewed and Carla was able to demonstrate them prior to the end of the session. Carla demonstrated good  understanding of the education provided. See EMR under Patient Instructions for exercises provided during therapy sessions.    ASSESSMENT     Carla has improved  active shoulder flexion as compared to last assessment and reporting decrease in normal pain levels.  She continues to have strength deficits, but slowly improving with AROM of right shoulder.  Patient has continued difficulty performing reaching, lifting, especially overhead or behind her back.  She would benefit from extending PT POC date to cover remaining visits approved by insurance.     Carla Is progressing well towards her goals.   Pt prognosis is Good.     Pt will continue to benefit from skilled outpatient physical therapy to address the deficits listed in the problem list box on initial evaluation, provide pt/family education and to maximize pt's level of independence in the home and community environment.     Pt's spiritual, cultural and educational needs considered and pt agreeable to plan of care and goals.     Anticipated barriers to physical therapy: none    Goals:   Short Term Goals:  4 weeks  Patient will be compliant with HEP to promote the independent management of current diagnosis. Partially Met  Patient will increase right shoulder flexion to 110 degrees to improve tolerance to overhead activities.  In Progress, Not Met  Patient will increase right shoulder functiona ER to reach C7 for function of grooming.  Met  Patient will report a decrease in complaints of right shoulder pain to 4/10 during performance of ADL's for independence of self care activities.  In Progress, Not Met        Long Term Goals:  8 weeks  Patient will increase right shoulder strength to 4+/5 in order to return to normal house work activities.  In Progress, Not Met  Patient will increase right shoulder flexion to 160 degrees in order to place dishes in overhead cabinets independently for self care independence.   In Progress, Not Met  Patient will increase right shoulder functional IR to reach T7 for function of dressing.  In Progress, Not Met  Patient will increase scapular stabilization strength to 4/5 to improve  tolerance to normal lifting.  In Progress, Not Met  Patient will improve FOTO limitation status from 62% to 39% placing the patient in the 20-40% impaired, limited, or restricted category indicating increased functional mobility.   In Progress, Not Met    PLAN     Continue with PT POC and progress as tolerated.     Ryan Oconnor, PT

## 2023-03-09 NOTE — PLAN OF CARE
Outpatient Therapy Updated Plan of Care     Visit Date: 3/9/2023  Name: Carla HERRING [facility] Number: [id number]    Therapy Diagnosis:   Encounter Diagnoses   Name Primary?    Acute pain of right shoulder Yes    Decreased ROM of right shoulder     Weakness of right arm      Physician: Taqueria Melendrez MD    Physician Orders: PT Eval and Treat   Medical Diagnosis from Referral: M75.41 (ICD-10-CM) - Impingement syndrome of right shoulder  Evaluation Date: 9/14/2022      Precautions:  standard    Subjective     Update: continues to have daily shoulder pain/stiffness with intensity of symptoms depending on activity level.  She continues to have soreness following therapy, but usually only lasts the remainder of the day    Objective     Update: Observation: pt holds right arm in guarded position, ambulates with decreased arm swing     Posture: rounded shoulder        Passive Range of Motion: supine  Shoulder Left Right   Flexion 160  155*   Abduction 160 155*   ER  95 90   IR 80 70*      Active Range of Motion: standing  Shoulder Left Right   Flexion 160 deg 100 deg *   Abduction 160 deg 92 deg *   ER  Reach T3 Reach C7*   IR Reach T4 Reach T7      Upper Extremity Strength    (L) UE (R) UE   Shoulder flexion: 4+/5 3-/5   Shoulder Abduction: 4+/5 3/5   Shoulder ER 4+/5 3-/5   Shoulder IR 4+/5 4/5   Lower Trap 3+/5 3+/5   Middle Trap 4-/5 3+/5   Rhomboids 4-/5 3+/5            Special Tests:  AC Joint Left Right   AC Joint Compression Test neg pos   Empty Can Test neg Pos   Drop Arm test neg neg         Joint Mobility: slight restrictions in posterior capsule     Palpation: tenderness noted over upper trapezius, AC joint, deltoids, bicep, pec major     Sensation: intact to light touch           Scapular Control/Dyskinesis:     Normal / Subtle / Obvious  Comments    Left  normal normal    Right  obvious Upper trap substitution        Assessment     Update: Carla has improved active shoulder flexion as compared to  last assessment and reporting decrease in normal pain levels.  She continues to have strength deficits, but slowly improving with AROM of right shoulder.  Patient has continued difficulty performing reaching, lifting, especially overhead or behind her back.  She would benefit from extending PT POC date to cover remaining visits approved by insurance.       New Short Term Goals Status:     Short Term Goals:  4 weeks  Patient will be compliant with HEP to promote the independent management of current diagnosis. Partially Met  Patient will increase right shoulder flexion to 110 degrees to improve tolerance to overhead activities.  In Progress, Not Met  Patient will increase right shoulder functiona ER to reach C7 for function of grooming.  Met  Patient will report a decrease in complaints of right shoulder pain to 4/10 during performance of ADL's for independence of self care activities.  In Progress, Not Met        Long Term Goals:  8 weeks  Patient will increase right shoulder strength to 4+/5 in order to return to normal house work activities.  In Progress, Not Met  Patient will increase right shoulder flexion to 160 degrees in order to place dishes in overhead cabinets independently for self care independence.   In Progress, Not Met  Patient will increase right shoulder functional IR to reach T7 for function of dressing.  In Progress, Not Met  Patient will increase scapular stabilization strength to 4/5 to improve tolerance to normal lifting.  In Progress, Not Met  Patient will improve FOTO limitation status from 62% to 39% placing the patient in the 20-40% impaired, limited, or restricted category indicating increased functional mobility.   In Progress, Not Met  Long Term Goal Status:   continue per initial plan of care.  Reasons for Recertification of Therapy:   continued AROM and strength deficits    Plan     Updated Certification Period: 3/9/2023 to 5/9/23  Recommended Treatment Plan: 2 times per week for  reaming visits: Manual Therapy, Moist Heat/ Ice, Neuromuscular Re-ed, Patient Education, Therapeutic Activities, Therapeutic Exercise, and IASTM, vacuum cupping, and kinesiotaping  Other Recommendations: none    Ryan Oconnor, PT  3/9/2023      I CERTIFY THE NEED FOR THESE SERVICES FURNISHED UNDER THIS PLAN OF TREATMENT AND WHILE UNDER MY CARE    Physician's comments:        Physician's Signature: ___________________________________________________

## 2023-03-14 ENCOUNTER — CLINICAL SUPPORT (OUTPATIENT)
Dept: REHABILITATION | Facility: HOSPITAL | Age: 68
End: 2023-03-14
Payer: MEDICARE

## 2023-03-14 DIAGNOSIS — M25.611 DECREASED ROM OF RIGHT SHOULDER: ICD-10-CM

## 2023-03-14 DIAGNOSIS — M25.511 ACUTE PAIN OF RIGHT SHOULDER: Primary | ICD-10-CM

## 2023-03-14 DIAGNOSIS — R29.898 WEAKNESS OF RIGHT ARM: ICD-10-CM

## 2023-03-14 PROCEDURE — 97110 THERAPEUTIC EXERCISES: CPT | Mod: HCNC,PN

## 2023-03-14 PROCEDURE — 97140 MANUAL THERAPY 1/> REGIONS: CPT | Mod: HCNC,PN

## 2023-03-14 NOTE — PROGRESS NOTES
[facility] OUTPATIENT THERAPY AND WELLNESS   Physical Therapy Treatment Note     Name: Carla HERRING [facility] Number: [id number]    Therapy Diagnosis:   Encounter Diagnoses   Name Primary?    Acute pain of right shoulder Yes    Decreased ROM of right shoulder     Weakness of right arm          Physician: Taqueria Melendrez MD    Visit Date: 3/14/2023    Physician Orders: PT Eval and Treat   Medical Diagnosis from Referral: M75.41 (ICD-10-CM) - Impingement syndrome of right shoulder  Evaluation Date: 9/14/2022  Authorization Period Expiration: 12/31/2023  Plan of Care Expiration: 5/9/2023  Progress Note Due: 4/9/2023  Visit # / Visits authorized: 14/ 20   FOTO: 2/ 3 (eval, 10/19/22)     Precautions: Standard    Time In: 10:00 am  Time Out: 10:45 am  Total Billable Time: 45 minutes    SUBJECTIVE     Pt reports: having slight increase in her shoulder pain this morning that she attributes to the cold weather.      She was not compliant with home exercise program.  Response to previous treatment: decreased pain  Functional change: able to reach up and pull ceiling fan string, able to dress independently    Pain: 6/10  Location: right shoulder     OBJECTIVE     Objective Measures updated at progress report unless specified.         TREATMENT     Carla received the treatments listed below:      Therapeutic exercises to develop strength, endurance, ROM, flexibility, posture, and core stabilization for 35 minutes including:   UBE x 4 minutes , level 1  Bicep curl 2 x 10, 3# db  Tricep extension 2 x 10, GTB  Scapular protractions  3 x 10 3#db  Supine reverse Codman's 1 x 30 cw/ccw 3#db  Supine chest press and AAROM for shoulder flexion with 3# bar 2 x 10   Supine active (R) shoulder flexion 1 x 8, 1 x 12 with 1#db  Side lying (R) shoulder ER 3 x 10   Side lying (R) shoulder abduction 2 x 10, 1#  Side lying (R) shoulder flexion 2 x 10  Prone horizontal abduction 3 x 10, 1#  Prone shoulder extension 3 x 10, 1#   Prone scaption  3 x 10, 1#      Possible next visit: prone horizontal abduction, prone extension, prone scaption, wall push up     Manual therapy techniques applied for 10 minutes, including:  PROM to right shoulder in all planes  STM to right deltoid, upper trap, infraspinatus, teres minor, and lat dorsi  Kinesiotaping for deltoid inhibition:   Patient was screened for use of kinesiotape and was cleared for use.  1. Has the patient ever had a reaction to the adhesive in bandaids? No  2. Has the patient ever uses athletic/kinesiotape in the past? No  3. Is the patient hemodynamically impaired (PE, DVT, CHF, Kidney failure)? No  4. Can the PT/PTA apply the tape to your skin? Yes  Patient was instructed on duration to wear the tape, proper drying techniques for the tape, and to remove the tape if he/she had any issues with it.  Patient verbalized understanding of these instructions. Kinesiotaping for right deltoid inhibition  dynamic functional therapeutic activities to improve functional performance for (0)  minutes, including:  PATIENT EDUCATION AND HOME EXERCISES     Home Exercises Provided and Patient Education Provided     Education/Self-Care provided:  Role of PT and goals for therapy.   HEP    Written Home Exercises Provided: Patient instructed to cont prior HEP. Exercises were reviewed and Carla was able to demonstrate them prior to the end of the session. Carla demonstrated good  understanding of the education provided. See EMR under Patient Instructions for exercises provided during therapy sessions.    ASSESSMENT     Carla was able to active flex shoulder to about 90 degrees in standing, and progressed prone, side lying, and supine active shoulder exercises.  She is slowly progressing with shoulder strength, but continues to be unable to perform external rotation through full ROM against gravity or resistance.       Carla Is progressing well towards her goals.   Pt prognosis is Good.     Pt will continue to benefit  from skilled outpatient physical therapy to address the deficits listed in the problem list box on initial evaluation, provide pt/family education and to maximize pt's level of independence in the home and community environment.     Pt's spiritual, cultural and educational needs considered and pt agreeable to plan of care and goals.     Anticipated barriers to physical therapy: none    Goals:   Short Term Goals:  4 weeks  Patient will be compliant with HEP to promote the independent management of current diagnosis. Partially Met  Patient will increase right shoulder flexion to 110 degrees to improve tolerance to overhead activities.  In Progress, Not Met  Patient will increase right shoulder functiona ER to reach C7 for function of grooming.  Met  Patient will report a decrease in complaints of right shoulder pain to 4/10 during performance of ADL's for independence of self care activities.  In Progress, Not Met        Long Term Goals:  8 weeks  Patient will increase right shoulder strength to 4+/5 in order to return to normal house work activities.  In Progress, Not Met  Patient will increase right shoulder flexion to 160 degrees in order to place dishes in overhead cabinets independently for self care independence.   In Progress, Not Met  Patient will increase right shoulder functional IR to reach T7 for function of dressing.  In Progress, Not Met  Patient will increase scapular stabilization strength to 4/5 to improve tolerance to normal lifting.  In Progress, Not Met  Patient will improve FOTO limitation status from 62% to 39% placing the patient in the 20-40% impaired, limited, or restricted category indicating increased functional mobility.   In Progress, Not Met    PLAN     Continue with PT POC and progress as tolerated.     Ryan Oconnor, PT

## 2023-03-17 ENCOUNTER — CLINICAL SUPPORT (OUTPATIENT)
Dept: FAMILY MEDICINE | Facility: CLINIC | Age: 68
End: 2023-03-17
Payer: MEDICARE

## 2023-03-17 DIAGNOSIS — E53.8 B12 DEFICIENCY: Primary | ICD-10-CM

## 2023-03-17 PROCEDURE — 96372 PR INJECTION,THERAP/PROPH/DIAG2ST, IM OR SUBCUT: ICD-10-PCS | Mod: HCNC,S$GLB,, | Performed by: FAMILY MEDICINE

## 2023-03-17 PROCEDURE — 96372 THER/PROPH/DIAG INJ SC/IM: CPT | Mod: HCNC,S$GLB,, | Performed by: FAMILY MEDICINE

## 2023-03-17 PROCEDURE — 99999 PR PBB SHADOW E&M-EST. PATIENT-LVL II: ICD-10-PCS | Mod: PBBFAC,HCNC,,

## 2023-03-17 PROCEDURE — 99999 PR PBB SHADOW E&M-EST. PATIENT-LVL II: CPT | Mod: PBBFAC,HCNC,,

## 2023-03-17 RX ADMIN — CYANOCOBALAMIN 1000 MCG: 1000 INJECTION, SOLUTION INTRAMUSCULAR at 09:03

## 2023-03-21 ENCOUNTER — CLINICAL SUPPORT (OUTPATIENT)
Dept: REHABILITATION | Facility: HOSPITAL | Age: 68
End: 2023-03-21
Payer: MEDICARE

## 2023-03-21 DIAGNOSIS — M25.511 ACUTE PAIN OF RIGHT SHOULDER: Primary | ICD-10-CM

## 2023-03-21 DIAGNOSIS — M25.611 DECREASED ROM OF RIGHT SHOULDER: ICD-10-CM

## 2023-03-21 DIAGNOSIS — R29.898 WEAKNESS OF RIGHT ARM: ICD-10-CM

## 2023-03-21 PROCEDURE — 97110 THERAPEUTIC EXERCISES: CPT | Mod: HCNC,PN

## 2023-03-21 PROCEDURE — 97140 MANUAL THERAPY 1/> REGIONS: CPT | Mod: HCNC,PN

## 2023-03-21 NOTE — PROGRESS NOTES
[facility] OUTPATIENT THERAPY AND [facility]LL[facility]   Physical Therapy Treatment Note     Name: Carla HERRING [facility] Number: [id number]    Therapy Diagnosis:   Encounter Diagnoses   Name Primary?    Acute pain of right shoulder Yes    Decreased ROM of right shoulder     Weakness of right arm          Physician: Taqueria Melendrez MD    Visit Date: 3/21/2023    Physician Orders: PT Eval and Treat   Medical Diagnosis from Referral: M75.41 (ICD-10-CM) - Impingement syndrome of right shoulder  Evaluation Date: 9/14/2022  Authorization Period Expiration: 12/31/2023  Plan of Care Expiration: 5/9/2023  Progress Note Due: 4/9/2023  Visit # / Visits authorized: 15/ 20   FOTO: 2/ 3 (eval, 10/19/22)     Precautions: Standard    Time In: 10:00 am  Time Out: 10:45 am  Total Billable Time: 45 minutes    SUBJECTIVE     Pt reports: continued shoulder soreness and decreased strength causing difficulty with ADL's.     She was not compliant with home exercise program.  Response to previous treatment: decreased pain  Functional change: able to reach up and pull ceiling fan string, able to dress independently    Pain: 5/10  Location: right shoulder     OBJECTIVE     Objective Measures updated at progress report unless specified.         TREATMENT     Carla received the treatments listed below:      Therapeutic exercises to develop strength, endurance, ROM, flexibility, posture, and core stabilization for 35 minutes including:   UBE x 4 minutes , level 1  Bicep curl 3 x 10, 3# db  Supine Tricep extension 2 x 10, 2#db  Scapular protractions  3 x 10 3#db  Supine reverse Codman's 1 x 30 cw/ccw 3#db  Supine chest press and AAROM for shoulder flexion with 3# bar 2 x 10   Supine active (R) shoulder flexion 2 x 10  Side lying (R) shoulder ER 3 x 10   Side lying (R) shoulder abduction 3 x 10  Side lying (R) shoulder flexion 2 x 10  Prone horizontal abduction 3 x 10, 1.5# cuff  Prone shoulder extension 3 x 10, 1.5# cuff  Prone scaption 3 x 10, 1.5#  cuff     Possible next visit: prone horizontal abduction, prone extension, prone scaption, wall push up     Manual therapy techniques applied for 10 minutes, including:  PROM to right shoulder in all planes  STM to right deltoid, upper trap, infraspinatus, teres minor, and lat dorsi  Kinesiotaping for deltoid inhibition:   Patient was screened for use of kinesiotape and was cleared for use.  1. Has the patient ever had a reaction to the adhesive in bandaids? No  2. Has the patient ever uses athletic/kinesiotape in the past? No  3. Is the patient hemodynamically impaired (PE, DVT, CHF, Kidney failure)? No  4. Can the PT/PTA apply the tape to your skin? Yes  Patient was instructed on duration to wear the tape, proper drying techniques for the tape, and to remove the tape if he/she had any issues with it.  Patient verbalized understanding of these instructions. Kinesiotaping for right deltoid inhibition  dynamic functional therapeutic activities to improve functional performance for (0)  minutes, including:  PATIENT EDUCATION AND HOME EXERCISES     Home Exercises Provided and Patient Education Provided     Education/Self-Care provided:  Role of PT and goals for therapy.   HEP    Written Home Exercises Provided: Patient instructed to cont prior HEP. Exercises were reviewed and Carla was able to demonstrate them prior to the end of the session. Carla demonstrated good  understanding of the education provided. See EMR under Patient Instructions for exercises provided during therapy sessions.    ASSESSMENT     Carla is able to actively flexion shoulder to 90 degrees on first couple reps of standing exercises but quickly fatigues and ROM decreases.  She continues with progression of stabilization exercises with addition of 1.5# cuff weight during prone exercises.  Patient continues to have limited functional mobility of right shoulder especially with overhead activities and continues to be unable to perform external  rotation through full range against gravity.    Carla Is progressing well towards her goals.   Pt prognosis is Good.     Pt will continue to benefit from skilled outpatient physical therapy to address the deficits listed in the problem list box on initial evaluation, provide pt/family education and to maximize pt's level of independence in the home and community environment.     Pt's spiritual, cultural and educational needs considered and pt agreeable to plan of care and goals.     Anticipated barriers to physical therapy: none    Goals:   Short Term Goals:  4 weeks  Patient will be compliant with HEP to promote the independent management of current diagnosis. Partially Met  Patient will increase right shoulder flexion to 110 degrees to improve tolerance to overhead activities.  In Progress, Not Met  Patient will increase right shoulder functiona ER to reach C7 for function of grooming.  Met  Patient will report a decrease in complaints of right shoulder pain to 4/10 during performance of ADL's for independence of self care activities.  In Progress, Not Met        Long Term Goals:  8 weeks  Patient will increase right shoulder strength to 4+/5 in order to return to normal house work activities.  In Progress, Not Met  Patient will increase right shoulder flexion to 160 degrees in order to place dishes in overhead cabinets independently for self care independence.   In Progress, Not Met  Patient will increase right shoulder functional IR to reach T7 for function of dressing.  In Progress, Not Met  Patient will increase scapular stabilization strength to 4/5 to improve tolerance to normal lifting.  In Progress, Not Met  Patient will improve FOTO limitation status from 62% to 39% placing the patient in the 20-40% impaired, limited, or restricted category indicating increased functional mobility.   In Progress, Not Met    PLAN     Continue with PT POC and progress as tolerated.     Ryan Oconnor, PT

## 2023-03-23 ENCOUNTER — CLINICAL SUPPORT (OUTPATIENT)
Dept: REHABILITATION | Facility: HOSPITAL | Age: 68
End: 2023-03-23
Payer: MEDICARE

## 2023-03-23 DIAGNOSIS — M25.611 DECREASED ROM OF RIGHT SHOULDER: ICD-10-CM

## 2023-03-23 DIAGNOSIS — M25.511 ACUTE PAIN OF RIGHT SHOULDER: Primary | ICD-10-CM

## 2023-03-23 DIAGNOSIS — R29.898 WEAKNESS OF RIGHT ARM: ICD-10-CM

## 2023-03-23 PROCEDURE — 97110 THERAPEUTIC EXERCISES: CPT | Mod: HCNC,PN

## 2023-03-23 NOTE — PROGRESS NOTES
[facility]   Physical Therapy Treatment Note     Name: Carla HERRING [facility] Number: [id number]    Therapy Diagnosis:   Encounter Diagnoses   Name Primary?    Acute pain of right shoulder Yes    Decreased ROM of right shoulder     Weakness of right arm          Physician: Taqueria Melendrez MD    Visit Date: 3/23/2023    Physician Orders: PT Eval and Treat   Medical Diagnosis from Referral: M75.41 (ICD-10-CM) - Impingement syndrome of right shoulder  Evaluation Date: 9/14/2022  Authorization Period Expiration: 12/31/2023  Plan of Care Expiration: 5/9/2023  Progress Note Due: 4/9/2023  Visit # / Visits authorized: 16/ 20   FOTO: 2/ 3 (eval, 10/19/22)     Precautions: Standard    Time In: 10:00 am  Time Out: 10:45 am  Total Billable Time: 30 minutes    SUBJECTIVE     Pt reports: having increased shoulder pain over the past couple days.    She was not compliant with home exercise program.  Response to previous treatment: decreased pain  Functional change: able to reach up and pull ceiling fan string, able to dress independently    Pain: 7/10  Location: right shoulder     OBJECTIVE     Objective Measures updated at progress report unless specified.         TREATMENT     Carla received the treatments listed below:      Therapeutic exercises to develop strength, endurance, ROM, flexibility, posture, and core stabilization for 30 minutes including:   UBE x 4 minutes , level 2  Bicep curl 3 x 10, 3# db  Supine Tricep extension 2 x 10, 2#db  Scapular protractions  3 x 10 3#db  Supine reverse Codman's 1 x 30 cw/ccw 3#db  Supine chest press and AAROM for shoulder flexion with 3# bar 2 x 10   Supine active (R) shoulder flexion 2 x 10, 1 x 10 with  1#db  Side lying (R) shoulder ER 3 x 10   Side lying (R) shoulder abduction 3 x 10  Side lying (R) shoulder flexion 2 x 10  Prone horizontal abduction 3 x 10, 1.5# cuff  Prone shoulder extension 3 x 10, 1.5# cuff  Prone scaption 3 x 10, 1.5# cuff      Possible next visit: prone horizontal abduction, prone extension, prone scaption, wall push up     Manual therapy techniques applied for 8 minutes, including:  PROM to right shoulder in all planes  STM to right deltoid, upper trap, infraspinatus, teres minor, and lat dorsi  dynamic functional therapeutic activities to improve functional performance for (0)  minutes, including:  PATIENT EDUCATION AND HOME EXERCISES     Home Exercises Provided and Patient Education Provided     Education/Self-Care provided:  Role of PT and goals for therapy.   HEP    Written Home Exercises Provided: Patient instructed to cont prior HEP. Exercises were reviewed and Carla was able to demonstrate them prior to the end of the session. Carla demonstrated good  understanding of the education provided. See EMR under Patient Instructions for exercises provided during therapy sessions.    ASSESSMENT     Carla has increased shoulder pain and was only progressed with supine active shoulder flexion with addition of 1#db during second set.  Patient has full PROM and continues to have strength limitations causing limited active flexion and ER against gravity.    Carla Is progressing well towards her goals.   Pt prognosis is Good.     Pt will continue to benefit from skilled outpatient physical therapy to address the deficits listed in the problem list box on initial evaluation, provide pt/family education and to maximize pt's level of independence in the home and community environment.     Pt's spiritual, cultural and educational needs considered and pt agreeable to plan of care and goals.     Anticipated barriers to physical therapy: none    Goals:   Short Term Goals:  4 weeks  Patient will be compliant with HEP to promote the independent management of current diagnosis. Partially Met  Patient will increase right shoulder flexion to 110 degrees to improve tolerance to overhead activities.  In Progress, Not Met  Patient will increase  right shoulder functiona ER to reach C7 for function of grooming.  Met  Patient will report a decrease in complaints of right shoulder pain to 4/10 during performance of ADL's for independence of self care activities.  In Progress, Not Met        Long Term Goals:  8 weeks  Patient will increase right shoulder strength to 4+/5 in order to return to normal house work activities.  In Progress, Not Met  Patient will increase right shoulder flexion to 160 degrees in order to place dishes in overhead cabinets independently for self care independence.   In Progress, Not Met  Patient will increase right shoulder functional IR to reach T7 for function of dressing.  In Progress, Not Met  Patient will increase scapular stabilization strength to 4/5 to improve tolerance to normal lifting.  In Progress, Not Met  Patient will improve FOTO limitation status from 62% to 39% placing the patient in the 20-40% impaired, limited, or restricted category indicating increased functional mobility.   In Progress, Not Met    PLAN     Continue with PT POC and progress as tolerated.     Ryan Oconnor, PT

## 2023-03-31 ENCOUNTER — EXTERNAL CHRONIC CARE MANAGEMENT (OUTPATIENT)
Dept: PRIMARY CARE CLINIC | Facility: CLINIC | Age: 68
End: 2023-03-31
Payer: MEDICARE

## 2023-03-31 PROCEDURE — 99490 PR CHRONIC CARE MGMT, 1ST 20 MIN: ICD-10-PCS | Mod: S$GLB,,, | Performed by: FAMILY MEDICINE

## 2023-03-31 PROCEDURE — 99490 CHRNC CARE MGMT STAFF 1ST 20: CPT | Mod: S$GLB,,, | Performed by: FAMILY MEDICINE

## 2023-04-04 ENCOUNTER — CLINICAL SUPPORT (OUTPATIENT)
Dept: REHABILITATION | Facility: HOSPITAL | Age: 68
End: 2023-04-04
Payer: MEDICARE

## 2023-04-04 DIAGNOSIS — R29.898 WEAKNESS OF RIGHT ARM: ICD-10-CM

## 2023-04-04 DIAGNOSIS — M25.611 DECREASED ROM OF RIGHT SHOULDER: ICD-10-CM

## 2023-04-04 DIAGNOSIS — M25.511 ACUTE PAIN OF RIGHT SHOULDER: Primary | ICD-10-CM

## 2023-04-04 PROCEDURE — 97110 THERAPEUTIC EXERCISES: CPT | Mod: HCNC,PN

## 2023-04-04 PROCEDURE — 97140 MANUAL THERAPY 1/> REGIONS: CPT | Mod: HCNC,PN

## 2023-04-04 NOTE — PROGRESS NOTES
[facility]   Physical Therapy Treatment Note     Name: Carla HERRING [facility] Number: [id number]    Therapy Diagnosis:   Encounter Diagnoses   Name Primary?    Acute pain of right shoulder Yes    Decreased ROM of right shoulder     Weakness of right arm          Physician: Taqueria Melendrez MD    Visit Date: 4/4/2023    Physician Orders: PT Eval and Treat   Medical Diagnosis from Referral: M75.41 (ICD-10-CM) - Impingement syndrome of right shoulder  Evaluation Date: 9/14/2022  Authorization Period Expiration: 12/31/2023  Plan of Care Expiration: 5/9/2023  Progress Note Due: 4/9/2023  Visit # / Visits authorized: 17/ 20   FOTO: 2/ 3 (eval, 10/19/22)     Precautions: Standard    Time In: 9:15 am  Time Out: 10:00 am  Total Billable Time: 40 minutes    SUBJECTIVE     Pt reports: she continues to have daily pain in shoulder and limited mobility reaching behind her back.    She was not compliant with home exercise program.  Response to previous treatment: decreased pain  Functional change: able to reach up and pull ceiling fan string, able to dress independently    Pain: 6/10  Location: right shoulder     OBJECTIVE     Objective Measures updated at progress report unless specified.      Standing shoulder flexion = 115 degrees actively     TREATMENT     Carla received the treatments listed below:      Therapeutic exercises to develop strength, endurance, ROM, flexibility, posture, and core stabilization for 32 minutes including:   UBE x 4 minutes , level 2  Bicep curl 3 x 10, 3# db  Supine Tricep extension 3 x 10, 3#db  Scapular protractions  3 x 10 3#db  Supine reverse Codman's 1 x 30 cw/ccw 3#db  Supine chest press and AAROM for shoulder flexion with 3# bar 2 x 10   Supine active (R) shoulder flexion 3 x 10, 1#db  Side lying (R) shoulder ER 3 x 10   Side lying (R) shoulder abduction 3 x 10  Side lying (R) shoulder flexion 2 x 10  Prone horizontal abduction 3 x 10, 2#db  Prone shoulder  extension 3 x 10, 2#db  Prone scaption 3 x 10, 2#db     Possible next visit: prone horizontal abduction, prone extension, prone scaption, wall push up     Manual therapy techniques applied for 8 minutes, including:  PROM to right shoulder in all planes  STM to right deltoid, upper trap, infraspinatus, teres minor, and lat dorsi  dynamic functional therapeutic activities to improve functional performance for (0)  minutes, including:  PATIENT EDUCATION AND HOME EXERCISES     Home Exercises Provided and Patient Education Provided     Education/Self-Care provided:  Role of PT and goals for therapy.   HEP    Written Home Exercises Provided: Patient instructed to cont prior HEP. Exercises were reviewed and Carla was able to demonstrate them prior to the end of the session. Carla demonstrated good  understanding of the education provided. See EMR under Patient Instructions for exercises provided during therapy sessions.    ASSESSMENT     Carla is noted to have improved standing active shoulder flexion and is progressed with strengthening exercises on this date with no increase in pain reported during or following today's treatment.  She continues to report functional limitations especially with reaching behind her.  Patient met STG # 2.      Carla Is progressing well towards her goals.   Pt prognosis is Good.     Pt will continue to benefit from skilled outpatient physical therapy to address the deficits listed in the problem list box on initial evaluation, provide pt/family education and to maximize pt's level of independence in the home and community environment.     Pt's spiritual, cultural and educational needs considered and pt agreeable to plan of care and goals.     Anticipated barriers to physical therapy: none    Goals:   Short Term Goals:  4 weeks  Patient will be compliant with HEP to promote the independent management of current diagnosis. Partially Met  Patient will increase right shoulder flexion to  110 degrees to improve tolerance to overhead activities. Met  Patient will increase right shoulder functiona ER to reach C7 for function of grooming.  Met  Patient will report a decrease in complaints of right shoulder pain to 4/10 during performance of ADL's for independence of self care activities.  In Progress, Not Met        Long Term Goals:  8 weeks  Patient will increase right shoulder strength to 4+/5 in order to return to normal house work activities.  In Progress, Not Met  Patient will increase right shoulder flexion to 160 degrees in order to place dishes in overhead cabinets independently for self care independence.   In Progress, Not Met  Patient will increase right shoulder functional IR to reach T7 for function of dressing.  In Progress, Not Met  Patient will increase scapular stabilization strength to 4/5 to improve tolerance to normal lifting.  In Progress, Not Met  Patient will improve FOTO limitation status from 62% to 39% placing the patient in the 20-40% impaired, limited, or restricted category indicating increased functional mobility.   In Progress, Not Met    PLAN     Continue with PT POC and progress as tolerated.     Ryan Oconnor, PT

## 2023-04-17 ENCOUNTER — PES CALL (OUTPATIENT)
Dept: ADMINISTRATIVE | Facility: CLINIC | Age: 68
End: 2023-04-17
Payer: MEDICARE

## 2023-04-18 ENCOUNTER — CLINICAL SUPPORT (OUTPATIENT)
Dept: REHABILITATION | Facility: HOSPITAL | Age: 68
End: 2023-04-18
Payer: MEDICARE

## 2023-04-18 DIAGNOSIS — M25.511 ACUTE PAIN OF RIGHT SHOULDER: Primary | ICD-10-CM

## 2023-04-18 DIAGNOSIS — R29.898 WEAKNESS OF RIGHT ARM: ICD-10-CM

## 2023-04-18 DIAGNOSIS — M25.611 DECREASED ROM OF RIGHT SHOULDER: ICD-10-CM

## 2023-04-18 PROCEDURE — 97530 THERAPEUTIC ACTIVITIES: CPT | Mod: HCNC,PN

## 2023-04-18 NOTE — PROGRESS NOTES
[facility]   Physical Therapy Treatment Note     Name: Carla HERRING [facility] Number: [id number]    Therapy Diagnosis:   Encounter Diagnoses   Name Primary?    Acute pain of right shoulder Yes    Decreased ROM of right shoulder     Weakness of right arm          Physician: Taqueria Melendrez MD    Visit Date: 4/18/2023    Physician Orders: PT Eval and Treat   Medical Diagnosis from Referral: M75.41 (ICD-10-CM) - Impingement syndrome of right shoulder  Evaluation Date: 9/14/2022  Authorization Period Expiration: 12/31/2023  Plan of Care Expiration: 5/9/2023  Progress Note Due: 4/9/2023  Visit # / Visits authorized: 18/ 40   FOTO: 2/ 3 (eval, 10/19/22)     Precautions: Standard    Time In: 11:30 am  Time Out: 12:00 am  Total Billable Time: 15 minutes    SUBJECTIVE     Pt reports: she has not been able to attend therapy recently due to her  having back surgery and her mother being in hospital.  She has been having continued shoulder pain and limited mobility.      She was not compliant with home exercise program.  Response to previous treatment: decreased pain  Functional change: able to reach up and pull ceiling fan string, able to dress independently    Pain: 5/10  Location: right shoulder     OBJECTIVE     Objective Measures updated at progress report unless specified.      Standing shoulder flexion = 90 degrees actively     TREATMENT     Carla received the treatments listed below:      Therapeutic exercises to develop strength, endurance, ROM, flexibility, posture, and core stabilization for 24 minutes including:   UBE x 4 minutes , level 2  Shoulder pulleys into flexion x 2 minutes   Bicep curl 3 x 10, 3# db  Scapular protractions  3 x 10 3#db  Supine reverse Codman's 1 x 30 cw/ccw 3#db     Supine active (R) shoulder flexion 3 x 10, 1#db  Side lying (R) shoulder ER 3 x 10   Side lying (R) shoulder abduction 3 x 10  Side lying (R) shoulder flexion 2 x 10  Prone horizontal  abduction 3 x 10, 2#db  Prone shoulder extension 3 x 10, 2#db  Prone scaption 3 x 10, 2#db     Possible next visit: prone horizontal abduction, prone extension, prone scaption, wall push up     Manual therapy techniques applied for 0 minutes, including:  dynamic functional therapeutic activities to improve functional performance for (8)  minutes, including:  - Rows 3 x 10, BTB  - wall push ups 3 x 10  - overhead press 1 x 10, 1#db  PATIENT EDUCATION AND HOME EXERCISES     Home Exercises Provided and Patient Education Provided     Education/Self-Care provided:  Role of PT and goals for therapy.   HEP    Written Home Exercises Provided: Patient instructed to cont prior HEP. Exercises were reviewed and Carla was able to demonstrate them prior to the end of the session. Carla demonstrated good  understanding of the education provided. See EMR under Patient Instructions for exercises provided during therapy sessions.    ASSESSMENT     Carla has not been able to attend therapy recently due to taking care of  and mother.  She has slight decrease in active shoulder flexion as compared to previous visit and continues to complain of consistent shoulder pain.  Patient resumes functional strengthening exercises, but fatigues quickly with overhead press.  Patient continues to be unable to perform side lying external rotation throughout full range without assistance.        Carla Is progressing well towards her goals.   Pt prognosis is Good.     Pt will continue to benefit from skilled outpatient physical therapy to address the deficits listed in the problem list box on initial evaluation, provide pt/family education and to maximize pt's level of independence in the home and community environment.     Pt's spiritual, cultural and educational needs considered and pt agreeable to plan of care and goals.     Anticipated barriers to physical therapy: none    Goals:   Short Term Goals:  4 weeks  Patient will be  compliant with HEP to promote the independent management of current diagnosis. Partially Met  Patient will increase right shoulder flexion to 110 degrees to improve tolerance to overhead activities. Met  Patient will increase right shoulder functiona ER to reach C7 for function of grooming.  Met  Patient will report a decrease in complaints of right shoulder pain to 4/10 during performance of ADL's for independence of self care activities.  In Progress, Not Met        Long Term Goals:  8 weeks  Patient will increase right shoulder strength to 4+/5 in order to return to normal house work activities.  In Progress, Not Met  Patient will increase right shoulder flexion to 160 degrees in order to place dishes in overhead cabinets independently for self care independence.   In Progress, Not Met  Patient will increase right shoulder functional IR to reach T7 for function of dressing.  In Progress, Not Met  Patient will increase scapular stabilization strength to 4/5 to improve tolerance to normal lifting.  In Progress, Not Met  Patient will improve FOTO limitation status from 62% to 39% placing the patient in the 20-40% impaired, limited, or restricted category indicating increased functional mobility.   In Progress, Not Met    PLAN     Continue with PT POC and progress as tolerated.     Ryan Oconnor, PT

## 2023-04-20 ENCOUNTER — CLINICAL SUPPORT (OUTPATIENT)
Dept: REHABILITATION | Facility: HOSPITAL | Age: 68
End: 2023-04-20
Payer: MEDICARE

## 2023-04-20 DIAGNOSIS — M25.611 DECREASED ROM OF RIGHT SHOULDER: ICD-10-CM

## 2023-04-20 DIAGNOSIS — R29.898 WEAKNESS OF RIGHT ARM: ICD-10-CM

## 2023-04-20 DIAGNOSIS — M25.511 ACUTE PAIN OF RIGHT SHOULDER: Primary | ICD-10-CM

## 2023-04-20 PROCEDURE — 97530 THERAPEUTIC ACTIVITIES: CPT | Mod: HCNC,PN

## 2023-04-20 PROCEDURE — 97110 THERAPEUTIC EXERCISES: CPT | Mod: HCNC,PN

## 2023-04-20 NOTE — PROGRESS NOTES
[facility]   Physical Therapy Treatment Note     Name: Carla HERRING [facility] Number: [id number]    Therapy Diagnosis:   Encounter Diagnoses   Name Primary?    Acute pain of right shoulder Yes    Decreased ROM of right shoulder     Weakness of right arm          Physician: Taqueria Melendrez MD    Visit Date: 4/20/2023    Physician Orders: PT Eval and Treat   Medical Diagnosis from Referral: M75.41 (ICD-10-CM) - Impingement syndrome of right shoulder  Evaluation Date: 9/14/2022  Authorization Period Expiration: 12/31/2023  Plan of Care Expiration: 5/9/2023  Progress Note Due: 4/9/2023  Visit # / Visits authorized: 19/ 40   FOTO: 2/ 3 (eval, 10/19/22)     Precautions: Standard    Time In: 11:30 am  Time Out: 12:15 am  Total Billable Time: 40 minutes    SUBJECTIVE     Pt reports: she has been having increased pain in shoulder since yesterday and believes it is due to transferring everything out of old refrigerator into new one.      She was not compliant with home exercise program.  Response to previous treatment: decreased pain  Functional change: able to reach up and pull ceiling fan string, able to dress independently    Pain: 7/10  Location: right shoulder     OBJECTIVE     Objective Measures updated at progress report unless specified.         TREATMENT     Carla received the treatments listed below:      Therapeutic exercises to develop strength, endurance, ROM, flexibility, posture, and core stabilization for 32 minutes including:   UBE x 4 minutes , level 3  Shoulder pulleys into flexion x 2 minutes   Bicep curl 3 x 10, 3# db  Tricep extension 3 x 10, 3#db  Scapular protractions  3 x 10 3#db  Supine reverse Codman's 1 x 30 cw/ccw 3#db  Supine chest press and AAROM for shoulder flexion with 3# bar 2 x 10   Supine active (R) shoulder flexion 3 x 10, 1#db  Side lying (R) shoulder ER 3 x 10   Side lying (R) shoulder abduction 3 x 10  Prone horizontal abduction 3 x 10, 2#db  Prone  shoulder extension 3 x 10, 2#db  Prone scaption 3 x 10, 2#db     Possible next visit: prone horizontal abduction, prone extension, prone scaption, wall push up     Manual therapy techniques applied for 0 minutes, including:  Kinesiotaping for deltoid inhibition:   Patient was screened for use of kinesiotape and was cleared for use.  1. Has the patient ever had a reaction to the adhesive in bandaids? No  2. Has the patient ever uses athletic/kinesiotape in the past? No  3. Is the patient hemodynamically impaired (PE, DVT, CHF, Kidney failure)? No  4. Can the PT/PTA apply the tape to your skin? Yes  Patient was instructed on duration to wear the tape, proper drying techniques for the tape, and to remove the tape if he/she had any issues with it.  Patient verbalized understanding of these instructions. Kinesiotaping for right deltoid inhibition    dynamic functional therapeutic activities to improve functional performance for (8)  minutes, including:  - Rows 3 x 10, 7#  - wall push ups 3 x 10  - overhead press 1 x 10, 1#db  PATIENT EDUCATION AND HOME EXERCISES     Home Exercises Provided and Patient Education Provided     Education/Self-Care provided:  Role of PT and goals for therapy.   HEP    Written Home Exercises Provided: Patient instructed to cont prior HEP. Exercises were reviewed and Carla was able to demonstrate them prior to the end of the session. Carla demonstrated good  understanding of the education provided. See EMR under Patient Instructions for exercises provided during therapy sessions.    ASSESSMENT     Carla has increased anterior shoulder pain today due to repetitive lifting and carrying objects from her old refrigerator to new one yesterday.  Patient continues to have weakness in shoulder external rotation and limited active shoulder flexion to 90 degrees while standing.  She is able to perform overhead press and has full active shoulder flexion in supine, as well as ability to perform full  motion while holding 1#db.  Patient had improved active flexion in standing prior to missing therapy due to  and mother's illness, and will continue with therapy to improve strength, stability, and functional mobility of right shoulder.    Carla Is progressing well towards her goals.   Pt prognosis is Good.     Pt will continue to benefit from skilled outpatient physical therapy to address the deficits listed in the problem list box on initial evaluation, provide pt/family education and to maximize pt's level of independence in the home and community environment.     Pt's spiritual, cultural and educational needs considered and pt agreeable to plan of care and goals.     Anticipated barriers to physical therapy: none    Goals:   Short Term Goals:  4 weeks  Patient will be compliant with HEP to promote the independent management of current diagnosis. Partially Met  Patient will increase right shoulder flexion to 110 degrees to improve tolerance to overhead activities. Met  Patient will increase right shoulder functiona ER to reach C7 for function of grooming.  Met  Patient will report a decrease in complaints of right shoulder pain to 4/10 during performance of ADL's for independence of self care activities.  In Progress, Not Met        Long Term Goals:  8 weeks  Patient will increase right shoulder strength to 4+/5 in order to return to normal house work activities.  In Progress, Not Met  Patient will increase right shoulder flexion to 160 degrees in order to place dishes in overhead cabinets independently for self care independence.   In Progress, Not Met  Patient will increase right shoulder functional IR to reach T7 for function of dressing.  In Progress, Not Met  Patient will increase scapular stabilization strength to 4/5 to improve tolerance to normal lifting.  In Progress, Not Met  Patient will improve FOTO limitation status from 62% to 39% placing the patient in the 20-40% impaired, limited, or  restricted category indicating increased functional mobility.   In Progress, Not Met    PLAN     Continue with PT POC and progress as tolerated.     Ryan Oconnor, PT

## 2023-04-21 ENCOUNTER — CLINICAL SUPPORT (OUTPATIENT)
Dept: FAMILY MEDICINE | Facility: CLINIC | Age: 68
End: 2023-04-21
Payer: MEDICARE

## 2023-04-21 DIAGNOSIS — E53.8 B12 DEFICIENCY: Primary | ICD-10-CM

## 2023-04-21 PROCEDURE — 99999 PR PBB SHADOW E&M-EST. PATIENT-LVL II: ICD-10-PCS | Mod: PBBFAC,HCNC,,

## 2023-04-21 PROCEDURE — 96372 PR INJECTION,THERAP/PROPH/DIAG2ST, IM OR SUBCUT: ICD-10-PCS | Mod: HCNC,S$GLB,, | Performed by: FAMILY MEDICINE

## 2023-04-21 PROCEDURE — 96372 THER/PROPH/DIAG INJ SC/IM: CPT | Mod: HCNC,S$GLB,, | Performed by: FAMILY MEDICINE

## 2023-04-21 PROCEDURE — 99999 PR PBB SHADOW E&M-EST. PATIENT-LVL II: CPT | Mod: PBBFAC,HCNC,,

## 2023-04-21 RX ADMIN — CYANOCOBALAMIN 1000 MCG: 1000 INJECTION, SOLUTION INTRAMUSCULAR at 08:04

## 2023-04-25 ENCOUNTER — CLINICAL SUPPORT (OUTPATIENT)
Dept: REHABILITATION | Facility: HOSPITAL | Age: 68
End: 2023-04-25
Payer: MEDICARE

## 2023-04-25 DIAGNOSIS — M25.511 ACUTE PAIN OF RIGHT SHOULDER: Primary | ICD-10-CM

## 2023-04-25 DIAGNOSIS — R29.898 WEAKNESS OF RIGHT ARM: ICD-10-CM

## 2023-04-25 DIAGNOSIS — M25.611 DECREASED ROM OF RIGHT SHOULDER: ICD-10-CM

## 2023-04-25 PROCEDURE — 97530 THERAPEUTIC ACTIVITIES: CPT | Mod: HCNC,PN

## 2023-04-25 PROCEDURE — 97110 THERAPEUTIC EXERCISES: CPT | Mod: HCNC,PN

## 2023-04-25 NOTE — PROGRESS NOTES
[facility]   Physical Therapy Treatment Note     Name: aCrla HERRING [facility] Number: [id number]    Therapy Diagnosis:   Encounter Diagnoses   Name Primary?    Acute pain of right shoulder Yes    Decreased ROM of right shoulder     Weakness of right arm          Physician: Taqueria Melendrez MD    Visit Date: 4/25/2023    Physician Orders: PT Eval and Treat   Medical Diagnosis from Referral: M75.41 (ICD-10-CM) - Impingement syndrome of right shoulder  Evaluation Date: 9/14/2022  Authorization Period Expiration: 12/31/2023  Plan of Care Expiration: 5/9/2023  Progress Note Due: 4/9/2023  Visit # / Visits authorized: 20/ 40   FOTO: 2/ 3 (eval, 10/19/22)     Precautions: Standard    Time In: 11:30 am  Time Out: 12:15 am  Total Billable Time: 40 minutes    SUBJECTIVE     Pt reports: decreased shoulder pain over the past few days and taping helped.      She was not compliant with home exercise program.  Response to previous treatment: decreased pain  Functional change: able to reach up and pull ceiling fan string, able to dress independently    Pain: 5/10  Location: right shoulder     OBJECTIVE     Objective Measures updated at progress report unless specified.         TREATMENT     Carla received the treatments listed below:      Therapeutic exercises to develop strength, endurance, ROM, flexibility, posture, and core stabilization for 32 minutes including:   UBE x 4 minutes , level 3  Standing functional IR with sliding bar behind back x 2 minutes  Shoulder pulleys into flexion x 2 minutes   Bicep curl 2 x 10, 4# db  Tricep extension 3 x 10, 3# (cross cable)  Scapular protractions  3 x 10 3#db  Supine reverse Codman's 1 x 30 cw/ccw 3#db  Supine chest press and AAROM for shoulder flexion with 3# bar 2 x 10   Supine active (R) shoulder flexion 3 x 10, 1#db  Side lying (R) shoulder ER 3 x 10   Side lying (R) shoulder abduction 3 x 10  Prone horizontal abduction 3 x 10, 2#db  Prone shoulder  extension 3 x 10, 2#db  Prone scaption 3 x 10, 2#db     Possible next visit: prone horizontal abduction, prone extension, prone scaption, wall push up     Manual therapy techniques applied for 0 minutes, including:    dynamic functional therapeutic activities to improve functional performance for (8)  minutes, including:  - Rows 3 x 10, 7#  - wall push ups 3 x 10  - overhead press 1 x 10, 1#db  PATIENT EDUCATION AND HOME EXERCISES     Home Exercises Provided and Patient Education Provided     Education/Self-Care provided:  Role of PT and goals for therapy.   HEP    Written Home Exercises Provided: Patient instructed to cont prior HEP. Exercises were reviewed and Carla was able to demonstrate them prior to the end of the session. Carla demonstrated good  understanding of the education provided. See EMR under Patient Instructions for exercises provided during therapy sessions.    ASSESSMENT     Carla is reporting decreased shoulder pain recently and is able to tolerate progression of shoulder strengthening today without increase in pain during or following today's treatment  Patient will continue with strengthening and stabilization exercises to improve functional mobility during ADL's..    Carla Is progressing well towards her goals.   Pt prognosis is Good.     Pt will continue to benefit from skilled outpatient physical therapy to address the deficits listed in the problem list box on initial evaluation, provide pt/family education and to maximize pt's level of independence in the home and community environment.     Pt's spiritual, cultural and educational needs considered and pt agreeable to plan of care and goals.     Anticipated barriers to physical therapy: none    Goals:   Short Term Goals:  4 weeks  Patient will be compliant with HEP to promote the independent management of current diagnosis. Partially Met  Patient will increase right shoulder flexion to 110 degrees to improve tolerance to overhead  activities. Met  Patient will increase right shoulder functiona ER to reach C7 for function of grooming.  Met  Patient will report a decrease in complaints of right shoulder pain to 4/10 during performance of ADL's for independence of self care activities.  In Progress, Not Met        Long Term Goals:  8 weeks  Patient will increase right shoulder strength to 4+/5 in order to return to normal house work activities.  In Progress, Not Met  Patient will increase right shoulder flexion to 160 degrees in order to place dishes in overhead cabinets independently for self care independence.   In Progress, Not Met  Patient will increase right shoulder functional IR to reach T7 for function of dressing.  In Progress, Not Met  Patient will increase scapular stabilization strength to 4/5 to improve tolerance to normal lifting.  In Progress, Not Met  Patient will improve FOTO limitation status from 62% to 39% placing the patient in the 20-40% impaired, limited, or restricted category indicating increased functional mobility.   In Progress, Not Met    PLAN     Continue with PT POC and progress as tolerated.     Ryan Oconnor, PT

## 2023-04-30 ENCOUNTER — EXTERNAL CHRONIC CARE MANAGEMENT (OUTPATIENT)
Dept: PRIMARY CARE CLINIC | Facility: CLINIC | Age: 68
End: 2023-04-30
Payer: MEDICARE

## 2023-04-30 PROCEDURE — 99490 PR CHRONIC CARE MGMT, 1ST 20 MIN: ICD-10-PCS | Mod: S$GLB,,, | Performed by: FAMILY MEDICINE

## 2023-04-30 PROCEDURE — 99490 CHRNC CARE MGMT STAFF 1ST 20: CPT | Mod: S$GLB,,, | Performed by: FAMILY MEDICINE

## 2023-05-04 ENCOUNTER — CLINICAL SUPPORT (OUTPATIENT)
Dept: REHABILITATION | Facility: HOSPITAL | Age: 68
End: 2023-05-04
Payer: MEDICARE

## 2023-05-04 DIAGNOSIS — R29.898 WEAKNESS OF RIGHT ARM: ICD-10-CM

## 2023-05-04 DIAGNOSIS — M25.611 DECREASED ROM OF RIGHT SHOULDER: ICD-10-CM

## 2023-05-04 DIAGNOSIS — M25.511 ACUTE PAIN OF RIGHT SHOULDER: Primary | ICD-10-CM

## 2023-05-04 PROCEDURE — 97110 THERAPEUTIC EXERCISES: CPT | Mod: HCNC,PN

## 2023-05-04 PROCEDURE — 97530 THERAPEUTIC ACTIVITIES: CPT | Mod: HCNC,PN

## 2023-05-04 NOTE — PROGRESS NOTES
[facility]   Physical Therapy Treatment Note     Name: Carla HERRING [facility] Number: [id number]    Therapy Diagnosis:   Encounter Diagnoses   Name Primary?    Acute pain of right shoulder Yes    Decreased ROM of right shoulder     Weakness of right arm          Physician: Taqueria Melendrez MD    Visit Date: 5/4/2023    Physician Orders: PT Eval and Treat   Medical Diagnosis from Referral: M75.41 (ICD-10-CM) - Impingement syndrome of right shoulder  Evaluation Date: 9/14/2022  Authorization Period Expiration: 12/31/2023  Plan of Care Expiration: 5/9/2023  Progress Note Due: 4/9/2023  Visit # / Visits authorized: 21/ 40   FOTO: 2/ 3 (eval, 10/19/22)     Precautions: Standard    Time In: 10:00 am  Time Out: 10:45 am  Total Billable Time: 40 minutes    SUBJECTIVE     Pt reports: she missed last appointment and has been experiencing increased shoulder pain.  She has injection scheduled for 5/23/23.    She was not compliant with home exercise program.  Response to previous treatment: decreased pain  Functional change: able to reach up and pull ceiling fan string, able to dress independently    Pain: 5/10  Location: right shoulder     OBJECTIVE     Objective Measures updated at progress report unless specified.         TREATMENT     Carla received the treatments listed below:      Therapeutic exercises to develop strength, endurance, ROM, flexibility, posture, and core stabilization for 32 minutes including:   UBE x 4 minutes , level 3  Standing functional IR with sliding bar behind back x 2 minutes  Bicep curl 2 x 10, 4# db  Tricep extension 3 x 10, 3# (cross cable)  Standing (R) shoulder flexion, scaption, abduction to 90deg 2 x 10 each   Scapular protractions  3 x 10 3#db  Supine reverse Codman's 1 x 30 cw/ccw 3#db  Supine chest press and AAROM for shoulder flexion with 3# bar 2 x 10   Supine active (R) shoulder flexion 3 x 10, 1#db  Side lying (R) shoulder ER 3 x 10   Side lying (R)  shoulder abduction 3 x 10  Prone horizontal abduction 3 x 10, 2#db  Prone shoulder extension 3 x 10, 2#db  Prone scaption 3 x 10, 2#db     Possible next visit: prone horizontal abduction, prone extension, prone scaption, wall push up     Manual therapy techniques applied for 0 minutes, including:  Kinesiotaping for deltoid inhibition:   Patient was screened for use of kinesiotape and was cleared for use.  1. Has the patient ever had a reaction to the adhesive in bandaids? No  2. Has the patient ever uses athletic/kinesiotape in the past? No  3. Is the patient hemodynamically impaired (PE, DVT, CHF, Kidney failure)? No  4. Can the PT/PTA apply the tape to your skin? Yes  Patient was instructed on duration to wear the tape, proper drying techniques for the tape, and to remove the tape if he/she had any issues with it.  Patient verbalized understanding of these instructions. Kinesiotaping for right deltoid inhibition    dynamic functional therapeutic activities to improve functional performance for (8)  minutes, including:  - Rows 3 x 10, 7#  - wall push ups 3 x 10  - overhead press 1 x 10, 1#db  PATIENT EDUCATION AND HOME EXERCISES     Home Exercises Provided and Patient Education Provided     Education/Self-Care provided:  Role of PT and goals for therapy.   HEP    Written Home Exercises Provided: Patient instructed to cont prior HEP. Exercises were reviewed and Carla was able to demonstrate them prior to the end of the session. Carla demonstrated good  understanding of the education provided. See EMR under Patient Instructions for exercises provided during therapy sessions.    ASSESSMENT     Carla returns to therapy after missing last appointment and reports having increased shoulder pain.  She receives kinesiotaping for deltoid inhibition with space correction over subacromial space to decrease pain and improve tolerance to active movements.  She continues to have limited active shoulder flexion in standing  and unable to perform side lying external rotation throughout full range.      Carla Is progressing well towards her goals.   Pt prognosis is Good.     Pt will continue to benefit from skilled outpatient physical therapy to address the deficits listed in the problem list box on initial evaluation, provide pt/family education and to maximize pt's level of independence in the home and community environment.     Pt's spiritual, cultural and educational needs considered and pt agreeable to plan of care and goals.     Anticipated barriers to physical therapy: none    Goals:   Short Term Goals:  4 weeks  Patient will be compliant with HEP to promote the independent management of current diagnosis. Partially Met  Patient will increase right shoulder flexion to 110 degrees to improve tolerance to overhead activities. Met  Patient will increase right shoulder functiona ER to reach C7 for function of grooming.  Met  Patient will report a decrease in complaints of right shoulder pain to 4/10 during performance of ADL's for independence of self care activities.  In Progress, Not Met        Long Term Goals:  8 weeks  Patient will increase right shoulder strength to 4+/5 in order to return to normal house work activities.  In Progress, Not Met  Patient will increase right shoulder flexion to 160 degrees in order to place dishes in overhead cabinets independently for self care independence.   In Progress, Not Met  Patient will increase right shoulder functional IR to reach T7 for function of dressing.  In Progress, Not Met  Patient will increase scapular stabilization strength to 4/5 to improve tolerance to normal lifting.  In Progress, Not Met  Patient will improve FOTO limitation status from 62% to 39% placing the patient in the 20-40% impaired, limited, or restricted category indicating increased functional mobility.   In Progress, Not Met    PLAN     Continue with PT POC and progress as tolerated.     Ryan Oconnor, PT

## 2023-05-08 ENCOUNTER — OFFICE VISIT (OUTPATIENT)
Dept: HOME HEALTH SERVICES | Facility: CLINIC | Age: 68
End: 2023-05-08
Payer: MEDICARE

## 2023-05-08 VITALS
BODY MASS INDEX: 25.13 KG/M2 | OXYGEN SATURATION: 97 % | WEIGHT: 151 LBS | SYSTOLIC BLOOD PRESSURE: 116 MMHG | DIASTOLIC BLOOD PRESSURE: 80 MMHG | HEART RATE: 84 BPM

## 2023-05-08 DIAGNOSIS — K21.9 GASTROESOPHAGEAL REFLUX DISEASE, UNSPECIFIED WHETHER ESOPHAGITIS PRESENT: ICD-10-CM

## 2023-05-08 DIAGNOSIS — M75.41 IMPINGEMENT SYNDROME OF RIGHT SHOULDER: ICD-10-CM

## 2023-05-08 DIAGNOSIS — I70.1 RENAL ARTERY STENOSIS, NATIVE: ICD-10-CM

## 2023-05-08 DIAGNOSIS — E78.5 HYPERLIPIDEMIA, UNSPECIFIED HYPERLIPIDEMIA TYPE: ICD-10-CM

## 2023-05-08 DIAGNOSIS — Z00.00 ENCOUNTER FOR PREVENTIVE HEALTH EXAMINATION: Primary | ICD-10-CM

## 2023-05-08 DIAGNOSIS — E83.42 HYPOMAGNESEMIA: ICD-10-CM

## 2023-05-08 DIAGNOSIS — I10 PRIMARY HYPERTENSION: ICD-10-CM

## 2023-05-08 DIAGNOSIS — N18.30 STAGE 3 CHRONIC KIDNEY DISEASE, UNSPECIFIED WHETHER STAGE 3A OR 3B CKD: ICD-10-CM

## 2023-05-08 DIAGNOSIS — M85.80 OSTEOPENIA, UNSPECIFIED LOCATION: ICD-10-CM

## 2023-05-08 PROBLEM — C50.911 INFILTRATING DUCTAL CARCINOMA OF BREAST, RIGHT: Status: RESOLVED | Noted: 2020-10-13 | Resolved: 2023-05-08

## 2023-05-08 PROCEDURE — 4010F PR ACE/ARB THEARPY RXD/TAKEN: ICD-10-PCS | Mod: CPTII,S$GLB,, | Performed by: NURSE PRACTITIONER

## 2023-05-08 PROCEDURE — 1125F AMNT PAIN NOTED PAIN PRSNT: CPT | Mod: CPTII,S$GLB,, | Performed by: NURSE PRACTITIONER

## 2023-05-08 PROCEDURE — 3288F FALL RISK ASSESSMENT DOCD: CPT | Mod: CPTII,S$GLB,, | Performed by: NURSE PRACTITIONER

## 2023-05-08 PROCEDURE — 3008F PR BODY MASS INDEX (BMI) DOCUMENTED: ICD-10-PCS | Mod: CPTII,S$GLB,, | Performed by: NURSE PRACTITIONER

## 2023-05-08 PROCEDURE — 1160F RVW MEDS BY RX/DR IN RCRD: CPT | Mod: CPTII,S$GLB,, | Performed by: NURSE PRACTITIONER

## 2023-05-08 PROCEDURE — 1160F PR REVIEW ALL MEDS BY PRESCRIBER/CLIN PHARMACIST DOCUMENTED: ICD-10-PCS | Mod: CPTII,S$GLB,, | Performed by: NURSE PRACTITIONER

## 2023-05-08 PROCEDURE — 3008F BODY MASS INDEX DOCD: CPT | Mod: CPTII,S$GLB,, | Performed by: NURSE PRACTITIONER

## 2023-05-08 PROCEDURE — 3074F PR MOST RECENT SYSTOLIC BLOOD PRESSURE < 130 MM HG: ICD-10-PCS | Mod: CPTII,S$GLB,, | Performed by: NURSE PRACTITIONER

## 2023-05-08 PROCEDURE — 3079F PR MOST RECENT DIASTOLIC BLOOD PRESSURE 80-89 MM HG: ICD-10-PCS | Mod: CPTII,S$GLB,, | Performed by: NURSE PRACTITIONER

## 2023-05-08 PROCEDURE — 1125F PR PAIN SEVERITY QUANTIFIED, PAIN PRESENT: ICD-10-PCS | Mod: CPTII,S$GLB,, | Performed by: NURSE PRACTITIONER

## 2023-05-08 PROCEDURE — 3079F DIAST BP 80-89 MM HG: CPT | Mod: CPTII,S$GLB,, | Performed by: NURSE PRACTITIONER

## 2023-05-08 PROCEDURE — 1159F PR MEDICATION LIST DOCUMENTED IN MEDICAL RECORD: ICD-10-PCS | Mod: CPTII,S$GLB,, | Performed by: NURSE PRACTITIONER

## 2023-05-08 PROCEDURE — 1159F MED LIST DOCD IN RCRD: CPT | Mod: CPTII,S$GLB,, | Performed by: NURSE PRACTITIONER

## 2023-05-08 PROCEDURE — G0439 PPPS, SUBSEQ VISIT: HCPCS | Mod: S$GLB,,, | Performed by: NURSE PRACTITIONER

## 2023-05-08 PROCEDURE — 4010F ACE/ARB THERAPY RXD/TAKEN: CPT | Mod: CPTII,S$GLB,, | Performed by: NURSE PRACTITIONER

## 2023-05-08 PROCEDURE — 1170F FXNL STATUS ASSESSED: CPT | Mod: CPTII,S$GLB,, | Performed by: NURSE PRACTITIONER

## 2023-05-08 PROCEDURE — 1101F PT FALLS ASSESS-DOCD LE1/YR: CPT | Mod: CPTII,S$GLB,, | Performed by: NURSE PRACTITIONER

## 2023-05-08 PROCEDURE — 1101F PR PT FALLS ASSESS DOC 0-1 FALLS W/OUT INJ PAST YR: ICD-10-PCS | Mod: CPTII,S$GLB,, | Performed by: NURSE PRACTITIONER

## 2023-05-08 PROCEDURE — G0439 PR MEDICARE ANNUAL WELLNESS SUBSEQUENT VISIT: ICD-10-PCS | Mod: S$GLB,,, | Performed by: NURSE PRACTITIONER

## 2023-05-08 PROCEDURE — 3288F PR FALLS RISK ASSESSMENT DOCUMENTED: ICD-10-PCS | Mod: CPTII,S$GLB,, | Performed by: NURSE PRACTITIONER

## 2023-05-08 PROCEDURE — 1170F PR FUNCTIONAL STATUS ASSESSED: ICD-10-PCS | Mod: CPTII,S$GLB,, | Performed by: NURSE PRACTITIONER

## 2023-05-08 PROCEDURE — 3074F SYST BP LT 130 MM HG: CPT | Mod: CPTII,S$GLB,, | Performed by: NURSE PRACTITIONER

## 2023-05-08 RX ORDER — AMILORIDE HYDROCHLORIDE 5 MG/1
5 TABLET ORAL DAILY
COMMUNITY
End: 2024-03-14

## 2023-05-08 NOTE — PROGRESS NOTES
Carla Dang presented for a  Medicare AWV and comprehensive Health Risk Assessment today. The following components were reviewed and updated:    Medical history  Family History  Social history  Allergies and Current Medications  Health Risk Assessment  Health Maintenance  Care Team         ** See Completed Assessments for Annual Wellness Visit within the encounter summary.**         The following assessments were completed:  Living Situation  CAGE  Depression Screening  Timed Get Up and Go  Whisper Test  Cognitive Function Screening    Nutrition Screening  ADL Screening  PAQ Screening    Review for Opioid Screening: Patient does not have rx for Opioids.  Review for Substance Use Disorders: Patient does not use substance.      Vitals:    05/08/23 0854   BP: 116/80   Pulse: 84   SpO2: 97%   Weight: 68.5 kg (151 lb)     Body mass index is 25.13 kg/m².  Physical Exam  Vitals reviewed.   HENT:      Head: Normocephalic.   Eyes:      Pupils: Pupils are equal, round, and reactive to light.   Cardiovascular:      Rate and Rhythm: Normal rate and regular rhythm.      Heart sounds: Normal heart sounds.   Pulmonary:      Effort: Pulmonary effort is normal.      Breath sounds: Normal breath sounds.   Abdominal:      General: Bowel sounds are normal.      Palpations: Abdomen is soft.   Musculoskeletal:      Cervical back: Normal range of motion.      Right lower leg: No edema.      Left lower leg: No edema.      Comments: Decreased ROM right shoulder   Skin:     General: Skin is warm and dry.   Neurological:      Mental Status: She is alert and oriented to person, place, and time.   Psychiatric:         Behavior: Behavior normal.             Diagnoses and health risks identified today and associated recommendations/orders:    1. Encounter for preventive health examination  - Above assessments completed. Preventive measures and health maintenance reviewed with patient.  -Discussed overdue shingles and tetanus    2. Stage 3  chronic kidney disease, unspecified whether stage 3a or 3b CKD  Stable, followed by PCP  -encouraged hydration and avoidance of NSAIDs    3. Renal artery stenosis, native  Stable, followed by PCP and Vascular Surg    4. Impingement syndrome of right shoulder  Stable, followed by Orthopedics  -currently in PT  -takes tylenol, also gets injections    5. Primary hypertension  Stable, followed by PCP  -on amlodipine and amiloride    6. Gastroesophageal reflux disease, unspecified whether esophagitis present  Stable, followed by PCP  -on PPI    7. Hyperlipidemia, unspecified hyperlipidemia type  Stable, followed by PCP  -on statin    8. Hypomagnesemia  Stable, followed by PCP  -on Mag ox    9. Osteopenia, unspecified location  Stable, followed by PCP  -on Calcium  -encouraged weight bearing exercises      Provided Carla with a 5-10 year written screening schedule and personal prevention plan. Recommendations were developed using the USPSTF age appropriate recommendations. Education, counseling, and referrals were provided as needed. After Visit Summary printed and given to patient which includes a list of additional screenings\tests needed.    Follow up in about 1 year (around 5/8/2024) for your next annual wellness visit.    Alisson Meneses NP    I offered to discuss advanced care planning, including how to pick a person who would make decisions for you if you were unable to make them for yourself, called a health care power of , and what kind of decisions you might make such as use of life sustaining treatments such as ventilators and tube feeding when faced with a life limiting illness recorded on a living will that they will need to know. (How you want to be cared for as you near the end of your natural life)     X Patient is interested in learning more about how to make advanced directives.  I provided them paperwork and offered to discuss this with them.

## 2023-05-08 NOTE — PATIENT INSTRUCTIONS
Counseling and Referral of Other Preventative  (Italic type indicates deductible and co-insurance are waived)    Patient Name: Carla Dang  Today's Date: 5/8/2023    Health Maintenance       Date Due Completion Date    TETANUS VACCINE Never done ---    Shingles Vaccine (1 of 2) Never done ---    Lipid Panel 07/21/2023 7/21/2022    Mammogram 11/10/2023 11/10/2022    Hemoglobin A1c (Diabetic Prevention Screening) 02/24/2025 2/24/2022    DEXA Scan 09/16/2025 9/16/2022    Colorectal Cancer Screening 02/15/2032 2/15/2022        No orders of the defined types were placed in this encounter.    The following information is provided to all patients.  This information is to help you find resources for any of the problems found today that may be affecting your health:                Living healthy guide: www.[facility].[place].gov      Understanding Diabetes: www.diabetes.org      Eating healthy: www.cdc.gov/healthyweight      CDC home safety checklist: www.cdc.gov/steadi/patient.html      Agency on Aging: www.goea.[place].[facility]      Alcoholics anonymous (AA): www.aa.org      Physical Activity: www.sarina.nih.gov/cr1miva      Tobacco use: www.quitwithusla.org

## 2023-05-09 ENCOUNTER — CLINICAL SUPPORT (OUTPATIENT)
Dept: REHABILITATION | Facility: HOSPITAL | Age: 68
End: 2023-05-09
Payer: MEDICARE

## 2023-05-09 DIAGNOSIS — M25.611 DECREASED ROM OF RIGHT SHOULDER: ICD-10-CM

## 2023-05-09 DIAGNOSIS — R29.898 WEAKNESS OF RIGHT ARM: ICD-10-CM

## 2023-05-09 DIAGNOSIS — M75.41 IMPINGEMENT SYNDROME OF RIGHT SHOULDER: Primary | ICD-10-CM

## 2023-05-09 PROCEDURE — 97530 THERAPEUTIC ACTIVITIES: CPT | Mod: PN

## 2023-05-09 PROCEDURE — 97110 THERAPEUTIC EXERCISES: CPT | Mod: PN

## 2023-05-09 NOTE — PLAN OF CARE
[facility]  Physical Therapy Plan of Care Note     Name: Carla HERRING [facility] Number: [id number]    Therapy Diagnosis:   Encounter Diagnoses   Name Primary?    Impingement syndrome of right shoulder Yes    Decreased ROM of right shoulder     Weakness of right arm      Physician: Taqueria Melendrez MD    Visit Date: 5/9/2023    Physician Orders: PT Eval and Treat   Medical Diagnosis from Referral: M75.41 (ICD-10-CM) - Impingement syndrome of right shoulder  Evaluation Date: 9/14/2022  Authorization Period Expiration: 12/31/2023  Plan of Care Expiration: 5/9/2023  Progress Note Due: 4/9/2023  Visit # / Visits authorized: 22/ 40   FOTO: 3/ 3 (eval, 10/19/22, 5/9/23)      Precautions: Standard  Functional Level Prior to Evaluation:  independent    SUBJECTIVE     Update: she has been very busy lately and having some soreness in shoulder region.  She is scheduled for injection on 5/23/23.    OBJECTIVE       Update: Observation: pt has improved arm swing during gait     Posture: rounded shoulder        Passive Range of Motion: supine  Shoulder Left Right   Flexion 160  160*   Abduction 160 165*   ER  95 90   IR 80 75*      Active Range of Motion: standing  Shoulder Left Right   Flexion 160 deg 115 deg *   Abduction 160 deg 125 deg *   ER  Reach T3 Reach T1*   IR Reach T4 Reach T7      Upper Extremity Strength    (L) UE (R) UE   Shoulder flexion: 4+/5 3/5   Shoulder Abduction: 4+/5 3+/5   Shoulder ER 4+/5 3-/5   Shoulder IR 4+/5 4/5   Lower Trap 3+/5 3+/5   Middle Trap 4-/5 4-/5   Rhomboids 4-/5 4-/5            Special Tests:  AC Joint Left Right   AC Joint Compression Test neg pos   Empty Can Test neg Pos   Drop Arm test neg neg         Joint Mobility: slight restrictions in posterior capsule     Palpation: tenderness noted over upper trapezius, AC joint, deltoids, bicep, pec major     Sensation: intact to light touch           Scapular Control/Dyskinesis:     Normal / Subtle / Obvious   Comments    Left  normal normal    Right  obvious Upper trap substitution                ASSESSMENT     Update: Carla is noted to have improved AROM of right shoulder and improved strength.  She continues to have weakness in external rotators and with overhead reaching.  Patient would benefit from continued therapy for further progression of strengthening and AROM exercises to improve functional mobility of right shoulder complex during ADL's.      New Short Term Goals Status:     Short Term Goals:  4 weeks  Patient will be compliant with HEP to promote the independent management of current diagnosis. Partially Met  Patient will increase right shoulder flexion to 110 degrees to improve tolerance to overhead activities. Met  Patient will increase right shoulder functiona ER to reach C7 for function of grooming.  Met  Patient will report a decrease in complaints of right shoulder pain to 4/10 during performance of ADL's for independence of self care activities.  In Progress, Not Met        Long Term Goals:  8 weeks  Patient will increase right shoulder strength to 4+/5 in order to return to normal house work activities.  In Progress, Not Met  Patient will increase right shoulder flexion to 160 degrees in order to place dishes in overhead cabinets independently for self care independence.   In Progress, Not Met  Patient will increase right shoulder functional IR to reach T7 for function of dressing. Met  Patient will increase scapular stabilization strength to 4/5 to improve tolerance to normal lifting.  In Progress, Not Met  Patient will improve FOTO limitation status from 62% to 39% placing the patient in the 20-40% impaired, limited, or restricted category indicating increased functional mobility.   In Progress, Not Met  Long Term Goal Status: continue per initial plan of care.  Reasons for Recertification of Therapy:   continued limitation in functional mobility      PLAN     Updated Certification Period:  5/9/2023 to 7/9/2023   Recommended Treatment Plan: 2 times per week for 8 weeks:  Electrical Stimulation IFC, Manual Therapy, Moist Heat/ Ice, Neuromuscular Re-ed, Patient Education, Therapeutic Activities, Therapeutic Exercise, and IASTM, vacuum cupping, dry needling, and kinesiotaping  Other Recommendations: none    Ryan Oconnor, PT

## 2023-05-09 NOTE — PROGRESS NOTES
[facility]   Physical Therapy Treatment Note     Name: Carla HERRING [facility] Number: [id number]    Therapy Diagnosis:   Encounter Diagnoses   Name Primary?    Impingement syndrome of right shoulder Yes    Decreased ROM of right shoulder     Weakness of right arm            Physician: Taqueria Melendrez MD    Visit Date: 5/9/2023    Physician Orders: PT Eval and Treat   Medical Diagnosis from Referral: M75.41 (ICD-10-CM) - Impingement syndrome of right shoulder  Evaluation Date: 9/14/2022  Authorization Period Expiration: 12/31/2023  Plan of Care Expiration: 5/9/2023  Progress Note Due: 4/9/2023  Visit # / Visits authorized: 22/ 40   FOTO: 3/ 3 (eval, 10/19/22, 5/9/23)     Precautions: Standard    Time In: 10:45 am  Time Out: 11:30 am  Total Billable Time: 38 minutes    SUBJECTIVE     Pt reports: she has been very busy lately and having some soreness in shoulder region.  She is scheduled for injection on 5/23/23.    She was not compliant with home exercise program.  Response to previous treatment: decreased pain  Functional change: able to reach up and pull ceiling fan string, able to dress independently    Pain: 5/10  Location: right shoulder     OBJECTIVE     Objective Measures updated at progress report unless specified.      Update: Observation: pt has improved arm swing during gait     Posture: rounded shoulder        Passive Range of Motion: supine  Shoulder Left Right   Flexion 160  160*   Abduction 160 165*   ER  95 90   IR 80 75*      Active Range of Motion: standing  Shoulder Left Right   Flexion 160 deg 115 deg *   Abduction 160 deg 125 deg *   ER  Reach T3 Reach T1*   IR Reach T4 Reach T7      Upper Extremity Strength    (L) UE (R) UE   Shoulder flexion: 4+/5 3/5   Shoulder Abduction: 4+/5 3+/5   Shoulder ER 4+/5 3-/5   Shoulder IR 4+/5 4/5   Lower Trap 3+/5 3+/5   Middle Trap 4-/5 4-/5   Rhomboids 4-/5 4-/5            Special Tests:  AC Joint Left Right   AC Joint  Compression Test neg pos   Empty Can Test neg Pos   Drop Arm test neg neg         Joint Mobility: slight restrictions in posterior capsule     Palpation: tenderness noted over upper trapezius, AC joint, deltoids, bicep, pec major     Sensation: intact to light touch           Scapular Control/Dyskinesis:     Normal / Subtle / Obvious  Comments    Left  normal normal    Right  obvious Upper trap substitution             TREATMENT     Carla received the treatments listed below:      Therapeutic exercises to develop strength, endurance, ROM, flexibility, posture, and core stabilization for 30 minutes including:     Re-assessment time included    UBE x 4 minutes , level 3  Bicep curl 3 x 10, 4# db  Tricep extension 3 x 10, 3# (cross cable)  Standing (R) shoulder flexion, scaption, abduction to 90deg 2 x 10 each      Possible next visit: prone horizontal abduction, prone extension, prone scaption, wall push up     Manual therapy techniques applied for 0 minutes, including:    dynamic functional therapeutic activities to improve functional performance for (8)  minutes, including:  - Rows 3 x 10, 10#  - wall push ups 3 x 10  - overhead press 1 x 10, 1#db  PATIENT EDUCATION AND HOME EXERCISES     Home Exercises Provided and Patient Education Provided     Education/Self-Care provided:  Role of PT and goals for therapy.   HEP    Written Home Exercises Provided: Patient instructed to cont prior HEP. Exercises were reviewed and Carla was able to demonstrate them prior to the end of the session. Carla demonstrated good  understanding of the education provided. See EMR under Patient Instructions for exercises provided during therapy sessions.    ASSESSMENT     Carla is noted to have improved AROM of right shoulder and improved strength.  She continues to have weakness in external rotators and with overhead reaching.  Patient would benefit from continued therapy for further progression of strengthening and AROM exercises  to improve functional mobility of right shoulder complex during ADL's.    Carla Is progressing well towards her goals.   Pt prognosis is Good.     Pt will continue to benefit from skilled outpatient physical therapy to address the deficits listed in the problem list box on initial evaluation, provide pt/family education and to maximize pt's level of independence in the home and community environment.     Pt's spiritual, cultural and educational needs considered and pt agreeable to plan of care and goals.     Anticipated barriers to physical therapy: none    Goals:   Short Term Goals:  4 weeks  Patient will be compliant with HEP to promote the independent management of current diagnosis. Partially Met  Patient will increase right shoulder flexion to 110 degrees to improve tolerance to overhead activities. Met  Patient will increase right shoulder functiona ER to reach C7 for function of grooming.  Met  Patient will report a decrease in complaints of right shoulder pain to 4/10 during performance of ADL's for independence of self care activities.  In Progress, Not Met        Long Term Goals:  8 weeks  Patient will increase right shoulder strength to 4+/5 in order to return to normal house work activities.  In Progress, Not Met  Patient will increase right shoulder flexion to 160 degrees in order to place dishes in overhead cabinets independently for self care independence.   In Progress, Not Met  Patient will increase right shoulder functional IR to reach T7 for function of dressing. Met  Patient will increase scapular stabilization strength to 4/5 to improve tolerance to normal lifting.  In Progress, Not Met  Patient will improve FOTO limitation status from 62% to 39% placing the patient in the 20-40% impaired, limited, or restricted category indicating increased functional mobility.   In Progress, Not Met    PLAN     Continue with PT POC and progress as tolerated.     Ryan Oconnor, PT

## 2023-05-11 ENCOUNTER — CLINICAL SUPPORT (OUTPATIENT)
Dept: REHABILITATION | Facility: HOSPITAL | Age: 68
End: 2023-05-11
Payer: MEDICARE

## 2023-05-11 DIAGNOSIS — M75.41 IMPINGEMENT SYNDROME OF RIGHT SHOULDER: Primary | ICD-10-CM

## 2023-05-11 DIAGNOSIS — M25.611 DECREASED ROM OF RIGHT SHOULDER: ICD-10-CM

## 2023-05-11 DIAGNOSIS — R29.898 WEAKNESS OF RIGHT ARM: ICD-10-CM

## 2023-05-11 PROCEDURE — 97530 THERAPEUTIC ACTIVITIES: CPT | Mod: PN

## 2023-05-11 PROCEDURE — 97110 THERAPEUTIC EXERCISES: CPT | Mod: PN

## 2023-05-11 NOTE — PROGRESS NOTES
[facility]   Physical Therapy Treatment Note     Name: Carla HERRING [facility] Number: [id number]    Therapy Diagnosis:   Encounter Diagnoses   Name Primary?    Impingement syndrome of right shoulder Yes    Decreased ROM of right shoulder     Weakness of right arm        Physician: Taqueria Melendrez MD    Visit Date: 5/11/2023    Physician Orders: PT Eval and Treat   Medical Diagnosis from Referral: M75.41 (ICD-10-CM) - Impingement syndrome of right shoulder  Evaluation Date: 9/14/2022  Authorization Period Expiration: 12/31/2023  Plan of Care Expiration: 5/9/2023  Progress Note Due: 4/9/2023  Visit # / Visits authorized: 23/ 40   FOTO: 3/ 3 (eval, 10/19/22, 5/9/23)     Precautions: Standard    Time In: 10:00 am  Time Out: 10:45 am  Total Billable Time: 40 minutes    SUBJECTIVE     Pt reports: she has decreased pain level this morning.  She was not compliant with home exercise program.  Response to previous treatment: decreased pain  Functional change: able to reach up and pull ceiling fan string, able to dress independently    Pain: 4/10  Location: right shoulder     OBJECTIVE     Objective Measures updated at progress report unless specified.        TREATMENT     Carla received the treatments listed below:      Therapeutic exercises to develop strength, endurance, ROM, flexibility, posture, and core stabilization for 32 minutes including:     Re-assessment time included    UBE x 4 minutes , level 3  Bicep curl 3 x 10, 4# db  Tricep extension 3 x 10, 3# (cross cable)  Standing (R) shoulder flexion, scaption, abduction to 90deg 2 x 10 each   Scapular protractions  3 x 10 3#db  Supine reverse Codman's 1 x 30 cw/ccw 3#db  Supine active (R) shoulder flexion 3 x 10, 1#db  Side lying (R) shoulder ER 3 x 10   Side lying (R) shoulder abduction 3 x 10  Prone horizontal abduction 3 x 10  Prone shoulder extension 3 x 10  Prone scaption 3 x 10     Possible next visit: prone horizontal  abduction, prone extension, prone scaption, wall push up     Manual therapy techniques applied for 0 minutes, including:    dynamic functional therapeutic activities to improve functional performance for (8)  minutes, including:  - Rows 3 x 10, 10#  - wall push ups 3 x 10  - overhead press 2 x 10, 1#db  PATIENT EDUCATION AND HOME EXERCISES     Home Exercises Provided and Patient Education Provided     Education/Self-Care provided:  Role of PT and goals for therapy.   HEP    Written Home Exercises Provided: Patient instructed to cont prior HEP. Exercises were reviewed and Carla was able to demonstrate them prior to the end of the session. Carla demonstrated good  understanding of the education provided. See EMR under Patient Instructions for exercises provided during therapy sessions.    ASSESSMENT     Carla continues to have limited AROM of shoulder ER against gravity.  She is improving with AROM of shoulder flexion in standing and will continue with gradual progression of strengthening.    Carla Is progressing well towards her goals.   Pt prognosis is Good.     Pt will continue to benefit from skilled outpatient physical therapy to address the deficits listed in the problem list box on initial evaluation, provide pt/family education and to maximize pt's level of independence in the home and community environment.     Pt's spiritual, cultural and educational needs considered and pt agreeable to plan of care and goals.     Anticipated barriers to physical therapy: none    Goals:   Short Term Goals:  4 weeks  Patient will be compliant with HEP to promote the independent management of current diagnosis. Partially Met  Patient will increase right shoulder flexion to 110 degrees to improve tolerance to overhead activities. Met  Patient will increase right shoulder functiona ER to reach C7 for function of grooming.  Met  Patient will report a decrease in complaints of right shoulder pain to 4/10 during performance  of ADL's for independence of self care activities.  In Progress, Not Met        Long Term Goals:  8 weeks  Patient will increase right shoulder strength to 4+/5 in order to return to normal house work activities.  In Progress, Not Met  Patient will increase right shoulder flexion to 160 degrees in order to place dishes in overhead cabinets independently for self care independence.   In Progress, Not Met  Patient will increase right shoulder functional IR to reach T7 for function of dressing. Met  Patient will increase scapular stabilization strength to 4/5 to improve tolerance to normal lifting.  In Progress, Not Met  Patient will improve FOTO limitation status from 62% to 39% placing the patient in the 20-40% impaired, limited, or restricted category indicating increased functional mobility.   In Progress, Not Met    PLAN     Continue with PT POC and progress as tolerated.     Ryan Oconnor, PT

## 2023-05-16 ENCOUNTER — CLINICAL SUPPORT (OUTPATIENT)
Dept: REHABILITATION | Facility: HOSPITAL | Age: 68
End: 2023-05-16
Payer: MEDICARE

## 2023-05-16 DIAGNOSIS — R29.898 WEAKNESS OF RIGHT ARM: ICD-10-CM

## 2023-05-16 DIAGNOSIS — M25.611 DECREASED ROM OF RIGHT SHOULDER: ICD-10-CM

## 2023-05-16 DIAGNOSIS — M75.41 IMPINGEMENT SYNDROME OF RIGHT SHOULDER: Primary | ICD-10-CM

## 2023-05-16 PROCEDURE — 97110 THERAPEUTIC EXERCISES: CPT | Mod: PN

## 2023-05-16 NOTE — PLAN OF CARE
[facility]  Physical Therapy Discharge Note    Name: Carla HERRING [facility] Number: [id number]    Therapy Diagnosis:   Encounter Diagnoses   Name Primary?    Impingement syndrome of right shoulder Yes    Decreased ROM of right shoulder     Weakness of right arm      Physician: Taqueria Melendrez MD       Physician Orders: PT Eval and Treat   Medical Diagnosis from Referral: M75.41 (ICD-10-CM) - Impingement syndrome of right shoulder  Evaluation Date: 9/14/2022      Date of Last visit: 5/16/23  Total Visits Received: 23    ASSESSMENT      Carla is noted to have improved FOTO status, improved AROM, strength, and overall decreased pain.  She is scheduled for another injection, and would like to hold therapy and attempt HEP at this time. Patient will be discharged from therapy per her request with goals partially met.     Discharge reason: Patient has reached the maximum rehab potential for the present time    Goals:   Short Term Goals:  4 weeks  Patient will be compliant with HEP to promote the independent management of current diagnosis. Met  Patient will increase right shoulder flexion to 110 degrees to improve tolerance to overhead activities. Met  Patient will increase right shoulder functiona ER to reach C7 for function of grooming.  Met  Patient will report a decrease in complaints of right shoulder pain to 4/10 during performance of ADL's for independence of self care activities.  Partially Met        Long Term Goals:  8 weeks  Patient will increase right shoulder strength to 4+/5 in order to return to normal house work activities.  Not Met  Patient will increase right shoulder flexion to 160 degrees in order to place dishes in overhead cabinets independently for self care independence. Not Met  Patient will increase right shoulder functional IR to reach T7 for function of dressing. Met  Patient will increase scapular stabilization strength to 4/5 to improve tolerance to normal  lifting. Not Met  Patient will improve FOTO limitation status from 62% to 39% placing the patient in the 20-40% impaired, limited, or restricted category indicating increased functional mobility. Not Met    PLAN   This patient is discharged from Physical Therapy      Ryan Oconnor, PT

## 2023-05-16 NOTE — PROGRESS NOTES
[facility]   Physical Therapy Treatment Note     Name: Carla HERRING [facility] Number: [id number]    Therapy Diagnosis:   Encounter Diagnoses   Name Primary?    Impingement syndrome of right shoulder Yes    Decreased ROM of right shoulder     Weakness of right arm        Physician: Taqueria Melendrez MD    Visit Date: 5/16/2023    Physician Orders: PT Eval and Treat   Medical Diagnosis from Referral: M75.41 (ICD-10-CM) - Impingement syndrome of right shoulder  Evaluation Date: 9/14/2022  Authorization Period Expiration: 12/31/2023  Plan of Care Expiration: 5/9/2023  Progress Note Due: 4/9/2023  Visit # / Visits authorized: 23/ 40   FOTO: 3/ 3 (eval, 10/19/22, 5/9/23)     Precautions: Standard    Time In: 10:00 am  Time Out: 10:45 am  Total Billable Time: 15 minutes    SUBJECTIVE     Pt reports: she has overall improved pain levels, but continues to have limited functional mobility of right shoulder especially with overhead activities.  She was not compliant with home exercise program.  Response to previous treatment: decreased pain  Functional change: able to reach up and pull ceiling fan string, able to dress independently    Pain: 4/10  Location: right shoulder     OBJECTIVE     Objective Measures updated at progress report unless specified.     Update: Observation: pt has improved arm swing during gait     Posture: rounded shoulder        Passive Range of Motion: supine  Shoulder Left Right   Flexion 160  160*   Abduction 160 165*   ER  95 90   IR 80 75*      Active Range of Motion: standing  Shoulder Left Right   Flexion 160 deg 135 deg *   Abduction 160 deg 135 deg *   ER  Reach T3 Reach T2*   IR Reach T4 Reach T7      Upper Extremity Strength    (L) UE (R) UE   Shoulder flexion: 4+/5 3+/5   Shoulder Abduction: 4+/5 3+/5   Shoulder ER 4+/5 3-/5   Shoulder IR 4+/5 4/5   Lower Trap 3+/5 3+/5   Middle Trap 4-/5 4-/5   Rhomboids 4-/5 4-/5            Special Tests:  AC Joint Left Right    AC Joint Compression Test neg pos   Empty Can Test neg Pos   Drop Arm test neg neg         Joint Mobility: slight restrictions in posterior capsule     Palpation: tenderness noted over upper trapezius, AC joint, deltoids, bicep, pec major     Sensation: intact to light touch           Scapular Control/Dyskinesis:     Normal / Subtle / Obvious  Comments    Left  normal normal    Right  obvious Upper trap substitution               TREATMENT     Carla received the treatments listed below:      Therapeutic exercises to develop strength, endurance, ROM, flexibility, posture, and core stabilization for 15 minutes including:     Re-assessment time included    PATIENT EDUCATION AND HOME EXERCISES     Home Exercises Provided and Patient Education Provided     Education/Self-Care provided:  Role of PT and goals for therapy.   HEP    Written Home Exercises Provided: Patient instructed to cont prior HEP. Exercises were reviewed and Carla was able to demonstrate them prior to the end of the session. Carla demonstrated good  understanding of the education provided. See EMR under Patient Instructions for exercises provided during therapy sessions.    ASSESSMENT     Carla is noted to have improved FOTO status, improved AROM, strength, and overall decreased pain.  She is scheduled for another injection, and would like to hold therapy and attempt HEP at this time. Patient will be discharged from therapy per her request with goals partially met.     Carla Is progressing well towards her goals.   Pt prognosis is Good.     Pt will continue to benefit from skilled outpatient physical therapy to address the deficits listed in the problem list box on initial evaluation, provide pt/family education and to maximize pt's level of independence in the home and community environment.     Pt's spiritual, cultural and educational needs considered and pt agreeable to plan of care and goals.     Anticipated barriers to physical therapy:  none    Goals:   Short Term Goals:  4 weeks  Patient will be compliant with HEP to promote the independent management of current diagnosis. Met  Patient will increase right shoulder flexion to 110 degrees to improve tolerance to overhead activities. Met  Patient will increase right shoulder functiona ER to reach C7 for function of grooming.  Met  Patient will report a decrease in complaints of right shoulder pain to 4/10 during performance of ADL's for independence of self care activities.  Partially Met        Long Term Goals:  8 weeks  Patient will increase right shoulder strength to 4+/5 in order to return to normal house work activities.  Not Met  Patient will increase right shoulder flexion to 160 degrees in order to place dishes in overhead cabinets independently for self care independence. Not Met  Patient will increase right shoulder functional IR to reach T7 for function of dressing. Met  Patient will increase scapular stabilization strength to 4/5 to improve tolerance to normal lifting. Not Met  Patient will improve FOTO limitation status from 62% to 39% placing the patient in the 20-40% impaired, limited, or restricted category indicating increased functional mobility. Not Met    PLAN     Discharge patient from therapy per her request.    Ryan Oconnor, PT

## 2023-05-19 ENCOUNTER — CLINICAL SUPPORT (OUTPATIENT)
Dept: FAMILY MEDICINE | Facility: CLINIC | Age: 68
End: 2023-05-19
Payer: MEDICARE

## 2023-05-19 DIAGNOSIS — E53.8 B12 DEFICIENCY: Primary | ICD-10-CM

## 2023-05-19 PROCEDURE — 99999 PR PBB SHADOW E&M-EST. PATIENT-LVL II: CPT | Mod: PBBFAC,,,

## 2023-05-19 PROCEDURE — 96372 PR INJECTION,THERAP/PROPH/DIAG2ST, IM OR SUBCUT: ICD-10-PCS | Mod: ,,, | Performed by: FAMILY MEDICINE

## 2023-05-19 PROCEDURE — 99212 OFFICE O/P EST SF 10 MIN: CPT | Mod: PO

## 2023-05-19 PROCEDURE — 99999 PR PBB SHADOW E&M-EST. PATIENT-LVL II: ICD-10-PCS | Mod: PBBFAC,,,

## 2023-05-19 PROCEDURE — 96372 THER/PROPH/DIAG INJ SC/IM: CPT | Mod: ,,, | Performed by: FAMILY MEDICINE

## 2023-05-19 RX ADMIN — CYANOCOBALAMIN 1000 MCG: 1000 INJECTION, SOLUTION INTRAMUSCULAR at 02:05

## 2023-05-19 NOTE — PROGRESS NOTES
Administered 1,000 mcg B12 IM to right ventrogluteal. Pt tolerated well.      Include Pregnancy/Lactation Warning?: No

## 2023-05-23 ENCOUNTER — OFFICE VISIT (OUTPATIENT)
Dept: ORTHOPEDICS | Facility: CLINIC | Age: 68
End: 2023-05-23
Payer: MEDICARE

## 2023-05-23 VITALS — BODY MASS INDEX: 25.16 KG/M2 | HEIGHT: 65 IN | WEIGHT: 151 LBS

## 2023-05-23 DIAGNOSIS — M75.41 IMPINGEMENT SYNDROME OF RIGHT SHOULDER: Primary | ICD-10-CM

## 2023-05-23 PROCEDURE — 20610 DRAIN/INJ JOINT/BURSA W/O US: CPT | Mod: RT,S$GLB,, | Performed by: NURSE PRACTITIONER

## 2023-05-23 PROCEDURE — 1160F RVW MEDS BY RX/DR IN RCRD: CPT | Mod: CPTII,S$GLB,, | Performed by: NURSE PRACTITIONER

## 2023-05-23 PROCEDURE — 99999 PR PBB SHADOW E&M-EST. PATIENT-LVL III: CPT | Mod: PBBFAC,,, | Performed by: NURSE PRACTITIONER

## 2023-05-23 PROCEDURE — 1101F PT FALLS ASSESS-DOCD LE1/YR: CPT | Mod: CPTII,S$GLB,, | Performed by: NURSE PRACTITIONER

## 2023-05-23 PROCEDURE — 1125F PR PAIN SEVERITY QUANTIFIED, PAIN PRESENT: ICD-10-PCS | Mod: CPTII,S$GLB,, | Performed by: NURSE PRACTITIONER

## 2023-05-23 PROCEDURE — 4010F ACE/ARB THERAPY RXD/TAKEN: CPT | Mod: CPTII,S$GLB,, | Performed by: NURSE PRACTITIONER

## 2023-05-23 PROCEDURE — 1159F MED LIST DOCD IN RCRD: CPT | Mod: CPTII,S$GLB,, | Performed by: NURSE PRACTITIONER

## 2023-05-23 PROCEDURE — 99212 PR OFFICE/OUTPT VISIT, EST, LEVL II, 10-19 MIN: ICD-10-PCS | Mod: 25,S$GLB,, | Performed by: NURSE PRACTITIONER

## 2023-05-23 PROCEDURE — 1160F PR REVIEW ALL MEDS BY PRESCRIBER/CLIN PHARMACIST DOCUMENTED: ICD-10-PCS | Mod: CPTII,S$GLB,, | Performed by: NURSE PRACTITIONER

## 2023-05-23 PROCEDURE — 1125F AMNT PAIN NOTED PAIN PRSNT: CPT | Mod: CPTII,S$GLB,, | Performed by: NURSE PRACTITIONER

## 2023-05-23 PROCEDURE — 1101F PR PT FALLS ASSESS DOC 0-1 FALLS W/OUT INJ PAST YR: ICD-10-PCS | Mod: CPTII,S$GLB,, | Performed by: NURSE PRACTITIONER

## 2023-05-23 PROCEDURE — 3008F BODY MASS INDEX DOCD: CPT | Mod: CPTII,S$GLB,, | Performed by: NURSE PRACTITIONER

## 2023-05-23 PROCEDURE — 99212 OFFICE O/P EST SF 10 MIN: CPT | Mod: 25,S$GLB,, | Performed by: NURSE PRACTITIONER

## 2023-05-23 PROCEDURE — 1159F PR MEDICATION LIST DOCUMENTED IN MEDICAL RECORD: ICD-10-PCS | Mod: CPTII,S$GLB,, | Performed by: NURSE PRACTITIONER

## 2023-05-23 PROCEDURE — 3008F PR BODY MASS INDEX (BMI) DOCUMENTED: ICD-10-PCS | Mod: CPTII,S$GLB,, | Performed by: NURSE PRACTITIONER

## 2023-05-23 PROCEDURE — 4010F PR ACE/ARB THEARPY RXD/TAKEN: ICD-10-PCS | Mod: CPTII,S$GLB,, | Performed by: NURSE PRACTITIONER

## 2023-05-23 PROCEDURE — 3288F FALL RISK ASSESSMENT DOCD: CPT | Mod: CPTII,S$GLB,, | Performed by: NURSE PRACTITIONER

## 2023-05-23 PROCEDURE — 99999 PR PBB SHADOW E&M-EST. PATIENT-LVL III: ICD-10-PCS | Mod: PBBFAC,,, | Performed by: NURSE PRACTITIONER

## 2023-05-23 PROCEDURE — 3288F PR FALLS RISK ASSESSMENT DOCUMENTED: ICD-10-PCS | Mod: CPTII,S$GLB,, | Performed by: NURSE PRACTITIONER

## 2023-05-23 PROCEDURE — 20610 LARGE JOINT ASPIRATION/INJECTION: R SUBACROMIAL BURSA: ICD-10-PCS | Mod: RT,S$GLB,, | Performed by: NURSE PRACTITIONER

## 2023-05-23 RX ORDER — MINOCYCLINE HYDROCHLORIDE 50 MG/1
50 CAPSULE ORAL 2 TIMES DAILY
COMMUNITY
Start: 2023-02-23

## 2023-05-23 RX ADMIN — TRIAMCINOLONE ACETONIDE 40 MG: 40 INJECTION, SUSPENSION INTRA-ARTICULAR; INTRAMUSCULAR at 09:05

## 2023-05-24 RX ORDER — TRIAMCINOLONE ACETONIDE 40 MG/ML
40 INJECTION, SUSPENSION INTRA-ARTICULAR; INTRAMUSCULAR
Status: DISCONTINUED | OUTPATIENT
Start: 2023-05-23 | End: 2023-05-24 | Stop reason: HOSPADM

## 2023-05-24 NOTE — PROCEDURES
Large Joint Aspiration/Injection: R subacromial bursa    Date/Time: 5/23/2023 9:40 AM  Performed by: MICHELINE Banuelos  Authorized by: MICHELINE Banuelos     Consent Done?:  Yes (Verbal)  Indications:  Pain  Timeout: prior to procedure the correct patient, procedure, and site was verified    Prep: patient was prepped and draped in usual sterile fashion      Local anesthesia used?: Yes    Local anesthetic:  Lidocaine 1% without epinephrine  Anesthetic total (ml):  5      Details:  Needle Size:  22 G  Ultrasonic Guidance for needle placement?: No    Approach:  Posterior  Location:  Shoulder  Site:  R subacromial bursa  Medications:  40 mg triamcinolone acetonide 40 mg/mL  Patient tolerance:  Patient tolerated the procedure well with no immediate complications

## 2023-05-24 NOTE — PROGRESS NOTES
Chief Complaint   Patient presents with    Right Shoulder - Pain, Injections       HPI:   This is a 67 y.o. who returns to clinic today for right shoulder pain for the past 1 weeks after no specific injury or trauma. Pain is aching and dull. No numbness or tingling. No associated signs or symptoms.    Past Medical History:   Diagnosis Date    Acute pancreatitis 3/21/2016    Breast cancer 2005    right side with lumpectomy, chemo and radiation    Closed fracture of ramus of right pubis 6/8/2016    Colon polyp     last scope 4/2012- repeat 10 y per pt- Dr. Johnson    GERD (gastroesophageal reflux disease) 5/29/2012    Hyperlipidemia 4/16/2013    Hypertension     Metabolic encephalopathy 4/20/2021    Osteopenia      Past Surgical History:   Procedure Laterality Date    BREAST LUMPECTOMY  2005    DILATION AND CURETTAGE OF UTERUS      ESOPHAGEAL DILATION      ESOPHAGOGASTRODUODENOSCOPY  6/1/2012    ROTATOR CUFF REPAIR      TEAR DUCT SURGERY      right rye     Current Outpatient Medications on File Prior to Visit   Medication Sig Dispense Refill    acetaminophen (TYLENOL) 650 MG TbSR Take 2 tablets (1,300 mg total) by mouth every 8 (eight) hours.  0    aMILoride (MIDAMOR) 5 MG Tab Take 5 mg by mouth once daily.      amLODIPine (NORVASC) 5 MG tablet Take 1 tablet (5 mg total) by mouth once daily. 90 tablet 3    benazepriL (LOTENSIN) 10 MG tablet Take 1 tablet by mouth once daily 90 tablet 2    calcium carbonate (OS-NASEEM) 500 mg calcium (1,250 mg) tablet qid 120 tablet 0    folic acid (FOLVITE) 1 MG tablet Take 1 tablet by mouth once daily 90 tablet 3    magnesium oxide (MAG-OX) 400 mg (241.3 mg magnesium) tablet bid 60 tablet 1    minocycline (MINOCIN,DYNACIN) 50 MG Cap Take 50 mg by mouth 2 (two) times daily.      omeprazole (PRILOSEC) 20 MG capsule Take 1 capsule by mouth once daily 90 capsule 3    pravastatin (PRAVACHOL) 20 MG tablet Take 1 tablet (20 mg total) by mouth once daily. 90 tablet 3    ergocalciferol  (ERGOCALCIFEROL) 50,000 unit Cap Once a week 4 capsule 5    LORazepam (ATIVAN) 0.5 MG tablet Take 1 tablet (0.5 mg total) by mouth once. Take 30-60 minutes prior to MRI for 1 dose 1 tablet 0     Current Facility-Administered Medications on File Prior to Visit   Medication Dose Route Frequency Provider Last Rate Last Admin    cyanocobalamin injection 1,000 mcg  1,000 mcg Intramuscular Q30 Days Taqueria Melendrez MD   1,000 mcg at 23 1425     Review of patient's allergies indicates:   Allergen Reactions    Hydrochlorothiazide Other (See Comments)     Multiple electrolyte abnormalities     Family History   Problem Relation Age of Onset    Stroke Brother 57    Breast cancer Mother 87     Social History     Socioeconomic History    Marital status:    Tobacco Use    Smoking status: Former     Packs/day: 0.30     Types: Cigarettes     Quit date: 2005     Years since quittin.9    Smokeless tobacco: Never   Substance and Sexual Activity    Alcohol use: Not Currently     Alcohol/week: 2.0 standard drinks     Types: 2 Standard drinks or equivalent per week     Comment: prior 6 pack/week    Drug use: Yes    Sexual activity: Not Currently     Social Determinants of Health     Financial Resource Strain: Low Risk     Difficulty of Paying Living Expenses: Not hard at all   Food Insecurity: No Food Insecurity    Worried About Running Out of Food in the Last Year: Never true    Ran Out of Food in the Last Year: Never true   Transportation Needs: No Transportation Needs    Lack of Transportation (Medical): No    Lack of Transportation (Non-Medical): No   Physical Activity: Inactive    Days of Exercise per Week: 0 days    Minutes of Exercise per Session: 0 min   Stress: No Stress Concern Present    Feeling of Stress : Not at all   Social Connections: Socially Integrated    Frequency of Communication with Friends and Family: More than three times a week    Frequency of Social Gatherings with Friends and Family:  More than three times a week    Attends Worship Services: More than 4 times per year    Active Member of Clubs or Organizations: No    Attends Club or Organization Meetings: More than 4 times per year    Marital Status:    Housing Stability: Unknown    Unable to Pay for Housing in the Last Year: No    Unstable Housing in the Last Year: No       Review of Systems:  Constitutional:  Denies fever or chills   Eyes:  Denies change in visual acuity   HENT:  Denies nasal congestion or sore throat   Respiratory:  Denies cough or shortness of breath   Cardiovascular:  Denies chest pain or edema   GI:  Denies abdominal pain, nausea, vomiting, bloody stools or diarrhea   :  Denies dysuria   Integument:  Denies rash   Neurologic:  Denies headache, focal weakness or sensory changes   Endocrine:  Denies polyuria or polydipsia   Lymphatic:  Denies swollen glands   Psychiatric:  Denies depression or anxiety     Physical Exam:   Constitutional:  Well developed, well nourished, no acute distress, non-toxic appearance   Integument:  Well hydrated, no rash   Lymphatic:  No lymphadenopathy noted   Neurologic:  Alert & oriented x 3,    Psychiatric:  Speech and behavior appropriate     Bilateral Shoulder Exam    Left Shoulder Exam   Shoulder exam performed same as contralateral side and is normal.    Right Shoulder Exam   Tenderness   Shoulder tenderness location: diffusely about shoulder.    Range of Motion   Forward Flexion: abnormal   External Rotation: abnormal     Muscle Strength   Supraspinatus: 4/5     Tests   Hawkin's test: positive  Impingement: positive    Other   Erythema: absent  Sensation: normal  Pulse: present         Impingement syndrome of right shoulder  -     Large Joint Aspiration/Injection: R subacromial bursa       Using an aseptic technique, I injected 5 cc of lidocaine 1% without and 1 cc of kenalog 40mg into the right shoulder. The patient tolerated this well.    Rtc 6-8wk.

## 2023-05-29 ENCOUNTER — PATIENT MESSAGE (OUTPATIENT)
Dept: FAMILY MEDICINE | Facility: CLINIC | Age: 68
End: 2023-05-29
Payer: MEDICARE

## 2023-05-31 ENCOUNTER — EXTERNAL CHRONIC CARE MANAGEMENT (OUTPATIENT)
Dept: PRIMARY CARE CLINIC | Facility: CLINIC | Age: 68
End: 2023-05-31
Payer: MEDICARE

## 2023-05-31 PROCEDURE — 99490 PR CHRONIC CARE MGMT, 1ST 20 MIN: ICD-10-PCS | Mod: S$GLB,,, | Performed by: FAMILY MEDICINE

## 2023-05-31 PROCEDURE — 99490 CHRNC CARE MGMT STAFF 1ST 20: CPT | Mod: S$GLB,,, | Performed by: FAMILY MEDICINE

## 2023-06-16 ENCOUNTER — CLINICAL SUPPORT (OUTPATIENT)
Dept: FAMILY MEDICINE | Facility: CLINIC | Age: 68
End: 2023-06-16
Payer: MEDICARE

## 2023-06-16 DIAGNOSIS — E53.8 B12 DEFICIENCY: Primary | ICD-10-CM

## 2023-06-16 PROCEDURE — 96372 PR INJECTION,THERAP/PROPH/DIAG2ST, IM OR SUBCUT: ICD-10-PCS | Mod: S$GLB,,, | Performed by: FAMILY MEDICINE

## 2023-06-16 PROCEDURE — 99999 PR PBB SHADOW E&M-EST. PATIENT-LVL II: ICD-10-PCS | Mod: PBBFAC,,,

## 2023-06-16 PROCEDURE — 96372 THER/PROPH/DIAG INJ SC/IM: CPT | Mod: S$GLB,,, | Performed by: FAMILY MEDICINE

## 2023-06-16 PROCEDURE — 99999 PR PBB SHADOW E&M-EST. PATIENT-LVL II: CPT | Mod: PBBFAC,,,

## 2023-06-16 RX ADMIN — CYANOCOBALAMIN 1000 MCG: 1000 INJECTION, SOLUTION INTRAMUSCULAR at 01:06

## 2023-06-27 ENCOUNTER — OFFICE VISIT (OUTPATIENT)
Dept: ORTHOPEDICS | Facility: CLINIC | Age: 68
End: 2023-06-27
Payer: MEDICARE

## 2023-06-27 DIAGNOSIS — M75.41 IMPINGEMENT SYNDROME OF RIGHT SHOULDER: Primary | ICD-10-CM

## 2023-06-27 PROCEDURE — 4010F PR ACE/ARB THEARPY RXD/TAKEN: ICD-10-PCS | Mod: CPTII,S$GLB,, | Performed by: NURSE PRACTITIONER

## 2023-06-27 PROCEDURE — 1160F RVW MEDS BY RX/DR IN RCRD: CPT | Mod: CPTII,S$GLB,, | Performed by: NURSE PRACTITIONER

## 2023-06-27 PROCEDURE — 1159F PR MEDICATION LIST DOCUMENTED IN MEDICAL RECORD: ICD-10-PCS | Mod: CPTII,S$GLB,, | Performed by: NURSE PRACTITIONER

## 2023-06-27 PROCEDURE — 99212 OFFICE O/P EST SF 10 MIN: CPT | Mod: 25,S$GLB,, | Performed by: NURSE PRACTITIONER

## 2023-06-27 PROCEDURE — 1125F AMNT PAIN NOTED PAIN PRSNT: CPT | Mod: CPTII,S$GLB,, | Performed by: NURSE PRACTITIONER

## 2023-06-27 PROCEDURE — 3288F FALL RISK ASSESSMENT DOCD: CPT | Mod: CPTII,S$GLB,, | Performed by: NURSE PRACTITIONER

## 2023-06-27 PROCEDURE — 99999 PR PBB SHADOW E&M-EST. PATIENT-LVL III: CPT | Mod: PBBFAC,,, | Performed by: NURSE PRACTITIONER

## 2023-06-27 PROCEDURE — 20610 DRAIN/INJ JOINT/BURSA W/O US: CPT | Mod: RT,S$GLB,, | Performed by: NURSE PRACTITIONER

## 2023-06-27 PROCEDURE — 99212 PR OFFICE/OUTPT VISIT, EST, LEVL II, 10-19 MIN: ICD-10-PCS | Mod: 25,S$GLB,, | Performed by: NURSE PRACTITIONER

## 2023-06-27 PROCEDURE — 20610 LARGE JOINT ASPIRATION/INJECTION: R SUBACROMIAL BURSA: ICD-10-PCS | Mod: RT,S$GLB,, | Performed by: NURSE PRACTITIONER

## 2023-06-27 PROCEDURE — 99999 PR PBB SHADOW E&M-EST. PATIENT-LVL III: ICD-10-PCS | Mod: PBBFAC,,, | Performed by: NURSE PRACTITIONER

## 2023-06-27 PROCEDURE — 3288F PR FALLS RISK ASSESSMENT DOCUMENTED: ICD-10-PCS | Mod: CPTII,S$GLB,, | Performed by: NURSE PRACTITIONER

## 2023-06-27 PROCEDURE — 4010F ACE/ARB THERAPY RXD/TAKEN: CPT | Mod: CPTII,S$GLB,, | Performed by: NURSE PRACTITIONER

## 2023-06-27 PROCEDURE — 1101F PR PT FALLS ASSESS DOC 0-1 FALLS W/OUT INJ PAST YR: ICD-10-PCS | Mod: CPTII,S$GLB,, | Performed by: NURSE PRACTITIONER

## 2023-06-27 PROCEDURE — 1159F MED LIST DOCD IN RCRD: CPT | Mod: CPTII,S$GLB,, | Performed by: NURSE PRACTITIONER

## 2023-06-27 PROCEDURE — 1101F PT FALLS ASSESS-DOCD LE1/YR: CPT | Mod: CPTII,S$GLB,, | Performed by: NURSE PRACTITIONER

## 2023-06-27 PROCEDURE — 1125F PR PAIN SEVERITY QUANTIFIED, PAIN PRESENT: ICD-10-PCS | Mod: CPTII,S$GLB,, | Performed by: NURSE PRACTITIONER

## 2023-06-27 PROCEDURE — 1160F PR REVIEW ALL MEDS BY PRESCRIBER/CLIN PHARMACIST DOCUMENTED: ICD-10-PCS | Mod: CPTII,S$GLB,, | Performed by: NURSE PRACTITIONER

## 2023-06-27 RX ORDER — TRIAMCINOLONE ACETONIDE 40 MG/ML
40 INJECTION, SUSPENSION INTRA-ARTICULAR; INTRAMUSCULAR
Status: DISCONTINUED | OUTPATIENT
Start: 2023-06-27 | End: 2023-06-27 | Stop reason: HOSPADM

## 2023-06-27 RX ADMIN — TRIAMCINOLONE ACETONIDE 40 MG: 40 INJECTION, SUSPENSION INTRA-ARTICULAR; INTRAMUSCULAR at 01:06

## 2023-06-27 NOTE — PROCEDURES
Large Joint Aspiration/Injection: R subacromial bursa    Date/Time: 6/27/2023 1:20 PM  Performed by: MICHELINE Banuelos  Authorized by: MICHELINE Banuelos     Consent Done?:  Yes (Verbal)  Indications:  Pain  Timeout: prior to procedure the correct patient, procedure, and site was verified    Prep: patient was prepped and draped in usual sterile fashion      Local anesthesia used?: Yes    Local anesthetic:  Lidocaine 1% without epinephrine  Anesthetic total (ml):  5      Details:  Needle Size:  22 G  Ultrasonic Guidance for needle placement?: No    Approach:  Posterior  Location:  Shoulder  Site:  R subacromial bursa  Medications:  40 mg triamcinolone acetonide 40 mg/mL  Patient tolerance:  Patient tolerated the procedure well with no immediate complications

## 2023-06-27 NOTE — PROGRESS NOTES
Chief Complaint   Patient presents with    Right Shoulder - Pain       HPI:   This is a 68 y.o. who returns to clinic today in follow-up for right shoulder pain for the past 2 weeks after no known injury. Pain is aching and dull. No numbness or tingling. No associated signs or symptoms.    Past Medical History:   Diagnosis Date    Acute pancreatitis 3/21/2016    Breast cancer 2005    right side with lumpectomy, chemo and radiation    Closed fracture of ramus of right pubis 6/8/2016    Colon polyp     last scope 4/2012- repeat 10 y per pt- Dr. Johnson    GERD (gastroesophageal reflux disease) 5/29/2012    Hyperlipidemia 4/16/2013    Hypertension     Metabolic encephalopathy 4/20/2021    Osteopenia      Past Surgical History:   Procedure Laterality Date    BREAST LUMPECTOMY  2005    DILATION AND CURETTAGE OF UTERUS      ESOPHAGEAL DILATION      ESOPHAGOGASTRODUODENOSCOPY  6/1/2012    ROTATOR CUFF REPAIR      TEAR DUCT SURGERY      right rye     Current Outpatient Medications on File Prior to Visit   Medication Sig Dispense Refill    acetaminophen (TYLENOL) 650 MG TbSR Take 2 tablets (1,300 mg total) by mouth every 8 (eight) hours.  0    aMILoride (MIDAMOR) 5 MG Tab Take 5 mg by mouth once daily.      amLODIPine (NORVASC) 5 MG tablet Take 1 tablet (5 mg total) by mouth once daily. 90 tablet 3    benazepriL (LOTENSIN) 10 MG tablet Take 1 tablet by mouth once daily 90 tablet 2    calcium carbonate (OS-NASEEM) 500 mg calcium (1,250 mg) tablet qid 120 tablet 0    ergocalciferol (ERGOCALCIFEROL) 50,000 unit Cap Once a week 4 capsule 5    folic acid (FOLVITE) 1 MG tablet Take 1 tablet by mouth once daily 90 tablet 3    magnesium oxide (MAG-OX) 400 mg (241.3 mg magnesium) tablet bid 60 tablet 1    minocycline (MINOCIN,DYNACIN) 50 MG Cap Take 50 mg by mouth 2 (two) times daily.      omeprazole (PRILOSEC) 20 MG capsule Take 1 capsule by mouth once daily 90 capsule 3    pravastatin (PRAVACHOL) 20 MG tablet Take 1 tablet (20 mg total)  by mouth once daily. 90 tablet 3    LORazepam (ATIVAN) 0.5 MG tablet Take 1 tablet (0.5 mg total) by mouth once. Take 30-60 minutes prior to MRI for 1 dose 1 tablet 0     Current Facility-Administered Medications on File Prior to Visit   Medication Dose Route Frequency Provider Last Rate Last Admin    cyanocobalamin injection 1,000 mcg  1,000 mcg Intramuscular Q30 Days Taqueria Melendrez MD   1,000 mcg at 23 1307     Review of patient's allergies indicates:   Allergen Reactions    Hydrochlorothiazide Other (See Comments)     Multiple electrolyte abnormalities     Family History   Problem Relation Age of Onset    Stroke Brother 57    Breast cancer Mother 87     Social History     Socioeconomic History    Marital status:    Tobacco Use    Smoking status: Former     Packs/day: 0.30     Types: Cigarettes     Quit date: 2005     Years since quittin.0    Smokeless tobacco: Never   Substance and Sexual Activity    Alcohol use: Not Currently     Alcohol/week: 2.0 standard drinks     Types: 2 Standard drinks or equivalent per week     Comment: prior 6 pack/week    Drug use: Yes    Sexual activity: Not Currently     Social Determinants of Health     Financial Resource Strain: Low Risk     Difficulty of Paying Living Expenses: Not hard at all   Food Insecurity: No Food Insecurity    Worried About Running Out of Food in the Last Year: Never true    Ran Out of Food in the Last Year: Never true   Transportation Needs: No Transportation Needs    Lack of Transportation (Medical): No    Lack of Transportation (Non-Medical): No   Physical Activity: Inactive    Days of Exercise per Week: 0 days    Minutes of Exercise per Session: 0 min   Stress: No Stress Concern Present    Feeling of Stress : Not at all   Social Connections: Socially Integrated    Frequency of Communication with Friends and Family: More than three times a week    Frequency of Social Gatherings with Friends and Family: More than three times a  week    Attends Taoism Services: More than 4 times per year    Active Member of Clubs or Organizations: No    Attends Club or Organization Meetings: More than 4 times per year    Marital Status:    Housing Stability: Unknown    Unable to Pay for Housing in the Last Year: No    Unstable Housing in the Last Year: No       Review of Systems:  Constitutional:  Denies fever or chills   Eyes:  Denies change in visual acuity   HENT:  Denies nasal congestion or sore throat   Respiratory:  Denies cough or shortness of breath   Cardiovascular:  Denies chest pain or edema   GI:  Denies abdominal pain, nausea, vomiting, bloody stools or diarrhea   :  Denies dysuria   Integument:  Denies rash   Neurologic:  Denies headache, focal weakness or sensory changes   Endocrine:  Denies polyuria or polydipsia   Lymphatic:  Denies swollen glands   Psychiatric:  Denies depression or anxiety     Physical Exam:   Constitutional:  Well developed, well nourished, no acute distress, non-toxic appearance   Integument:  Well hydrated  Neurologic:  Alert & oriented x 3    Bilateral Shoulder Exam    Left Shoulder Exam   Shoulder exam performed same as contralateral side and is normal.    Right Shoulder Exam   Tenderness   Shoulder tenderness location: diffusely about shoulder.    Range of Motion   Forward Flexion: abnormal   External Rotation: abnormal     Muscle Strength   Supraspinatus: 4/5     Tests   Hawkin's test: positive  Impingement: positive    Other   Erythema: absent  Sensation: normal  Pulse: present           Impingement syndrome of right shoulder  -     Large Joint Aspiration/Injection: R subacromial bursa     Using an aseptic technique, I injected 5 cc of lidocaine 1% without and 1 cc of kenalog 40mg into the right shoulder. The patient tolerated this well.    Rtc 6-8 weeks.

## 2023-06-30 ENCOUNTER — EXTERNAL CHRONIC CARE MANAGEMENT (OUTPATIENT)
Dept: PRIMARY CARE CLINIC | Facility: CLINIC | Age: 68
End: 2023-06-30
Payer: MEDICARE

## 2023-06-30 PROCEDURE — 99490 CHRNC CARE MGMT STAFF 1ST 20: CPT | Mod: S$GLB,,, | Performed by: FAMILY MEDICINE

## 2023-06-30 PROCEDURE — 99490 PR CHRONIC CARE MGMT, 1ST 20 MIN: ICD-10-PCS | Mod: S$GLB,,, | Performed by: FAMILY MEDICINE

## 2023-07-14 ENCOUNTER — CLINICAL SUPPORT (OUTPATIENT)
Dept: FAMILY MEDICINE | Facility: CLINIC | Age: 68
End: 2023-07-14
Payer: MEDICARE

## 2023-07-14 DIAGNOSIS — E53.8 B12 DEFICIENCY: Primary | ICD-10-CM

## 2023-07-14 PROCEDURE — 99999 PR PBB SHADOW E&M-EST. PATIENT-LVL II: ICD-10-PCS | Mod: PBBFAC,HCNC,,

## 2023-07-14 PROCEDURE — 96372 THER/PROPH/DIAG INJ SC/IM: CPT | Mod: HCNC,S$GLB,, | Performed by: FAMILY MEDICINE

## 2023-07-14 PROCEDURE — 96372 PR INJECTION,THERAP/PROPH/DIAG2ST, IM OR SUBCUT: ICD-10-PCS | Mod: HCNC,S$GLB,, | Performed by: FAMILY MEDICINE

## 2023-07-14 PROCEDURE — 99999 PR PBB SHADOW E&M-EST. PATIENT-LVL II: CPT | Mod: PBBFAC,HCNC,,

## 2023-07-14 RX ADMIN — CYANOCOBALAMIN 1000 MCG: 1000 INJECTION, SOLUTION INTRAMUSCULAR at 09:07

## 2023-07-31 ENCOUNTER — EXTERNAL CHRONIC CARE MANAGEMENT (OUTPATIENT)
Dept: PRIMARY CARE CLINIC | Facility: CLINIC | Age: 68
End: 2023-07-31
Payer: MEDICARE

## 2023-07-31 PROCEDURE — 99490 PR CHRONIC CARE MGMT, 1ST 20 MIN: ICD-10-PCS | Mod: S$GLB,,, | Performed by: FAMILY MEDICINE

## 2023-07-31 PROCEDURE — 99490 CHRNC CARE MGMT STAFF 1ST 20: CPT | Mod: S$GLB,,, | Performed by: FAMILY MEDICINE

## 2023-08-08 ENCOUNTER — OFFICE VISIT (OUTPATIENT)
Dept: FAMILY MEDICINE | Facility: CLINIC | Age: 68
End: 2023-08-08
Payer: MEDICARE

## 2023-08-08 VITALS
DIASTOLIC BLOOD PRESSURE: 79 MMHG | SYSTOLIC BLOOD PRESSURE: 113 MMHG | WEIGHT: 150.81 LBS | BODY MASS INDEX: 25.09 KG/M2 | TEMPERATURE: 98 F | OXYGEN SATURATION: 100 % | HEART RATE: 92 BPM

## 2023-08-08 DIAGNOSIS — U07.1 COVID-19: Primary | ICD-10-CM

## 2023-08-08 DIAGNOSIS — R05.9 COUGH, UNSPECIFIED TYPE: ICD-10-CM

## 2023-08-08 LAB
CTP QC/QA: YES
SARS-COV-2 RDRP RESP QL NAA+PROBE: POSITIVE

## 2023-08-08 PROCEDURE — 3008F PR BODY MASS INDEX (BMI) DOCUMENTED: ICD-10-PCS | Mod: HCNC,CPTII,S$GLB, | Performed by: NURSE PRACTITIONER

## 2023-08-08 PROCEDURE — 99999 PR PBB SHADOW E&M-EST. PATIENT-LVL V: ICD-10-PCS | Mod: PBBFAC,HCNC,, | Performed by: NURSE PRACTITIONER

## 2023-08-08 PROCEDURE — 1126F AMNT PAIN NOTED NONE PRSNT: CPT | Mod: HCNC,CPTII,S$GLB, | Performed by: NURSE PRACTITIONER

## 2023-08-08 PROCEDURE — 99213 PR OFFICE/OUTPT VISIT, EST, LEVL III, 20-29 MIN: ICD-10-PCS | Mod: HCNC,S$GLB,, | Performed by: NURSE PRACTITIONER

## 2023-08-08 PROCEDURE — 1160F PR REVIEW ALL MEDS BY PRESCRIBER/CLIN PHARMACIST DOCUMENTED: ICD-10-PCS | Mod: HCNC,CPTII,S$GLB, | Performed by: NURSE PRACTITIONER

## 2023-08-08 PROCEDURE — 3008F BODY MASS INDEX DOCD: CPT | Mod: HCNC,CPTII,S$GLB, | Performed by: NURSE PRACTITIONER

## 2023-08-08 PROCEDURE — 3078F DIAST BP <80 MM HG: CPT | Mod: HCNC,CPTII,S$GLB, | Performed by: NURSE PRACTITIONER

## 2023-08-08 PROCEDURE — 87635 SARS-COV-2 COVID-19 AMP PRB: CPT | Mod: QW,HCNC,S$GLB, | Performed by: NURSE PRACTITIONER

## 2023-08-08 PROCEDURE — 1101F PR PT FALLS ASSESS DOC 0-1 FALLS W/OUT INJ PAST YR: ICD-10-PCS | Mod: HCNC,CPTII,S$GLB, | Performed by: NURSE PRACTITIONER

## 2023-08-08 PROCEDURE — 99213 OFFICE O/P EST LOW 20 MIN: CPT | Mod: HCNC,S$GLB,, | Performed by: NURSE PRACTITIONER

## 2023-08-08 PROCEDURE — 87635: ICD-10-PCS | Mod: QW,HCNC,S$GLB, | Performed by: NURSE PRACTITIONER

## 2023-08-08 PROCEDURE — 3074F PR MOST RECENT SYSTOLIC BLOOD PRESSURE < 130 MM HG: ICD-10-PCS | Mod: HCNC,CPTII,S$GLB, | Performed by: NURSE PRACTITIONER

## 2023-08-08 PROCEDURE — 3288F PR FALLS RISK ASSESSMENT DOCUMENTED: ICD-10-PCS | Mod: HCNC,CPTII,S$GLB, | Performed by: NURSE PRACTITIONER

## 2023-08-08 PROCEDURE — 3078F PR MOST RECENT DIASTOLIC BLOOD PRESSURE < 80 MM HG: ICD-10-PCS | Mod: HCNC,CPTII,S$GLB, | Performed by: NURSE PRACTITIONER

## 2023-08-08 PROCEDURE — 1160F RVW MEDS BY RX/DR IN RCRD: CPT | Mod: HCNC,CPTII,S$GLB, | Performed by: NURSE PRACTITIONER

## 2023-08-08 PROCEDURE — 4010F PR ACE/ARB THEARPY RXD/TAKEN: ICD-10-PCS | Mod: HCNC,CPTII,S$GLB, | Performed by: NURSE PRACTITIONER

## 2023-08-08 PROCEDURE — 99999 PR PBB SHADOW E&M-EST. PATIENT-LVL V: CPT | Mod: PBBFAC,HCNC,, | Performed by: NURSE PRACTITIONER

## 2023-08-08 PROCEDURE — 4010F ACE/ARB THERAPY RXD/TAKEN: CPT | Mod: HCNC,CPTII,S$GLB, | Performed by: NURSE PRACTITIONER

## 2023-08-08 PROCEDURE — 1101F PT FALLS ASSESS-DOCD LE1/YR: CPT | Mod: HCNC,CPTII,S$GLB, | Performed by: NURSE PRACTITIONER

## 2023-08-08 PROCEDURE — 3288F FALL RISK ASSESSMENT DOCD: CPT | Mod: HCNC,CPTII,S$GLB, | Performed by: NURSE PRACTITIONER

## 2023-08-08 PROCEDURE — 1126F PR PAIN SEVERITY QUANTIFIED, NO PAIN PRESENT: ICD-10-PCS | Mod: HCNC,CPTII,S$GLB, | Performed by: NURSE PRACTITIONER

## 2023-08-08 PROCEDURE — 1159F PR MEDICATION LIST DOCUMENTED IN MEDICAL RECORD: ICD-10-PCS | Mod: HCNC,CPTII,S$GLB, | Performed by: NURSE PRACTITIONER

## 2023-08-08 PROCEDURE — 3074F SYST BP LT 130 MM HG: CPT | Mod: HCNC,CPTII,S$GLB, | Performed by: NURSE PRACTITIONER

## 2023-08-08 PROCEDURE — 1159F MED LIST DOCD IN RCRD: CPT | Mod: HCNC,CPTII,S$GLB, | Performed by: NURSE PRACTITIONER

## 2023-08-08 RX ORDER — BENZONATATE 200 MG/1
200 CAPSULE ORAL 3 TIMES DAILY PRN
Qty: 30 CAPSULE | Refills: 0 | Status: SHIPPED | OUTPATIENT
Start: 2023-08-08 | End: 2023-08-18

## 2023-08-08 RX ORDER — TRIAMCINOLONE ACETONIDE 40 MG/ML
INJECTION, SUSPENSION INTRA-ARTICULAR; INTRAMUSCULAR
COMMUNITY
Start: 2023-05-23 | End: 2023-08-30

## 2023-08-08 RX ORDER — MINOCYCLINE 15 MG/G
AEROSOL, FOAM TOPICAL 2 TIMES DAILY
COMMUNITY
Start: 2023-08-07

## 2023-08-08 NOTE — PATIENT INSTRUCTIONS
Hydrate well  Rest  Tylenol OTC as directed (as needed)  Report to ER immediately if symptoms worsen or persist    Instructions for Patients with Confirmed or Suspected COVID-19    If you are awaiting your test result, you will either be called or it will be released to the patient portal.  If you have any questions about your test, please visit [email or web address] or call our COVID-19 information line at [phone].      Please isolate yourself at home.  You may leave home and/or return to work once the following conditions are met:    If you have symptoms and tested positive:  More than 5 days since symptoms first appeared AND  More than 24 hours fever free without medications AND       symptoms have improved   For five days after ending isolation, masks are required.    If you had no symptoms but tested positive:  More than 5 days since the date of the first positive test. If you develop symptoms, then use the guidelines above  For five days after ending isolation, masks are required.      Testing is not recommended if you are symptom free after completing isolation.

## 2023-08-08 NOTE — PROGRESS NOTES
"Subjective     Patient ID: Carla Dang is a 68 y.o. female.    Chief Complaint: Cough    Cough  This is a new problem. The current episode started in the past 7 days (x 5d; states exposed to COVID-19). The problem has been gradually improving. The problem occurs every few hours (Pt states, "I haven't had any coughing since yesterday."). The cough is Productive of sputum. Pertinent negatives include no chest pain, chills, ear congestion, ear pain, fever, headaches, heartburn, hemoptysis, myalgias, nasal congestion, postnasal drip, rash, rhinorrhea, sore throat, shortness of breath, sweats, weight loss or wheezing. Nothing aggravates the symptoms. She has tried nothing (Declines COVID-19 antiviral) for the symptoms. The treatment provided significant relief. There is no history of asthma, bronchiectasis, bronchitis, COPD, emphysema, environmental allergies or pneumonia.     Past Medical History:   Diagnosis Date    Acute pancreatitis 3/21/2016    Breast cancer     right side with lumpectomy, chemo and radiation    Closed fracture of ramus of right pubis 2016    Colon polyp     last scope 2012- repeat 10 y per pt- Dr. Johnson    GERD (gastroesophageal reflux disease) 2012    Hyperlipidemia 2013    Hypertension     Metabolic encephalopathy 2021    Osteopenia      Social History     Socioeconomic History    Marital status:    Tobacco Use    Smoking status: Former     Current packs/day: 0.00     Types: Cigarettes     Quit date: 2005     Years since quittin.2    Smokeless tobacco: Never   Substance and Sexual Activity    Alcohol use: Not Currently     Alcohol/week: 2.0 standard drinks of alcohol     Types: 2 Standard drinks or equivalent per week     Comment: prior 6 pack/week    Drug use: Yes    Sexual activity: Not Currently     Social Determinants of Health     Financial Resource Strain: Low Risk  (2023)    Overall Financial Resource Strain (CARDIA)     Difficulty of " Paying Living Expenses: Not hard at all   Food Insecurity: No Food Insecurity (5/8/2023)    Hunger Vital Sign     Worried About Running Out of Food in the Last Year: Never true     Ran Out of Food in the Last Year: Never true   Transportation Needs: No Transportation Needs (5/8/2023)    PRAPARE - Transportation     Lack of Transportation (Medical): No     Lack of Transportation (Non-Medical): No   Physical Activity: Inactive (5/8/2023)    Exercise Vital Sign     Days of Exercise per Week: 0 days     Minutes of Exercise per Session: 0 min   Stress: No Stress Concern Present (5/8/2023)    [employer] - Occupational Stress Questionnaire     Feeling of Stress : Not at all   Social Connections: Socially Integrated (5/8/2023)    Social Connection and Isolation Panel [NHANES]     Frequency of Communication with Friends and Family: More than three times a week     Frequency of Social Gatherings with Friends and Family: More than three times a week     Attends Hoahaoism Services: More than 4 times per year     Active Member of Clubs or Organizations: No     Attends Club or Organization Meetings: More than 4 times per year     Marital Status:    Housing Stability: Unknown (5/8/2023)    Housing Stability Vital Sign     Unable to Pay for Housing in the Last Year: No     Unstable Housing in the Last Year: No     Past Surgical History:   Procedure Laterality Date    BREAST LUMPECTOMY  2005    DILATION AND CURETTAGE OF UTERUS      ESOPHAGEAL DILATION      ESOPHAGOGASTRODUODENOSCOPY  6/1/2012    ROTATOR CUFF REPAIR      TEAR DUCT SURGERY      right rye       Review of Systems   Constitutional: Negative.  Negative for chills, fever and weight loss.   HENT: Negative.  Negative for ear pain, postnasal drip, rhinorrhea and sore throat.    Eyes: Negative.    Respiratory:  Positive for cough. Negative for hemoptysis, shortness of breath and wheezing.    Cardiovascular: Negative.  Negative for chest  pain.   Gastrointestinal: Negative.  Negative for heartburn.   Endocrine: Negative.    Genitourinary: Negative.    Musculoskeletal: Negative.  Negative for myalgias.   Integumentary:  Negative for rash. Negative.   Allergic/Immunologic: Negative.  Negative for environmental allergies.   Neurological: Negative.  Negative for headaches.   Psychiatric/Behavioral: Negative.            Objective     Physical Exam  Vitals and nursing note reviewed.   Constitutional:       Appearance: Normal appearance.   HENT:      Head: Normocephalic.      Right Ear: Tympanic membrane, ear canal and external ear normal.      Left Ear: Tympanic membrane, ear canal and external ear normal.      Nose: Nose normal.      Mouth/Throat:      Mouth: Mucous membranes are moist.      Pharynx: Oropharynx is clear.   Eyes:      Conjunctiva/sclera: Conjunctivae normal.      Pupils: Pupils are equal, round, and reactive to light.   Cardiovascular:      Rate and Rhythm: Normal rate and regular rhythm.      Pulses: Normal pulses.      Heart sounds: Normal heart sounds.   Pulmonary:      Effort: Pulmonary effort is normal.      Breath sounds: Normal breath sounds.   Abdominal:      General: Bowel sounds are normal.      Palpations: Abdomen is soft.   Musculoskeletal:         General: Normal range of motion.      Cervical back: Normal range of motion and neck supple.   Skin:     General: Skin is warm and dry.      Capillary Refill: Capillary refill takes 2 to 3 seconds.   Neurological:      Mental Status: She is alert and oriented to person, place, and time.   Psychiatric:         Mood and Affect: Mood normal.         Behavior: Behavior normal.         Thought Content: Thought content normal.         Judgment: Judgment normal.            Assessment and Plan     1. COVID-19  2. Cough, unspecified type  -     POCT COVID-19 Rapid Screening        -     benzonatate (TESSALON) 200 MG capsule; Take 1 capsule (200 mg total) by mouth 3 (three) times daily as  needed.  Dispense: 30 capsule; Refill: 0  -     COVID-19 Home Symptom Monitoring  - Duration (days): 14  COVID-19 home instructions given  Hydrate well  Rest  Tylenol OTC as directed (as needed)  Report to ER immediately if symptoms worsen or persist               No follow-ups on file.

## 2023-08-18 ENCOUNTER — CLINICAL SUPPORT (OUTPATIENT)
Dept: FAMILY MEDICINE | Facility: CLINIC | Age: 68
End: 2023-08-18
Payer: MEDICARE

## 2023-08-18 DIAGNOSIS — E53.8 B12 DEFICIENCY: Primary | ICD-10-CM

## 2023-08-18 PROCEDURE — 99999 PR PBB SHADOW E&M-EST. PATIENT-LVL II: ICD-10-PCS | Mod: PBBFAC,HCNC,,

## 2023-08-18 PROCEDURE — 96372 THER/PROPH/DIAG INJ SC/IM: CPT | Mod: HCNC,S$GLB,, | Performed by: FAMILY MEDICINE

## 2023-08-18 PROCEDURE — 96372 PR INJECTION,THERAP/PROPH/DIAG2ST, IM OR SUBCUT: ICD-10-PCS | Mod: HCNC,S$GLB,, | Performed by: FAMILY MEDICINE

## 2023-08-18 PROCEDURE — 99999 PR PBB SHADOW E&M-EST. PATIENT-LVL II: CPT | Mod: PBBFAC,HCNC,,

## 2023-08-18 RX ADMIN — CYANOCOBALAMIN 1000 MCG: 1000 INJECTION, SOLUTION INTRAMUSCULAR at 01:08

## 2023-08-22 ENCOUNTER — PATIENT MESSAGE (OUTPATIENT)
Dept: FAMILY MEDICINE | Facility: CLINIC | Age: 68
End: 2023-08-22
Payer: MEDICARE

## 2023-08-22 ENCOUNTER — TELEPHONE (OUTPATIENT)
Dept: FAMILY MEDICINE | Facility: CLINIC | Age: 68
End: 2023-08-22
Payer: MEDICARE

## 2023-08-22 NOTE — TELEPHONE ENCOUNTER
Patient needs appointment to review.  Have her bring in her medications..  I did not prescribe the amiloride.  I have not seen her since last September.

## 2023-08-22 NOTE — TELEPHONE ENCOUNTER
Pt has questions regarding current medications. Pt would like to know if she should be taking both Benazepril 10 mg daily and amiloride 5 mg daily. Please advise. Thank you.

## 2023-08-25 ENCOUNTER — OFFICE VISIT (OUTPATIENT)
Dept: ORTHOPEDICS | Facility: CLINIC | Age: 68
End: 2023-08-25
Payer: MEDICARE

## 2023-08-25 ENCOUNTER — PATIENT MESSAGE (OUTPATIENT)
Dept: ADMINISTRATIVE | Facility: HOSPITAL | Age: 68
End: 2023-08-25
Payer: MEDICARE

## 2023-08-25 VITALS — WEIGHT: 150.81 LBS | BODY MASS INDEX: 25.13 KG/M2 | HEIGHT: 65 IN

## 2023-08-25 DIAGNOSIS — M75.41 IMPINGEMENT SYNDROME OF RIGHT SHOULDER: Primary | ICD-10-CM

## 2023-08-25 PROCEDURE — 1159F MED LIST DOCD IN RCRD: CPT | Mod: HCNC,CPTII,S$GLB, | Performed by: NURSE PRACTITIONER

## 2023-08-25 PROCEDURE — 1159F PR MEDICATION LIST DOCUMENTED IN MEDICAL RECORD: ICD-10-PCS | Mod: HCNC,CPTII,S$GLB, | Performed by: NURSE PRACTITIONER

## 2023-08-25 PROCEDURE — 3008F PR BODY MASS INDEX (BMI) DOCUMENTED: ICD-10-PCS | Mod: HCNC,CPTII,S$GLB, | Performed by: NURSE PRACTITIONER

## 2023-08-25 PROCEDURE — 20610 DRAIN/INJ JOINT/BURSA W/O US: CPT | Mod: HCNC,RT,S$GLB, | Performed by: NURSE PRACTITIONER

## 2023-08-25 PROCEDURE — 99212 PR OFFICE/OUTPT VISIT, EST, LEVL II, 10-19 MIN: ICD-10-PCS | Mod: HCNC,25,S$GLB, | Performed by: NURSE PRACTITIONER

## 2023-08-25 PROCEDURE — 1101F PT FALLS ASSESS-DOCD LE1/YR: CPT | Mod: HCNC,CPTII,S$GLB, | Performed by: NURSE PRACTITIONER

## 2023-08-25 PROCEDURE — 99999 PR PBB SHADOW E&M-EST. PATIENT-LVL III: ICD-10-PCS | Mod: PBBFAC,HCNC,, | Performed by: NURSE PRACTITIONER

## 2023-08-25 PROCEDURE — 4010F ACE/ARB THERAPY RXD/TAKEN: CPT | Mod: HCNC,CPTII,S$GLB, | Performed by: NURSE PRACTITIONER

## 2023-08-25 PROCEDURE — 1125F AMNT PAIN NOTED PAIN PRSNT: CPT | Mod: HCNC,CPTII,S$GLB, | Performed by: NURSE PRACTITIONER

## 2023-08-25 PROCEDURE — 3288F PR FALLS RISK ASSESSMENT DOCUMENTED: ICD-10-PCS | Mod: HCNC,CPTII,S$GLB, | Performed by: NURSE PRACTITIONER

## 2023-08-25 PROCEDURE — 1160F PR REVIEW ALL MEDS BY PRESCRIBER/CLIN PHARMACIST DOCUMENTED: ICD-10-PCS | Mod: HCNC,CPTII,S$GLB, | Performed by: NURSE PRACTITIONER

## 2023-08-25 PROCEDURE — 1160F RVW MEDS BY RX/DR IN RCRD: CPT | Mod: HCNC,CPTII,S$GLB, | Performed by: NURSE PRACTITIONER

## 2023-08-25 PROCEDURE — 1101F PR PT FALLS ASSESS DOC 0-1 FALLS W/OUT INJ PAST YR: ICD-10-PCS | Mod: HCNC,CPTII,S$GLB, | Performed by: NURSE PRACTITIONER

## 2023-08-25 PROCEDURE — 99212 OFFICE O/P EST SF 10 MIN: CPT | Mod: HCNC,25,S$GLB, | Performed by: NURSE PRACTITIONER

## 2023-08-25 PROCEDURE — 1125F PR PAIN SEVERITY QUANTIFIED, PAIN PRESENT: ICD-10-PCS | Mod: HCNC,CPTII,S$GLB, | Performed by: NURSE PRACTITIONER

## 2023-08-25 PROCEDURE — 20610 LARGE JOINT ASPIRATION/INJECTION: R SUBACROMIAL BURSA: ICD-10-PCS | Mod: HCNC,RT,S$GLB, | Performed by: NURSE PRACTITIONER

## 2023-08-25 PROCEDURE — 99999 PR PBB SHADOW E&M-EST. PATIENT-LVL III: CPT | Mod: PBBFAC,HCNC,, | Performed by: NURSE PRACTITIONER

## 2023-08-25 PROCEDURE — 4010F PR ACE/ARB THEARPY RXD/TAKEN: ICD-10-PCS | Mod: HCNC,CPTII,S$GLB, | Performed by: NURSE PRACTITIONER

## 2023-08-25 PROCEDURE — 3288F FALL RISK ASSESSMENT DOCD: CPT | Mod: HCNC,CPTII,S$GLB, | Performed by: NURSE PRACTITIONER

## 2023-08-25 PROCEDURE — 3008F BODY MASS INDEX DOCD: CPT | Mod: HCNC,CPTII,S$GLB, | Performed by: NURSE PRACTITIONER

## 2023-08-25 RX ADMIN — TRIAMCINOLONE ACETONIDE 40 MG: 40 INJECTION, SUSPENSION INTRA-ARTICULAR; INTRAMUSCULAR at 08:08

## 2023-08-26 RX ORDER — TRIAMCINOLONE ACETONIDE 40 MG/ML
40 INJECTION, SUSPENSION INTRA-ARTICULAR; INTRAMUSCULAR
Status: DISCONTINUED | OUTPATIENT
Start: 2023-08-25 | End: 2023-08-26 | Stop reason: HOSPADM

## 2023-08-26 NOTE — PROCEDURES
Large Joint Aspiration/Injection: R subacromial bursa    Date/Time: 8/25/2023 8:40 AM    Performed by: Mony Garcia FNP  Authorized by: Mony Garcia FNP    Consent Done?:  Yes (Verbal)  Indications:  Pain  Timeout: prior to procedure the correct patient, procedure, and site was verified    Prep: patient was prepped and draped in usual sterile fashion      Local anesthesia used?: Yes    Local anesthetic:  Lidocaine 1% without epinephrine  Anesthetic total (ml):  5      Details:  Needle Size:  22 G  Ultrasonic Guidance for needle placement?: No    Approach:  Posterior  Location:  Shoulder  Site:  R subacromial bursa  Medications:  40 mg triamcinolone acetonide 40 mg/mL  Patient tolerance:  Patient tolerated the procedure well with no immediate complications

## 2023-08-26 NOTE — PROGRESS NOTES
Chief Complaint   Patient presents with    Right Shoulder - Pain       HPI:   This is a 68 y.o. who returns to clinic today in follow-up for right shoulder pain for the past 2 weeks after no injury. She is wanting another injection as she has had these in the past and they work well for her. Pain is aching and throbbing. No numbness or tingling. No associated signs or symptoms.    Past Medical History:   Diagnosis Date    Acute pancreatitis 3/21/2016    Breast cancer 2005    right side with lumpectomy, chemo and radiation    Closed fracture of ramus of right pubis 6/8/2016    Colon polyp     last scope 4/2012- repeat 10 y per pt- Dr. Johnson    GERD (gastroesophageal reflux disease) 5/29/2012    Hyperlipidemia 4/16/2013    Hypertension     Metabolic encephalopathy 4/20/2021    Osteopenia      Past Surgical History:   Procedure Laterality Date    BREAST LUMPECTOMY  2005    DILATION AND CURETTAGE OF UTERUS      ESOPHAGEAL DILATION      ESOPHAGOGASTRODUODENOSCOPY  6/1/2012    ROTATOR CUFF REPAIR      TEAR DUCT SURGERY      right rye     Current Outpatient Medications on File Prior to Visit   Medication Sig Dispense Refill    acetaminophen (TYLENOL) 650 MG TbSR Take 2 tablets (1,300 mg total) by mouth every 8 (eight) hours. (Patient not taking: Reported on 8/8/2023)  0    aMILoride (MIDAMOR) 5 MG Tab Take 5 mg by mouth once daily.      amLODIPine (NORVASC) 5 MG tablet Take 1 tablet (5 mg total) by mouth once daily. 90 tablet 3    benazepriL (LOTENSIN) 10 MG tablet Take 1 tablet by mouth once daily 90 tablet 2    calcium carbonate (OS-NASEEM) 500 mg calcium (1,250 mg) tablet qid 120 tablet 0    ergocalciferol (ERGOCALCIFEROL) 50,000 unit Cap Once a week 4 capsule 5    folic acid (FOLVITE) 1 MG tablet Take 1 tablet by mouth once daily 90 tablet 3    KENALOG 40 mg/mL injection       LORazepam (ATIVAN) 0.5 MG tablet Take 1 tablet (0.5 mg total) by mouth once. Take 30-60 minutes prior to MRI for 1 dose 1 tablet 0    magnesium  oxide (MAG-OX) 400 mg (241.3 mg magnesium) tablet bid 60 tablet 1    minocycline (MINOCIN,DYNACIN) 50 MG Cap Take 50 mg by mouth 2 (two) times daily.      omeprazole (PRILOSEC) 20 MG capsule Take 1 capsule by mouth once daily 90 capsule 3    pravastatin (PRAVACHOL) 20 MG tablet Take 1 tablet (20 mg total) by mouth once daily. 90 tablet 3    ZILXI 1.5 % Foam Apply topically 2 (two) times daily.       Current Facility-Administered Medications on File Prior to Visit   Medication Dose Route Frequency Provider Last Rate Last Admin    cyanocobalamin injection 1,000 mcg  1,000 mcg Intramuscular Q30 Days Taqueria Melendrez MD   1,000 mcg at 23 1336     Review of patient's allergies indicates:   Allergen Reactions    Hydrochlorothiazide Other (See Comments)     Multiple electrolyte abnormalities     Family History   Problem Relation Age of Onset    Stroke Brother 57    Breast cancer Mother 87     Social History     Socioeconomic History    Marital status:    Tobacco Use    Smoking status: Former     Current packs/day: 0.00     Types: Cigarettes     Quit date: 2005     Years since quittin.2    Smokeless tobacco: Never   Substance and Sexual Activity    Alcohol use: Not Currently     Alcohol/week: 2.0 standard drinks of alcohol     Types: 2 Standard drinks or equivalent per week     Comment: prior 6 pack/week    Drug use: Yes    Sexual activity: Not Currently     Social Determinants of Health     Financial Resource Strain: Low Risk  (2023)    Overall Financial Resource Strain (CARDIA)     Difficulty of Paying Living Expenses: Not hard at all   Food Insecurity: No Food Insecurity (2023)    Hunger Vital Sign     Worried About Running Out of Food in the Last Year: Never true     Ran Out of Food in the Last Year: Never true   Transportation Needs: No Transportation Needs (2023)    PRAPARE - Transportation     Lack of Transportation (Medical): No     Lack of Transportation (Non-Medical): No    Physical Activity: Inactive (5/8/2023)    Exercise Vital Sign     Days of Exercise per Week: 0 days     Minutes of Exercise per Session: 0 min   Stress: No Stress Concern Present (5/8/2023)    [place] [employer] - Occupational Stress Questionnaire     Feeling of Stress : Not at all   Social Connections: Socially Integrated (5/8/2023)    Social Connection and Isolation Panel [NHANES]     Frequency of Communication with Friends and Family: More than three times a week     Frequency of Social Gatherings with Friends and Family: More than three times a week     Attends Religion Services: More than 4 times per year     Active Member of Clubs or Organizations: No     Attends Club or Organization Meetings: More than 4 times per year     Marital Status:    Housing Stability: Unknown (5/8/2023)    Housing Stability Vital Sign     Unable to Pay for Housing in the Last Year: No     Unstable Housing in the Last Year: No       Review of Systems:  Constitutional:  Denies fever or chills   Eyes:  Denies change in visual acuity   HENT:  Denies nasal congestion or sore throat   Respiratory:  Denies cough or shortness of breath   Cardiovascular:  Denies chest pain or edema   GI:  Denies abdominal pain, nausea, vomiting, bloody stools or diarrhea   :  Denies dysuria   Integument:  Denies rash   Neurologic:  Denies headache, focal weakness or sensory changes   Endocrine:  Denies polyuria or polydipsia   Lymphatic:  Denies swollen glands   Psychiatric:  Denies depression or anxiety     Physical Exam:   Constitutional:  Well developed, well nourished, no acute distress, non-toxic appearance   Integument:  Well hydrated  Neurologic:  Alert & oriented x 3  Psychiatric:  Speech and behavior appropriate     Bilateral Shoulder Exam    Left Shoulder Exam   Shoulder exam performed same as contralateral side and is normal.    Right Shoulder Exam   Tenderness   Shoulder tenderness location: diffusely about  shoulder.    Range of Motion   Forward Flexion: abnormal   External Rotation: abnormal     Muscle Strength   Supraspinatus: 4/5     Tests   Hawkin's test: positive  Impingement: positive    Other   Erythema: absent  Sensation: normal  Pulse: present             Impingement syndrome of right shoulder  -     Large Joint Aspiration/Injection: R subacromial bursa         Using an aseptic technique, I injected 5 cc of lidocaine 1% without and 1 cc of kenalog 40mg into the right shoulder. The patient tolerated this well. I will have them return to clinic in 6 weeks or as needed.

## 2023-08-30 ENCOUNTER — OFFICE VISIT (OUTPATIENT)
Dept: FAMILY MEDICINE | Facility: CLINIC | Age: 68
End: 2023-08-30
Payer: MEDICARE

## 2023-08-30 ENCOUNTER — LAB VISIT (OUTPATIENT)
Dept: LAB | Facility: HOSPITAL | Age: 68
End: 2023-08-30
Attending: FAMILY MEDICINE
Payer: MEDICARE

## 2023-08-30 VITALS
DIASTOLIC BLOOD PRESSURE: 76 MMHG | HEART RATE: 87 BPM | WEIGHT: 154.38 LBS | TEMPERATURE: 98 F | HEIGHT: 66 IN | BODY MASS INDEX: 24.81 KG/M2 | SYSTOLIC BLOOD PRESSURE: 125 MMHG

## 2023-08-30 DIAGNOSIS — E78.5 HYPERLIPIDEMIA, UNSPECIFIED HYPERLIPIDEMIA TYPE: ICD-10-CM

## 2023-08-30 DIAGNOSIS — E21.3 HYPERPARATHYROIDISM: ICD-10-CM

## 2023-08-30 DIAGNOSIS — D64.9 NORMOCYTIC ANEMIA: ICD-10-CM

## 2023-08-30 DIAGNOSIS — I10 PRIMARY HYPERTENSION: ICD-10-CM

## 2023-08-30 DIAGNOSIS — E83.42 HYPOMAGNESEMIA: ICD-10-CM

## 2023-08-30 DIAGNOSIS — E53.8 VITAMIN B12 DEFICIENCY: ICD-10-CM

## 2023-08-30 DIAGNOSIS — E55.9 VITAMIN D DEFICIENCY: ICD-10-CM

## 2023-08-30 DIAGNOSIS — Z00.00 ROUTINE HISTORY AND PHYSICAL EXAMINATION OF ADULT: Primary | ICD-10-CM

## 2023-08-30 DIAGNOSIS — Z00.00 ROUTINE HISTORY AND PHYSICAL EXAMINATION OF ADULT: ICD-10-CM

## 2023-08-30 DIAGNOSIS — N18.30 STAGE 3 CHRONIC KIDNEY DISEASE, UNSPECIFIED WHETHER STAGE 3A OR 3B CKD: ICD-10-CM

## 2023-08-30 PROBLEM — Z12.31 ENCOUNTER FOR SCREENING MAMMOGRAM FOR BREAST CANCER: Status: RESOLVED | Noted: 2020-10-13 | Resolved: 2023-08-30

## 2023-08-30 LAB
25(OH)D3+25(OH)D2 SERPL-MCNC: 19 NG/ML (ref 30–96)
ALBUMIN SERPL BCP-MCNC: 3.6 G/DL (ref 3.5–5.2)
ALP SERPL-CCNC: 72 U/L (ref 55–135)
ALT SERPL W/O P-5'-P-CCNC: 12 U/L (ref 10–44)
ANION GAP SERPL CALC-SCNC: 9 MMOL/L (ref 8–16)
AST SERPL-CCNC: 19 U/L (ref 10–40)
BASOPHILS # BLD AUTO: 0.02 K/UL (ref 0–0.2)
BASOPHILS NFR BLD: 0.3 % (ref 0–1.9)
BILIRUB SERPL-MCNC: 0.4 MG/DL (ref 0.1–1)
BUN SERPL-MCNC: 27 MG/DL (ref 8–23)
CALCIUM SERPL-MCNC: 8.5 MG/DL (ref 8.7–10.5)
CHLORIDE SERPL-SCNC: 110 MMOL/L (ref 95–110)
CHOLEST SERPL-MCNC: 232 MG/DL (ref 120–199)
CHOLEST/HDLC SERPL: 2.8 {RATIO} (ref 2–5)
CO2 SERPL-SCNC: 22 MMOL/L (ref 23–29)
CREAT SERPL-MCNC: 1.5 MG/DL (ref 0.5–1.4)
DIFFERENTIAL METHOD: ABNORMAL
EOSINOPHIL # BLD AUTO: 0.1 K/UL (ref 0–0.5)
EOSINOPHIL NFR BLD: 1.1 % (ref 0–8)
ERYTHROCYTE [DISTWIDTH] IN BLOOD BY AUTOMATED COUNT: 12.9 % (ref 11.5–14.5)
EST. GFR  (NO RACE VARIABLE): 37.3 ML/MIN/1.73 M^2
GLUCOSE SERPL-MCNC: 77 MG/DL (ref 70–110)
HCT VFR BLD AUTO: 32.3 % (ref 37–48.5)
HDLC SERPL-MCNC: 82 MG/DL (ref 40–75)
HDLC SERPL: 35.3 % (ref 20–50)
HGB BLD-MCNC: 10.3 G/DL (ref 12–16)
IMM GRANULOCYTES # BLD AUTO: 0.06 K/UL (ref 0–0.04)
IMM GRANULOCYTES NFR BLD AUTO: 0.8 % (ref 0–0.5)
LDLC SERPL CALC-MCNC: 96 MG/DL (ref 63–159)
LYMPHOCYTES # BLD AUTO: 2.8 K/UL (ref 1–4.8)
LYMPHOCYTES NFR BLD: 34.8 % (ref 18–48)
MAGNESIUM SERPL-MCNC: 1.2 MG/DL (ref 1.6–2.6)
MCH RBC QN AUTO: 31 PG (ref 27–31)
MCHC RBC AUTO-ENTMCNC: 31.9 G/DL (ref 32–36)
MCV RBC AUTO: 97 FL (ref 82–98)
MONOCYTES # BLD AUTO: 0.5 K/UL (ref 0.3–1)
MONOCYTES NFR BLD: 6.7 % (ref 4–15)
NEUTROPHILS # BLD AUTO: 4.5 K/UL (ref 1.8–7.7)
NEUTROPHILS NFR BLD: 56.3 % (ref 38–73)
NONHDLC SERPL-MCNC: 150 MG/DL
NRBC BLD-RTO: 0 /100 WBC
PLATELET # BLD AUTO: 231 K/UL (ref 150–450)
PMV BLD AUTO: 10.6 FL (ref 9.2–12.9)
POTASSIUM SERPL-SCNC: 4.7 MMOL/L (ref 3.5–5.1)
PROT SERPL-MCNC: 7 G/DL (ref 6–8.4)
PTH-INTACT SERPL-MCNC: 340 PG/ML (ref 9–77)
RBC # BLD AUTO: 3.32 M/UL (ref 4–5.4)
SODIUM SERPL-SCNC: 141 MMOL/L (ref 136–145)
TRIGL SERPL-MCNC: 270 MG/DL (ref 30–150)
VIT B12 SERPL-MCNC: 870 PG/ML (ref 210–950)
WBC # BLD AUTO: 7.9 K/UL (ref 3.9–12.7)

## 2023-08-30 PROCEDURE — 82607 VITAMIN B-12: CPT | Mod: HCNC | Performed by: FAMILY MEDICINE

## 2023-08-30 PROCEDURE — 1159F MED LIST DOCD IN RCRD: CPT | Mod: HCNC,CPTII,S$GLB, | Performed by: FAMILY MEDICINE

## 2023-08-30 PROCEDURE — 3074F PR MOST RECENT SYSTOLIC BLOOD PRESSURE < 130 MM HG: ICD-10-PCS | Mod: HCNC,CPTII,S$GLB, | Performed by: FAMILY MEDICINE

## 2023-08-30 PROCEDURE — 3078F PR MOST RECENT DIASTOLIC BLOOD PRESSURE < 80 MM HG: ICD-10-PCS | Mod: HCNC,CPTII,S$GLB, | Performed by: FAMILY MEDICINE

## 2023-08-30 PROCEDURE — 82306 VITAMIN D 25 HYDROXY: CPT | Mod: HCNC | Performed by: FAMILY MEDICINE

## 2023-08-30 PROCEDURE — 4010F PR ACE/ARB THEARPY RXD/TAKEN: ICD-10-PCS | Mod: HCNC,CPTII,S$GLB, | Performed by: FAMILY MEDICINE

## 2023-08-30 PROCEDURE — 1126F AMNT PAIN NOTED NONE PRSNT: CPT | Mod: HCNC,CPTII,S$GLB, | Performed by: FAMILY MEDICINE

## 2023-08-30 PROCEDURE — 3074F SYST BP LT 130 MM HG: CPT | Mod: HCNC,CPTII,S$GLB, | Performed by: FAMILY MEDICINE

## 2023-08-30 PROCEDURE — 99397 PR PREVENTIVE VISIT,EST,65 & OVER: ICD-10-PCS | Mod: HCNC,S$GLB,, | Performed by: FAMILY MEDICINE

## 2023-08-30 PROCEDURE — 4010F ACE/ARB THERAPY RXD/TAKEN: CPT | Mod: HCNC,CPTII,S$GLB, | Performed by: FAMILY MEDICINE

## 2023-08-30 PROCEDURE — 99999 PR PBB SHADOW E&M-EST. PATIENT-LVL IV: CPT | Mod: PBBFAC,HCNC,, | Performed by: FAMILY MEDICINE

## 2023-08-30 PROCEDURE — 85025 COMPLETE CBC W/AUTO DIFF WBC: CPT | Mod: HCNC | Performed by: FAMILY MEDICINE

## 2023-08-30 PROCEDURE — 3008F BODY MASS INDEX DOCD: CPT | Mod: HCNC,CPTII,S$GLB, | Performed by: FAMILY MEDICINE

## 2023-08-30 PROCEDURE — 1159F PR MEDICATION LIST DOCUMENTED IN MEDICAL RECORD: ICD-10-PCS | Mod: HCNC,CPTII,S$GLB, | Performed by: FAMILY MEDICINE

## 2023-08-30 PROCEDURE — 36415 COLL VENOUS BLD VENIPUNCTURE: CPT | Mod: HCNC,PO | Performed by: FAMILY MEDICINE

## 2023-08-30 PROCEDURE — 3078F DIAST BP <80 MM HG: CPT | Mod: HCNC,CPTII,S$GLB, | Performed by: FAMILY MEDICINE

## 2023-08-30 PROCEDURE — 83735 ASSAY OF MAGNESIUM: CPT | Mod: HCNC | Performed by: FAMILY MEDICINE

## 2023-08-30 PROCEDURE — 80053 COMPREHEN METABOLIC PANEL: CPT | Mod: HCNC | Performed by: FAMILY MEDICINE

## 2023-08-30 PROCEDURE — 1126F PR PAIN SEVERITY QUANTIFIED, NO PAIN PRESENT: ICD-10-PCS | Mod: HCNC,CPTII,S$GLB, | Performed by: FAMILY MEDICINE

## 2023-08-30 PROCEDURE — 99999 PR PBB SHADOW E&M-EST. PATIENT-LVL IV: ICD-10-PCS | Mod: PBBFAC,HCNC,, | Performed by: FAMILY MEDICINE

## 2023-08-30 PROCEDURE — 80061 LIPID PANEL: CPT | Mod: HCNC | Performed by: FAMILY MEDICINE

## 2023-08-30 PROCEDURE — 3008F PR BODY MASS INDEX (BMI) DOCUMENTED: ICD-10-PCS | Mod: HCNC,CPTII,S$GLB, | Performed by: FAMILY MEDICINE

## 2023-08-30 PROCEDURE — 83970 ASSAY OF PARATHORMONE: CPT | Mod: HCNC | Performed by: FAMILY MEDICINE

## 2023-08-30 PROCEDURE — 99397 PER PM REEVAL EST PAT 65+ YR: CPT | Mod: HCNC,S$GLB,, | Performed by: FAMILY MEDICINE

## 2023-08-30 RX ORDER — CYANOCOBALAMIN 1000 UG/ML
INJECTION, SOLUTION INTRAMUSCULAR; SUBCUTANEOUS
COMMUNITY
Start: 2023-07-14

## 2023-08-30 NOTE — TELEPHONE ENCOUNTER
Recommend wait until we get blood test back from today before filling this as we might need to make some changes

## 2023-08-30 NOTE — PATIENT INSTRUCTIONS
Recommend tetanus/diptheria (Tdap) and shingles (Shingrx) vaccines at the pharmacy.  Recommend flu shot and covid vaccine when updated. Recommend RSV vaccine.

## 2023-08-30 NOTE — TELEPHONE ENCOUNTER
Care Due:                  Date            Visit Type   Department     Provider  --------------------------------------------------------------------------------                                EP -                              PRIMARY      [facility]  Last Visit: 08-      CARE ([facility])   MEDICINE       Taqueria Melendrez  Next Visit: None Scheduled  None         None Found                                                            Last  Test          Frequency    Reason                     Performed    Due Date  --------------------------------------------------------------------------------    CMP.........  12 months..  benazepriL, pravastatin..  07- 07-    Lipid Panel.  12 months..  pravastatin..............  07- 07-    Health [facility] Embedded Care Due Messages. Reference number: [id number].   8/30/2023 12:16:05 PM CDT

## 2023-08-30 NOTE — PROGRESS NOTES
Patient presents physical exam.  Hypertension controlled.  Hyperlipidemia on statin.  Chronic kidney disease stage 3 somewhat worse on recent laboratory through her endocrinologist.  She has chronic hypomagnesemia initially felt to be related to diuretic however has persisted with discontinuation.  She did have an elevated potassium recently as well through their office.  She does have follow-up appointment scheduled in the next few weeks.  Mild chronic normocytic anemia asymptomatic.  Hyperparathyroidism stable, sees Endocrinology.  Vitamin B12 deficiency on supplementation.    Carla was seen today for annual exam.    Diagnoses and all orders for this visit:    Routine history and physical examination of adult  -     Comprehensive Metabolic Panel; Future  -     Lipid Panel; Future  -     Magnesium; Future  -     CBC Auto Differential; Future  -     PTH, Intact; Future  -     Vitamin D; Future  -     Vitamin B12; Future    Primary hypertension  -     Comprehensive Metabolic Panel; Future    Hyperlipidemia, unspecified hyperlipidemia type  -     Lipid Panel; Future    Hypomagnesemia  -     Magnesium; Future    Stage 3 chronic kidney disease, unspecified whether stage 3a or 3b CKD  -     CBC Auto Differential; Future    Normocytic anemia  -     CBC Auto Differential; Future    Hyperparathyroidism  -     PTH, Intact; Future  -     Vitamin D; Future    Vitamin B12 deficiency  -     Vitamin B12; Future    Vitamin D deficiency  -     PTH, Intact; Future  -     Vitamin D; Future      Laboratory evaluation as above.  Continue follow-up with endocrine.  Consider nephrology referral pending above results.  Continue current medications.            Past Medical History:  Past Medical History:   Diagnosis Date    Acute pancreatitis 3/21/2016    Breast cancer 2005    right side with lumpectomy, chemo and radiation    Closed fracture of ramus of right pubis 6/8/2016    Colon polyp     last scope 4/2012- repeat 10 y per pt-   Alex    GERD (gastroesophageal reflux disease) 5/29/2012    Hyperlipidemia 4/16/2013    Hypertension     Metabolic encephalopathy 4/20/2021    Osteopenia      Past Surgical History:   Procedure Laterality Date    BREAST LUMPECTOMY  2005    DILATION AND CURETTAGE OF UTERUS      ESOPHAGEAL DILATION      ESOPHAGOGASTRODUODENOSCOPY  6/1/2012    ROTATOR CUFF REPAIR      TEAR DUCT SURGERY      right rye     Review of patient's allergies indicates:   Allergen Reactions    Hydrochlorothiazide Other (See Comments)     Multiple electrolyte abnormalities     Current Outpatient Medications on File Prior to Visit   Medication Sig Dispense Refill    aMILoride (MIDAMOR) 5 MG Tab Take 5 mg by mouth once daily.      amLODIPine (NORVASC) 5 MG tablet Take 1 tablet (5 mg total) by mouth once daily. 90 tablet 3    benazepriL (LOTENSIN) 10 MG tablet Take 1 tablet by mouth once daily 90 tablet 2    calcium carbonate (OS-NASEEM) 500 mg calcium (1,250 mg) tablet qid 120 tablet 0    cyanocobalamin 1,000 mcg/mL injection       folic acid (FOLVITE) 1 MG tablet Take 1 tablet by mouth once daily 90 tablet 3    magnesium oxide (MAG-OX) 400 mg (241.3 mg magnesium) tablet bid 60 tablet 1    minocycline (MINOCIN,DYNACIN) 50 MG Cap Take 50 mg by mouth 2 (two) times daily.      omeprazole (PRILOSEC) 20 MG capsule Take 1 capsule by mouth once daily 90 capsule 3    pravastatin (PRAVACHOL) 20 MG tablet Take 1 tablet (20 mg total) by mouth once daily. 90 tablet 3    ZILXI 1.5 % Foam Apply topically 2 (two) times daily.      [DISCONTINUED] acetaminophen (TYLENOL) 650 MG TbSR Take 2 tablets (1,300 mg total) by mouth every 8 (eight) hours.  0    [DISCONTINUED] ergocalciferol (ERGOCALCIFEROL) 50,000 unit Cap Once a week 4 capsule 5    [DISCONTINUED] KENALOG 40 mg/mL injection       [DISCONTINUED] LORazepam (ATIVAN) 0.5 MG tablet Take 1 tablet (0.5 mg total) by mouth once. Take 30-60 minutes prior to MRI for 1 dose 1 tablet 0     Current Facility-Administered  Medications on File Prior to Visit   Medication Dose Route Frequency Provider Last Rate Last Admin    cyanocobalamin injection 1,000 mcg  1,000 mcg Intramuscular Q30 Days Taqueria Melendrez MD   1,000 mcg at 23 1336     Social History     Socioeconomic History    Marital status:    Tobacco Use    Smoking status: Former     Current packs/day: 0.00     Types: Cigarettes     Quit date: 2005     Years since quittin.2    Smokeless tobacco: Never   Substance and Sexual Activity    Alcohol use: Not Currently     Alcohol/week: 2.0 standard drinks of alcohol     Types: 2 Standard drinks or equivalent per week     Comment: prior 6 pack/week    Drug use: Yes    Sexual activity: Not Currently     Social Determinants of Health     Financial Resource Strain: Low Risk  (2023)    Overall Financial Resource Strain (CARDIA)     Difficulty of Paying Living Expenses: Not hard at all   Food Insecurity: No Food Insecurity (2023)    Hunger Vital Sign     Worried About Running Out of Food in the Last Year: Never true     Ran Out of Food in the Last Year: Never true   Transportation Needs: No Transportation Needs (2023)    PRAPARE - Transportation     Lack of Transportation (Medical): No     Lack of Transportation (Non-Medical): No   Physical Activity: Inactive (2023)    Exercise Vital Sign     Days of Exercise per Week: 0 days     Minutes of Exercise per Session: 0 min   Stress: No Stress Concern Present (2023)    [place] [employer] - Occupational Stress Questionnaire     Feeling of Stress : Not at all   Social Connections: Socially Integrated (2023)    Social Connection and Isolation Panel [NHANES]     Frequency of Communication with Friends and Family: More than three times a week     Frequency of Social Gatherings with Friends and Family: More than three times a week     Attends Worship Services: More than 4 times per year     Active Member of Clubs or Organizations:  "No     Attends Club or Organization Meetings: More than 4 times per year     Marital Status:    Housing Stability: Unknown (5/8/2023)    Housing Stability Vital Sign     Unable to Pay for Housing in the Last Year: No     Unstable Housing in the Last Year: No     Family History   Problem Relation Age of Onset    Stroke Brother 57    Breast cancer Mother 87           ROS:  GENERAL: No fever, chills,  or significant weight changes.   CARDIOVASCULAR: Denies chest pain, PND, orthopnea or reduced exercise tolerance.  ABDOMEN: Appetite fine. Denies diarrhea, abdominal pain, hematemesis or blood in stool.  URINARY: No flank pain, dysuria or hematuria.    Vitals:    08/30/23 0920   BP: 125/76   Pulse: 87   Temp: 97.7 °F (36.5 °C)   TempSrc: Temporal   Weight: 70 kg (154 lb 6.4 oz)   Height: 5' 5.5" (1.664 m)     Wt Readings from Last 3 Encounters:   08/30/23 70 kg (154 lb 6.4 oz)   08/25/23 68.4 kg (150 lb 12.7 oz)   08/08/23 68.4 kg (150 lb 12.8 oz)       OBJECTIVE:   APPEARANCE: Well nourished, well developed, in no acute distress.    HEAD: Normocephalic.  Atraumatic.  No sinus tenderness.  EYES:   Right eye: Pupil reactive.  Conjunctiva clear.    Left eye: Pupil reactive.  Conjunctiva clear.  EOMI.    EARS: TM's intact. Light reflex normal. No retraction or perforation.    NOSE:  clear.  MOUTH & THROAT:  No pharyngeal erythema or exudate. No lesions.  NECK: Supple. No bruits.  No JVD.  No cervical lymphadenopathy.  No thyromegaly.    CHEST: Breath sounds clear bilaterally.  Normal respiratory effort  CARDIOVASCULAR: Normal rate.  Regular rhythm.  No murmurs.  No rub.  No gallops.  ABDOMEN: Bowel sounds normal.  Soft.  No tenderness.  No organomegaly.  PERIPHERAL VASCULAR: No cyanosis.  No clubbing.  No edema.  NEUROLOGIC: No focal findings.  MENTAL STATUS: Alert.  Oriented x 3.          "

## 2023-08-30 NOTE — TELEPHONE ENCOUNTER
Refill Routing Note   Medication(s) are not appropriate for processing by [facility] for the following reason(s):      Required labs outdated: eGFR  Required labs abnormal: Cr, K    [facility] action(s):  Defer Care Due:  Labs due              Appointments  past 12m or future 3m with PCP    Date Provider   Last Visit   8/30/2023 Taqueria Melendrez MD   Next Visit   Visit date not found Taqueria Melendrez MD   ED visits in past 90 days: 0        Note composed:2:34 PM 08/30/2023

## 2023-08-31 ENCOUNTER — EXTERNAL CHRONIC CARE MANAGEMENT (OUTPATIENT)
Dept: PRIMARY CARE CLINIC | Facility: CLINIC | Age: 68
End: 2023-08-31
Payer: MEDICARE

## 2023-08-31 PROCEDURE — 99490 CHRNC CARE MGMT STAFF 1ST 20: CPT | Mod: S$GLB,,, | Performed by: FAMILY MEDICINE

## 2023-08-31 PROCEDURE — 99439 PR CHRONIC CARE MGMT, EA ADDTL 20 MIN: ICD-10-PCS | Mod: S$GLB,,, | Performed by: FAMILY MEDICINE

## 2023-08-31 PROCEDURE — 99490 PR CHRONIC CARE MGMT, 1ST 20 MIN: ICD-10-PCS | Mod: S$GLB,,, | Performed by: FAMILY MEDICINE

## 2023-08-31 PROCEDURE — 99439 CHRNC CARE MGMT STAF EA ADDL: CPT | Mod: S$GLB,,, | Performed by: FAMILY MEDICINE

## 2023-08-31 RX ORDER — BENAZEPRIL HYDROCHLORIDE 10 MG/1
TABLET ORAL
Qty: 90 TABLET | Refills: 3 | Status: SHIPPED | OUTPATIENT
Start: 2023-08-31

## 2023-09-15 ENCOUNTER — CLINICAL SUPPORT (OUTPATIENT)
Dept: FAMILY MEDICINE | Facility: CLINIC | Age: 68
End: 2023-09-15
Payer: MEDICARE

## 2023-09-15 DIAGNOSIS — E53.8 B12 DEFICIENCY: Primary | ICD-10-CM

## 2023-09-15 PROCEDURE — 99999 PR PBB SHADOW E&M-EST. PATIENT-LVL II: CPT | Mod: PBBFAC,HCNC,,

## 2023-09-15 PROCEDURE — 96372 PR INJECTION,THERAP/PROPH/DIAG2ST, IM OR SUBCUT: ICD-10-PCS | Mod: HCNC,S$GLB,, | Performed by: FAMILY MEDICINE

## 2023-09-15 PROCEDURE — 99999 PR PBB SHADOW E&M-EST. PATIENT-LVL II: ICD-10-PCS | Mod: PBBFAC,HCNC,,

## 2023-09-15 PROCEDURE — 96372 THER/PROPH/DIAG INJ SC/IM: CPT | Mod: HCNC,S$GLB,, | Performed by: FAMILY MEDICINE

## 2023-09-15 RX ADMIN — CYANOCOBALAMIN 1000 MCG: 1000 INJECTION, SOLUTION INTRAMUSCULAR at 09:09

## 2023-09-19 ENCOUNTER — TELEPHONE (OUTPATIENT)
Dept: FAMILY MEDICINE | Facility: CLINIC | Age: 68
End: 2023-09-19
Payer: MEDICARE

## 2023-09-19 DIAGNOSIS — E83.42 HYPOMAGNESEMIA: ICD-10-CM

## 2023-09-19 DIAGNOSIS — N18.30 STAGE 3 CHRONIC KIDNEY DISEASE, UNSPECIFIED WHETHER STAGE 3A OR 3B CKD: Primary | ICD-10-CM

## 2023-09-19 DIAGNOSIS — E83.51 HYPOCALCEMIA: ICD-10-CM

## 2023-09-19 NOTE — TELEPHONE ENCOUNTER
PT AWARE OF TEST RESULTS AND RECOMMENDATIONS PER DR MORTON . PT VU  . PT AGREE TO SEE NEPROLOGY . SIGN ORDER PLEASE

## 2023-09-19 NOTE — TELEPHONE ENCOUNTER
----- Message from Taqueria Melendrez MD sent at 9/5/2023  5:51 PM CDT -----  Parathyroid level is very high.  Vitamin-D is low.  Cholesterol acceptable.  Kidney function is decreased but better than last check.  Magnesium is still low.  Mild anemia noted.  Recommend vitamin-D 2000 units daily.  Make sure she is taking her calcium supplement twice daily.  Keep the follow-up appointment that she has scheduled with [facility] for further evaluation.  Also please refer her to Nephrology regarding decreased kidney function and electrolyte abnormalities. My nurse will contact you to arrange.  Thanks,  Dr. Melendrez

## 2023-09-20 ENCOUNTER — TELEPHONE (OUTPATIENT)
Dept: FAMILY MEDICINE | Facility: CLINIC | Age: 68
End: 2023-09-20
Payer: MEDICARE

## 2023-09-26 RX ORDER — PRAVASTATIN SODIUM 20 MG/1
20 TABLET ORAL
Qty: 90 TABLET | Refills: 3 | Status: SHIPPED | OUTPATIENT
Start: 2023-09-26

## 2023-09-26 RX ORDER — FOLIC ACID 1 MG/1
TABLET ORAL
Qty: 90 TABLET | Refills: 3 | Status: SHIPPED | OUTPATIENT
Start: 2023-09-26

## 2023-09-26 NOTE — TELEPHONE ENCOUNTER
Refill Decision Note   Carla Laurence  is requesting a refill authorization.  Brief Assessment and Rationale for Refill:  Approve     Medication Therapy Plan:         Comments:     Note composed:11:24 AM 09/26/2023

## 2023-09-26 NOTE — TELEPHONE ENCOUNTER
No care due was identified.  Health [facility] Embedded Care Due Messages. Reference number: [id number].   9/26/2023 11:09:55 AM CDT

## 2023-09-30 ENCOUNTER — EXTERNAL CHRONIC CARE MANAGEMENT (OUTPATIENT)
Dept: PRIMARY CARE CLINIC | Facility: CLINIC | Age: 68
End: 2023-09-30
Payer: MEDICARE

## 2023-09-30 PROCEDURE — 99490 PR CHRONIC CARE MGMT, 1ST 20 MIN: ICD-10-PCS | Mod: S$GLB,,, | Performed by: FAMILY MEDICINE

## 2023-09-30 PROCEDURE — 99490 CHRNC CARE MGMT STAFF 1ST 20: CPT | Mod: S$GLB,,, | Performed by: FAMILY MEDICINE

## 2023-10-06 ENCOUNTER — OFFICE VISIT (OUTPATIENT)
Dept: ORTHOPEDICS | Facility: CLINIC | Age: 68
End: 2023-10-06
Payer: MEDICARE

## 2023-10-06 DIAGNOSIS — M75.41 IMPINGEMENT SYNDROME OF RIGHT SHOULDER: Primary | ICD-10-CM

## 2023-10-06 PROCEDURE — 1160F RVW MEDS BY RX/DR IN RCRD: CPT | Mod: HCNC,CPTII,S$GLB, | Performed by: NURSE PRACTITIONER

## 2023-10-06 PROCEDURE — 1126F AMNT PAIN NOTED NONE PRSNT: CPT | Mod: HCNC,CPTII,S$GLB, | Performed by: NURSE PRACTITIONER

## 2023-10-06 PROCEDURE — 1126F PR PAIN SEVERITY QUANTIFIED, NO PAIN PRESENT: ICD-10-PCS | Mod: HCNC,CPTII,S$GLB, | Performed by: NURSE PRACTITIONER

## 2023-10-06 PROCEDURE — 3288F PR FALLS RISK ASSESSMENT DOCUMENTED: ICD-10-PCS | Mod: HCNC,CPTII,S$GLB, | Performed by: NURSE PRACTITIONER

## 2023-10-06 PROCEDURE — 99999 PR PBB SHADOW E&M-EST. PATIENT-LVL III: CPT | Mod: PBBFAC,HCNC,, | Performed by: NURSE PRACTITIONER

## 2023-10-06 PROCEDURE — 1101F PR PT FALLS ASSESS DOC 0-1 FALLS W/OUT INJ PAST YR: ICD-10-PCS | Mod: HCNC,CPTII,S$GLB, | Performed by: NURSE PRACTITIONER

## 2023-10-06 PROCEDURE — 20610 DRAIN/INJ JOINT/BURSA W/O US: CPT | Mod: HCNC,RT,S$GLB, | Performed by: NURSE PRACTITIONER

## 2023-10-06 PROCEDURE — 4010F ACE/ARB THERAPY RXD/TAKEN: CPT | Mod: HCNC,CPTII,S$GLB, | Performed by: NURSE PRACTITIONER

## 2023-10-06 PROCEDURE — 3288F FALL RISK ASSESSMENT DOCD: CPT | Mod: HCNC,CPTII,S$GLB, | Performed by: NURSE PRACTITIONER

## 2023-10-06 PROCEDURE — 1101F PT FALLS ASSESS-DOCD LE1/YR: CPT | Mod: HCNC,CPTII,S$GLB, | Performed by: NURSE PRACTITIONER

## 2023-10-06 PROCEDURE — 4010F PR ACE/ARB THEARPY RXD/TAKEN: ICD-10-PCS | Mod: HCNC,CPTII,S$GLB, | Performed by: NURSE PRACTITIONER

## 2023-10-06 PROCEDURE — 99999 PR PBB SHADOW E&M-EST. PATIENT-LVL III: ICD-10-PCS | Mod: PBBFAC,HCNC,, | Performed by: NURSE PRACTITIONER

## 2023-10-06 PROCEDURE — 1159F PR MEDICATION LIST DOCUMENTED IN MEDICAL RECORD: ICD-10-PCS | Mod: HCNC,CPTII,S$GLB, | Performed by: NURSE PRACTITIONER

## 2023-10-06 PROCEDURE — 99212 PR OFFICE/OUTPT VISIT, EST, LEVL II, 10-19 MIN: ICD-10-PCS | Mod: HCNC,25,S$GLB, | Performed by: NURSE PRACTITIONER

## 2023-10-06 PROCEDURE — 20610 LARGE JOINT ASPIRATION/INJECTION: R SUBACROMIAL BURSA: ICD-10-PCS | Mod: HCNC,RT,S$GLB, | Performed by: NURSE PRACTITIONER

## 2023-10-06 PROCEDURE — 1159F MED LIST DOCD IN RCRD: CPT | Mod: HCNC,CPTII,S$GLB, | Performed by: NURSE PRACTITIONER

## 2023-10-06 PROCEDURE — 1160F PR REVIEW ALL MEDS BY PRESCRIBER/CLIN PHARMACIST DOCUMENTED: ICD-10-PCS | Mod: HCNC,CPTII,S$GLB, | Performed by: NURSE PRACTITIONER

## 2023-10-06 PROCEDURE — 99212 OFFICE O/P EST SF 10 MIN: CPT | Mod: HCNC,25,S$GLB, | Performed by: NURSE PRACTITIONER

## 2023-10-06 RX ORDER — TRIAMCINOLONE ACETONIDE 40 MG/ML
40 INJECTION, SUSPENSION INTRA-ARTICULAR; INTRAMUSCULAR
Status: DISCONTINUED | OUTPATIENT
Start: 2023-10-06 | End: 2023-10-06 | Stop reason: HOSPADM

## 2023-10-06 RX ADMIN — TRIAMCINOLONE ACETONIDE 40 MG: 40 INJECTION, SUSPENSION INTRA-ARTICULAR; INTRAMUSCULAR at 08:10

## 2023-10-07 NOTE — PROCEDURES
Large Joint Aspiration/Injection: R subacromial bursa    Date/Time: 10/6/2023 8:40 AM    Performed by: Mony Garcia FNP  Authorized by: Mony Garcia FNP    Consent Done?:  Yes (Verbal)  Indications:  Pain  Timeout: prior to procedure the correct patient, procedure, and site was verified    Prep: patient was prepped and draped in usual sterile fashion      Local anesthesia used?: Yes    Local anesthetic:  Lidocaine 1% without epinephrine  Anesthetic total (ml):  5      Details:  Needle Size:  22 G  Ultrasonic Guidance for needle placement?: No    Approach:  Posterior  Location:  Shoulder  Site:  R subacromial bursa  Medications:  40 mg triamcinolone acetonide 40 mg/mL  Patient tolerance:  Patient tolerated the procedure well with no immediate complications

## 2023-10-07 NOTE — PROGRESS NOTES
Chief Complaint   Patient presents with    Right Shoulder - Pain     R shoulder f/u injection done 8/25/23. Denies any pain. Range of motion is good.        HPI:   This is a 68 y.o. who returns to clinic today in follow-up for right shoulder pain for the past 1 weeks after no injury. Pain is aching and dull. No numbness or tingling. No associated signs or symptoms.    Past Medical History:   Diagnosis Date    Acute pancreatitis 3/21/2016    Breast cancer 2005    right side with lumpectomy, chemo and radiation    Closed fracture of ramus of right pubis 6/8/2016    Colon polyp     last scope 4/2012- repeat 10 y per pt- Dr. Johnson    GERD (gastroesophageal reflux disease) 5/29/2012    Hyperlipidemia 4/16/2013    Hypertension     Metabolic encephalopathy 4/20/2021    Osteopenia      Past Surgical History:   Procedure Laterality Date    BREAST LUMPECTOMY  2005    DILATION AND CURETTAGE OF UTERUS      ESOPHAGEAL DILATION      ESOPHAGOGASTRODUODENOSCOPY  6/1/2012    ROTATOR CUFF REPAIR      TEAR DUCT SURGERY      right rye     Current Outpatient Medications on File Prior to Visit   Medication Sig Dispense Refill    aMILoride (MIDAMOR) 5 MG Tab Take 5 mg by mouth once daily.      amLODIPine (NORVASC) 5 MG tablet Take 1 tablet (5 mg total) by mouth once daily. 90 tablet 3    benazepriL (LOTENSIN) 10 MG tablet Take 1 tablet by mouth once daily 90 tablet 3    calcium carbonate (OS-NASEEM) 500 mg calcium (1,250 mg) tablet qid 120 tablet 0    cyanocobalamin 1,000 mcg/mL injection       folic acid (FOLVITE) 1 MG tablet Take 1 tablet by mouth once daily 90 tablet 3    magnesium oxide (MAG-OX) 400 mg (241.3 mg magnesium) tablet bid 60 tablet 1    minocycline (MINOCIN,DYNACIN) 50 MG Cap Take 50 mg by mouth 2 (two) times daily.      omeprazole (PRILOSEC) 20 MG capsule Take 1 capsule by mouth once daily 90 capsule 3    pravastatin (PRAVACHOL) 20 MG tablet Take 1 tablet by mouth once daily 90 tablet 3    ZILXI 1.5 % Foam Apply topically 2  (two) times daily.       Current Facility-Administered Medications on File Prior to Visit   Medication Dose Route Frequency Provider Last Rate Last Admin    cyanocobalamin injection 1,000 mcg  1,000 mcg Intramuscular Q30 Days Taqueria Melendrez MD   1,000 mcg at 09/15/23 0958     Review of patient's allergies indicates:   Allergen Reactions    Hydrochlorothiazide Other (See Comments)     Multiple electrolyte abnormalities     Family History   Problem Relation Age of Onset    Stroke Brother 57    Breast cancer Mother 87     Social History     Socioeconomic History    Marital status:    Tobacco Use    Smoking status: Former     Current packs/day: 0.00     Types: Cigarettes     Quit date: 2005     Years since quittin.3    Smokeless tobacco: Never   Substance and Sexual Activity    Alcohol use: Not Currently     Alcohol/week: 2.0 standard drinks of alcohol     Types: 2 Standard drinks or equivalent per week     Comment: prior 6 pack/week    Drug use: Yes    Sexual activity: Not Currently     Social Determinants of Health     Financial Resource Strain: Low Risk  (2023)    Overall Financial Resource Strain (CARDIA)     Difficulty of Paying Living Expenses: Not hard at all   Food Insecurity: No Food Insecurity (2023)    Hunger Vital Sign     Worried About Running Out of Food in the Last Year: Never true     Ran Out of Food in the Last Year: Never true   Transportation Needs: No Transportation Needs (2023)    PRAPARE - Transportation     Lack of Transportation (Medical): No     Lack of Transportation (Non-Medical): No   Physical Activity: Inactive (2023)    Exercise Vital Sign     Days of Exercise per Week: 0 days     Minutes of Exercise per Session: 0 min   Stress: No Stress Concern Present (2023)    [employer] - Occupational Stress Questionnaire     Feeling of Stress : Not at all   Social Connections: Socially Integrated (2023)    Social Connection and  Isolation Panel [NHANES]     Frequency of Communication with Friends and Family: More than three times a week     Frequency of Social Gatherings with Friends and Family: More than three times a week     Attends Pentec[employer]al Services: More than 4 times per year     Active Member of Clubs or Organizations: No     Attends Club or Organization Meetings: More than 4 times per year     Marital Status:    Housing Stability: Unknown (5/8/2023)    Housing Stability Vital Sign     Unable to Pay for Housing in the Last Year: No     Unstable Housing in the Last Year: No       Review of Systems:  Constitutional:  Denies fever or chills   Eyes:  Denies change in visual acuity   HENT:  Denies nasal congestion or sore throat   Respiratory:  Denies cough or shortness of breath   Cardiovascular:  Denies chest pain or edema   GI:  Denies abdominal pain, nausea, vomiting, bloody stools or diarrhea   :  Denies dysuria   Integument:  Denies rash   Neurologic:  Denies headache, focal weakness or sensory changes   Endocrine:  Denies polyuria or polydipsia   Lymphatic:  Denies swollen glands   Psychiatric:  Denies depression or anxiety     Physical Exam:   Constitutional:  Well developed, well nourished, no acute distress, non-toxic appearance   Integument:  Well hydrated, no rash   Lymphatic:  No lymphadenopathy noted   Neurologic:  Alert & oriented x 3,    Psychiatric:  Speech and behavior appropriate     Bilateral Shoulder Exam    Left Shoulder Exam   Shoulder exam performed same as contralateral side and is normal.    Right Shoulder Exam   Tenderness   Shoulder tenderness location: diffusely about shoulder.    Range of Motion   Forward Flexion: abnormal   External Rotation: abnormal     Muscle Strength   Supraspinatus: 4/5     Tests   Hawkin's test: positive  Impingement: positive    Other   Erythema: absent  Sensation: normal  Pulse: present           Impingement syndrome of right shoulder  -     Large Joint Aspiration/Injection:  R subacromial bursa  -     triamcinolone acetonide injection 40 mg       Using an aseptic technique, I injected 5 cc of lidocaine 1% without and 1 cc of kenalog 40mg into the right Shoulder. The patient tolerated this well. I will have them return to clinic in 6 to 8 weeks as scheduled.

## 2023-10-13 ENCOUNTER — CLINICAL SUPPORT (OUTPATIENT)
Dept: FAMILY MEDICINE | Facility: CLINIC | Age: 68
End: 2023-10-13
Payer: MEDICARE

## 2023-10-13 DIAGNOSIS — E53.8 B12 DEFICIENCY: Primary | ICD-10-CM

## 2023-10-13 PROCEDURE — 99999 PR PBB SHADOW E&M-EST. PATIENT-LVL II: ICD-10-PCS | Mod: PBBFAC,HCNC,,

## 2023-10-13 PROCEDURE — 96372 PR INJECTION,THERAP/PROPH/DIAG2ST, IM OR SUBCUT: ICD-10-PCS | Mod: HCNC,S$GLB,, | Performed by: FAMILY MEDICINE

## 2023-10-13 PROCEDURE — 96372 THER/PROPH/DIAG INJ SC/IM: CPT | Mod: HCNC,S$GLB,, | Performed by: FAMILY MEDICINE

## 2023-10-13 PROCEDURE — 99999 PR PBB SHADOW E&M-EST. PATIENT-LVL II: CPT | Mod: PBBFAC,HCNC,,

## 2023-10-13 RX ADMIN — CYANOCOBALAMIN 1000 MCG: 1000 INJECTION, SOLUTION INTRAMUSCULAR at 09:10

## 2023-10-16 ENCOUNTER — OFFICE VISIT (OUTPATIENT)
Dept: FAMILY MEDICINE | Facility: CLINIC | Age: 68
End: 2023-10-16
Payer: MEDICARE

## 2023-10-16 ENCOUNTER — HOSPITAL ENCOUNTER (OUTPATIENT)
Dept: RADIOLOGY | Facility: HOSPITAL | Age: 68
Discharge: HOME | End: 2023-10-16
Attending: FAMILY MEDICINE
Payer: MEDICARE

## 2023-10-16 VITALS
TEMPERATURE: 98 F | WEIGHT: 148.81 LBS | OXYGEN SATURATION: 98 % | HEART RATE: 100 BPM | HEIGHT: 66 IN | DIASTOLIC BLOOD PRESSURE: 60 MMHG | BODY MASS INDEX: 23.92 KG/M2 | SYSTOLIC BLOOD PRESSURE: 110 MMHG

## 2023-10-16 DIAGNOSIS — R68.89 FLU-LIKE SYMPTOMS: ICD-10-CM

## 2023-10-16 DIAGNOSIS — I10 PRIMARY HYPERTENSION: ICD-10-CM

## 2023-10-16 DIAGNOSIS — R10.9 ABDOMINAL PAIN, UNSPECIFIED ABDOMINAL LOCATION: Primary | ICD-10-CM

## 2023-10-16 DIAGNOSIS — R10.9 ABDOMINAL PAIN, UNSPECIFIED ABDOMINAL LOCATION: ICD-10-CM

## 2023-10-16 DIAGNOSIS — R07.9 CHEST PAIN, UNSPECIFIED TYPE: ICD-10-CM

## 2023-10-16 DIAGNOSIS — E83.42 HYPOMAGNESEMIA: ICD-10-CM

## 2023-10-16 LAB
CTP QC/QA: YES
CTP QC/QA: YES
FLUAV AG NPH QL: NEGATIVE
FLUBV AG NPH QL: NEGATIVE
SARS-COV-2 RDRP RESP QL NAA+PROBE: NEGATIVE

## 2023-10-16 PROCEDURE — 71046 XR CHEST PA AND LATERAL: ICD-10-PCS | Mod: 26,HCNC,, | Performed by: RADIOLOGY

## 2023-10-16 PROCEDURE — 4010F PR ACE/ARB THEARPY RXD/TAKEN: ICD-10-PCS | Mod: HCNC,CPTII,S$GLB, | Performed by: FAMILY MEDICINE

## 2023-10-16 PROCEDURE — 3008F PR BODY MASS INDEX (BMI) DOCUMENTED: ICD-10-PCS | Mod: HCNC,CPTII,S$GLB, | Performed by: FAMILY MEDICINE

## 2023-10-16 PROCEDURE — 87635 SARS-COV-2 COVID-19 AMP PRB: CPT | Mod: QW,HCNC,S$GLB, | Performed by: FAMILY MEDICINE

## 2023-10-16 PROCEDURE — 99999 PR PBB SHADOW E&M-EST. PATIENT-LVL III: CPT | Mod: PBBFAC,HCNC,, | Performed by: FAMILY MEDICINE

## 2023-10-16 PROCEDURE — 93010 ELECTROCARDIOGRAM REPORT: CPT | Mod: HCNC,S$GLB,, | Performed by: INTERNAL MEDICINE

## 2023-10-16 PROCEDURE — 93005 EKG 12-LEAD: ICD-10-PCS | Mod: HCNC,S$GLB,, | Performed by: FAMILY MEDICINE

## 2023-10-16 PROCEDURE — 1101F PR PT FALLS ASSESS DOC 0-1 FALLS W/OUT INJ PAST YR: ICD-10-PCS | Mod: HCNC,CPTII,S$GLB, | Performed by: FAMILY MEDICINE

## 2023-10-16 PROCEDURE — 93010 EKG 12-LEAD: ICD-10-PCS | Mod: HCNC,S$GLB,, | Performed by: INTERNAL MEDICINE

## 2023-10-16 PROCEDURE — 3288F PR FALLS RISK ASSESSMENT DOCUMENTED: ICD-10-PCS | Mod: HCNC,CPTII,S$GLB, | Performed by: FAMILY MEDICINE

## 2023-10-16 PROCEDURE — 81003 URINALYSIS AUTO W/O SCOPE: CPT | Mod: HCNC | Performed by: FAMILY MEDICINE

## 2023-10-16 PROCEDURE — 87804 INFLUENZA ASSAY W/OPTIC: CPT | Mod: QW,HCNC,S$GLB, | Performed by: FAMILY MEDICINE

## 2023-10-16 PROCEDURE — 1159F MED LIST DOCD IN RCRD: CPT | Mod: HCNC,CPTII,S$GLB, | Performed by: FAMILY MEDICINE

## 2023-10-16 PROCEDURE — 3288F FALL RISK ASSESSMENT DOCD: CPT | Mod: HCNC,CPTII,S$GLB, | Performed by: FAMILY MEDICINE

## 2023-10-16 PROCEDURE — 3074F SYST BP LT 130 MM HG: CPT | Mod: HCNC,CPTII,S$GLB, | Performed by: FAMILY MEDICINE

## 2023-10-16 PROCEDURE — 4010F ACE/ARB THERAPY RXD/TAKEN: CPT | Mod: HCNC,CPTII,S$GLB, | Performed by: FAMILY MEDICINE

## 2023-10-16 PROCEDURE — 3078F DIAST BP <80 MM HG: CPT | Mod: HCNC,CPTII,S$GLB, | Performed by: FAMILY MEDICINE

## 2023-10-16 PROCEDURE — 3074F PR MOST RECENT SYSTOLIC BLOOD PRESSURE < 130 MM HG: ICD-10-PCS | Mod: HCNC,CPTII,S$GLB, | Performed by: FAMILY MEDICINE

## 2023-10-16 PROCEDURE — 87635: ICD-10-PCS | Mod: QW,HCNC,S$GLB, | Performed by: FAMILY MEDICINE

## 2023-10-16 PROCEDURE — 74019 RADEX ABDOMEN 2 VIEWS: CPT | Mod: 26,HCNC,, | Performed by: RADIOLOGY

## 2023-10-16 PROCEDURE — 1101F PT FALLS ASSESS-DOCD LE1/YR: CPT | Mod: HCNC,CPTII,S$GLB, | Performed by: FAMILY MEDICINE

## 2023-10-16 PROCEDURE — 99999 PR PBB SHADOW E&M-EST. PATIENT-LVL III: ICD-10-PCS | Mod: PBBFAC,HCNC,, | Performed by: FAMILY MEDICINE

## 2023-10-16 PROCEDURE — 74019 XR ABDOMEN FLAT AND ERECT: ICD-10-PCS | Mod: 26,HCNC,, | Performed by: RADIOLOGY

## 2023-10-16 PROCEDURE — 93005 ELECTROCARDIOGRAM TRACING: CPT | Mod: HCNC,S$GLB,, | Performed by: FAMILY MEDICINE

## 2023-10-16 PROCEDURE — 99214 OFFICE O/P EST MOD 30 MIN: CPT | Mod: HCNC,S$GLB,, | Performed by: FAMILY MEDICINE

## 2023-10-16 PROCEDURE — 3078F PR MOST RECENT DIASTOLIC BLOOD PRESSURE < 80 MM HG: ICD-10-PCS | Mod: HCNC,CPTII,S$GLB, | Performed by: FAMILY MEDICINE

## 2023-10-16 PROCEDURE — 87804 POCT INFLUENZA A/B: ICD-10-PCS | Mod: QW,HCNC,S$GLB, | Performed by: FAMILY MEDICINE

## 2023-10-16 PROCEDURE — 71046 X-RAY EXAM CHEST 2 VIEWS: CPT | Mod: 26,HCNC,, | Performed by: RADIOLOGY

## 2023-10-16 PROCEDURE — 71046 X-RAY EXAM CHEST 2 VIEWS: CPT | Mod: TC,HCNC,PO

## 2023-10-16 PROCEDURE — 3008F BODY MASS INDEX DOCD: CPT | Mod: HCNC,CPTII,S$GLB, | Performed by: FAMILY MEDICINE

## 2023-10-16 PROCEDURE — 74019 RADEX ABDOMEN 2 VIEWS: CPT | Mod: TC,HCNC,PO

## 2023-10-16 PROCEDURE — 1159F PR MEDICATION LIST DOCUMENTED IN MEDICAL RECORD: ICD-10-PCS | Mod: HCNC,CPTII,S$GLB, | Performed by: FAMILY MEDICINE

## 2023-10-16 PROCEDURE — 99214 PR OFFICE/OUTPT VISIT, EST, LEVL IV, 30-39 MIN: ICD-10-PCS | Mod: HCNC,S$GLB,, | Performed by: FAMILY MEDICINE

## 2023-10-16 NOTE — PROGRESS NOTES
"Subjective:       Patient ID: Carla Dang is a 68 y.o. female.    Chief Complaint: Generalized Body Aches      HPI Comments:       Current Outpatient Medications:     aMILoride (MIDAMOR) 5 MG Tab, Take 5 mg by mouth once daily., Disp: , Rfl:     amLODIPine (NORVASC) 5 MG tablet, Take 1 tablet (5 mg total) by mouth once daily., Disp: 90 tablet, Rfl: 3    benazepriL (LOTENSIN) 10 MG tablet, Take 1 tablet by mouth once daily, Disp: 90 tablet, Rfl: 3    calcium carbonate (OS-NASEEM) 500 mg calcium (1,250 mg) tablet, qid, Disp: 120 tablet, Rfl: 0    cyanocobalamin 1,000 mcg/mL injection, , Disp: , Rfl:     folic acid (FOLVITE) 1 MG tablet, Take 1 tablet by mouth once daily, Disp: 90 tablet, Rfl: 3    magnesium oxide (MAG-OX) 400 mg (241.3 mg magnesium) tablet, bid, Disp: 60 tablet, Rfl: 1    minocycline (MINOCIN,DYNACIN) 50 MG Cap, Take 50 mg by mouth 2 (two) times daily., Disp: , Rfl:     omeprazole (PRILOSEC) 20 MG capsule, Take 1 capsule by mouth once daily, Disp: 90 capsule, Rfl: 3    pravastatin (PRAVACHOL) 20 MG tablet, Take 1 tablet by mouth once daily, Disp: 90 tablet, Rfl: 3    ZILXI 1.5 % Foam, Apply topically 2 (two) times daily., Disp: , Rfl:     Current Facility-Administered Medications:     cyanocobalamin injection 1,000 mcg, 1,000 mcg, Intramuscular, Q30 Days, Taqueria Melendrez MD, 1,000 mcg at 10/13/23 0957      This 1st time seeing this patient.  She came from [place] today, not being able to get him with any of her regular providers closer to home.      Here because "I just do not feel well".  Reminds her of when she had pneumonia in the past.      Says that the symptoms started on  when her best friend .  At that time she started having some chest pressure.    Following that she had constipation.  Her endocrinologist told her to double her magnesium, as she has chronic hypomagnesemia.  It is not clear what the mechanism is for this.  The extra magnesium helped her stools start " "moving.  However she struggled over the last 2 weeks off and on with constipation.  For quick relief she put castor oil on a towel and applied to her abdomen with a heating pad.  This seems to work well for bowel movements.      Last weekend she went to her friend's .  On Saturday she felt fine but on  she began feeling ill again.      Currently she feels like not eating.  She has no sense of taste.  She gets abdominal pain from time to time, sometimes with belching    Today she just feels weak.  She has continued to have chest pressure off and on during this time.  She is validates headaches and body aches, but no fever or chills.  She has no cough.  She has no nausea but sometimes will make herself vomit just to feel better      Review of Systems   Constitutional:  Positive for activity change, appetite change and fatigue. Negative for chills and fever.   Respiratory:  Positive for chest tightness.    Gastrointestinal:  Positive for abdominal distention, abdominal pain and constipation.   Musculoskeletal:  Positive for myalgias.   Neurological:  Positive for weakness and headaches.       Objective:      Vitals:    10/16/23 1528   BP: 110/60   Pulse: 100   Temp: 98 °F (36.7 °C)   TempSrc: Tympanic   SpO2: 98%   Weight: 67.5 kg (148 lb 13 oz)   Height: 5' 5.5" (1.664 m)     Physical Exam  Vitals and nursing note reviewed.   Constitutional:       General: She is not in acute distress.     Appearance: She is well-developed. She is not ill-appearing or diaphoretic.      Comments: Patient does not look ill   HENT:      Head: Normocephalic.      Mouth/Throat:      Pharynx: No oropharyngeal exudate.   Neck:      Thyroid: No thyromegaly.   Cardiovascular:      Rate and Rhythm: Normal rate and regular rhythm.      Heart sounds: Normal heart sounds. No murmur heard.  Pulmonary:      Effort: Pulmonary effort is normal.      Breath sounds: Normal breath sounds. No wheezing or rales.   Abdominal:      General: " There is no distension.      Palpations: Abdomen is soft.   Musculoskeletal:      Cervical back: Neck supple.   Lymphadenopathy:      Cervical: No cervical adenopathy.   Skin:     General: Skin is warm and dry.   Neurological:      Mental Status: She is alert and oriented to person, place, and time.   Psychiatric:         Mood and Affect: Mood normal.         Behavior: Behavior normal.         Thought Content: Thought content normal.         Judgment: Judgment normal.         Assessment:       1. Abdominal pain, unspecified abdominal location    2. Flu-like symptoms    3. Hypomagnesemia    4. Primary hypertension    5. Chest pain, unspecified type        Plan:   Abdominal pain, unspecified abdominal location  Comments:  Benign abdominal exam.  Symptoms are sporadic.  Lipase, abdominal x-ray, urinalysis  Orders:  -     Urinalysis, Reflex to Urine Culture Urine, Clean Catch  -     Basic Metabolic Panel; Future; Expected date: 10/16/2023  -     Magnesium; Future; Expected date: 10/16/2023  -     LIPASE; Future; Expected date: 10/16/2023  -     X-Ray Abdomen Flat And Erect; Future; Expected date: 10/16/2023    Flu-like symptoms  Comments:  Rapid influenza and COVID tests are negative  Orders:  -     POCT COVID-19 Rapid Screening  -     POCT Influenza A/B  -     X-Ray Chest PA And Lateral; Future; Expected date: 10/16/2023    Hypomagnesemia  Comments:  Chronic.  On replacement    Primary hypertension  Comments:  Controlled    Chest pain, unspecified type  Comments:  EKG okay.  Chest x-ray today.  Possible grief reaction  Orders:  -     EKG 12-lead  -     TROPONIN I; Future; Expected date: 10/16/2023

## 2023-10-17 ENCOUNTER — TELEPHONE (OUTPATIENT)
Dept: FAMILY MEDICINE | Facility: CLINIC | Age: 68
End: 2023-10-17
Payer: MEDICARE

## 2023-10-17 LAB
BILIRUB UR QL STRIP: NEGATIVE
CLARITY UR REFRACT.AUTO: CLEAR
COLOR UR AUTO: YELLOW
GLUCOSE UR QL STRIP: NEGATIVE
HGB UR QL STRIP: NEGATIVE
KETONES UR QL STRIP: ABNORMAL
LEUKOCYTE ESTERASE UR QL STRIP: NEGATIVE
NITRITE UR QL STRIP: NEGATIVE
PH UR STRIP: 6 [PH] (ref 5–8)
PROT UR QL STRIP: ABNORMAL
SP GR UR STRIP: 1.02 (ref 1–1.03)
URN SPEC COLLECT METH UR: ABNORMAL

## 2023-10-17 NOTE — TELEPHONE ENCOUNTER
----- Message from Aronld Lucia MD sent at 10/17/2023 12:41 PM CDT -----  Good news.  All blood work, urine tests, x-rays look good.  Hope you are feeling better

## 2023-10-19 ENCOUNTER — OFFICE VISIT (OUTPATIENT)
Dept: FAMILY MEDICINE | Facility: CLINIC | Age: 68
End: 2023-10-19
Payer: MEDICARE

## 2023-10-19 VITALS
SYSTOLIC BLOOD PRESSURE: 114 MMHG | DIASTOLIC BLOOD PRESSURE: 72 MMHG | BODY MASS INDEX: 23.7 KG/M2 | WEIGHT: 147.5 LBS | TEMPERATURE: 97 F | HEIGHT: 66 IN | HEART RATE: 89 BPM

## 2023-10-19 DIAGNOSIS — N18.30 STAGE 3 CHRONIC KIDNEY DISEASE, UNSPECIFIED WHETHER STAGE 3A OR 3B CKD: ICD-10-CM

## 2023-10-19 DIAGNOSIS — R07.89 CHEST PRESSURE: ICD-10-CM

## 2023-10-19 DIAGNOSIS — E83.42 HYPOMAGNESEMIA: ICD-10-CM

## 2023-10-19 DIAGNOSIS — N25.81 SECONDARY HYPERPARATHYROIDISM: ICD-10-CM

## 2023-10-19 DIAGNOSIS — E55.9 VITAMIN D DEFICIENCY: ICD-10-CM

## 2023-10-19 DIAGNOSIS — R14.0 ABDOMINAL BLOATING: Primary | ICD-10-CM

## 2023-10-19 PROCEDURE — 1101F PT FALLS ASSESS-DOCD LE1/YR: CPT | Mod: HCNC,CPTII,S$GLB, | Performed by: FAMILY MEDICINE

## 2023-10-19 PROCEDURE — 1125F AMNT PAIN NOTED PAIN PRSNT: CPT | Mod: HCNC,CPTII,S$GLB, | Performed by: FAMILY MEDICINE

## 2023-10-19 PROCEDURE — 3288F PR FALLS RISK ASSESSMENT DOCUMENTED: ICD-10-PCS | Mod: HCNC,CPTII,S$GLB, | Performed by: FAMILY MEDICINE

## 2023-10-19 PROCEDURE — 3008F BODY MASS INDEX DOCD: CPT | Mod: HCNC,CPTII,S$GLB, | Performed by: FAMILY MEDICINE

## 2023-10-19 PROCEDURE — 3078F DIAST BP <80 MM HG: CPT | Mod: HCNC,CPTII,S$GLB, | Performed by: FAMILY MEDICINE

## 2023-10-19 PROCEDURE — 99999 PR PBB SHADOW E&M-EST. PATIENT-LVL V: CPT | Mod: PBBFAC,HCNC,, | Performed by: FAMILY MEDICINE

## 2023-10-19 PROCEDURE — 1159F PR MEDICATION LIST DOCUMENTED IN MEDICAL RECORD: ICD-10-PCS | Mod: HCNC,CPTII,S$GLB, | Performed by: FAMILY MEDICINE

## 2023-10-19 PROCEDURE — 4010F ACE/ARB THERAPY RXD/TAKEN: CPT | Mod: HCNC,CPTII,S$GLB, | Performed by: FAMILY MEDICINE

## 2023-10-19 PROCEDURE — 99999 PR PBB SHADOW E&M-EST. PATIENT-LVL V: ICD-10-PCS | Mod: PBBFAC,HCNC,, | Performed by: FAMILY MEDICINE

## 2023-10-19 PROCEDURE — 1159F MED LIST DOCD IN RCRD: CPT | Mod: HCNC,CPTII,S$GLB, | Performed by: FAMILY MEDICINE

## 2023-10-19 PROCEDURE — 3074F PR MOST RECENT SYSTOLIC BLOOD PRESSURE < 130 MM HG: ICD-10-PCS | Mod: HCNC,CPTII,S$GLB, | Performed by: FAMILY MEDICINE

## 2023-10-19 PROCEDURE — 1125F PR PAIN SEVERITY QUANTIFIED, PAIN PRESENT: ICD-10-PCS | Mod: HCNC,CPTII,S$GLB, | Performed by: FAMILY MEDICINE

## 2023-10-19 PROCEDURE — 3074F SYST BP LT 130 MM HG: CPT | Mod: HCNC,CPTII,S$GLB, | Performed by: FAMILY MEDICINE

## 2023-10-19 PROCEDURE — 99214 OFFICE O/P EST MOD 30 MIN: CPT | Mod: HCNC,S$GLB,, | Performed by: FAMILY MEDICINE

## 2023-10-19 PROCEDURE — 3078F PR MOST RECENT DIASTOLIC BLOOD PRESSURE < 80 MM HG: ICD-10-PCS | Mod: HCNC,CPTII,S$GLB, | Performed by: FAMILY MEDICINE

## 2023-10-19 PROCEDURE — 3008F PR BODY MASS INDEX (BMI) DOCUMENTED: ICD-10-PCS | Mod: HCNC,CPTII,S$GLB, | Performed by: FAMILY MEDICINE

## 2023-10-19 PROCEDURE — 3288F FALL RISK ASSESSMENT DOCD: CPT | Mod: HCNC,CPTII,S$GLB, | Performed by: FAMILY MEDICINE

## 2023-10-19 PROCEDURE — 99214 PR OFFICE/OUTPT VISIT, EST, LEVL IV, 30-39 MIN: ICD-10-PCS | Mod: HCNC,S$GLB,, | Performed by: FAMILY MEDICINE

## 2023-10-19 PROCEDURE — 1101F PR PT FALLS ASSESS DOC 0-1 FALLS W/OUT INJ PAST YR: ICD-10-PCS | Mod: HCNC,CPTII,S$GLB, | Performed by: FAMILY MEDICINE

## 2023-10-19 PROCEDURE — 4010F PR ACE/ARB THEARPY RXD/TAKEN: ICD-10-PCS | Mod: HCNC,CPTII,S$GLB, | Performed by: FAMILY MEDICINE

## 2023-10-19 RX ORDER — ACETAMINOPHEN 500 MG
2000 TABLET ORAL DAILY
COMMUNITY
Start: 2023-10-19

## 2023-10-19 NOTE — PROGRESS NOTES
Patient states that a friend passed away September 24th.  After that she felt bad some fatigue decreased appetite.  She states she has some chest soreness intermittently over a couple of days.  Symptoms seem to improve.  October 2nd she started feeling bad again with decreased appetite constipation for 4 days then had bowel movement.  Since then she is continued to have some abdominal bloating, decreased appetite.  Denies recurrent constipation or diarrhea.  If she makes herself throw up she feels better.  Recently seen by another provider with laboratory and x-ray unrevealing.  She does have chronic issues with hypomagnesemia, hypocalcemia vitamin-D deficiency, secondary hyperparathyroidism, previous hypokalemia.  Symptoms felt past could have been related to thiazide and was discontinued however she has continued to have issues.  She does have evidence CKD 3 on recent laboratory..        Carla was seen today for fatigue, emesis, diarrhea and constipation.    Diagnoses and all orders for this visit:    Abdominal bloating  -     US Abdomen Complete; Future  -     Ambulatory referral/consult to Gastroenterology; Future    Stage 3 chronic kidney disease, unspecified whether stage 3a or 3b CKD  -     US Abdomen Complete; Future  -     Ambulatory referral/consult to Nephrology; Future    Secondary hyperparathyroidism  -     Ambulatory referral/consult to Nephrology; Future    Hypomagnesemia  -     Ambulatory referral/consult to Nephrology; Future    Vitamin D deficiency    Chest pressure  -     Stress Echo Which stress agent will be used? Treadmill Exercise; Color Flow Doppler? No; Future    Other orders  -     cholecalciferol, vitamin D3, (VITAMIN D3) 50 mcg (2,000 unit) Cap capsule; Take 1 capsule (2,000 Units total) by mouth once daily.    Continue follow-up [facility].  Arrange evaluation Nephrology and GI.  Check ultrasound.  Check stress test.              Past Medical History:  Past Medical History:    Diagnosis Date    Acute pancreatitis 3/21/2016    Breast cancer 2005    right side with lumpectomy, chemo and radiation    Closed fracture of ramus of right pubis 6/8/2016    Colon polyp     last scope 4/2012- repeat 10 y per pt- Dr. Johnson    GERD (gastroesophageal reflux disease) 5/29/2012    Hyperlipidemia 4/16/2013    Hypertension     Metabolic encephalopathy 4/20/2021    Osteopenia      Past Surgical History:   Procedure Laterality Date    BREAST LUMPECTOMY  2005    DILATION AND CURETTAGE OF UTERUS      ESOPHAGEAL DILATION      ESOPHAGOGASTRODUODENOSCOPY  6/1/2012    ROTATOR CUFF REPAIR      TEAR DUCT SURGERY      right rye     Review of patient's allergies indicates:   Allergen Reactions    Hydrochlorothiazide Other (See Comments)     Multiple electrolyte abnormalities     Current Outpatient Medications on File Prior to Visit   Medication Sig Dispense Refill    aMILoride (MIDAMOR) 5 MG Tab Take 5 mg by mouth once daily.      amLODIPine (NORVASC) 5 MG tablet Take 1 tablet (5 mg total) by mouth once daily. 90 tablet 3    benazepriL (LOTENSIN) 10 MG tablet Take 1 tablet by mouth once daily 90 tablet 3    calcium carbonate (OS-NASEEM) 500 mg calcium (1,250 mg) tablet qid 120 tablet 0    cyanocobalamin 1,000 mcg/mL injection       folic acid (FOLVITE) 1 MG tablet Take 1 tablet by mouth once daily 90 tablet 3    magnesium oxide (MAG-OX) 400 mg (241.3 mg magnesium) tablet bid 60 tablet 1    minocycline (MINOCIN,DYNACIN) 50 MG Cap Take 50 mg by mouth 2 (two) times daily.      omeprazole (PRILOSEC) 20 MG capsule Take 1 capsule by mouth once daily 90 capsule 3    pravastatin (PRAVACHOL) 20 MG tablet Take 1 tablet by mouth once daily 90 tablet 3    ZILXI 1.5 % Foam Apply topically 2 (two) times daily.       Current Facility-Administered Medications on File Prior to Visit   Medication Dose Route Frequency Provider Last Rate Last Admin    cyanocobalamin injection 1,000 mcg  1,000 mcg Intramuscular Q30 Days Taqueria Melendrez.,  MD   1,000 mcg at 10/13/23 0957     Social History     Socioeconomic History    Marital status:    Tobacco Use    Smoking status: Former     Current packs/day: 0.00     Types: Cigarettes     Quit date: 2005     Years since quittin.4    Smokeless tobacco: Never   Substance and Sexual Activity    Alcohol use: Not Currently     Alcohol/week: 2.0 standard drinks of alcohol     Types: 2 Standard drinks or equivalent per week     Comment: prior 6 pack/week    Drug use: Yes    Sexual activity: Not Currently     Social Determinants of Health     Financial Resource Strain: Low Risk  (2023)    Overall Financial Resource Strain (CARDIA)     Difficulty of Paying Living Expenses: Not hard at all   Food Insecurity: No Food Insecurity (2023)    Hunger Vital Sign     Worried About Running Out of Food in the Last Year: Never true     Ran Out of Food in the Last Year: Never true   Transportation Needs: No Transportation Needs (2023)    PRAPARE - Transportation     Lack of Transportation (Medical): No     Lack of Transportation (Non-Medical): No   Physical Activity: Inactive (2023)    Exercise Vital Sign     Days of Exercise per Week: 0 days     Minutes of Exercise per Session: 0 min   Stress: No Stress Concern Present (2023)    [employer] - Occupational Stress Questionnaire     Feeling of Stress : Not at all   Social Connections: Socially Integrated (2023)    Social Connection and Isolation Panel [NHANES]     Frequency of Communication with Friends and Family: More than three times a week     Frequency of Social Gatherings with Friends and Family: More than three times a week     Attends Judaism Services: More than 4 times per year     Active Member of Clubs or Organizations: No     Attends Club or Organization Meetings: More than 4 times per year     Marital Status:    Housing Stability: Unknown (2023)    Housing Stability Vital Sign     Unable to  "Pay for Housing in the Last Year: No     Unstable Housing in the Last Year: No     Family History   Problem Relation Age of Onset    Stroke Brother 57    Breast cancer Mother 87           ROS:  GENERAL: No fever, chills.   CARDIOVASCULAR: Denies  PND, orthopnea or reduced exercise tolerance.  ABDOMEN: Appetite fine. Denies diarrhea, abdominal pain, hematemesis or blood in stool.  URINARY: No flank pain, dysuria or hematuria.    Vitals:    10/19/23 0831   BP: 114/72   Pulse: 89   Temp: 97 °F (36.1 °C)   TempSrc: Temporal   Weight: 66.9 kg (147 lb 8 oz)   Height: 5' 5.5" (1.664 m)     Wt Readings from Last 3 Encounters:   10/19/23 66.9 kg (147 lb 8 oz)   10/16/23 67.5 kg (148 lb 13 oz)   08/30/23 70 kg (154 lb 6.4 oz)       OBJECTIVE:   APPEARANCE: Well nourished, well developed, in no acute distress.    HEAD: Normocephalic.  Atraumatic.  No sinus tenderness.  EYES:   Right eye: Pupil reactive.  Conjunctiva clear.    Left eye: Pupil reactive.  Conjunctiva clear.  EOMI.    EARS: TM's intact. Light reflex normal. No retraction or perforation.    NOSE:  clear.  MOUTH & THROAT:  No pharyngeal erythema or exudate. No lesions.  NECK: Supple. No bruits.  No JVD.  No cervical lymphadenopathy.  No thyromegaly.    CHEST: Breath sounds clear bilaterally.  Normal respiratory effort  CARDIOVASCULAR: Normal rate.  Regular rhythm.  No murmurs.  No rub.  No gallops.  ABDOMEN: Bowel sounds normal.  Soft.  No tenderness.  No organomegaly.  PERIPHERAL VASCULAR: No cyanosis.  No clubbing.  No edema.  NEUROLOGIC: No focal findings.  MENTAL STATUS: Alert.  Oriented x 3.        "

## 2023-10-20 ENCOUNTER — HOSPITAL ENCOUNTER (OUTPATIENT)
Dept: RADIOLOGY | Facility: HOSPITAL | Age: 68
Discharge: HOME | End: 2023-10-20
Attending: FAMILY MEDICINE
Payer: MEDICARE

## 2023-10-20 ENCOUNTER — TELEPHONE (OUTPATIENT)
Dept: FAMILY MEDICINE | Facility: CLINIC | Age: 68
End: 2023-10-20
Payer: MEDICARE

## 2023-10-20 DIAGNOSIS — R14.0 ABDOMINAL BLOATING: ICD-10-CM

## 2023-10-20 DIAGNOSIS — N18.30 STAGE 3 CHRONIC KIDNEY DISEASE, UNSPECIFIED WHETHER STAGE 3A OR 3B CKD: ICD-10-CM

## 2023-10-20 PROCEDURE — 76700 US EXAM ABDOM COMPLETE: CPT | Mod: 26,HCNC,, | Performed by: RADIOLOGY

## 2023-10-20 PROCEDURE — 76700 US EXAM ABDOM COMPLETE: CPT | Mod: TC,HCNC,PO

## 2023-10-20 PROCEDURE — 76700 US ABDOMEN COMPLETE: ICD-10-PCS | Mod: 26,HCNC,, | Performed by: RADIOLOGY

## 2023-10-20 NOTE — TELEPHONE ENCOUNTER
----- Message from Margarita Fontanez sent at 10/20/2023  9:56 AM CDT -----  Regarding: Requesting a phone call  Ms. Dang is requesting that someone from Dr. Melendrez' staff give her a call to her bowl movements, or lack of. Patient can be reached at [phone]. Thank you

## 2023-10-20 NOTE — TELEPHONE ENCOUNTER
Regarding constipation recommend try MiraLax once or twice daily.  She can get this over-the-counter.    The abdominal ultrasound shows a stable lesion in the liver compared to prior CT scan from 2022.  Likely hemangioma based on previous CT report.  Hemangioma would not require further treatment.  She does have evidence of possible chronic pancreatitis.  Apparently this was seen on the previous CT as well.  Pancreatic duct may be slightly enlarged.  I would recommend that she follow-up with GI regarding this as well as for symptoms.  May need further imaging.  She was referred to Dr. Johnson at appointment yesterday.  Recommend see him and have him review this.

## 2023-10-20 NOTE — TELEPHONE ENCOUNTER
Abd US done this am, c/o constipation, last BM wed, has been off & on with this problem for a while, please advise

## 2023-10-24 ENCOUNTER — HOSPITAL ENCOUNTER (OUTPATIENT)
Dept: CARDIOLOGY | Facility: HOSPITAL | Age: 68
Discharge: HOME | End: 2023-10-24
Attending: FAMILY MEDICINE
Payer: MEDICARE

## 2023-10-24 VITALS
SYSTOLIC BLOOD PRESSURE: 126 MMHG | WEIGHT: 147 LBS | HEART RATE: 87 BPM | HEIGHT: 65 IN | BODY MASS INDEX: 24.49 KG/M2 | DIASTOLIC BLOOD PRESSURE: 69 MMHG

## 2023-10-24 DIAGNOSIS — R07.89 CHEST PRESSURE: ICD-10-CM

## 2023-10-24 LAB
AORTIC ROOT ANNULUS: 2.91 CM
ASCENDING AORTA: 2.64 CM
AV INDEX (PROSTH): 0.69
AV MEAN GRADIENT: 4 MMHG
AV PEAK GRADIENT: 7 MMHG
AV VALVE AREA BY VELOCITY RATIO: 2.24 CM²
AV VALVE AREA: 2.09 CM²
AV VELOCITY RATIO: 0.74
BSA FOR ECHO PROCEDURE: 1.75 M2
CV ECHO LV RWT: 0.63 CM
DOP CALC AO PEAK VEL: 1.36 M/S
DOP CALC AO VTI: 26.7 CM
DOP CALC LVOT AREA: 3 CM2
DOP CALC LVOT DIAMETER: 1.96 CM
DOP CALC LVOT PEAK VEL: 1.01 M/S
DOP CALC LVOT STROKE VOLUME: 55.79 CM3
DOP CALC RVOT PEAK VEL: 0.97 M/S
DOP CALC RVOT VTI: 20.5 CM
DOP CALCLVOT PEAK VEL VTI: 18.5 CM
E WAVE DECELERATION TIME: 203.24 MSEC
E/A RATIO: 0.67
E/E' RATIO: 11.33 M/S
ECHO LV POSTERIOR WALL: 1.11 CM (ref 0.6–1.1)
FRACTIONAL SHORTENING: 34 % (ref 28–44)
INTERVENTRICULAR SEPTUM: 1.23 CM (ref 0.6–1.1)
IVC DIAMETER: 0.77 CM
IVRT: 83.73 MSEC
LA MAJOR: 4.69 CM
LA MINOR: 4.54 CM
LA WIDTH: 2.6 CM
LEFT ATRIUM SIZE: 3.24 CM
LEFT ATRIUM VOLUME INDEX MOD: 19.4 ML/M2
LEFT ATRIUM VOLUME INDEX: 19 ML/M2
LEFT ATRIUM VOLUME MOD: 33.76 CM3
LEFT ATRIUM VOLUME: 33.04 CM3
LEFT INTERNAL DIMENSION IN SYSTOLE: 2.3 CM (ref 2.1–4)
LEFT VENTRICLE DIASTOLIC VOLUME INDEX: 29.32 ML/M2
LEFT VENTRICLE DIASTOLIC VOLUME: 51.01 ML
LEFT VENTRICLE MASS INDEX: 75 G/M2
LEFT VENTRICLE SYSTOLIC VOLUME INDEX: 10.5 ML/M2
LEFT VENTRICLE SYSTOLIC VOLUME: 18.19 ML
LEFT VENTRICULAR INTERNAL DIMENSION IN DIASTOLE: 3.5 CM (ref 3.5–6)
LEFT VENTRICULAR MASS: 130.64 G
LV LATERAL E/E' RATIO: 13.6 M/S
LV SEPTAL E/E' RATIO: 9.71 M/S
LVOT MG: 2.49 MMHG
LVOT MV: 0.75 CM/S
MV PEAK A VEL: 1.02 M/S
MV PEAK E VEL: 0.68 M/S
MV STENOSIS PRESSURE HALF TIME: 58.94 MS
MV VALVE AREA P 1/2 METHOD: 3.73 CM2
PISA TR MAX VEL: 2.24 M/S
PV MEAN GRADIENT: 2 MMHG
PV PEAK GRADIENT: 4 MMHG
PV PEAK VELOCITY: 0.99 M/S
RA MAJOR: 3.68 CM
RA PRESSURE ESTIMATED: 3 MMHG
RA WIDTH: 2.5 CM
RIGHT VENTRICULAR END-DIASTOLIC DIMENSION: 3.02 CM
RV TB RVSP: 5 MMHG
STJ: 2.58 CM
TDI LATERAL: 0.05 M/S
TDI SEPTAL: 0.07 M/S
TDI: 0.06 M/S
TR MAX PG: 20 MMHG
TRICUSPID ANNULAR PLANE SYSTOLIC EXCURSION: 2.05 CM
TV REST PULMONARY ARTERY PRESSURE: 23 MMHG
Z-SCORE OF LEFT VENTRICULAR DIMENSION IN END DIASTOLE: -3.19
Z-SCORE OF LEFT VENTRICULAR DIMENSION IN END SYSTOLE: -2.05

## 2023-10-24 PROCEDURE — 93308 TTE F-UP OR LMTD: CPT | Mod: 26,HCNC,, | Performed by: INTERNAL MEDICINE

## 2023-10-24 PROCEDURE — 93308 ECHO (CUPID ONLY): ICD-10-PCS | Mod: 26,HCNC,, | Performed by: INTERNAL MEDICINE

## 2023-10-24 PROCEDURE — 93308 TTE F-UP OR LMTD: CPT | Mod: HCNC,PO

## 2023-10-25 ENCOUNTER — TELEPHONE (OUTPATIENT)
Dept: FAMILY MEDICINE | Facility: CLINIC | Age: 68
End: 2023-10-25
Payer: MEDICARE

## 2023-10-25 NOTE — TELEPHONE ENCOUNTER
----- Message from Radha De La Torre sent at 10/25/2023  9:35 AM CDT -----  Contact: Carla  Patient is calling to speak with a nurse . States having a missed call . Please give a call back at [phone] .

## 2023-10-31 ENCOUNTER — EXTERNAL CHRONIC CARE MANAGEMENT (OUTPATIENT)
Dept: PRIMARY CARE CLINIC | Facility: CLINIC | Age: 68
End: 2023-10-31
Payer: MEDICARE

## 2023-10-31 PROCEDURE — 99439 CHRNC CARE MGMT STAF EA ADDL: CPT | Mod: S$GLB,,, | Performed by: FAMILY MEDICINE

## 2023-10-31 PROCEDURE — 99490 PR CHRONIC CARE MGMT, 1ST 20 MIN: ICD-10-PCS | Mod: S$GLB,,, | Performed by: FAMILY MEDICINE

## 2023-10-31 PROCEDURE — 99439 PR CHRONIC CARE MGMT, EA ADDTL 20 MIN: ICD-10-PCS | Mod: S$GLB,,, | Performed by: FAMILY MEDICINE

## 2023-10-31 PROCEDURE — 99490 CHRNC CARE MGMT STAFF 1ST 20: CPT | Mod: S$GLB,,, | Performed by: FAMILY MEDICINE

## 2023-11-02 ENCOUNTER — OFFICE VISIT (OUTPATIENT)
Dept: NEPHROLOGY | Facility: CLINIC | Age: 68
End: 2023-11-02
Payer: MEDICARE

## 2023-11-02 VITALS
SYSTOLIC BLOOD PRESSURE: 122 MMHG | HEIGHT: 65 IN | OXYGEN SATURATION: 99 % | DIASTOLIC BLOOD PRESSURE: 70 MMHG | WEIGHT: 152 LBS | HEART RATE: 82 BPM | BODY MASS INDEX: 25.33 KG/M2

## 2023-11-02 DIAGNOSIS — E55.9 VITAMIN D DEFICIENCY: ICD-10-CM

## 2023-11-02 DIAGNOSIS — K21.9 GASTROESOPHAGEAL REFLUX DISEASE, UNSPECIFIED WHETHER ESOPHAGITIS PRESENT: ICD-10-CM

## 2023-11-02 DIAGNOSIS — N18.30 STAGE 3 CHRONIC KIDNEY DISEASE, UNSPECIFIED WHETHER STAGE 3A OR 3B CKD: Primary | ICD-10-CM

## 2023-11-02 DIAGNOSIS — N25.81 SECONDARY HYPERPARATHYROIDISM: ICD-10-CM

## 2023-11-02 DIAGNOSIS — E83.42 HYPOMAGNESEMIA: ICD-10-CM

## 2023-11-02 DIAGNOSIS — E87.6 HYPOKALEMIA: ICD-10-CM

## 2023-11-02 DIAGNOSIS — Z85.3 HISTORY OF RIGHT BREAST CANCER: ICD-10-CM

## 2023-11-02 DIAGNOSIS — D63.1 ANEMIA IN STAGE 3B CHRONIC KIDNEY DISEASE: ICD-10-CM

## 2023-11-02 DIAGNOSIS — I10 PRIMARY HYPERTENSION: ICD-10-CM

## 2023-11-02 DIAGNOSIS — N18.32 ANEMIA IN STAGE 3B CHRONIC KIDNEY DISEASE: ICD-10-CM

## 2023-11-02 DIAGNOSIS — I70.1 RENAL ARTERY STENOSIS, NATIVE: ICD-10-CM

## 2023-11-02 DIAGNOSIS — D64.9 ANEMIA, UNSPECIFIED TYPE: ICD-10-CM

## 2023-11-02 PROCEDURE — 1160F RVW MEDS BY RX/DR IN RCRD: CPT | Mod: HCNC,CPTII,S$GLB, | Performed by: INTERNAL MEDICINE

## 2023-11-02 PROCEDURE — 1160F PR REVIEW ALL MEDS BY PRESCRIBER/CLIN PHARMACIST DOCUMENTED: ICD-10-PCS | Mod: HCNC,CPTII,S$GLB, | Performed by: INTERNAL MEDICINE

## 2023-11-02 PROCEDURE — 3288F FALL RISK ASSESSMENT DOCD: CPT | Mod: HCNC,CPTII,S$GLB, | Performed by: INTERNAL MEDICINE

## 2023-11-02 PROCEDURE — 1126F PR PAIN SEVERITY QUANTIFIED, NO PAIN PRESENT: ICD-10-PCS | Mod: HCNC,CPTII,S$GLB, | Performed by: INTERNAL MEDICINE

## 2023-11-02 PROCEDURE — 99999 PR PBB SHADOW E&M-EST. PATIENT-LVL V: ICD-10-PCS | Mod: PBBFAC,HCNC,, | Performed by: INTERNAL MEDICINE

## 2023-11-02 PROCEDURE — 3078F DIAST BP <80 MM HG: CPT | Mod: HCNC,CPTII,S$GLB, | Performed by: INTERNAL MEDICINE

## 2023-11-02 PROCEDURE — 1101F PT FALLS ASSESS-DOCD LE1/YR: CPT | Mod: HCNC,CPTII,S$GLB, | Performed by: INTERNAL MEDICINE

## 2023-11-02 PROCEDURE — 99205 OFFICE O/P NEW HI 60 MIN: CPT | Mod: HCNC,S$GLB,, | Performed by: INTERNAL MEDICINE

## 2023-11-02 PROCEDURE — 3008F PR BODY MASS INDEX (BMI) DOCUMENTED: ICD-10-PCS | Mod: HCNC,CPTII,S$GLB, | Performed by: INTERNAL MEDICINE

## 2023-11-02 PROCEDURE — 99999 PR PBB SHADOW E&M-EST. PATIENT-LVL V: CPT | Mod: PBBFAC,HCNC,, | Performed by: INTERNAL MEDICINE

## 2023-11-02 PROCEDURE — 4010F ACE/ARB THERAPY RXD/TAKEN: CPT | Mod: HCNC,CPTII,S$GLB, | Performed by: INTERNAL MEDICINE

## 2023-11-02 PROCEDURE — 3066F PR DOCUMENTATION OF TREATMENT FOR NEPHROPATHY: ICD-10-PCS | Mod: HCNC,CPTII,S$GLB, | Performed by: INTERNAL MEDICINE

## 2023-11-02 PROCEDURE — 1126F AMNT PAIN NOTED NONE PRSNT: CPT | Mod: HCNC,CPTII,S$GLB, | Performed by: INTERNAL MEDICINE

## 2023-11-02 PROCEDURE — 1101F PR PT FALLS ASSESS DOC 0-1 FALLS W/OUT INJ PAST YR: ICD-10-PCS | Mod: HCNC,CPTII,S$GLB, | Performed by: INTERNAL MEDICINE

## 2023-11-02 PROCEDURE — 1159F PR MEDICATION LIST DOCUMENTED IN MEDICAL RECORD: ICD-10-PCS | Mod: HCNC,CPTII,S$GLB, | Performed by: INTERNAL MEDICINE

## 2023-11-02 PROCEDURE — 4010F PR ACE/ARB THEARPY RXD/TAKEN: ICD-10-PCS | Mod: HCNC,CPTII,S$GLB, | Performed by: INTERNAL MEDICINE

## 2023-11-02 PROCEDURE — 3008F BODY MASS INDEX DOCD: CPT | Mod: HCNC,CPTII,S$GLB, | Performed by: INTERNAL MEDICINE

## 2023-11-02 PROCEDURE — 99205 PR OFFICE/OUTPT VISIT, NEW, LEVL V, 60-74 MIN: ICD-10-PCS | Mod: HCNC,S$GLB,, | Performed by: INTERNAL MEDICINE

## 2023-11-02 PROCEDURE — 3066F NEPHROPATHY DOC TX: CPT | Mod: HCNC,CPTII,S$GLB, | Performed by: INTERNAL MEDICINE

## 2023-11-02 PROCEDURE — 3074F SYST BP LT 130 MM HG: CPT | Mod: HCNC,CPTII,S$GLB, | Performed by: INTERNAL MEDICINE

## 2023-11-02 PROCEDURE — 3288F PR FALLS RISK ASSESSMENT DOCUMENTED: ICD-10-PCS | Mod: HCNC,CPTII,S$GLB, | Performed by: INTERNAL MEDICINE

## 2023-11-02 PROCEDURE — 3074F PR MOST RECENT SYSTOLIC BLOOD PRESSURE < 130 MM HG: ICD-10-PCS | Mod: HCNC,CPTII,S$GLB, | Performed by: INTERNAL MEDICINE

## 2023-11-02 PROCEDURE — 3078F PR MOST RECENT DIASTOLIC BLOOD PRESSURE < 80 MM HG: ICD-10-PCS | Mod: HCNC,CPTII,S$GLB, | Performed by: INTERNAL MEDICINE

## 2023-11-02 PROCEDURE — 1159F MED LIST DOCD IN RCRD: CPT | Mod: HCNC,CPTII,S$GLB, | Performed by: INTERNAL MEDICINE

## 2023-11-02 NOTE — PROGRESS NOTES
Subjective:      Patient ID: Carla Dang is a 68 y.o. Black or  female who presents for new evaluation of Consult    HPI    She is referred by her PCP for 1) CKD and   2) electrolyte abnormalities.   Her baseline Cr is 1.3-1.5mg/dL but she has ranged as high as 1.8mg/dL  Breast cancer--chemo, sx and XRT  2005 -platin  On a PPI, lyyte abnormalities plus platin  No known kidney disease in family   HTN, no DM 2  PPI X years   HTN--50's   Siblings with HTN  Anum selter, no NSAIDs  LE edema--magesium low   High salt diet   Hydrates with coke,orange daniel, lemonade  No foamy urine   Laying in bed (depression?) recently lost her friend    accompanies pt    Review of Systems   Constitutional:  Positive for unexpected weight change. Negative for activity change and appetite change.   HENT:  Negative for facial swelling.    Respiratory:  Negative for shortness of breath.    Cardiovascular:  Positive for leg swelling (intermittent, none today).   Gastrointestinal:  Positive for diarrhea (attributes to magnesium).   Genitourinary:  Negative for difficulty urinating.   Musculoskeletal:  Positive for arthralgias.   Allergic/Immunologic: Positive for immunocompromised state (age, HTN, CKD).   Psychiatric/Behavioral:  Negative for confusion and decreased concentration.       Objective:     Physical Exam  Vitals and nursing note reviewed.   Constitutional:       General: She is not in acute distress.     Appearance: She is not ill-appearing.   HENT:      Head: Atraumatic.   Cardiovascular:      Rate and Rhythm: Normal rate.   Pulmonary:      Effort: Pulmonary effort is normal. No respiratory distress.   Abdominal:      General: There is no distension.   Musculoskeletal:      Right lower leg: No edema.      Left lower leg: No edema.   Neurological:      Mental Status: She is alert and oriented to person, place, and time.   Psychiatric:         Mood and Affect: Mood normal.     Assessment:     1. Stage 3  chronic kidney disease, unspecified whether stage 3a or 3b CKD    2. Secondary hyperparathyroidism    3. Hypomagnesemia    4. History of right breast cancer    5. Primary hypertension    6. Hypokalemia    7. Gastroesophageal reflux disease, unspecified whether esophagitis present    8. Vitamin D deficiency    9. Renal artery stenosis, native    10. Anemia, unspecified type    11. Anemia in stage 3b chronic kidney disease       Plan:     1) CKD appearing to be at Stage 3 with risk factors to include HTN and history of obesity, slowly progressive since 2019  - we discussed the stages of CKD  - discussed tips on maintaining kidney health, with focus on lowering sodium intake and increasing water as main beverage   - she is already maintained on an ace inhibitor   - BP control  - dicuss SGLTi next visit     2) Hypomagnesemia/Hypokalemia  - she has tubular damage from -platin based breast cancer treatment and will need magnesium supplementation for life  - discussed adding more glycinate magnesium salt versus oxide to help with loose stools    3) HTN  - goal parameters discussed    4) Anemia  - contributing to fatigue, refer t heme    5) Hypocalcemia  - check D level and PTH      RTC 4mo w labs  Refer to Heme for Anemia

## 2023-11-02 NOTE — PATIENT INSTRUCTIONS
1--Check to see if you are still taking prilosec/omeprazole   2--breast cancer common chemo causes kidneys to leak magnesium   3--Magnesium Glycinate, try looking for this formula   4--Refer you to anemia specialists Hematologists     Tips on keeping healthy kidneys  1--more water  2--less salt  3--continue benazepril   4--increase fruits and vegetables   5--avoid non steroid anti-inflammatories (Tylenol is OK)   6--be active   7--Good BP, top# 120-135, bottom # less than 85. Check BP at home  8--no Diabetes  9--you are not obese

## 2023-11-05 PROBLEM — N18.30 ANEMIA IN STAGE 3 CHRONIC KIDNEY DISEASE: Status: ACTIVE | Noted: 2023-11-05

## 2023-11-05 PROBLEM — D63.1 ANEMIA IN STAGE 3 CHRONIC KIDNEY DISEASE: Status: ACTIVE | Noted: 2023-11-05

## 2023-11-13 ENCOUNTER — TELEPHONE (OUTPATIENT)
Dept: HEMATOLOGY/ONCOLOGY | Facility: CLINIC | Age: 68
End: 2023-11-13
Payer: MEDICARE

## 2023-11-13 NOTE — TELEPHONE ENCOUNTER
LVM for pt to call back and sent pt portal msg.  Pt scheduled incorrectly on hemonc schedule.  Pt dx is anemia and will need to be rescheduled to the benign hem schedule.     Pt returned call and rescheduled appt to Dec 19th.  Confirmed the new appt date time and location.

## 2023-11-17 ENCOUNTER — TELEPHONE (OUTPATIENT)
Dept: FAMILY MEDICINE | Facility: CLINIC | Age: 68
End: 2023-11-17

## 2023-11-17 ENCOUNTER — CLINICAL SUPPORT (OUTPATIENT)
Dept: FAMILY MEDICINE | Facility: CLINIC | Age: 68
End: 2023-11-17
Payer: MEDICARE

## 2023-11-17 DIAGNOSIS — E53.8 B12 DEFICIENCY: Primary | ICD-10-CM

## 2023-11-17 PROCEDURE — 99999 PR PBB SHADOW E&M-EST. PATIENT-LVL II: CPT | Mod: PBBFAC,HCNC,,

## 2023-11-17 PROCEDURE — 96372 THER/PROPH/DIAG INJ SC/IM: CPT | Mod: HCNC,S$GLB,, | Performed by: FAMILY MEDICINE

## 2023-11-17 PROCEDURE — 99999 PR PBB SHADOW E&M-EST. PATIENT-LVL II: ICD-10-PCS | Mod: PBBFAC,HCNC,,

## 2023-11-17 PROCEDURE — 96372 PR INJECTION,THERAP/PROPH/DIAG2ST, IM OR SUBCUT: ICD-10-PCS | Mod: HCNC,S$GLB,, | Performed by: FAMILY MEDICINE

## 2023-11-17 RX ADMIN — CYANOCOBALAMIN 1000 MCG: 1000 INJECTION, SOLUTION INTRAMUSCULAR at 10:11

## 2023-11-17 NOTE — TELEPHONE ENCOUNTER
She does not need lab work for this at present.  She can continue with B12 injections as she has been doing.

## 2023-11-17 NOTE — TELEPHONE ENCOUNTER
Pt received last ordered dose of B12 today 11/17/23. Please advise if lab work is needed at this time or if injections should be continued. Thank you.

## 2023-11-30 ENCOUNTER — EXTERNAL CHRONIC CARE MANAGEMENT (OUTPATIENT)
Dept: PRIMARY CARE CLINIC | Facility: CLINIC | Age: 68
End: 2023-11-30
Payer: MEDICARE

## 2023-11-30 PROCEDURE — 99490 CHRNC CARE MGMT STAFF 1ST 20: CPT | Mod: S$GLB,,, | Performed by: FAMILY MEDICINE

## 2023-11-30 PROCEDURE — 99490 PR CHRONIC CARE MGMT, 1ST 20 MIN: ICD-10-PCS | Mod: S$GLB,,, | Performed by: FAMILY MEDICINE

## 2023-12-01 ENCOUNTER — OFFICE VISIT (OUTPATIENT)
Dept: ORTHOPEDICS | Facility: CLINIC | Age: 68
End: 2023-12-01
Payer: MEDICARE

## 2023-12-01 VITALS — HEIGHT: 65 IN | BODY MASS INDEX: 25.33 KG/M2 | WEIGHT: 152 LBS

## 2023-12-01 DIAGNOSIS — M75.41 IMPINGEMENT SYNDROME OF RIGHT SHOULDER: Primary | ICD-10-CM

## 2023-12-01 PROCEDURE — 20610 LARGE JOINT ASPIRATION/INJECTION: R SUBACROMIAL BURSA: ICD-10-PCS | Mod: HCNC,RT,S$GLB, | Performed by: NURSE PRACTITIONER

## 2023-12-01 PROCEDURE — 20610 DRAIN/INJ JOINT/BURSA W/O US: CPT | Mod: HCNC,RT,S$GLB, | Performed by: NURSE PRACTITIONER

## 2023-12-01 PROCEDURE — 1125F AMNT PAIN NOTED PAIN PRSNT: CPT | Mod: HCNC,CPTII,S$GLB, | Performed by: NURSE PRACTITIONER

## 2023-12-01 PROCEDURE — 1125F PR PAIN SEVERITY QUANTIFIED, PAIN PRESENT: ICD-10-PCS | Mod: HCNC,CPTII,S$GLB, | Performed by: NURSE PRACTITIONER

## 2023-12-01 PROCEDURE — 3066F NEPHROPATHY DOC TX: CPT | Mod: HCNC,CPTII,S$GLB, | Performed by: NURSE PRACTITIONER

## 2023-12-01 PROCEDURE — 4010F PR ACE/ARB THEARPY RXD/TAKEN: ICD-10-PCS | Mod: HCNC,CPTII,S$GLB, | Performed by: NURSE PRACTITIONER

## 2023-12-01 PROCEDURE — 3066F PR DOCUMENTATION OF TREATMENT FOR NEPHROPATHY: ICD-10-PCS | Mod: HCNC,CPTII,S$GLB, | Performed by: NURSE PRACTITIONER

## 2023-12-01 PROCEDURE — 99212 OFFICE O/P EST SF 10 MIN: CPT | Mod: HCNC,25,S$GLB, | Performed by: NURSE PRACTITIONER

## 2023-12-01 PROCEDURE — 3288F FALL RISK ASSESSMENT DOCD: CPT | Mod: HCNC,CPTII,S$GLB, | Performed by: NURSE PRACTITIONER

## 2023-12-01 PROCEDURE — 3008F BODY MASS INDEX DOCD: CPT | Mod: HCNC,CPTII,S$GLB, | Performed by: NURSE PRACTITIONER

## 2023-12-01 PROCEDURE — 99212 PR OFFICE/OUTPT VISIT, EST, LEVL II, 10-19 MIN: ICD-10-PCS | Mod: HCNC,25,S$GLB, | Performed by: NURSE PRACTITIONER

## 2023-12-01 PROCEDURE — 3288F PR FALLS RISK ASSESSMENT DOCUMENTED: ICD-10-PCS | Mod: HCNC,CPTII,S$GLB, | Performed by: NURSE PRACTITIONER

## 2023-12-01 PROCEDURE — 99999 PR PBB SHADOW E&M-EST. PATIENT-LVL III: CPT | Mod: PBBFAC,HCNC,, | Performed by: NURSE PRACTITIONER

## 2023-12-01 PROCEDURE — 4010F ACE/ARB THERAPY RXD/TAKEN: CPT | Mod: HCNC,CPTII,S$GLB, | Performed by: NURSE PRACTITIONER

## 2023-12-01 PROCEDURE — 1159F PR MEDICATION LIST DOCUMENTED IN MEDICAL RECORD: ICD-10-PCS | Mod: HCNC,CPTII,S$GLB, | Performed by: NURSE PRACTITIONER

## 2023-12-01 PROCEDURE — 3008F PR BODY MASS INDEX (BMI) DOCUMENTED: ICD-10-PCS | Mod: HCNC,CPTII,S$GLB, | Performed by: NURSE PRACTITIONER

## 2023-12-01 PROCEDURE — 1160F RVW MEDS BY RX/DR IN RCRD: CPT | Mod: HCNC,CPTII,S$GLB, | Performed by: NURSE PRACTITIONER

## 2023-12-01 PROCEDURE — 1101F PT FALLS ASSESS-DOCD LE1/YR: CPT | Mod: HCNC,CPTII,S$GLB, | Performed by: NURSE PRACTITIONER

## 2023-12-01 PROCEDURE — 1160F PR REVIEW ALL MEDS BY PRESCRIBER/CLIN PHARMACIST DOCUMENTED: ICD-10-PCS | Mod: HCNC,CPTII,S$GLB, | Performed by: NURSE PRACTITIONER

## 2023-12-01 PROCEDURE — 1159F MED LIST DOCD IN RCRD: CPT | Mod: HCNC,CPTII,S$GLB, | Performed by: NURSE PRACTITIONER

## 2023-12-01 PROCEDURE — 99999 PR PBB SHADOW E&M-EST. PATIENT-LVL III: ICD-10-PCS | Mod: PBBFAC,HCNC,, | Performed by: NURSE PRACTITIONER

## 2023-12-01 PROCEDURE — 1101F PR PT FALLS ASSESS DOC 0-1 FALLS W/OUT INJ PAST YR: ICD-10-PCS | Mod: HCNC,CPTII,S$GLB, | Performed by: NURSE PRACTITIONER

## 2023-12-01 RX ORDER — TRIAMCINOLONE ACETONIDE 40 MG/ML
40 INJECTION, SUSPENSION INTRA-ARTICULAR; INTRAMUSCULAR
Status: DISCONTINUED | OUTPATIENT
Start: 2023-12-01 | End: 2023-12-01 | Stop reason: HOSPADM

## 2023-12-01 RX ADMIN — TRIAMCINOLONE ACETONIDE 40 MG: 40 INJECTION, SUSPENSION INTRA-ARTICULAR; INTRAMUSCULAR at 08:12

## 2023-12-02 NOTE — PROCEDURES
Large Joint Aspiration/Injection: R subacromial bursa    Date/Time: 12/1/2023 8:40 AM    Performed by: Mony Garcia FNP  Authorized by: Mony Garcia FNP    Consent Done?:  Yes (Verbal)  Indications:  Pain  Timeout: prior to procedure the correct patient, procedure, and site was verified    Prep: patient was prepped and draped in usual sterile fashion      Local anesthesia used?: Yes    Local anesthetic:  Lidocaine 1% without epinephrine  Anesthetic total (ml):  5      Details:  Needle Size:  22 G  Ultrasonic Guidance for needle placement?: No    Approach:  Posterior  Location:  Shoulder  Site:  R subacromial bursa  Medications:  40 mg triamcinolone acetonide 40 mg/mL  Patient tolerance:  Patient tolerated the procedure well with no immediate complications

## 2023-12-02 NOTE — PROGRESS NOTES
Chief Complaint   Patient presents with    Right Shoulder - Pain       HPI:   This is a 68 y.o. who returns to clinic today in follow-up for right shoulder for the past 2 weeks after no known injury or trauma. Pain is aching and dull. No numbness or tingling. No associated signs or symptoms.    Past Medical History:   Diagnosis Date    Acute pancreatitis 3/21/2016    Breast cancer 2005    right side with lumpectomy, chemo and radiation    Closed fracture of ramus of right pubis 6/8/2016    Colon polyp     last scope 4/2012- repeat 10 y per pt- Dr. Johnson    GERD (gastroesophageal reflux disease) 5/29/2012    Hyperlipidemia 4/16/2013    Hypertension     Metabolic encephalopathy 4/20/2021    Osteopenia      Past Surgical History:   Procedure Laterality Date    BREAST LUMPECTOMY  2005    DILATION AND CURETTAGE OF UTERUS      ESOPHAGEAL DILATION      ESOPHAGOGASTRODUODENOSCOPY  6/1/2012    ROTATOR CUFF REPAIR      TEAR DUCT SURGERY      right rye     Current Outpatient Medications on File Prior to Visit   Medication Sig Dispense Refill    aMILoride (MIDAMOR) 5 MG Tab Take 5 mg by mouth once daily.      amLODIPine (NORVASC) 5 MG tablet Take 1 tablet (5 mg total) by mouth once daily. 90 tablet 3    benazepriL (LOTENSIN) 10 MG tablet Take 1 tablet by mouth once daily 90 tablet 3    calcium carbonate (OS-NASEEM) 500 mg calcium (1,250 mg) tablet qid 120 tablet 0    cholecalciferol, vitamin D3, (VITAMIN D3) 50 mcg (2,000 unit) Cap capsule Take 1 capsule (2,000 Units total) by mouth once daily.      cyanocobalamin 1,000 mcg/mL injection       folic acid (FOLVITE) 1 MG tablet Take 1 tablet by mouth once daily 90 tablet 3    magnesium oxide (MAG-OX) 400 mg (241.3 mg magnesium) tablet bid 60 tablet 1    minocycline (MINOCIN,DYNACIN) 50 MG Cap Take 50 mg by mouth 2 (two) times daily.      omeprazole (PRILOSEC) 20 MG capsule Take 1 capsule by mouth once daily 90 capsule 3    pravastatin (PRAVACHOL) 20 MG tablet Take 1 tablet by mouth  once daily 90 tablet 3    ZILXI 1.5 % Foam Apply topically 2 (two) times daily.       No current facility-administered medications on file prior to visit.     Review of patient's allergies indicates:   Allergen Reactions    Hydrochlorothiazide Other (See Comments)     Multiple electrolyte abnormalities     Family History   Problem Relation Age of Onset    Stroke Brother 57    Breast cancer Mother 87     Social History     Socioeconomic History    Marital status:    Tobacco Use    Smoking status: Former     Current packs/day: 0.00     Types: Cigarettes     Quit date: 2005     Years since quittin.5    Smokeless tobacco: Never   Substance and Sexual Activity    Alcohol use: Not Currently     Alcohol/week: 2.0 standard drinks of alcohol     Types: 2 Standard drinks or equivalent per week     Comment: prior 6 pack/week    Drug use: Yes    Sexual activity: Not Currently     Social Determinants of Health     Financial Resource Strain: Low Risk  (2023)    Overall Financial Resource Strain (CARDIA)     Difficulty of Paying Living Expenses: Not hard at all   Food Insecurity: No Food Insecurity (2023)    Hunger Vital Sign     Worried About Running Out of Food in the Last Year: Never true     Ran Out of Food in the Last Year: Never true   Transportation Needs: No Transportation Needs (2023)    PRAPARE - Transportation     Lack of Transportation (Medical): No     Lack of Transportation (Non-Medical): No   Physical Activity: Inactive (2023)    Exercise Vital Sign     Days of Exercise per Week: 0 days     Minutes of Exercise per Session: 0 min   Stress: No Stress Concern Present (2023)    [place] [employer] - Occupational Stress Questionnaire     Feeling of Stress : Not at all   Social Connections: Socially Integrated (2023)    Social Connection and Isolation Panel [NHANES]     Frequency of Communication with Friends and Family: More than three times a week      Frequency of Social Gatherings with Friends and Family: More than three times a week     Attends Yarsanism Services: More than 4 times per year     Active Member of Clubs or Organizations: No     Attends Club or Organization Meetings: More than 4 times per year     Marital Status:    Housing Stability: Unknown (5/8/2023)    Housing Stability Vital Sign     Unable to Pay for Housing in the Last Year: No     Unstable Housing in the Last Year: No       Review of Systems:  Constitutional:  Denies fever or chills   Eyes:  Denies change in visual acuity   HENT:  Denies nasal congestion or sore throat   Respiratory:  Denies cough or shortness of breath   Cardiovascular:  Denies chest pain or edema   GI:  Denies abdominal pain, nausea, vomiting, bloody stools or diarrhea   :  Denies dysuria   Integument:  Denies rash   Neurologic:  Denies headache, focal weakness or sensory changes   Endocrine:  Denies polyuria or polydipsia   Lymphatic:  Denies swollen glands   Psychiatric:  Denies depression or anxiety     Physical Exam:   Constitutional:  Well developed, well nourished, no acute distress, non-toxic appearance   Integument:  Well hydrated, no rash   Lymphatic:  No lymphadenopathy noted   Neurologic:  Alert & oriented x 3,    Psychiatric:  Speech and behavior appropriate     Bilateral Shoulder Exam    left Shoulder Exam   Shoulder exam performed same as contralateral side and is normal.    right Shoulder Exam   Tenderness   Shoulder tenderness location: diffusely about shoulder.    Range of Motion   Forward Flexion: abnormal   External Rotation: abnormal     Muscle Strength   Supraspinatus: 4/5     Tests   Hawkin's test: positive  Impingement: positive    Other   Erythema: absent  Sensation: normal  Pulse: present             Impingement syndrome of right shoulder  -     Large Joint Aspiration/Injection: R subacromial bursa  -     triamcinolone acetonide injection 40 mg         Using an aseptic technique, I  injected 5 cc of lidocaine 1% without and 1 cc of kenalog 40mg into the right Shoulder. The patient tolerated this well.     Rtc in 6 weeks.

## 2023-12-11 ENCOUNTER — TELEPHONE (OUTPATIENT)
Dept: FAMILY MEDICINE | Facility: CLINIC | Age: 68
End: 2023-12-11
Payer: MEDICARE

## 2023-12-11 NOTE — TELEPHONE ENCOUNTER
----- Message from Cami Esteban sent at 12/11/2023  9:08 AM CST -----  Pt is requesting a call back regarding an appt for B12 injection. Call the pt back at .[phone] to advise. [email or web address]

## 2023-12-18 ENCOUNTER — TELEPHONE (OUTPATIENT)
Dept: HEMATOLOGY/ONCOLOGY | Facility: CLINIC | Age: 68
End: 2023-12-18
Payer: MEDICARE

## 2023-12-18 NOTE — PROGRESS NOTES
[facility]  FOLLOW-UP VISIT.     Reason for visit: Establish Care     Best Contact Phone Number(s): [phone] (home)      Anemia  2023: Establish care with heme/onc, b/l Hgb 10.3    Interval History: Carla Dang is a 68 y.o. female with anemia who presents to establish care. Patient notes longstanding history of feeling cold but remains very active. She is retired from working as a  at the [employer],  is a . Patient denies significant fatigue but takes daytime naps due to insomnia. She denies picca, eats a well rounded diet. Notes diminished appetite, notes weight loss x1 yr with death of mother.     Patient presents to clinic with , Boyd.  ECOG Performance Status is 0.    ROS:  A complete 12-point review of systems was reviewed and is negative except as mentioned above.     Past Medical History:   Past Medical History:   Diagnosis Date    Acute pancreatitis 3/21/2016    Breast cancer 2005    right side with lumpectomy, chemo and radiation    Closed fracture of ramus of right pubis 6/8/2016    Colon polyp     last scope 4/2012- repeat 10 y per pt- Dr. Johnson    GERD (gastroesophageal reflux disease) 5/29/2012    Hyperlipidemia 4/16/2013    Hypertension     Metabolic encephalopathy 4/20/2021    Osteopenia         Allergies:   Review of patient's allergies indicates:   Allergen Reactions    Hydrochlorothiazide Other (See Comments)     Multiple electrolyte abnormalities        Medications:   Current Outpatient Medications   Medication Sig Dispense Refill    aMILoride (MIDAMOR) 5 MG Tab Take 5 mg by mouth once daily.      amLODIPine (NORVASC) 5 MG tablet Take 1 tablet (5 mg total) by mouth once daily. 90 tablet 3    benazepriL (LOTENSIN) 10 MG tablet Take 1 tablet by mouth once daily 90 tablet 3    calcium carbonate (OS-NASEEM) 500 mg calcium (1,250 mg) tablet qid 120 tablet 0    cholecalciferol, vitamin D3, (VITAMIN D3) 50 mcg (2,000 unit) Cap capsule Take 1  "capsule (2,000 Units total) by mouth once daily.      cyanocobalamin 1,000 mcg/mL injection       folic acid (FOLVITE) 1 MG tablet Take 1 tablet by mouth once daily 90 tablet 3    magnesium oxide (MAG-OX) 400 mg (241.3 mg magnesium) tablet bid 60 tablet 1    minocycline (MINOCIN,DYNACIN) 50 MG Cap Take 50 mg by mouth 2 (two) times daily.      omeprazole (PRILOSEC) 20 MG capsule Take 1 capsule by mouth once daily 90 capsule 3    pravastatin (PRAVACHOL) 20 MG tablet Take 1 tablet by mouth once daily 90 tablet 3    ZILXI 1.5 % Foam Apply topically 2 (two) times daily.       No current facility-administered medications for this visit.        Physical Exam:   /84 (BP Location: Right arm, Patient Position: Sitting, BP Method: Medium (Manual))   Pulse 86   Temp 97.4 °F (36.3 °C) (Temporal)   Resp 16   Ht 5' 5" (1.651 m)   Wt 68.1 kg (150 lb 2.1 oz)   SpO2 99%   BMI 24.98 kg/m²      Physical Exam  Constitutional:       Appearance: Normal appearance.   HENT:      Head: Normocephalic and atraumatic.      Mouth/Throat:      Mouth: Mucous membranes are moist.   Eyes:      Extraocular Movements: Extraocular movements intact.      Pupils: Pupils are equal, round, and reactive to light.   Cardiovascular:      Rate and Rhythm: Normal rate and regular rhythm.      Pulses: Normal pulses.      Heart sounds: No murmur heard.  Pulmonary:      Effort: Pulmonary effort is normal. No respiratory distress.      Breath sounds: Normal breath sounds.   Abdominal:      General: Abdomen is flat. There is no distension.      Palpations: Abdomen is soft.      Tenderness: There is no abdominal tenderness.   Musculoskeletal:         General: No swelling or tenderness. Normal range of motion.      Cervical back: Normal range of motion. No rigidity.   Skin:     General: Skin is warm and dry.      Coloration: Skin is not jaundiced.   Neurological:      General: No focal deficit present.      Mental Status: She is alert and oriented to " person, place, and time.   Psychiatric:         Mood and Affect: Mood normal.         Behavior: Behavior normal.         Labs:   Lab Results   Component Value Date    WBC 7.90 08/30/2023    HGB 10.3 (L) 08/30/2023    HCT 32.3 (L) 08/30/2023    MCV 97 08/30/2023     08/30/2023       Lab Results   Component Value Date     11/20/2023    K 5.0 11/20/2023     10/16/2023    CO2 22 11/20/2023    BUN 20 11/20/2023    CREATININE 1.22 (H) 11/20/2023    ALBUMIN 3.6 09/18/2023    BILITOT 0.5 09/18/2023    ALKPHOS 84 09/18/2023    AST 15 09/18/2023    ALT 6 09/18/2023       Imaging:   Mammo Digital Screening Bilat w/ Nikos  Narrative: Result:   Mammo Digital Screening Bilat w/ Nikos     History:  Patient is 68 y.o. and is seen for a screening mammogram.    Films Compared:  Compared to: 11/10/2022 Mammo Digital Screening Bilat w/ Nikos, 11/09/2021   Mammo Digital Screening Bilat w/ Nikos, and 09/29/2020 Mammo Digital   Screening Bilat w/ Nikos     Findings:  This procedure was performed using tomosynthesis. Computer-aided detection   was utilized in the interpretation of this examination.  The breasts have scattered areas of fibroglandular density. There is no   evidence of suspicious masses, calcifications, or other abnormal findings.   Stable right lumpectomy changes are noted.  Impression: Bilateral  There is no mammographic evidence of malignancy.    BI-RADS Category:   Overall: 2 - Benign       Recommendation:  Routine screening mammogram in 1 year is recommended.    Patient information entered into a reminder system with a target due date   for the above recommendation.            Diagnoses:       1. Anemia, unspecified type          Assessment and Plan:     #  Anemia - Patient reports UTD with colonoscopy and recent EGD for dysphagia negative.    No smoking history, UTD on mammograms after right breast cancer in 2005. Takes B12 inj with Dr. Melendrez, thyroid fxn normal. Recently established with nephrology for  CKD.     Etiology of anemia is most likely related to renal dysfunction. Will check nutritional parameters today and myeloma serology. If unremarkable, recommend Epo consideration with Dr. Marcelo if symptomatic.      # HTN -  Takes amlodipine, benazepril    # Hypomagnesemia - Takes magnesium supplements    # CKD - Established with Dr. Marcelo, Cr b/l 1.2. Encouraged hydration.      # R Breast Cancer - Follows with Dr. Rae Tello, treatment in 2005. Treated with surgery, chemo x4 and radiation x6w. Unsure receptors. Follows annually, UTD on mammogram.     she will return to clinic as needed, but knows to call in the interim if symptoms change or should a problem arise.    Lucia Villanueva MD  Hematology/Oncology  [facility]      Med Onc Chart Routing      Follow up with physician No follow up needed.   Follow up with MAURISIO    Infusion scheduling note    Injection scheduling note    Labs   Scheduling:  Preferred lab:  Lab interval:  All labs today   Imaging    Pharmacy appointment    Other referrals

## 2023-12-18 NOTE — TELEPHONE ENCOUNTER
Do to Dr Rich sierra change, pt was called and asked if she can come in for 9:30am tomorrow morning instead of 9:40.  Pt agreed and confirmed the appt change.

## 2023-12-19 ENCOUNTER — LAB VISIT (OUTPATIENT)
Dept: LAB | Facility: HOSPITAL | Age: 68
End: 2023-12-19
Attending: STUDENT IN AN ORGANIZED HEALTH CARE EDUCATION/TRAINING PROGRAM
Payer: MEDICARE

## 2023-12-19 ENCOUNTER — OFFICE VISIT (OUTPATIENT)
Dept: HEMATOLOGY/ONCOLOGY | Facility: CLINIC | Age: 68
End: 2023-12-19
Payer: MEDICARE

## 2023-12-19 VITALS
HEART RATE: 86 BPM | RESPIRATION RATE: 16 BRPM | SYSTOLIC BLOOD PRESSURE: 120 MMHG | WEIGHT: 150.13 LBS | TEMPERATURE: 97 F | BODY MASS INDEX: 25.01 KG/M2 | HEIGHT: 65 IN | DIASTOLIC BLOOD PRESSURE: 84 MMHG | OXYGEN SATURATION: 99 %

## 2023-12-19 DIAGNOSIS — D53.9 NUTRITIONAL ANEMIA: ICD-10-CM

## 2023-12-19 DIAGNOSIS — D64.9 ANEMIA, UNSPECIFIED TYPE: ICD-10-CM

## 2023-12-19 LAB
ALBUMIN SERPL BCP-MCNC: 3.5 G/DL (ref 3.5–5.2)
ALP SERPL-CCNC: 96 U/L (ref 55–135)
ALT SERPL W/O P-5'-P-CCNC: 9 U/L (ref 10–44)
ANION GAP SERPL CALC-SCNC: 7 MMOL/L (ref 8–16)
AST SERPL-CCNC: 15 U/L (ref 10–40)
BASOPHILS # BLD AUTO: 0.03 K/UL (ref 0–0.2)
BASOPHILS NFR BLD: 0.6 % (ref 0–1.9)
BILIRUB SERPL-MCNC: 0.3 MG/DL (ref 0.1–1)
BUN SERPL-MCNC: 19 MG/DL (ref 8–23)
CALCIUM SERPL-MCNC: 8.9 MG/DL (ref 8.7–10.5)
CHLORIDE SERPL-SCNC: 111 MMOL/L (ref 95–110)
CO2 SERPL-SCNC: 20 MMOL/L (ref 23–29)
CREAT SERPL-MCNC: 1.4 MG/DL (ref 0.5–1.4)
DIFFERENTIAL METHOD: ABNORMAL
EOSINOPHIL # BLD AUTO: 0.1 K/UL (ref 0–0.5)
EOSINOPHIL NFR BLD: 1.3 % (ref 0–8)
ERYTHROCYTE [DISTWIDTH] IN BLOOD BY AUTOMATED COUNT: 13.5 % (ref 11.5–14.5)
EST. GFR  (NO RACE VARIABLE): 40.5 ML/MIN/1.73 M^2
FERRITIN SERPL-MCNC: 44 NG/ML (ref 20–300)
FOLATE SERPL-MCNC: >40 NG/ML (ref 4–24)
GLUCOSE SERPL-MCNC: 99 MG/DL (ref 70–110)
HCT VFR BLD AUTO: 30.5 % (ref 37–48.5)
HGB BLD-MCNC: 10.1 G/DL (ref 12–16)
IGA SERPL-MCNC: 228 MG/DL (ref 40–350)
IGG SERPL-MCNC: 943 MG/DL (ref 650–1600)
IGM SERPL-MCNC: 28 MG/DL (ref 50–300)
IMM GRANULOCYTES # BLD AUTO: 0.01 K/UL (ref 0–0.04)
IMM GRANULOCYTES NFR BLD AUTO: 0.2 % (ref 0–0.5)
IRON SERPL-MCNC: 46 UG/DL (ref 30–160)
LYMPHOCYTES # BLD AUTO: 1.8 K/UL (ref 1–4.8)
LYMPHOCYTES NFR BLD: 34.6 % (ref 18–48)
MCH RBC QN AUTO: 30.2 PG (ref 27–31)
MCHC RBC AUTO-ENTMCNC: 33.1 G/DL (ref 32–36)
MCV RBC AUTO: 91 FL (ref 82–98)
MONOCYTES # BLD AUTO: 0.3 K/UL (ref 0.3–1)
MONOCYTES NFR BLD: 5.5 % (ref 4–15)
NEUTROPHILS # BLD AUTO: 3 K/UL (ref 1.8–7.7)
NEUTROPHILS NFR BLD: 57.8 % (ref 38–73)
NRBC BLD-RTO: 0 /100 WBC
PLATELET # BLD AUTO: 267 K/UL (ref 150–450)
PMV BLD AUTO: 9.5 FL (ref 9.2–12.9)
POTASSIUM SERPL-SCNC: 5.1 MMOL/L (ref 3.5–5.1)
PROT SERPL-MCNC: 6.6 G/DL (ref 6–8.4)
RBC # BLD AUTO: 3.34 M/UL (ref 4–5.4)
RETICS/RBC NFR AUTO: 1.2 % (ref 0.5–2.5)
SATURATED IRON: 16 % (ref 20–50)
SODIUM SERPL-SCNC: 138 MMOL/L (ref 136–145)
TOTAL IRON BINDING CAPACITY: 286 UG/DL (ref 250–450)
TRANSFERRIN SERPL-MCNC: 193 MG/DL (ref 200–375)
WBC # BLD AUTO: 5.23 K/UL (ref 3.9–12.7)

## 2023-12-19 PROCEDURE — 3288F FALL RISK ASSESSMENT DOCD: CPT | Mod: HCNC,CPTII,S$GLB, | Performed by: STUDENT IN AN ORGANIZED HEALTH CARE EDUCATION/TRAINING PROGRAM

## 2023-12-19 PROCEDURE — 3079F DIAST BP 80-89 MM HG: CPT | Mod: HCNC,CPTII,S$GLB, | Performed by: STUDENT IN AN ORGANIZED HEALTH CARE EDUCATION/TRAINING PROGRAM

## 2023-12-19 PROCEDURE — 99999 PR PBB SHADOW E&M-EST. PATIENT-LVL IV: ICD-10-PCS | Mod: PBBFAC,HCNC,, | Performed by: STUDENT IN AN ORGANIZED HEALTH CARE EDUCATION/TRAINING PROGRAM

## 2023-12-19 PROCEDURE — 1101F PT FALLS ASSESS-DOCD LE1/YR: CPT | Mod: HCNC,CPTII,S$GLB, | Performed by: STUDENT IN AN ORGANIZED HEALTH CARE EDUCATION/TRAINING PROGRAM

## 2023-12-19 PROCEDURE — 86334 PATHOLOGIST INTERPRETATION IFE: ICD-10-PCS | Mod: 26,HCNC,, | Performed by: PATHOLOGY

## 2023-12-19 PROCEDURE — 82784 ASSAY IGA/IGD/IGG/IGM EACH: CPT | Mod: 59,HCNC | Performed by: STUDENT IN AN ORGANIZED HEALTH CARE EDUCATION/TRAINING PROGRAM

## 2023-12-19 PROCEDURE — 1126F PR PAIN SEVERITY QUANTIFIED, NO PAIN PRESENT: ICD-10-PCS | Mod: HCNC,CPTII,S$GLB, | Performed by: STUDENT IN AN ORGANIZED HEALTH CARE EDUCATION/TRAINING PROGRAM

## 2023-12-19 PROCEDURE — 86334 IMMUNOFIX E-PHORESIS SERUM: CPT | Mod: HCNC | Performed by: STUDENT IN AN ORGANIZED HEALTH CARE EDUCATION/TRAINING PROGRAM

## 2023-12-19 PROCEDURE — 1159F PR MEDICATION LIST DOCUMENTED IN MEDICAL RECORD: ICD-10-PCS | Mod: HCNC,CPTII,S$GLB, | Performed by: STUDENT IN AN ORGANIZED HEALTH CARE EDUCATION/TRAINING PROGRAM

## 2023-12-19 PROCEDURE — 84165 PROTEIN E-PHORESIS SERUM: CPT | Mod: HCNC | Performed by: STUDENT IN AN ORGANIZED HEALTH CARE EDUCATION/TRAINING PROGRAM

## 2023-12-19 PROCEDURE — 3008F BODY MASS INDEX DOCD: CPT | Mod: HCNC,CPTII,S$GLB, | Performed by: STUDENT IN AN ORGANIZED HEALTH CARE EDUCATION/TRAINING PROGRAM

## 2023-12-19 PROCEDURE — 84165 PATHOLOGIST INTERPRETATION SPE: ICD-10-PCS | Mod: 26,HCNC,, | Performed by: PATHOLOGY

## 2023-12-19 PROCEDURE — 85025 COMPLETE CBC W/AUTO DIFF WBC: CPT | Mod: HCNC,PN | Performed by: STUDENT IN AN ORGANIZED HEALTH CARE EDUCATION/TRAINING PROGRAM

## 2023-12-19 PROCEDURE — 83521 IG LIGHT CHAINS FREE EACH: CPT | Mod: HCNC | Performed by: STUDENT IN AN ORGANIZED HEALTH CARE EDUCATION/TRAINING PROGRAM

## 2023-12-19 PROCEDURE — 80053 COMPREHEN METABOLIC PANEL: CPT | Mod: HCNC,PN | Performed by: STUDENT IN AN ORGANIZED HEALTH CARE EDUCATION/TRAINING PROGRAM

## 2023-12-19 PROCEDURE — 99205 PR OFFICE/OUTPT VISIT, NEW, LEVL V, 60-74 MIN: ICD-10-PCS | Mod: HCNC,S$GLB,, | Performed by: STUDENT IN AN ORGANIZED HEALTH CARE EDUCATION/TRAINING PROGRAM

## 2023-12-19 PROCEDURE — 82728 ASSAY OF FERRITIN: CPT | Mod: HCNC | Performed by: STUDENT IN AN ORGANIZED HEALTH CARE EDUCATION/TRAINING PROGRAM

## 2023-12-19 PROCEDURE — 84165 PROTEIN E-PHORESIS SERUM: CPT | Mod: 26,HCNC,, | Performed by: PATHOLOGY

## 2023-12-19 PROCEDURE — 84466 ASSAY OF TRANSFERRIN: CPT | Mod: HCNC | Performed by: STUDENT IN AN ORGANIZED HEALTH CARE EDUCATION/TRAINING PROGRAM

## 2023-12-19 PROCEDURE — 3066F PR DOCUMENTATION OF TREATMENT FOR NEPHROPATHY: ICD-10-PCS | Mod: HCNC,CPTII,S$GLB, | Performed by: STUDENT IN AN ORGANIZED HEALTH CARE EDUCATION/TRAINING PROGRAM

## 2023-12-19 PROCEDURE — 3288F PR FALLS RISK ASSESSMENT DOCUMENTED: ICD-10-PCS | Mod: HCNC,CPTII,S$GLB, | Performed by: STUDENT IN AN ORGANIZED HEALTH CARE EDUCATION/TRAINING PROGRAM

## 2023-12-19 PROCEDURE — 82746 ASSAY OF FOLIC ACID SERUM: CPT | Mod: HCNC | Performed by: STUDENT IN AN ORGANIZED HEALTH CARE EDUCATION/TRAINING PROGRAM

## 2023-12-19 PROCEDURE — 3079F PR MOST RECENT DIASTOLIC BLOOD PRESSURE 80-89 MM HG: ICD-10-PCS | Mod: HCNC,CPTII,S$GLB, | Performed by: STUDENT IN AN ORGANIZED HEALTH CARE EDUCATION/TRAINING PROGRAM

## 2023-12-19 PROCEDURE — 99205 OFFICE O/P NEW HI 60 MIN: CPT | Mod: HCNC,S$GLB,, | Performed by: STUDENT IN AN ORGANIZED HEALTH CARE EDUCATION/TRAINING PROGRAM

## 2023-12-19 PROCEDURE — 83540 ASSAY OF IRON: CPT | Mod: HCNC | Performed by: STUDENT IN AN ORGANIZED HEALTH CARE EDUCATION/TRAINING PROGRAM

## 2023-12-19 PROCEDURE — 1160F PR REVIEW ALL MEDS BY PRESCRIBER/CLIN PHARMACIST DOCUMENTED: ICD-10-PCS | Mod: HCNC,CPTII,S$GLB, | Performed by: STUDENT IN AN ORGANIZED HEALTH CARE EDUCATION/TRAINING PROGRAM

## 2023-12-19 PROCEDURE — 3066F NEPHROPATHY DOC TX: CPT | Mod: HCNC,CPTII,S$GLB, | Performed by: STUDENT IN AN ORGANIZED HEALTH CARE EDUCATION/TRAINING PROGRAM

## 2023-12-19 PROCEDURE — 86334 IMMUNOFIX E-PHORESIS SERUM: CPT | Mod: 26,HCNC,, | Performed by: PATHOLOGY

## 2023-12-19 PROCEDURE — 1159F MED LIST DOCD IN RCRD: CPT | Mod: HCNC,CPTII,S$GLB, | Performed by: STUDENT IN AN ORGANIZED HEALTH CARE EDUCATION/TRAINING PROGRAM

## 2023-12-19 PROCEDURE — 1101F PR PT FALLS ASSESS DOC 0-1 FALLS W/OUT INJ PAST YR: ICD-10-PCS | Mod: HCNC,CPTII,S$GLB, | Performed by: STUDENT IN AN ORGANIZED HEALTH CARE EDUCATION/TRAINING PROGRAM

## 2023-12-19 PROCEDURE — 36415 COLL VENOUS BLD VENIPUNCTURE: CPT | Mod: HCNC,PN | Performed by: STUDENT IN AN ORGANIZED HEALTH CARE EDUCATION/TRAINING PROGRAM

## 2023-12-19 PROCEDURE — 85045 AUTOMATED RETICULOCYTE COUNT: CPT | Mod: HCNC,PN | Performed by: STUDENT IN AN ORGANIZED HEALTH CARE EDUCATION/TRAINING PROGRAM

## 2023-12-19 PROCEDURE — 99999 PR PBB SHADOW E&M-EST. PATIENT-LVL IV: CPT | Mod: PBBFAC,HCNC,, | Performed by: STUDENT IN AN ORGANIZED HEALTH CARE EDUCATION/TRAINING PROGRAM

## 2023-12-19 PROCEDURE — 3008F PR BODY MASS INDEX (BMI) DOCUMENTED: ICD-10-PCS | Mod: HCNC,CPTII,S$GLB, | Performed by: STUDENT IN AN ORGANIZED HEALTH CARE EDUCATION/TRAINING PROGRAM

## 2023-12-19 PROCEDURE — 1126F AMNT PAIN NOTED NONE PRSNT: CPT | Mod: HCNC,CPTII,S$GLB, | Performed by: STUDENT IN AN ORGANIZED HEALTH CARE EDUCATION/TRAINING PROGRAM

## 2023-12-19 PROCEDURE — 1160F RVW MEDS BY RX/DR IN RCRD: CPT | Mod: HCNC,CPTII,S$GLB, | Performed by: STUDENT IN AN ORGANIZED HEALTH CARE EDUCATION/TRAINING PROGRAM

## 2023-12-19 PROCEDURE — 3074F PR MOST RECENT SYSTOLIC BLOOD PRESSURE < 130 MM HG: ICD-10-PCS | Mod: HCNC,CPTII,S$GLB, | Performed by: STUDENT IN AN ORGANIZED HEALTH CARE EDUCATION/TRAINING PROGRAM

## 2023-12-19 PROCEDURE — 3074F SYST BP LT 130 MM HG: CPT | Mod: HCNC,CPTII,S$GLB, | Performed by: STUDENT IN AN ORGANIZED HEALTH CARE EDUCATION/TRAINING PROGRAM

## 2023-12-19 PROCEDURE — 4010F ACE/ARB THERAPY RXD/TAKEN: CPT | Mod: HCNC,CPTII,S$GLB, | Performed by: STUDENT IN AN ORGANIZED HEALTH CARE EDUCATION/TRAINING PROGRAM

## 2023-12-19 PROCEDURE — 4010F PR ACE/ARB THEARPY RXD/TAKEN: ICD-10-PCS | Mod: HCNC,CPTII,S$GLB, | Performed by: STUDENT IN AN ORGANIZED HEALTH CARE EDUCATION/TRAINING PROGRAM

## 2023-12-19 NOTE — Clinical Note
Thank you for referral! I think anemia most likely 2/2 renal fxn, but will check nutritional parameters and myeloma serology today.

## 2023-12-20 LAB
ALBUMIN SERPL ELPH-MCNC: 3.46 G/DL (ref 3.35–5.55)
ALPHA1 GLOB SERPL ELPH-MCNC: 0.33 G/DL (ref 0.17–0.41)
ALPHA2 GLOB SERPL ELPH-MCNC: 0.78 G/DL (ref 0.43–0.99)
B-GLOBULIN SERPL ELPH-MCNC: 0.81 G/DL (ref 0.5–1.1)
GAMMA GLOB SERPL ELPH-MCNC: 0.82 G/DL (ref 0.67–1.58)
INTERPRETATION SERPL IFE-IMP: NORMAL
KAPPA LC SER QL IA: 3.88 MG/DL (ref 0.33–1.94)
KAPPA LC/LAMBDA SER IA: 1.9 (ref 0.26–1.65)
LAMBDA LC SER QL IA: 2.04 MG/DL (ref 0.57–2.63)
PATHOLOGIST INTERPRETATION IFE: NORMAL
PATHOLOGIST INTERPRETATION SPE: NORMAL
PROT SERPL-MCNC: 6.2 G/DL (ref 6–8.4)

## 2023-12-21 ENCOUNTER — TELEPHONE (OUTPATIENT)
Dept: FAMILY MEDICINE | Facility: CLINIC | Age: 68
End: 2023-12-21
Payer: MEDICARE

## 2023-12-21 ENCOUNTER — TELEPHONE (OUTPATIENT)
Dept: HEMATOLOGY/ONCOLOGY | Facility: CLINIC | Age: 68
End: 2023-12-21
Payer: MEDICARE

## 2023-12-21 DIAGNOSIS — E53.8 B12 DEFICIENCY: Primary | ICD-10-CM

## 2023-12-21 RX ORDER — CYANOCOBALAMIN 1000 UG/ML
1000 INJECTION, SOLUTION INTRAMUSCULAR; SUBCUTANEOUS
Status: ACTIVE | OUTPATIENT
Start: 2023-12-22 | End: 2024-06-19

## 2023-12-22 ENCOUNTER — CLINICAL SUPPORT (OUTPATIENT)
Dept: FAMILY MEDICINE | Facility: CLINIC | Age: 68
End: 2023-12-22
Payer: MEDICARE

## 2023-12-22 DIAGNOSIS — E53.8 B12 DEFICIENCY: Primary | ICD-10-CM

## 2023-12-22 PROCEDURE — 99999 PR PBB SHADOW E&M-EST. PATIENT-LVL II: CPT | Mod: PBBFAC,HCNC,,

## 2023-12-22 PROCEDURE — 96372 THER/PROPH/DIAG INJ SC/IM: CPT | Mod: HCNC,S$GLB,, | Performed by: FAMILY MEDICINE

## 2023-12-22 PROCEDURE — 99999 PR PBB SHADOW E&M-EST. PATIENT-LVL II: ICD-10-PCS | Mod: PBBFAC,HCNC,,

## 2023-12-22 PROCEDURE — 96372 PR INJECTION,THERAP/PROPH/DIAG2ST, IM OR SUBCUT: ICD-10-PCS | Mod: HCNC,S$GLB,, | Performed by: FAMILY MEDICINE

## 2023-12-22 RX ADMIN — CYANOCOBALAMIN 1000 MCG: 1000 INJECTION, SOLUTION INTRAMUSCULAR; SUBCUTANEOUS at 10:12

## 2024-01-03 ENCOUNTER — OFFICE VISIT (OUTPATIENT)
Dept: FAMILY MEDICINE | Facility: CLINIC | Age: 69
End: 2024-01-03
Payer: MEDICARE

## 2024-01-03 ENCOUNTER — TELEPHONE (OUTPATIENT)
Dept: FAMILY MEDICINE | Facility: CLINIC | Age: 69
End: 2024-01-03
Payer: MEDICARE

## 2024-01-03 DIAGNOSIS — J06.9 UPPER RESPIRATORY TRACT INFECTION, UNSPECIFIED TYPE: Primary | ICD-10-CM

## 2024-01-03 PROCEDURE — 99499 UNLISTED E&M SERVICE: CPT | Mod: HCNC,95,, | Performed by: FAMILY MEDICINE

## 2024-01-03 NOTE — TELEPHONE ENCOUNTER
----- Message from Carmencita Manning sent at 1/3/2024  3:57 PM CST -----  Contact: [phone]  Type:  Patient Returning Call    Who Called:Carla   Who Left Message for Patient:nurse   Does the patient know what this is regarding?:n/a  Would the patient rather a call back or a response via [facility]? Call back   Best Call Back Number:[phone]   Additional Information: n/a      Thanks KB

## 2024-01-03 NOTE — TELEPHONE ENCOUNTER
Riley w/  , we called earlier to remind her of audio appt w/ Dr Covington . If she needs to speak w/ us I asked him to ask her to call the office back .

## 2024-01-03 NOTE — TELEPHONE ENCOUNTER
----- Message from Lina Kyel sent at 1/3/2024 11:17 AM CST -----  Regarding: Rx for Cough  1/3/2024       Hello,         I received a call from Ksenia with [facility] regarding ANA DANG [[id number]]. Ms. Dang was in Urgent Care this Saturday and was given a prescription for a cough. Her condition has not improved and Ms. Mccormack would like for her to get a new prescription. Please give her a call. She can be reached at [phone].      Thank you,   Lina MILLER   Access Navigator

## 2024-01-03 NOTE — TELEPHONE ENCOUNTER
----- Message from Myla Guerin sent at 1/3/2024  2:08 PM CST -----  Contact: ulysses  .Type:  Patient Returning Call    Who Called:Ulysses  Who Left Message for Patient:nurse  Does the patient know what this is regarding?:yes  Would the patient rather a call back or a response via [facility]? Call back  Best Call Back Number:.[phone]   Additional Information: appt          Thanks  RADHA

## 2024-01-04 ENCOUNTER — TELEPHONE (OUTPATIENT)
Dept: FAMILY MEDICINE | Facility: CLINIC | Age: 69
End: 2024-01-04
Payer: MEDICARE

## 2024-01-04 NOTE — TELEPHONE ENCOUNTER
----- Message from Cami Esteban sent at 1/4/2024  9:06 AM CST -----  Pt is requesting a call back from Magda. Call back number is .[phone]. Thx EL

## 2024-01-04 NOTE — TELEPHONE ENCOUNTER
Pt still with cough from last week, informed no available appts in clinic until next week as providers out for holidays, still feeling bad, advised she should get checked, she will go back to urgent care

## 2024-01-11 DIAGNOSIS — Z00.00 ENCOUNTER FOR MEDICARE ANNUAL WELLNESS EXAM: ICD-10-CM

## 2024-01-18 ENCOUNTER — OFFICE VISIT (OUTPATIENT)
Dept: ORTHOPEDICS | Facility: CLINIC | Age: 69
End: 2024-01-18
Payer: MEDICARE

## 2024-01-18 VITALS — BODY MASS INDEX: 24.99 KG/M2 | WEIGHT: 150 LBS | HEIGHT: 65 IN

## 2024-01-18 DIAGNOSIS — M75.41 IMPINGEMENT SYNDROME OF RIGHT SHOULDER: Primary | ICD-10-CM

## 2024-01-18 PROCEDURE — 99212 OFFICE O/P EST SF 10 MIN: CPT | Mod: HCNC,25,S$GLB, | Performed by: NURSE PRACTITIONER

## 2024-01-18 PROCEDURE — 3008F BODY MASS INDEX DOCD: CPT | Mod: HCNC,CPTII,S$GLB, | Performed by: NURSE PRACTITIONER

## 2024-01-18 PROCEDURE — 3288F FALL RISK ASSESSMENT DOCD: CPT | Mod: HCNC,CPTII,S$GLB, | Performed by: NURSE PRACTITIONER

## 2024-01-18 PROCEDURE — 20610 DRAIN/INJ JOINT/BURSA W/O US: CPT | Mod: HCNC,RT,S$GLB, | Performed by: NURSE PRACTITIONER

## 2024-01-18 PROCEDURE — 1101F PT FALLS ASSESS-DOCD LE1/YR: CPT | Mod: HCNC,CPTII,S$GLB, | Performed by: NURSE PRACTITIONER

## 2024-01-18 PROCEDURE — 99999 PR PBB SHADOW E&M-EST. PATIENT-LVL III: CPT | Mod: PBBFAC,HCNC,, | Performed by: NURSE PRACTITIONER

## 2024-01-18 RX ADMIN — TRIAMCINOLONE ACETONIDE 40 MG: 40 INJECTION, SUSPENSION INTRA-ARTICULAR; INTRAMUSCULAR at 01:01

## 2024-01-19 ENCOUNTER — CLINICAL SUPPORT (OUTPATIENT)
Dept: FAMILY MEDICINE | Facility: CLINIC | Age: 69
End: 2024-01-19
Payer: MEDICARE

## 2024-01-19 DIAGNOSIS — E53.8 B12 DEFICIENCY: Primary | ICD-10-CM

## 2024-01-19 PROCEDURE — 96372 THER/PROPH/DIAG INJ SC/IM: CPT | Mod: HCNC,S$GLB,, | Performed by: FAMILY MEDICINE

## 2024-01-19 PROCEDURE — 99999 PR PBB SHADOW E&M-EST. PATIENT-LVL II: CPT | Mod: PBBFAC,HCNC,,

## 2024-01-19 RX ADMIN — CYANOCOBALAMIN 1000 MCG: 1000 INJECTION, SOLUTION INTRAMUSCULAR; SUBCUTANEOUS at 09:01

## 2024-01-21 RX ORDER — TRIAMCINOLONE ACETONIDE 40 MG/ML
40 INJECTION, SUSPENSION INTRA-ARTICULAR; INTRAMUSCULAR
Status: DISCONTINUED | OUTPATIENT
Start: 2024-01-18 | End: 2024-01-21 | Stop reason: HOSPADM

## 2024-01-21 NOTE — PROCEDURES
Large Joint Aspiration/Injection: R subacromial bursa    Date/Time: 1/18/2024 1:00 PM    Performed by: Mony Garcia FNP  Authorized by: Mony Garcia FNP    Consent Done?:  Yes (Verbal)  Indications:  Pain  Timeout: prior to procedure the correct patient, procedure, and site was verified    Prep: patient was prepped and draped in usual sterile fashion      Local anesthesia used?: Yes    Local anesthetic:  Lidocaine 1% without epinephrine  Anesthetic total (ml):  5      Details:  Needle Size:  22 G  Ultrasonic Guidance for needle placement?: No    Approach:  Posterior  Location:  Shoulder  Site:  R subacromial bursa  Medications:  40 mg triamcinolone acetonide 40 mg/mL  Patient tolerance:  Patient tolerated the procedure well with no immediate complications

## 2024-01-21 NOTE — PROGRESS NOTES
Chief Complaint   Patient presents with    Right Shoulder - Pain       HPI:   This is a 68 y.o. who returns to clinic today in follow-up for R shoulder for the past 1 weeks after no trauma. Pain is aching and throbbing. No numbness or tingling. No associated signs or symptoms.    Past Medical History:   Diagnosis Date    Acute pancreatitis 3/21/2016    Breast cancer 2005    right side with lumpectomy, chemo and radiation    Closed fracture of ramus of right pubis 6/8/2016    Colon polyp     last scope 4/2012- repeat 10 y per pt- Dr. Johnson    GERD (gastroesophageal reflux disease) 5/29/2012    Hyperlipidemia 4/16/2013    Hypertension     Metabolic encephalopathy 4/20/2021    Osteopenia      Past Surgical History:   Procedure Laterality Date    BREAST LUMPECTOMY  2005    DILATION AND CURETTAGE OF UTERUS      ESOPHAGEAL DILATION      ESOPHAGOGASTRODUODENOSCOPY  6/1/2012    ROTATOR CUFF REPAIR      TEAR DUCT SURGERY      right rye     Current Outpatient Medications on File Prior to Visit   Medication Sig Dispense Refill    aMILoride (MIDAMOR) 5 MG Tab Take 5 mg by mouth once daily.      amLODIPine (NORVASC) 5 MG tablet Take 1 tablet (5 mg total) by mouth once daily. 90 tablet 3    benazepriL (LOTENSIN) 10 MG tablet Take 1 tablet by mouth once daily 90 tablet 3    calcium carbonate (OS-NASEEM) 500 mg calcium (1,250 mg) tablet qid 120 tablet 0    cholecalciferol, vitamin D3, (VITAMIN D3) 50 mcg (2,000 unit) Cap capsule Take 1 capsule (2,000 Units total) by mouth once daily.      cyanocobalamin 1,000 mcg/mL injection       folic acid (FOLVITE) 1 MG tablet Take 1 tablet by mouth once daily 90 tablet 3    magnesium oxide (MAG-OX) 400 mg (241.3 mg magnesium) tablet bid 60 tablet 1    minocycline (MINOCIN,DYNACIN) 50 MG Cap Take 50 mg by mouth 2 (two) times daily.      omeprazole (PRILOSEC) 20 MG capsule Take 1 capsule by mouth once daily 90 capsule 3    pravastatin (PRAVACHOL) 20 MG tablet Take 1 tablet by mouth once daily 90  tablet 3    ZILXI 1.5 % Foam Apply topically 2 (two) times daily.       Current Facility-Administered Medications on File Prior to Visit   Medication Dose Route Frequency Provider Last Rate Last Admin    cyanocobalamin injection 1,000 mcg  1,000 mcg Intramuscular Q30 Days Taqueria Melendrez MD   1,000 mcg at 24 0954     Review of patient's allergies indicates:   Allergen Reactions    Hydrochlorothiazide Other (See Comments)     Multiple electrolyte abnormalities     Family History   Problem Relation Age of Onset    Stroke Brother 57    Breast cancer Mother 87     Social History     Socioeconomic History    Marital status:    Tobacco Use    Smoking status: Former     Current packs/day: 0.00     Types: Cigarettes     Quit date: 2005     Years since quittin.6    Smokeless tobacco: Never   Substance and Sexual Activity    Alcohol use: Not Currently     Alcohol/week: 2.0 standard drinks of alcohol     Types: 2 Standard drinks or equivalent per week     Comment: prior 6 pack/week    Drug use: Yes    Sexual activity: Not Currently     Social Determinants of Health     Financial Resource Strain: Low Risk  (2023)    Overall Financial Resource Strain (CARDIA)     Difficulty of Paying Living Expenses: Not hard at all   Food Insecurity: No Food Insecurity (2023)    Hunger Vital Sign     Worried About Running Out of Food in the Last Year: Never true     Ran Out of Food in the Last Year: Never true   Transportation Needs: No Transportation Needs (2023)    PRAPARE - Transportation     Lack of Transportation (Medical): No     Lack of Transportation (Non-Medical): No   Physical Activity: Inactive (2023)    Exercise Vital Sign     Days of Exercise per Week: 0 days     Minutes of Exercise per Session: 0 min   Stress: No Stress Concern Present (2023)    [place] [place] [employer] - Occupational Stress Questionnaire     Feeling of Stress : Not at all   Social Connections: Socially  Integrated (5/8/2023)    Social Connection and Isolation Panel [NHANES]     Frequency of Communication with Friends and Family: More than three times a week     Frequency of Social Gatherings with Friends and Family: More than three times a week     Attends Jew Services: More than 4 times per year     Active Member of Clubs or Organizations: No     Attends Club or Organization Meetings: More than 4 times per year     Marital Status:    Housing Stability: Unknown (5/8/2023)    Housing Stability Vital Sign     Unable to Pay for Housing in the Last Year: No     Unstable Housing in the Last Year: No       Review of Systems:  Constitutional:  Denies fever or chills   Eyes:  Denies change in visual acuity   HENT:  Denies nasal congestion or sore throat   Respiratory:  Denies cough or shortness of breath   Cardiovascular:  Denies chest pain or edema   GI:  Denies abdominal pain, nausea, vomiting, bloody stools or diarrhea   :  Denies dysuria   Integument:  Denies rash   Neurologic:  Denies headache, focal weakness or sensory changes   Endocrine:  Denies polyuria or polydipsia   Lymphatic:  Denies swollen glands   Psychiatric:  Denies depression or anxiety     Physical Exam:   Constitutional:  Well developed, well nourished, no acute distress, non-toxic appearance   Integument:  Well hydrated  Neurologic:  Alert & oriented x 3  Psychiatric:  Speech and behavior appropriate       Bilateral Shoulder Exam    Left Shoulder Exam   Shoulder exam performed same as contralateral side and is normal.    Right Shoulder Exam   Tenderness   Shoulder tenderness location: diffusely about shoulder.    Range of Motion   Forward Flexion: abnormal   External Rotation: abnormal     Muscle Strength   Supraspinatus: 4/5     Tests   Hawkin's test: positive  Impingement: positive    Other   Erythema: absent  Sensation: normal  Pulse: present     Assessment & plan    Impingement syndrome of right shoulder  -     Large Joint  Aspiration/Injection: R subacromial bursa  -     triamcinolone acetonide injection 40 mg       Discussed with patient that steroid injection can take 3-5 days to notice improvement. Give injection at least 1-2 weeks to take full effect. If still having significant pain or if no improvement in 2 weeks, instructed patient to notify clinic via mychart or by telephone. No strenuous activity or performing activities they have not been able to do for today( post injection) until lidocaine wears off; the following day, may return to normal activities as tolerated.     Using an aseptic technique, I injected 5 cc of lidocaine 1% without and 1 cc of kenalog 40mg into the right shoulder. The patient tolerated this well. I will have them return to clinic in 6 weeks or as needed.

## 2024-01-31 ENCOUNTER — NURSE TRIAGE (OUTPATIENT)
Dept: ADMINISTRATIVE | Facility: CLINIC | Age: 69
End: 2024-01-31
Payer: MEDICARE

## 2024-01-31 NOTE — TELEPHONE ENCOUNTER
[place]    PCP:  Dr. Taqueria Melendrez    She denies any symptoms at this time.  She is returning call to someone that called about setting up an appt.  She states she thought it was [place] that called.  NT does not see a note in the EMR that anyone called the pt.  NT will send a message to PCP, Hem/Onc, and Orthopedics in case it was someone from their office calling.  She denies any symptoms at this time.  Advised to call for new symptoms/questions/concerns.  VU.    Reason for Disposition   Follow-up information-only call to recent contact, no triage required    Additional Information   Negative: Nursing judgment   Negative: Nursing judgment   Negative: Nursing judgment   Negative: Requesting lab results and adult stable (no new symptoms, not worsening)   Negative: Requesting referral to a specialist   Negative: Questions about durable medical equipment ordered and triager unable to answer   Negative: Requesting regular office appointment and adult stable (no new symptoms, not worsening)   Negative: Health information question, no triage required and triager able to answer question   Negative: General information question, no triage required and triager able to answer question   Negative: Question about upcoming scheduled surgery, procedure or test, no triage required, and triager able to answer question   Negative: Caller is not with the adult (patient) AND patient has probable NON-URGENT symptoms    Protocols used: Information Only Call - No Triage-A-[place]

## 2024-02-23 ENCOUNTER — CLINICAL SUPPORT (OUTPATIENT)
Dept: FAMILY MEDICINE | Facility: CLINIC | Age: 69
End: 2024-02-23
Payer: MEDICARE

## 2024-02-23 DIAGNOSIS — E53.8 B12 DEFICIENCY: Primary | ICD-10-CM

## 2024-02-23 PROCEDURE — 96372 THER/PROPH/DIAG INJ SC/IM: CPT | Mod: HCNC,S$GLB,, | Performed by: FAMILY MEDICINE

## 2024-02-23 PROCEDURE — 99999 PR PBB SHADOW E&M-EST. PATIENT-LVL II: CPT | Mod: PBBFAC,HCNC,,

## 2024-02-23 RX ADMIN — CYANOCOBALAMIN 1000 MCG: 1000 INJECTION, SOLUTION INTRAMUSCULAR; SUBCUTANEOUS at 09:02

## 2024-03-11 ENCOUNTER — LAB VISIT (OUTPATIENT)
Dept: LAB | Facility: HOSPITAL | Age: 69
End: 2024-03-11
Attending: INTERNAL MEDICINE
Payer: MEDICARE

## 2024-03-11 DIAGNOSIS — N18.30 STAGE 3 CHRONIC KIDNEY DISEASE, UNSPECIFIED WHETHER STAGE 3A OR 3B CKD: ICD-10-CM

## 2024-03-11 LAB
ALBUMIN SERPL BCP-MCNC: 3.4 G/DL (ref 3.5–5.2)
ANION GAP SERPL CALC-SCNC: 7 MMOL/L (ref 8–16)
BUN SERPL-MCNC: 25 MG/DL (ref 8–23)
CALCIUM SERPL-MCNC: 9 MG/DL (ref 8.7–10.5)
CHLORIDE SERPL-SCNC: 108 MMOL/L (ref 95–110)
CO2 SERPL-SCNC: 21 MMOL/L (ref 23–29)
CREAT SERPL-MCNC: 1.1 MG/DL (ref 0.5–1.4)
EST. GFR  (NO RACE VARIABLE): 54.1 ML/MIN/1.73 M^2
GLUCOSE SERPL-MCNC: 83 MG/DL (ref 70–110)
PHOSPHATE SERPL-MCNC: 3.6 MG/DL (ref 2.7–4.5)
POTASSIUM SERPL-SCNC: 5.9 MMOL/L (ref 3.5–5.1)
PTH-INTACT SERPL-MCNC: 271.7 PG/ML (ref 9–77)
SODIUM SERPL-SCNC: 136 MMOL/L (ref 136–145)

## 2024-03-11 PROCEDURE — 36415 COLL VENOUS BLD VENIPUNCTURE: CPT | Mod: HCNC,PO | Performed by: INTERNAL MEDICINE

## 2024-03-11 PROCEDURE — 83970 ASSAY OF PARATHORMONE: CPT | Mod: HCNC | Performed by: INTERNAL MEDICINE

## 2024-03-11 PROCEDURE — 80069 RENAL FUNCTION PANEL: CPT | Mod: HCNC | Performed by: INTERNAL MEDICINE

## 2024-03-14 ENCOUNTER — OFFICE VISIT (OUTPATIENT)
Dept: NEPHROLOGY | Facility: CLINIC | Age: 69
End: 2024-03-14
Payer: MEDICARE

## 2024-03-14 VITALS
HEIGHT: 65 IN | WEIGHT: 150 LBS | OXYGEN SATURATION: 98 % | HEART RATE: 77 BPM | DIASTOLIC BLOOD PRESSURE: 68 MMHG | BODY MASS INDEX: 24.99 KG/M2 | SYSTOLIC BLOOD PRESSURE: 110 MMHG

## 2024-03-14 DIAGNOSIS — N18.31 ANEMIA IN STAGE 3A CHRONIC KIDNEY DISEASE: ICD-10-CM

## 2024-03-14 DIAGNOSIS — E87.5 HYPERKALEMIA: ICD-10-CM

## 2024-03-14 DIAGNOSIS — E83.42 HYPOMAGNESEMIA: ICD-10-CM

## 2024-03-14 DIAGNOSIS — N25.81 SECONDARY HYPERPARATHYROIDISM: ICD-10-CM

## 2024-03-14 DIAGNOSIS — E55.9 VITAMIN D DEFICIENCY: ICD-10-CM

## 2024-03-14 DIAGNOSIS — D63.1 ANEMIA IN STAGE 3A CHRONIC KIDNEY DISEASE: ICD-10-CM

## 2024-03-14 DIAGNOSIS — I10 PRIMARY HYPERTENSION: ICD-10-CM

## 2024-03-14 DIAGNOSIS — E87.6 HYPOKALEMIA: ICD-10-CM

## 2024-03-14 DIAGNOSIS — Z85.3 HISTORY OF RIGHT BREAST CANCER: ICD-10-CM

## 2024-03-14 DIAGNOSIS — N18.31 STAGE 3A CHRONIC KIDNEY DISEASE: Primary | ICD-10-CM

## 2024-03-14 PROCEDURE — 1101F PT FALLS ASSESS-DOCD LE1/YR: CPT | Mod: HCNC,CPTII,S$GLB, | Performed by: INTERNAL MEDICINE

## 2024-03-14 PROCEDURE — 99999 PR PBB SHADOW E&M-EST. PATIENT-LVL IV: CPT | Mod: PBBFAC,HCNC,, | Performed by: INTERNAL MEDICINE

## 2024-03-14 PROCEDURE — 3288F FALL RISK ASSESSMENT DOCD: CPT | Mod: HCNC,CPTII,S$GLB, | Performed by: INTERNAL MEDICINE

## 2024-03-14 PROCEDURE — 1159F MED LIST DOCD IN RCRD: CPT | Mod: HCNC,CPTII,S$GLB, | Performed by: INTERNAL MEDICINE

## 2024-03-14 PROCEDURE — 1126F AMNT PAIN NOTED NONE PRSNT: CPT | Mod: HCNC,CPTII,S$GLB, | Performed by: INTERNAL MEDICINE

## 2024-03-14 PROCEDURE — 3074F SYST BP LT 130 MM HG: CPT | Mod: HCNC,CPTII,S$GLB, | Performed by: INTERNAL MEDICINE

## 2024-03-14 PROCEDURE — 3008F BODY MASS INDEX DOCD: CPT | Mod: HCNC,CPTII,S$GLB, | Performed by: INTERNAL MEDICINE

## 2024-03-14 PROCEDURE — 3066F NEPHROPATHY DOC TX: CPT | Mod: HCNC,CPTII,S$GLB, | Performed by: INTERNAL MEDICINE

## 2024-03-14 PROCEDURE — 99214 OFFICE O/P EST MOD 30 MIN: CPT | Mod: HCNC,S$GLB,, | Performed by: INTERNAL MEDICINE

## 2024-03-14 PROCEDURE — 3078F DIAST BP <80 MM HG: CPT | Mod: HCNC,CPTII,S$GLB, | Performed by: INTERNAL MEDICINE

## 2024-03-14 PROCEDURE — 4010F ACE/ARB THERAPY RXD/TAKEN: CPT | Mod: HCNC,CPTII,S$GLB, | Performed by: INTERNAL MEDICINE

## 2024-03-14 RX ORDER — COVID-19 VACCINE, MRNA 50 UG/.5ML
INJECTION, SUSPENSION INTRAMUSCULAR
COMMUNITY
Start: 2024-01-26

## 2024-03-14 RX ORDER — AMLODIPINE BESYLATE 2.5 MG/1
1 TABLET ORAL DAILY
COMMUNITY

## 2024-03-14 RX ORDER — FAMOTIDINE 40 MG/1
40 TABLET, FILM COATED ORAL NIGHTLY
COMMUNITY
End: 2024-06-05 | Stop reason: SDUPTHER

## 2024-03-14 RX ORDER — SODIUM SULFACETAMIDE AND SULFUR 100; 50 MG/ML; MG/ML
1 SOLUTION TOPICAL
COMMUNITY
Start: 2024-01-03

## 2024-03-14 RX ORDER — ZOSTER VACCINE RECOMBINANT, ADJUVANTED 50 MCG/0.5
KIT INTRAMUSCULAR
COMMUNITY
Start: 2023-11-02

## 2024-03-14 RX ORDER — RESPIRATORY SYNCYTIAL VISUS VACCINE RECOMBINANT, ADJUVANTED 120MCG/0.5
KIT INTRAMUSCULAR
COMMUNITY
Start: 2023-11-02

## 2024-03-14 NOTE — PROGRESS NOTES
Subjective:      Patient ID: Carla Dang is a 68 y.o. Black or  female who presents for follow up evaluation of Chronic Kidney Disease    HPI    She is referred by her PCP for 1) CKD and   2) electrolyte abnormalities.   Her baseline Cr is 1.3-1.5mg/dL but she has ranged as high as 1.8mg/dL  Breast cancer--chemo, sx and XRT  2005 -platin  On a PPI, lyyte abnormalities plus platin  No known kidney disease in family   HTN, no DM 2  PPI X years   HTN--50's   Siblings with HTN  Anum selter, no NSAIDs  LE edema--magesium low   High salt diet   Hydrates with coke,orange daniel, lemonade  No foamy urine   Laying in bed (depression?) recently lost her friend    accompanies pt    March 2024: She changed sodas to water, cutting back on on sodium too. Kidney function better. Potassium high on ace and amiloride.  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   HENT:  Negative for facial swelling.    Cardiovascular:  Negative for leg swelling.   Musculoskeletal:  Positive for arthralgias.   Allergic/Immunologic: Positive for immunocompromised state (age, HTN, CKD).   Psychiatric/Behavioral:  Negative for confusion and decreased concentration.       Objective:     Physical Exam  Vitals and nursing note reviewed.   Constitutional:       General: She is not in acute distress.     Appearance: She is not toxic-appearing.   HENT:      Head: Atraumatic.   Cardiovascular:      Rate and Rhythm: Normal rate.   Pulmonary:      Effort: Pulmonary effort is normal. No respiratory distress.      Breath sounds: No wheezing or rales.   Abdominal:      General: There is no distension.   Musculoskeletal:      Right lower leg: No edema.      Left lower leg: No edema.   Neurological:      Mental Status: She is alert and oriented to person, place, and time. Mental status is at baseline.   Psychiatric:          Mood and Affect: Mood normal.       Assessment:     1. Stage 3a chronic kidney disease    2. Anemia in stage 3a chronic kidney disease    3. Secondary hyperparathyroidism    4. Vitamin D deficiency    5. Hypomagnesemia    6. Primary hypertension    7. Hypokalemia    8. History of right breast cancer         Plan:     1) CKD appearing to be at Stage 3 with risk factors to include HTN and history of obesity, slowly progressive CKD since 2019  - we discussed the stages of CKD  - kidney function has improved   - she is already maintained on an ace inhibitor   - BP control  - ?SGLTi next visit     2) Hypomagnesemia/Hypokalemia  - she has tubular damage from -platin based breast cancer treatment and will need magnesium supplementation for life  - discussed adding more glycinate magnesium salt versus oxide to help with loose stools  - now with hyperkalemia--stop amiloride and recheck potassium level     3) HTN  - goal parameters discussed      4) MBD/Secondary HPT  - check D and continue to trend PTH      RTC 4mo w labs  35 minutes of total time spent on the encounter, which includes face to face time and non-face to face time preparing to see the patient (eg, review of tests), Obtaining and/or reviewing separately obtained history, Documenting clinical information in the electronic or other health record, Independently interpreting results (not separately reported) and communicating results to the patient/family/caregiver, or Care coordination (not separately reported).

## 2024-03-22 ENCOUNTER — LAB VISIT (OUTPATIENT)
Dept: LAB | Facility: HOSPITAL | Age: 69
End: 2024-03-22
Attending: INTERNAL MEDICINE
Payer: MEDICARE

## 2024-03-22 ENCOUNTER — CLINICAL SUPPORT (OUTPATIENT)
Dept: FAMILY MEDICINE | Facility: CLINIC | Age: 69
End: 2024-03-22
Payer: MEDICARE

## 2024-03-22 DIAGNOSIS — E87.5 HYPERKALEMIA: ICD-10-CM

## 2024-03-22 DIAGNOSIS — E53.8 B12 DEFICIENCY: Primary | ICD-10-CM

## 2024-03-22 LAB
ANION GAP SERPL CALC-SCNC: 6 MMOL/L (ref 8–16)
BUN SERPL-MCNC: 17 MG/DL (ref 8–23)
CALCIUM SERPL-MCNC: 8.5 MG/DL (ref 8.7–10.5)
CHLORIDE SERPL-SCNC: 109 MMOL/L (ref 95–110)
CO2 SERPL-SCNC: 23 MMOL/L (ref 23–29)
CREAT SERPL-MCNC: 1.2 MG/DL (ref 0.5–1.4)
EST. GFR  (NO RACE VARIABLE): 48.7 ML/MIN/1.73 M^2
GLUCOSE SERPL-MCNC: 74 MG/DL (ref 70–110)
POTASSIUM SERPL-SCNC: 4.9 MMOL/L (ref 3.5–5.1)
SODIUM SERPL-SCNC: 138 MMOL/L (ref 136–145)

## 2024-03-22 PROCEDURE — 99999 PR PBB SHADOW E&M-EST. PATIENT-LVL II: CPT | Mod: PBBFAC,HCNC,,

## 2024-03-22 PROCEDURE — 96372 THER/PROPH/DIAG INJ SC/IM: CPT | Mod: HCNC,S$GLB,, | Performed by: FAMILY MEDICINE

## 2024-03-22 PROCEDURE — 80048 BASIC METABOLIC PNL TOTAL CA: CPT | Mod: HCNC | Performed by: INTERNAL MEDICINE

## 2024-03-22 PROCEDURE — 36415 COLL VENOUS BLD VENIPUNCTURE: CPT | Mod: HCNC,PO | Performed by: INTERNAL MEDICINE

## 2024-03-22 RX ADMIN — CYANOCOBALAMIN 1000 MCG: 1000 INJECTION, SOLUTION INTRAMUSCULAR; SUBCUTANEOUS at 09:03

## 2024-04-02 ENCOUNTER — OFFICE VISIT (OUTPATIENT)
Dept: ORTHOPEDICS | Facility: CLINIC | Age: 69
End: 2024-04-02
Payer: MEDICARE

## 2024-04-02 VITALS — BODY MASS INDEX: 24.11 KG/M2 | WEIGHT: 150 LBS | HEIGHT: 66 IN

## 2024-04-02 DIAGNOSIS — M75.41 IMPINGEMENT SYNDROME OF RIGHT SHOULDER: Primary | ICD-10-CM

## 2024-04-02 PROCEDURE — 1125F AMNT PAIN NOTED PAIN PRSNT: CPT | Mod: HCNC,CPTII,S$GLB, | Performed by: NURSE PRACTITIONER

## 2024-04-02 PROCEDURE — 99999 PR PBB SHADOW E&M-EST. PATIENT-LVL III: CPT | Mod: PBBFAC,HCNC,, | Performed by: NURSE PRACTITIONER

## 2024-04-02 PROCEDURE — 99213 OFFICE O/P EST LOW 20 MIN: CPT | Mod: HCNC,25,S$GLB, | Performed by: NURSE PRACTITIONER

## 2024-04-02 PROCEDURE — 1101F PT FALLS ASSESS-DOCD LE1/YR: CPT | Mod: HCNC,CPTII,S$GLB, | Performed by: NURSE PRACTITIONER

## 2024-04-02 PROCEDURE — 3288F FALL RISK ASSESSMENT DOCD: CPT | Mod: HCNC,CPTII,S$GLB, | Performed by: NURSE PRACTITIONER

## 2024-04-02 PROCEDURE — 3008F BODY MASS INDEX DOCD: CPT | Mod: HCNC,CPTII,S$GLB, | Performed by: NURSE PRACTITIONER

## 2024-04-02 PROCEDURE — 1160F RVW MEDS BY RX/DR IN RCRD: CPT | Mod: HCNC,CPTII,S$GLB, | Performed by: NURSE PRACTITIONER

## 2024-04-02 PROCEDURE — 3066F NEPHROPATHY DOC TX: CPT | Mod: HCNC,CPTII,S$GLB, | Performed by: NURSE PRACTITIONER

## 2024-04-02 PROCEDURE — 20610 DRAIN/INJ JOINT/BURSA W/O US: CPT | Mod: HCNC,RT,S$GLB, | Performed by: NURSE PRACTITIONER

## 2024-04-02 PROCEDURE — 1159F MED LIST DOCD IN RCRD: CPT | Mod: HCNC,CPTII,S$GLB, | Performed by: NURSE PRACTITIONER

## 2024-04-02 PROCEDURE — 4010F ACE/ARB THERAPY RXD/TAKEN: CPT | Mod: HCNC,CPTII,S$GLB, | Performed by: NURSE PRACTITIONER

## 2024-04-02 RX ADMIN — TRIAMCINOLONE ACETONIDE 40 MG: 40 INJECTION, SUSPENSION INTRA-ARTICULAR; INTRAMUSCULAR at 08:04
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
## 2024-04-08 RX ORDER — TRIAMCINOLONE ACETONIDE 40 MG/ML
40 INJECTION, SUSPENSION INTRA-ARTICULAR; INTRAMUSCULAR
Status: DISCONTINUED | OUTPATIENT
Start: 2024-04-02 | End: 2024-04-08 | Stop reason: HOSPADM

## 2024-04-08 NOTE — PROGRESS NOTES
Chief Complaint   Patient presents with    Right Shoulder - Pain       HPI:   This is a 68 y.o. who returns to clinic today in follow-up for right shoulder pain for the past 3 weeks after no known injury. Pain is aching and dull. No numbness or tingling. No associated signs or symptoms.    Past Medical History:   Diagnosis Date    Acute pancreatitis 3/21/2016    Breast cancer 2005    right side with lumpectomy, chemo and radiation    Closed fracture of ramus of right pubis 6/8/2016    Colon polyp     last scope 4/2012- repeat 10 y per pt- Dr. Johnson    GERD (gastroesophageal reflux disease) 5/29/2012    Hyperlipidemia 4/16/2013    Hypertension     Metabolic encephalopathy 4/20/2021    Osteopenia      Past Surgical History:   Procedure Laterality Date    BREAST LUMPECTOMY  2005    DILATION AND CURETTAGE OF UTERUS      ESOPHAGEAL DILATION      ESOPHAGOGASTRODUODENOSCOPY  6/1/2012    ROTATOR CUFF REPAIR      TEAR DUCT SURGERY      right rye     Current Outpatient Medications on File Prior to Visit   Medication Sig Dispense Refill    amLODIPine (NORVASC) 2.5 MG tablet Take 1 tablet by mouth once daily.      AREXVY, PF, 120 mcg/0.5 mL SusR vaccine       benazepriL (LOTENSIN) 10 MG tablet Take 1 tablet by mouth once daily 90 tablet 3    calcium carbonate (OS-NASEEM) 500 mg calcium (1,250 mg) tablet qid 120 tablet 0    cholecalciferol, vitamin D3, (VITAMIN D3) 50 mcg (2,000 unit) Cap capsule Take 1 capsule (2,000 Units total) by mouth once daily.      cyanocobalamin 1,000 mcg/mL injection       famotidine (PEPCID) 40 MG tablet Take 40 mg by mouth every evening.      folic acid (FOLVITE) 1 MG tablet Take 1 tablet by mouth once daily 90 tablet 3    magnesium oxide (MAG-OX) 400 mg (241.3 mg magnesium) tablet bid 60 tablet 1    minocycline (MINOCIN,DYNACIN) 50 MG Cap Take 50 mg by mouth 2 (two) times daily.      omeprazole (PRILOSEC) 20 MG capsule Take 1 capsule by mouth once daily 90 capsule 3    pravastatin (PRAVACHOL) 20 MG  tablet Take 1 tablet by mouth once daily 90 tablet 3    SHINGRIX, PF, 50 mcg/0.5 mL injection       SPIKEVAX [id number],12Y UP,,PF, 50 mcg/0.5 mL injection       sulfacetamide sod-sulfur-urea 10-5-10 % Clsr Apply 1 application  topically.      ZILXI 1.5 % Foam Apply topically 2 (two) times daily.       Current Facility-Administered Medications on File Prior to Visit   Medication Dose Route Frequency Provider Last Rate Last Admin    cyanocobalamin injection 1,000 mcg  1,000 mcg Intramuscular Q30 Days Taqueria Melendrez MD   1,000 mcg at 24 0935     Review of patient's allergies indicates:   Allergen Reactions    Hydrochlorothiazide Other (See Comments)     Multiple electrolyte abnormalities     Family History   Problem Relation Age of Onset    Stroke Brother 57    Breast cancer Mother 87     Social History     Socioeconomic History    Marital status:    Tobacco Use    Smoking status: Former     Current packs/day: 0.00     Types: Cigarettes     Quit date: 2005     Years since quittin.8    Smokeless tobacco: Never   Substance and Sexual Activity    Alcohol use: Not Currently     Alcohol/week: 2.0 standard drinks of alcohol     Types: 2 Standard drinks or equivalent per week     Comment: prior 6 pack/week    Drug use: Yes    Sexual activity: Not Currently     Social Determinants of Health     Financial Resource Strain: Low Risk  (2023)    Overall Financial Resource Strain (CARDIA)     Difficulty of Paying Living Expenses: Not hard at all   Food Insecurity: No Food Insecurity (2023)    Hunger Vital Sign     Worried About Running Out of Food in the Last Year: Never true     Ran Out of Food in the Last Year: Never true   Transportation Needs: No Transportation Needs (2023)    PRAPARE - Transportation     Lack of Transportation (Medical): No     Lack of Transportation (Non-Medical): No   Physical Activity: Inactive (2023)    Exercise Vital Sign     Days of Exercise per Week: 0 days      Minutes of Exercise per Session: 0 min   Stress: No Stress Concern Present (5/8/2023)    [place] [employer] - Occupational Stress Questionnaire     Feeling of Stress : Not at all   Social Connections: Socially Integrated (5/8/2023)    Social Connection and Isolation Panel [NHANES]     Frequency of Communication with Friends and Family: More than three times a week     Frequency of Social Gatherings with Friends and Family: More than three times a week     Attends Alevism Services: More than 4 times per year     Active Member of Clubs or Organizations: No     Attends Club or Organization Meetings: More than 4 times per year     Marital Status:    Housing Stability: Unknown (5/8/2023)    Housing Stability Vital Sign     Unable to Pay for Housing in the Last Year: No     Unstable Housing in the Last Year: No       Review of Systems:  Constitutional:  Denies fever or chills   Eyes:  Denies change in visual acuity   HENT:  Denies nasal congestion or sore throat   Respiratory:  Denies cough or shortness of breath   Cardiovascular:  Denies chest pain or edema   GI:  Denies abdominal pain, nausea, vomiting, bloody stools or diarrhea   :  Denies dysuria   Integument:  Denies rash   Neurologic:  Denies headache, focal weakness or sensory changes   Endocrine:  Denies polyuria or polydipsia   Lymphatic:  Denies swollen glands   Psychiatric:  Denies depression or anxiety     Physical Exam:   Constitutional:  Well developed, well nourished, no acute distress, non-toxic appearance   Integument:  Well hydrated  Neurologic:  Alert & oriented x 3,    Psychiatric:  Speech and behavior appropriate     Bilateral Shoulder Exam    left Shoulder Exam   Shoulder exam performed same as contralateral side and is normal.    right Shoulder Exam   Tenderness   Shoulder tenderness location: diffusely about shoulder.    Range of Motion   Forward Flexion: abnormal   External Rotation: abnormal     Muscle Strength    Supraspinatus: 4/5     Tests   Hawkin's test: positive  Impingement: positive    Other   Erythema: absent  Sensation: normal  Pulse: present           Impingement syndrome of right shoulder  -     Large Joint Aspiration/Injection: R subacromial bursa  -     triamcinolone acetonide injection 40 mg       Using an aseptic technique, I injected 5 cc of lidocaine 1% without and 1 cc of kenalog 40mg into the right Shoulder. The patient tolerated this well. I will have them return to clinic as needed.       supervision

## 2024-04-08 NOTE — PROCEDURES
Large Joint Aspiration/Injection: R subacromial bursa    Date/Time: 4/2/2024 8:40 AM    Performed by: Mony Garcia FNP  Authorized by: Mony Garcia FNP    Consent Done?:  Yes (Verbal)  Indications:  Pain  Timeout: prior to procedure the correct patient, procedure, and site was verified    Prep: patient was prepped and draped in usual sterile fashion      Local anesthesia used?: Yes    Local anesthetic:  Lidocaine 1% without epinephrine  Anesthetic total (ml):  5      Details:  Needle Size:  22 G  Ultrasonic Guidance for needle placement?: No    Approach:  Posterior  Location:  Shoulder  Site:  R subacromial bursa  Medications:  40 mg triamcinolone acetonide 40 mg/mL  Patient tolerance:  Patient tolerated the procedure well with no immediate complications

## 2024-04-19 ENCOUNTER — CLINICAL SUPPORT (OUTPATIENT)
Dept: FAMILY MEDICINE | Facility: CLINIC | Age: 69
End: 2024-04-19
Payer: MEDICARE

## 2024-04-19 DIAGNOSIS — E53.8 B12 DEFICIENCY: Primary | ICD-10-CM

## 2024-04-19 PROCEDURE — 99999 PR PBB SHADOW E&M-EST. PATIENT-LVL II: CPT | Mod: PBBFAC,,,

## 2024-04-19 PROCEDURE — 96372 THER/PROPH/DIAG INJ SC/IM: CPT | Mod: S$GLB,,, | Performed by: FAMILY MEDICINE

## 2024-04-19 RX ADMIN — CYANOCOBALAMIN 1000 MCG: 1000 INJECTION, SOLUTION INTRAMUSCULAR; SUBCUTANEOUS at 10:04

## 2024-05-21 ENCOUNTER — OFFICE VISIT (OUTPATIENT)
Dept: ORTHOPEDICS | Facility: CLINIC | Age: 69
End: 2024-05-21
Payer: MEDICARE

## 2024-05-21 VITALS — BODY MASS INDEX: 24.1 KG/M2 | HEIGHT: 66 IN | WEIGHT: 149.94 LBS

## 2024-05-21 DIAGNOSIS — M75.41 IMPINGEMENT SYNDROME OF RIGHT SHOULDER: Primary | ICD-10-CM

## 2024-05-21 PROCEDURE — 4010F ACE/ARB THERAPY RXD/TAKEN: CPT | Mod: HCNC,CPTII,S$GLB, | Performed by: NURSE PRACTITIONER

## 2024-05-21 PROCEDURE — 3288F FALL RISK ASSESSMENT DOCD: CPT | Mod: HCNC,CPTII,S$GLB, | Performed by: NURSE PRACTITIONER

## 2024-05-21 PROCEDURE — 1160F RVW MEDS BY RX/DR IN RCRD: CPT | Mod: HCNC,CPTII,S$GLB, | Performed by: NURSE PRACTITIONER

## 2024-05-21 PROCEDURE — 20610 DRAIN/INJ JOINT/BURSA W/O US: CPT | Mod: HCNC,RT,S$GLB, | Performed by: NURSE PRACTITIONER

## 2024-05-21 PROCEDURE — 1101F PT FALLS ASSESS-DOCD LE1/YR: CPT | Mod: HCNC,CPTII,S$GLB, | Performed by: NURSE PRACTITIONER

## 2024-05-21 PROCEDURE — 3008F BODY MASS INDEX DOCD: CPT | Mod: HCNC,CPTII,S$GLB, | Performed by: NURSE PRACTITIONER

## 2024-05-21 PROCEDURE — 3066F NEPHROPATHY DOC TX: CPT | Mod: HCNC,CPTII,S$GLB, | Performed by: NURSE PRACTITIONER

## 2024-05-21 PROCEDURE — 99999 PR PBB SHADOW E&M-EST. PATIENT-LVL III: CPT | Mod: PBBFAC,HCNC,, | Performed by: NURSE PRACTITIONER

## 2024-05-21 PROCEDURE — 1125F AMNT PAIN NOTED PAIN PRSNT: CPT | Mod: HCNC,CPTII,S$GLB, | Performed by: NURSE PRACTITIONER

## 2024-05-21 PROCEDURE — 99212 OFFICE O/P EST SF 10 MIN: CPT | Mod: HCNC,25,S$GLB, | Performed by: NURSE PRACTITIONER

## 2024-05-21 PROCEDURE — 1159F MED LIST DOCD IN RCRD: CPT | Mod: HCNC,CPTII,S$GLB, | Performed by: NURSE PRACTITIONER

## 2024-05-21 RX ADMIN — TRIAMCINOLONE ACETONIDE 40 MG: 40 INJECTION, SUSPENSION INTRA-ARTICULAR; INTRAMUSCULAR at 02:05

## 2024-05-23 ENCOUNTER — TELEPHONE (OUTPATIENT)
Dept: FAMILY MEDICINE | Facility: CLINIC | Age: 69
End: 2024-05-23
Payer: MEDICARE

## 2024-05-23 DIAGNOSIS — E53.8 B12 DEFICIENCY: Primary | ICD-10-CM

## 2024-05-23 RX ORDER — CYANOCOBALAMIN 1000 UG/ML
1000 INJECTION, SOLUTION INTRAMUSCULAR; SUBCUTANEOUS
Status: ACTIVE | OUTPATIENT
Start: 2024-05-28 | End: 2024-09-25

## 2024-05-28 ENCOUNTER — CLINICAL SUPPORT (OUTPATIENT)
Dept: FAMILY MEDICINE | Facility: CLINIC | Age: 69
End: 2024-05-28
Payer: MEDICARE

## 2024-05-28 DIAGNOSIS — E53.8 B12 DEFICIENCY: Primary | ICD-10-CM

## 2024-05-28 PROCEDURE — 99999 PR PBB SHADOW E&M-EST. PATIENT-LVL II: CPT | Mod: PBBFAC,HCNC,,

## 2024-05-28 PROCEDURE — 96372 THER/PROPH/DIAG INJ SC/IM: CPT | Mod: HCNC,S$GLB,, | Performed by: FAMILY MEDICINE

## 2024-05-28 RX ORDER — TRIAMCINOLONE ACETONIDE 40 MG/ML
40 INJECTION, SUSPENSION INTRA-ARTICULAR; INTRAMUSCULAR
Status: DISCONTINUED | OUTPATIENT
Start: 2024-05-21 | End: 2024-05-28 | Stop reason: HOSPADM

## 2024-05-28 RX ADMIN — CYANOCOBALAMIN 1000 MCG: 1000 INJECTION, SOLUTION INTRAMUSCULAR; SUBCUTANEOUS at 10:05

## 2024-05-28 NOTE — PROGRESS NOTES
Chief Complaint   Patient presents with    Right Shoulder - Pain       HPI:   This is a 68 y.o. who returns to clinic today in follow-up for right shoulder pain for the past 2 weeks after no known trauma. Pain is aching. No numbness or tingling. No associated signs or symptoms.    Past Medical History:   Diagnosis Date    Acute pancreatitis 3/21/2016    Breast cancer 2005    right side with lumpectomy, chemo and radiation    Closed fracture of ramus of right pubis 6/8/2016    Colon polyp     last scope 4/2012- repeat 10 y per pt- Dr. Johnson    GERD (gastroesophageal reflux disease) 5/29/2012    Hyperlipidemia 4/16/2013    Hypertension     Metabolic encephalopathy 4/20/2021    Osteopenia      Past Surgical History:   Procedure Laterality Date    BREAST LUMPECTOMY  2005    DILATION AND CURETTAGE OF UTERUS      ESOPHAGEAL DILATION      ESOPHAGOGASTRODUODENOSCOPY  6/1/2012    ROTATOR CUFF REPAIR      TEAR DUCT SURGERY      right rye     Current Outpatient Medications on File Prior to Visit   Medication Sig Dispense Refill    amLODIPine (NORVASC) 2.5 MG tablet Take 1 tablet by mouth once daily.      AREXVY, PF, 120 mcg/0.5 mL SusR vaccine       benazepriL (LOTENSIN) 10 MG tablet Take 1 tablet by mouth once daily 90 tablet 3    calcium carbonate (OS-NASEEM) 500 mg calcium (1,250 mg) tablet qid 120 tablet 0    cholecalciferol, vitamin D3, (VITAMIN D3) 50 mcg (2,000 unit) Cap capsule Take 1 capsule (2,000 Units total) by mouth once daily.      cyanocobalamin 1,000 mcg/mL injection       famotidine (PEPCID) 40 MG tablet Take 40 mg by mouth every evening.      folic acid (FOLVITE) 1 MG tablet Take 1 tablet by mouth once daily 90 tablet 3    magnesium oxide (MAG-OX) 400 mg (241.3 mg magnesium) tablet bid 60 tablet 1    minocycline (MINOCIN,DYNACIN) 50 MG Cap Take 50 mg by mouth 2 (two) times daily.      omeprazole (PRILOSEC) 20 MG capsule Take 1 capsule by mouth once daily 90 capsule 3    pravastatin (PRAVACHOL) 20 MG tablet Take 1  tablet by mouth once daily 90 tablet 3    SHINGRIX, PF, 50 mcg/0.5 mL injection       SPIKEVAX [id number],12Y UP,,PF, 50 mcg/0.5 mL injection       sulfacetamide sod-sulfur-urea 10-5-10 % Clsr Apply 1 application  topically.      ZILXI 1.5 % Foam Apply topically 2 (two) times daily.       Current Facility-Administered Medications on File Prior to Visit   Medication Dose Route Frequency Provider Last Rate Last Admin    cyanocobalamin injection 1,000 mcg  1,000 mcg Intramuscular Q30 Days Taqueria Melendrez MD   1,000 mcg at 24 1048     Review of patient's allergies indicates:   Allergen Reactions    Hydrochlorothiazide Other (See Comments)     Multiple electrolyte abnormalities     Family History   Problem Relation Name Age of Onset    Stroke Brother  57    Breast cancer Mother  87     Social History     Socioeconomic History    Marital status:    Tobacco Use    Smoking status: Former     Current packs/day: 0.00     Types: Cigarettes     Quit date: 2005     Years since quittin.0    Smokeless tobacco: Never   Substance and Sexual Activity    Alcohol use: Not Currently     Alcohol/week: 2.0 standard drinks of alcohol     Types: 2 Standard drinks or equivalent per week     Comment: prior 6 pack/week    Drug use: Yes    Sexual activity: Not Currently     Social Determinants of Health     Financial Resource Strain: Low Risk  (2023)    Overall Financial Resource Strain (CARDIA)     Difficulty of Paying Living Expenses: Not hard at all   Food Insecurity: No Food Insecurity (2023)    Hunger Vital Sign     Worried About Running Out of Food in the Last Year: Never true     Ran Out of Food in the Last Year: Never true   Transportation Needs: No Transportation Needs (2023)    PRAPARE - Transportation     Lack of Transportation (Medical): No     Lack of Transportation (Non-Medical): No   Physical Activity: Inactive (2023)    Exercise Vital Sign     Days of Exercise per Week: 0 days     Minutes  of Exercise per Session: 0 min   Stress: No Stress Concern Present (5/8/2023)    [place] [employer] - Occupational Stress Questionnaire     Feeling of Stress : Not at all   Housing Stability: Unknown (5/8/2023)    Housing Stability Vital Sign     Unable to Pay for Housing in the Last Year: No     Unstable Housing in the Last Year: No       Review of Systems:  Constitutional:  Denies fever or chills   Eyes:  Denies change in visual acuity   HENT:  Denies nasal congestion or sore throat   Respiratory:  Denies cough or shortness of breath   Cardiovascular:  Denies chest pain or edema   GI:  Denies abdominal pain, nausea, vomiting, bloody stools or diarrhea   :  Denies dysuria   Integument:  Denies rash   Neurologic:  Denies headache, focal weakness or sensory changes   Endocrine:  Denies polyuria or polydipsia   Lymphatic:  Denies swollen glands   Psychiatric:  Denies depression or anxiety     Physical Exam:   Constitutional:  Well developed, well nourished, no acute distress, non-toxic appearance   Integument:  Well hydrated, no rash   Lymphatic:  No lymphadenopathy noted   Neurologic:  Alert & oriented x 3,    Psychiatric:  Speech and behavior appropriate     Bilateral Shoulder Exam    left Shoulder Exam   Shoulder exam performed same as contralateral side and is normal.    right Shoulder Exam   Tenderness   Shoulder tenderness location: diffusely about shoulder.    Range of Motion   Forward Flexion: abnormal   External Rotation: abnormal     Muscle Strength   Supraspinatus: 4/5     Tests   Hawkin's test: positive  Impingement: positive    Other   Erythema: absent  Sensation: normal  Pulse: present           Assessment & plan    Impingement syndrome of right shoulder  -     Large Joint Aspiration/Injection: R subacromial bursa  -     triamcinolone acetonide injection 40 mg         Using an aseptic technique, I injected 5 cc of lidocaine 1% without and 1 cc of kenalog 40mg into the right  Shoulder. The patient tolerated this well. I will have them return to clinic as needed.

## 2024-05-28 NOTE — PROCEDURES
Large Joint Aspiration/Injection: R subacromial bursa    Date/Time: 5/21/2024 2:20 PM    Performed by: Mony Garcia FNP  Authorized by: Mony Garcia FNP    Consent Done?:  Yes (Verbal)  Indications:  Pain  Timeout: prior to procedure the correct patient, procedure, and site was verified    Prep: patient was prepped and draped in usual sterile fashion      Local anesthesia used?: Yes    Local anesthetic:  Lidocaine 1% without epinephrine  Anesthetic total (ml):  5      Details:  Needle Size:  22 G  Ultrasonic Guidance for needle placement?: No    Approach:  Posterior  Location:  Shoulder  Site:  R subacromial bursa  Medications:  40 mg triamcinolone acetonide 40 mg/mL  Patient tolerance:  Patient tolerated the procedure well with no immediate complications

## 2024-06-05 RX ORDER — FAMOTIDINE 40 MG/1
40 TABLET, FILM COATED ORAL NIGHTLY
Qty: 90 TABLET | Refills: 1 | Status: SHIPPED | OUTPATIENT
Start: 2024-06-05

## 2024-06-05 NOTE — TELEPHONE ENCOUNTER
Care Due:                  Date            Visit Type   Department     Provider  --------------------------------------------------------------------------------                                VIRTUAL      [facility]  Last Visit: 01-      AUDIO ONLY   MEDICINE       Ryan Covington  Next Visit: None Scheduled  None         None Found                                                            Last  Test          Frequency    Reason                     Performed    Due Date  --------------------------------------------------------------------------------    Lipid Panel.  12 months..  pravastatin..............  08- 08-    Vitamin D...  12 months..  cholecalciferol,.........  08- 08-    [employer] Embedded Care Due Messages. Reference number: [id number].   6/05/2024 2:14:08 PM CDT

## 2024-06-05 NOTE — TELEPHONE ENCOUNTER
Pt was referred to Dr Sahu for problems swallowing/esophagus, she was prescribed famotidine and it helped, she can not get hold of them for refills, will you prescribe?

## 2024-06-27 ENCOUNTER — CLINICAL SUPPORT (OUTPATIENT)
Dept: FAMILY MEDICINE | Facility: CLINIC | Age: 69
End: 2024-06-27
Payer: MEDICARE

## 2024-06-27 DIAGNOSIS — E53.8 B12 DEFICIENCY: Primary | ICD-10-CM

## 2024-06-27 PROCEDURE — 96372 THER/PROPH/DIAG INJ SC/IM: CPT | Mod: HCNC,S$GLB,, | Performed by: FAMILY MEDICINE

## 2024-06-27 PROCEDURE — 99999 PR PBB SHADOW E&M-EST. PATIENT-LVL II: CPT | Mod: PBBFAC,HCNC,,

## 2024-06-27 RX ADMIN — CYANOCOBALAMIN 1000 MCG: 1000 INJECTION, SOLUTION INTRAMUSCULAR; SUBCUTANEOUS at 01:06

## 2024-07-02 ENCOUNTER — OFFICE VISIT (OUTPATIENT)
Dept: ORTHOPEDICS | Facility: CLINIC | Age: 69
End: 2024-07-02
Payer: MEDICARE

## 2024-07-02 VITALS — WEIGHT: 149.94 LBS | HEIGHT: 66 IN | BODY MASS INDEX: 24.1 KG/M2

## 2024-07-02 DIAGNOSIS — M75.41 IMPINGEMENT SYNDROME OF RIGHT SHOULDER: Primary | ICD-10-CM

## 2024-07-02 PROCEDURE — 99999 PR PBB SHADOW E&M-EST. PATIENT-LVL III: CPT | Mod: PBBFAC,HCNC,, | Performed by: NURSE PRACTITIONER

## 2024-07-02 RX ADMIN — TRIAMCINOLONE ACETONIDE 40 MG: 40 INJECTION, SUSPENSION INTRA-ARTICULAR; INTRAMUSCULAR at 10:07

## 2024-07-03 RX ORDER — TRIAMCINOLONE ACETONIDE 40 MG/ML
40 INJECTION, SUSPENSION INTRA-ARTICULAR; INTRAMUSCULAR
Status: DISCONTINUED | OUTPATIENT
Start: 2024-07-02 | End: 2024-07-03 | Stop reason: HOSPADM

## 2024-07-03 NOTE — PROCEDURES
Large Joint Aspiration/Injection: R subacromial bursa    Date/Time: 7/2/2024 10:40 AM    Performed by: Mony Garcia FNP  Authorized by: Mony Garcia FNP    Consent Done?:  Yes (Verbal)  Indications:  Pain  Timeout: prior to procedure the correct patient, procedure, and site was verified    Prep: patient was prepped and draped in usual sterile fashion      Local anesthesia used?: Yes    Local anesthetic:  Lidocaine 1% without epinephrine  Anesthetic total (ml):  5      Details:  Needle Size:  22 G  Ultrasonic Guidance for needle placement?: No    Approach:  Posterior  Location:  Shoulder  Site:  R subacromial bursa  Medications:  40 mg triamcinolone acetonide 40 mg/mL  Patient tolerance:  Patient tolerated the procedure well with no immediate complications

## 2024-07-03 NOTE — PROGRESS NOTES
Chief Complaint   Patient presents with    Right Shoulder - Injections       HPI:   This is a 69 y.o. who returns to clinic today in follow-up for right shoulder pain for the past 3 weeks after no known injury. She has chronic right shoulder pain and is managed with injections every 6 to 8 weeks. Pain is aching and dull. No numbness or tingling. No associated signs or symptoms.    Past Medical History:   Diagnosis Date    Acute pancreatitis 3/21/2016    Breast cancer 2005    right side with lumpectomy, chemo and radiation    Closed fracture of ramus of right pubis 6/8/2016    Colon polyp     last scope 4/2012- repeat 10 y per pt- Dr. Johnson    GERD (gastroesophageal reflux disease) 5/29/2012    Hyperlipidemia 4/16/2013    Hypertension     Metabolic encephalopathy 4/20/2021    Osteopenia      Past Surgical History:   Procedure Laterality Date    BREAST LUMPECTOMY  2005    DILATION AND CURETTAGE OF UTERUS      ESOPHAGEAL DILATION      ESOPHAGOGASTRODUODENOSCOPY  6/1/2012    ROTATOR CUFF REPAIR      TEAR DUCT SURGERY      right rye     Current Outpatient Medications on File Prior to Visit   Medication Sig Dispense Refill    amLODIPine (NORVASC) 2.5 MG tablet Take 1 tablet by mouth once daily.      AREXVY, PF, 120 mcg/0.5 mL SusR vaccine       benazepriL (LOTENSIN) 10 MG tablet Take 1 tablet by mouth once daily 90 tablet 3    calcium carbonate (OS-NASEEM) 500 mg calcium (1,250 mg) tablet qid 120 tablet 0    cholecalciferol, vitamin D3, (VITAMIN D3) 50 mcg (2,000 unit) Cap capsule Take 1 capsule (2,000 Units total) by mouth once daily.      cyanocobalamin 1,000 mcg/mL injection       famotidine (PEPCID) 40 MG tablet Take 1 tablet (40 mg total) by mouth every evening. 90 tablet 1    folic acid (FOLVITE) 1 MG tablet Take 1 tablet by mouth once daily 90 tablet 3    magnesium oxide (MAG-OX) 400 mg (241.3 mg magnesium) tablet bid 60 tablet 1    minocycline (MINOCIN,DYNACIN) 50 MG Cap Take 50 mg by mouth 2 (two) times daily.       omeprazole (PRILOSEC) 20 MG capsule Take 1 capsule by mouth once daily 90 capsule 3    pravastatin (PRAVACHOL) 20 MG tablet Take 1 tablet by mouth once daily 90 tablet 3    SHINGRIX, PF, 50 mcg/0.5 mL injection       SPIKEVAX [id number],12Y UP,,PF, 50 mcg/0.5 mL injection       sulfacetamide sod-sulfur-urea 10-5-10 % Clsr Apply 1 application  topically.      ZILXI 1.5 % Foam Apply topically 2 (two) times daily.       Current Facility-Administered Medications on File Prior to Visit   Medication Dose Route Frequency Provider Last Rate Last Admin    cyanocobalamin injection 1,000 mcg  1,000 mcg Intramuscular Q30 Days    1,000 mcg at 24 1319     Review of patient's allergies indicates:   Allergen Reactions    Hydrochlorothiazide Other (See Comments)     Multiple electrolyte abnormalities     Family History   Problem Relation Name Age of Onset    Stroke Brother  57    Breast cancer Mother  87     Social History     Socioeconomic History    Marital status:    Tobacco Use    Smoking status: Former     Current packs/day: 0.00     Types: Cigarettes     Quit date: 2005     Years since quittin.1    Smokeless tobacco: Never   Substance and Sexual Activity    Alcohol use: Not Currently     Alcohol/week: 2.0 standard drinks of alcohol     Types: 2 Standard drinks or equivalent per week     Comment: prior 6 pack/week    Drug use: Yes    Sexual activity: Not Currently     Social Determinants of Health     Financial Resource Strain: Low Risk  (2023)    Overall Financial Resource Strain (CARDIA)     Difficulty of Paying Living Expenses: Not hard at all   Food Insecurity: No Food Insecurity (2023)    Hunger Vital Sign     Worried About Running Out of Food in the Last Year: Never true     Ran Out of Food in the Last Year: Never true   Transportation Needs: No Transportation Needs (2023)    PRAPARE - Transportation     Lack of Transportation (Medical): No     Lack of Transportation (Non-Medical): No    Physical Activity: Inactive (5/8/2023)    Exercise Vital Sign     Days of Exercise per Week: 0 days     Minutes of Exercise per Session: 0 min   Stress: No Stress Concern Present (5/8/2023)    [place] [employer] - Occupational Stress Questionnaire     Feeling of Stress : Not at all   Housing Stability: Unknown (5/8/2023)    Housing Stability Vital Sign     Unable to Pay for Housing in the Last Year: No     Unstable Housing in the Last Year: No       Review of Systems:  Constitutional:  Denies fever or chills   Eyes:  Denies change in visual acuity   HENT:  Denies nasal congestion or sore throat   Respiratory:  Denies cough or shortness of breath   Cardiovascular:  Denies chest pain or edema   GI:  Denies abdominal pain, nausea, vomiting, bloody stools or diarrhea   :  Denies dysuria   Integument:  Denies rash   Neurologic:  Denies headache, focal weakness or sensory changes   Endocrine:  Denies polyuria or polydipsia   Lymphatic:  Denies swollen glands   Psychiatric:  Denies depression or anxiety     Physical Exam:   Constitutional:  Well developed, well nourished, no acute distress, non-toxic appearance   Integument:  Well hydrated, no rash   Lymphatic:  No lymphadenopathy noted   Neurologic:  Alert & oriented x 3,    Psychiatric:  Speech and behavior appropriate     Bilateral Shoulder Exam    left Shoulder Exam   Shoulder exam performed same as contralateral side and is normal.    right Shoulder Exam   Tenderness   Shoulder tenderness location: diffusely about shoulder.    Range of Motion   Forward Flexion: abnormal   External Rotation: abnormal     Muscle Strength   Supraspinatus: 4/5     Tests   Hawkin's test: positive  Impingement: positive    Other   Erythema: absent  Sensation: normal  Pulse: present           Assessment & plan    Impingement syndrome of right shoulder  -     Large Joint Aspiration/Injection: R subacromial bursa  -     triamcinolone acetonide injection 40 mg      Using an aseptic technique, I injected 5 cc of lidocaine 1% without and 1 cc of kenalog 40mg into the right Shoulder. The patient tolerated this well. I will have them return to clinic in 6-8 weeks.

## 2024-07-11 ENCOUNTER — HOSPITAL ENCOUNTER (OUTPATIENT)
Dept: RADIOLOGY | Facility: HOSPITAL | Age: 69
Discharge: HOME | End: 2024-07-11
Attending: NURSE PRACTITIONER
Payer: MEDICARE

## 2024-07-11 ENCOUNTER — OFFICE VISIT (OUTPATIENT)
Dept: FAMILY MEDICINE | Facility: CLINIC | Age: 69
End: 2024-07-11
Payer: MEDICARE

## 2024-07-11 VITALS
DIASTOLIC BLOOD PRESSURE: 88 MMHG | OXYGEN SATURATION: 100 % | WEIGHT: 151.63 LBS | SYSTOLIC BLOOD PRESSURE: 157 MMHG | TEMPERATURE: 97 F | BODY MASS INDEX: 25.26 KG/M2 | HEART RATE: 87 BPM | HEIGHT: 65 IN

## 2024-07-11 DIAGNOSIS — M79.672 LEFT FOOT PAIN: ICD-10-CM

## 2024-07-11 DIAGNOSIS — M54.2 CERVICALGIA: Primary | ICD-10-CM

## 2024-07-11 DIAGNOSIS — M54.2 CERVICALGIA: ICD-10-CM

## 2024-07-11 DIAGNOSIS — M25.50 ARTHRALGIA, UNSPECIFIED JOINT: ICD-10-CM

## 2024-07-11 PROCEDURE — 3066F NEPHROPATHY DOC TX: CPT | Mod: CPTII,S$GLB,, | Performed by: NURSE PRACTITIONER

## 2024-07-11 PROCEDURE — 1125F AMNT PAIN NOTED PAIN PRSNT: CPT | Mod: CPTII,S$GLB,, | Performed by: NURSE PRACTITIONER

## 2024-07-11 PROCEDURE — 73630 X-RAY EXAM OF FOOT: CPT | Mod: 26,HCNC,LT, | Performed by: RADIOLOGY

## 2024-07-11 PROCEDURE — 99999 PR PBB SHADOW E&M-EST. PATIENT-LVL V: CPT | Mod: PBBFAC,HCNC,, | Performed by: NURSE PRACTITIONER

## 2024-07-11 PROCEDURE — 4010F ACE/ARB THERAPY RXD/TAKEN: CPT | Mod: CPTII,S$GLB,, | Performed by: NURSE PRACTITIONER

## 2024-07-11 PROCEDURE — 1101F PT FALLS ASSESS-DOCD LE1/YR: CPT | Mod: CPTII,S$GLB,, | Performed by: NURSE PRACTITIONER

## 2024-07-11 PROCEDURE — 3288F FALL RISK ASSESSMENT DOCD: CPT | Mod: CPTII,S$GLB,, | Performed by: NURSE PRACTITIONER

## 2024-07-11 PROCEDURE — 3008F BODY MASS INDEX DOCD: CPT | Mod: CPTII,S$GLB,, | Performed by: NURSE PRACTITIONER

## 2024-07-11 PROCEDURE — 72040 X-RAY EXAM NECK SPINE 2-3 VW: CPT | Mod: 26,HCNC,, | Performed by: RADIOLOGY

## 2024-07-11 PROCEDURE — 3079F DIAST BP 80-89 MM HG: CPT | Mod: CPTII,S$GLB,, | Performed by: NURSE PRACTITIONER

## 2024-07-11 PROCEDURE — 1159F MED LIST DOCD IN RCRD: CPT | Mod: CPTII,S$GLB,, | Performed by: NURSE PRACTITIONER

## 2024-07-11 PROCEDURE — 72040 X-RAY EXAM NECK SPINE 2-3 VW: CPT | Mod: TC,HCNC,PO

## 2024-07-11 PROCEDURE — 1160F RVW MEDS BY RX/DR IN RCRD: CPT | Mod: CPTII,S$GLB,, | Performed by: NURSE PRACTITIONER

## 2024-07-11 PROCEDURE — 3077F SYST BP >= 140 MM HG: CPT | Mod: CPTII,S$GLB,, | Performed by: NURSE PRACTITIONER

## 2024-07-11 PROCEDURE — 99213 OFFICE O/P EST LOW 20 MIN: CPT | Mod: S$GLB,,, | Performed by: NURSE PRACTITIONER

## 2024-07-11 PROCEDURE — 73630 X-RAY EXAM OF FOOT: CPT | Mod: TC,HCNC,PO,LT

## 2024-07-11 RX ORDER — BACLOFEN 10 MG/1
10 TABLET ORAL 2 TIMES DAILY PRN
Qty: 14 TABLET | Refills: 0 | Status: SHIPPED | OUTPATIENT
Start: 2024-07-11 | End: 2024-07-18

## 2024-07-11 NOTE — PATIENT INSTRUCTIONS
"Alternate heat and ice to affected area  Muscle relaxer causes drowsiness  Hydrate well  Rest  Tylenol OTC as directed  Report to ER immediately if symptoms worsen or persist  Exercises as demonstrated in handout        Baclofen: Patient drug information  2024© UpToDate, Inc. and its affiliates and/or licensors. All Rights Reserved.  Access [employer] for additional drug information, tools, and databases.  Contributor Disclosures  For additional information see "Baclofen: Drug information" and "Baclofen: Pediatric drug information"    You must carefully read the "Consumer Information Use and Disclaimer" below in order to understand and correctly use this information.  Brand Names: [place]  Fleqsuvy;     Gablofen;     Lioresal;     Lyvispah;     Ozobax DS;     Ozobax [DSC]    Brand Names: Audi  APO-Baclofen;     Baclofen-10;     Baclofen-20;     Lioresal Intrathecal;     MYLAN-Baclofen;     PMS-Baclofen;     RIDGE-Baclofen    Warning  Infusion:  Unsafe side effects have happened when this drug was stopped all of a sudden. Some of these side effects have been high fever, mental changes, more spasms, and muscle stiffness. Rarely, these side effects have led to very bad muscle problems, organ problems, and death. Avoid stopping this drug all of a sudden without talking with your doctor. Be sure you get your pump refilled on time and you know about the pump alarms and what to do if the pump alarm goes off. Tell your doctor if you have ever had signs of withdrawal while getting baclofen tablets or shot. Call your doctor right away if you have signs of withdrawal.  Read the package insert for more details.    What is this drug used for?  It is used to treat spasms in patients with MS (multiple sclerosis) or spinal cord problems.  It may be given to you for other reasons. Talk with the doctor.    What do I need to tell my doctor BEFORE I take this drug?  All products:  If you are allergic to this drug; any part of this " drug; or any other drugs, foods, or substances. Tell your doctor about the allergy and what signs you had.  Infusion:  If you have an infection.  This is not a list of all drugs or health problems that interact with this drug.  Tell your doctor and pharmacist about all of your drugs (prescription or OTC, natural products, vitamins) and health problems. You must check to make sure that it is safe for you to take this drug with all of your drugs and health problems. Do not start, stop, or change the dose of any drug without checking with your doctor.    What are some things I need to know or do while I take this drug?  Tell all of your health care providers that you take this drug. This includes your doctors, nurses, pharmacists, and dentists.  Avoid driving and doing other tasks or actions that call for you to be alert until you see how this drug affects you.  Talk with your doctor before you use alcohol, marijuana or other forms of cannabis, or prescription or OTC drugs that may slow your actions.  Do not stop taking this drug all of a sudden. You may have a greater risk of side effects. These may include hallucinations (seeing or hearing things that are not there), seizures, high fever, stiff muscles, and feeling confused. Rarely, this can lead to organ problems and even death. If you need to stop this drug, slowly stop it as ordered by your doctor. Talk with your doctor if you have any new or worsening signs.  If the patient is a child, use this drug with care. The risk of some side effects may be higher in children.  Tell your doctor if you are pregnant, plan on getting pregnant, or are breast-feeding. You will need to talk about the benefits and risks to you and the baby.  Taking this drug during pregnancy may lead to withdrawal in the .  Injection:  Talk with your doctor if this drug stops working well. Do not take more than ordered.    What are some side effects that I need to call my doctor about right  away?  WARNING/CAUTION: Even though it may be rare, some people may have very bad and sometimes deadly side effects when taking a drug. Tell your doctor or get medical help right away if you have any of the following signs or symptoms that may be related to a very bad side effect:  Signs of an allergic reaction, like rash; hives; itching; red, swollen, blistered, or peeling skin with or without fever; wheezing; tightness in the chest or throat; trouble breathing, swallowing, or talking; unusual hoarseness; or swelling of the mouth, face, lips, tongue, or throat.  Severe dizziness or passing out.  Feeling confused.  Mental, mood, or behavior changes that are new or worse.  Hallucinations (seeing or hearing things that are not there).  Seizures.  Change in balance.  Change in eyesight.  Chest pain.  Muscle pain or weakness.  Muscle stiffness.  A burning, numbness, or tingling feeling that is not normal.  Trouble breathing, slow breathing, or shallow breathing.  Not able to pass urine or change in how much urine is passed.  Change in how often urine is passed.  Blood in the urine.  Swelling in the arms or legs.  Trouble controlling body movements, twitching, change in balance, trouble swallowing or speaking.  Not able to control eye movements.  A heartbeat that does not feel normal.    What are some other side effects of this drug?  All drugs may cause side effects. However, many people have no side effects or only have minor side effects. Call your doctor or get medical help if any of these side effects or any other side effects bother you or do not go away:  Feeling dizzy, sleepy, tired, or weak.  Trouble sleeping.  Upset stomach or throwing up.  Headache.  Constipation.  These are not all of the side effects that may occur. If you have questions about side effects, call your doctor. Call your doctor for medical advice about side effects.  You may report side effects to your national health agency.    How is this  drug best taken?  Use this drug as ordered by your doctor. Read all information given to you. Follow all instructions closely.  Tablets:  Take with or without food.  All liquid products:  Measure liquid doses carefully. Use the measuring device that comes with this drug. If there is none, ask the pharmacist for a device to measure this drug.  Do not use a household teaspoon or tablespoon to measure this drug. Doing so could lead to the dose being too high.  Liquid (suspension):  Shake well before use.  Those who have feeding tubes may use some brands of this drug. If you have a feeding tube, be sure you know if your brand can be used. Take as you have been told. Flush the feeding tube after this drug is given.  Oral granules:  You may put this drug right into your mouth. The granules will dissolve in the mouth or you can swallow them. If needed, this drug can also be mixed in liquids or soft foods like applesauce, yogurt, or pudding. Mix 1 packet in up to 1 tablespoon (15 mL) of liquid or soft food. If your dose is more than 1 packet, mix each packet by itself. Take this drug within 2 hours after mixing.  Those who have feeding tubes may use this drug. Use as you have been told. Flush the feeding tube after this drug is given.  Infusion:  It is given into the spine.    What do I do if I miss a dose?  All oral products:  Take a missed dose as soon as you think about it.  If it is close to the time for your next dose, skip the missed dose and go back to your normal time.  Do not take 2 doses at the same time or extra doses.  Infusion:  Call your doctor to find out what to do.    How do I store and/or throw out this drug?  Tablets:  Store at room temperature in a dry place. Do not store in a bathroom.  If you split the tablets, you may need to throw them away after a certain amount of time. Be sure you know how long you can store the split tablets. If you are not sure, call the pharmacist.  Liquid (solution):  Some  brands of this drug are stored in the refrigerator. Some brands are stored at room temperature. Ask your pharmacist how to store this drug.  Do not freeze.  Protect from light.  Liquid (suspension):  Store at room temperature in a dry place. Do not store in a bathroom.  Throw away any unused portion 2 months after opening.  Oral granules:  Store at room temperature in a dry place. Do not store in a bathroom.  Infusion:  If you need to store this drug at home, talk with your doctor, nurse, or pharmacist about how to store it.  All products:  Keep all drugs in a safe place. Keep all drugs out of the reach of children and pets.  Throw away unused or  drugs. Do not flush down a toilet or pour down a drain unless you are told to do so. Check with your pharmacist if you have questions about the best way to throw out drugs. There may be drug take-back programs in your area.    General drug facts  If your symptoms or health problems do not get better or if they become worse, call your doctor.  Do not share your drugs with others and do not take anyone else's drugs.  Some drugs may have another patient information leaflet. If you have any questions about this drug, please talk with your doctor, nurse, pharmacist, or other health care provider.  If you think there has been an overdose, call your poison control center or get medical care right away. Be ready to tell or show what was taken, how much, and when it happened.      Jered Aguirre,     If you are due for any health screening(s) below please notify me so we can arrange them to be ordered and scheduled. Most healthy patients at your age complete them, but you are free to accept or refuse.     If you can't do it, I'll definitely understand. If you can, I'd certainly appreciate it!    All of your core healthy metrics are met.

## 2024-07-11 NOTE — PROGRESS NOTES
Subjective     Patient ID: Carla Dang is a 69 y.o. female.    Chief Complaint: Foot Swelling    Neck Pain   This is a new problem. The current episode started yesterday. The problem occurs daily. The problem has been unchanged. The pain is associated with nothing. The pain is present in the midline. The quality of the pain is described as aching (tightness). The pain is mild. The symptoms are aggravated by position. Pertinent negatives include no chest pain, fever, headaches, leg pain, numbness, pain with swallowing, paresis, photophobia, syncope, tingling, trouble swallowing, visual change, weakness or weight loss. She has tried acetaminophen for the symptoms. The treatment provided mild relief.   Pain  This is a new (Left foot; swelling to top of foot) problem. The current episode started yesterday. The problem occurs daily. The problem has been unchanged. Associated symptoms include arthralgias (left foot) and neck pain. Pertinent negatives include no abdominal pain, anorexia, change in bowel habit, chest pain, chills, congestion, coughing, diaphoresis, fatigue, fever, headaches, joint swelling, myalgias, nausea, numbness, rash, sore throat, swollen glands, urinary symptoms, vertigo, visual change, vomiting or weakness. Nothing aggravates the symptoms. She has tried acetaminophen for the symptoms. The treatment provided mild relief.     Past Medical History:   Diagnosis Date    Acute pancreatitis 3/21/2016    Breast cancer 2005    right side with lumpectomy, chemo and radiation    Closed fracture of ramus of right pubis 6/8/2016    Colon polyp     last scope 4/2012- repeat 10 y per pt- Dr. Johnson    GERD (gastroesophageal reflux disease) 5/29/2012    Hyperlipidemia 4/16/2013    Hypertension     Metabolic encephalopathy 4/20/2021    Osteopenia      Social History     Socioeconomic History    Marital status:    Tobacco Use    Smoking status: Former     Current packs/day: 0.00     Types: Cigarettes      Quit date: 2005     Years since quittin.1    Smokeless tobacco: Never   Substance and Sexual Activity    Alcohol use: Not Currently     Alcohol/week: 2.0 standard drinks of alcohol     Types: 2 Standard drinks or equivalent per week     Comment: prior 6 pack/week    Drug use: Yes    Sexual activity: Not Currently     Social Determinants of Health     Financial Resource Strain: Low Risk  (2023)    Overall Financial Resource Strain (CARDIA)     Difficulty of Paying Living Expenses: Not hard at all   Food Insecurity: No Food Insecurity (2023)    Hunger Vital Sign     Worried About Running Out of Food in the Last Year: Never true     Ran Out of Food in the Last Year: Never true   Transportation Needs: No Transportation Needs (2023)    PRAPARE - Transportation     Lack of Transportation (Medical): No     Lack of Transportation (Non-Medical): No   Physical Activity: Inactive (2023)    Exercise Vital Sign     Days of Exercise per Week: 0 days     Minutes of Exercise per Session: 0 min   Stress: No Stress Concern Present (2023)    [employer] - Occupational Stress Questionnaire     Feeling of Stress : Not at all   Housing Stability: Unknown (2023)    Housing Stability Vital Sign     Unable to Pay for Housing in the Last Year: No     Unstable Housing in the Last Year: No     Past Surgical History:   Procedure Laterality Date    BREAST LUMPECTOMY  2005    DILATION AND CURETTAGE OF UTERUS      ESOPHAGEAL DILATION      ESOPHAGOGASTRODUODENOSCOPY  2012    ROTATOR CUFF REPAIR      TEAR DUCT SURGERY      right rye       Review of Systems   Constitutional: Negative.  Negative for chills, diaphoresis, fatigue, fever and weight loss.   HENT: Negative.  Negative for nasal congestion, sore throat and trouble swallowing.    Eyes: Negative.  Negative for photophobia.   Respiratory: Negative.  Negative for cough.    Cardiovascular: Negative.  Negative for chest pain and  syncope.   Gastrointestinal: Negative.  Negative for abdominal pain, anorexia, change in bowel habit, nausea and vomiting.   Endocrine: Negative.    Genitourinary: Negative.    Musculoskeletal:  Positive for arthralgias (left foot) and neck pain. Negative for joint swelling, leg pain and myalgias.   Integumentary:  Negative for rash. Negative.   Allergic/Immunologic: Negative.    Neurological: Negative.  Negative for vertigo, tingling, weakness, numbness and headaches.   Psychiatric/Behavioral: Negative.            Objective     Physical Exam  Vitals and nursing note reviewed.   Constitutional:       Appearance: Normal appearance.   HENT:      Head: Normocephalic.      Right Ear: Tympanic membrane, ear canal and external ear normal.      Left Ear: Tympanic membrane, ear canal and external ear normal.      Nose: Nose normal.      Mouth/Throat:      Mouth: Mucous membranes are moist.      Pharynx: Oropharynx is clear.   Eyes:      Conjunctiva/sclera: Conjunctivae normal.      Pupils: Pupils are equal, round, and reactive to light.   Cardiovascular:      Rate and Rhythm: Normal rate and regular rhythm.      Pulses: Normal pulses.      Heart sounds: Normal heart sounds.   Pulmonary:      Effort: Pulmonary effort is normal.      Breath sounds: Normal breath sounds.   Abdominal:      General: Bowel sounds are normal.      Palpations: Abdomen is soft.   Musculoskeletal:         General: Normal range of motion.      Cervical back: Normal range of motion and neck supple. No edema, erythema, signs of trauma, rigidity, torticollis or crepitus. Pain with movement present. No spinous process tenderness or muscular tenderness. Normal range of motion.        Legs:    Skin:     General: Skin is warm and dry.      Capillary Refill: Capillary refill takes 2 to 3 seconds.   Neurological:      Mental Status: She is alert and oriented to person, place, and time.   Psychiatric:         Mood and Affect: Mood normal.         Behavior:  Behavior normal.         Thought Content: Thought content normal.         Judgment: Judgment normal.            Assessment and Plan     1. Cervicalgia  2. Left foot pain  3. Arthralgia, unspecified joint  -     X-Ray Cervical Spine AP And Lateral; Future; Expected date: 07/11/2024  -     X-Ray Foot Complete Left; Future; Expected date: 07/11/2024  -     Uric Acid; Future; Expected date: 07/11/2024  -     RHEUMATOID FACTOR; Future; Expected date: 07/11/2024  -     NATI Screen w/Reflex; Future; Expected date: 07/11/2024  -     baclofen (LIORESAL) 10 MG tablet; Take 1 tablet (10 mg total) by mouth 2 (two) times daily as needed.  Dispense: 14 tablet; Refill: 0  Alternate heat and ice to affected area  Hydrate well  Rest  Tylenol OTC as directed  Report to ER immediately if symptoms worsen or persist  Exercises as demonstrated in handout             No follow-ups on file.

## 2024-07-12 ENCOUNTER — TELEPHONE (OUTPATIENT)
Dept: FAMILY MEDICINE | Facility: CLINIC | Age: 69
End: 2024-07-12
Payer: MEDICARE

## 2024-07-12 DIAGNOSIS — I99.8 VASCULAR CALCIFICATION: Primary | ICD-10-CM

## 2024-07-12 DIAGNOSIS — R76.8 RHEUMATOID FACTOR POSITIVE: ICD-10-CM

## 2024-07-12 NOTE — TELEPHONE ENCOUNTER
----- Message from Katty Marie DNP sent at 7/11/2024  2:39 PM CDT -----  Reviewed; degenerative changes, spurring noted. Vascular calcifications noted; consider US lower extremity arteries left to r/o PAD. Awaiting lab results. Consider podiatry for foot.

## 2024-07-12 NOTE — TELEPHONE ENCOUNTER
Consulted with PCP regarding recent uric acid/RF factor results and treatment. Pt can take tylenol for current symptoms. PCP stated may consider colchine or steroid, however due to her CKD and recent steroid joint injection, I would recommend the tylenol at this point, along with the current referral. Please inform pt when she is called with her lab results.

## 2024-07-12 NOTE — TELEPHONE ENCOUNTER
"Spoke to pt  ,he states " my wife is just not herself she is acting very different and adeline what it could be , this is not her norm " I advise pt to bring wife to [facility]. Understanding was verbalized and he will bring her to [facility]   "

## 2024-07-12 NOTE — TELEPHONE ENCOUNTER
----- Message from Katty Marie DNP sent at 7/12/2024  8:38 AM CDT -----  Reviewed; RF positive, which indicates likely RA; uric acid also elevated; rheumatology recommended for further evaluation.

## 2024-07-12 NOTE — TELEPHONE ENCOUNTER
----- Message from Sweetie Carrizales sent at 7/12/2024  4:06 PM CDT -----  .Type:  Patient Returning Call    Who Called:Carla  Who Left Message for Patient:Nurse  Does the patient know what this is regarding?:Yes  Would the patient rather a call back or a response via [facility]? Call back  Best Call Back Number:[phone]  Additional Information: Mr. Nix is asking for call back before end of day. Mr. Nix is very concern about Ms. Aguirre and would like to be advised. Ms. Aguirre is not herself today.      Thanks  [id number]

## 2024-07-18 ENCOUNTER — OFFICE VISIT (OUTPATIENT)
Dept: FAMILY MEDICINE | Facility: CLINIC | Age: 69
End: 2024-07-18
Payer: MEDICARE

## 2024-07-18 VITALS
HEART RATE: 92 BPM | DIASTOLIC BLOOD PRESSURE: 69 MMHG | SYSTOLIC BLOOD PRESSURE: 113 MMHG | BODY MASS INDEX: 24.99 KG/M2 | HEIGHT: 65 IN | OXYGEN SATURATION: 99 % | WEIGHT: 150 LBS

## 2024-07-18 DIAGNOSIS — D63.1 ANEMIA IN STAGE 3A CHRONIC KIDNEY DISEASE: ICD-10-CM

## 2024-07-18 DIAGNOSIS — R79.89 LOW TSH LEVEL: ICD-10-CM

## 2024-07-18 DIAGNOSIS — D63.8 ANEMIA, CHRONIC DISEASE: ICD-10-CM

## 2024-07-18 DIAGNOSIS — R70.0 ELEVATED SEDIMENTATION RATE: ICD-10-CM

## 2024-07-18 DIAGNOSIS — E87.20 METABOLIC ACIDOSIS, NORMAL ANION GAP (NAG): ICD-10-CM

## 2024-07-18 DIAGNOSIS — M79.672 LEFT FOOT PAIN: ICD-10-CM

## 2024-07-18 DIAGNOSIS — I67.4 HYPERTENSIVE ENCEPHALOPATHY: Primary | ICD-10-CM

## 2024-07-18 DIAGNOSIS — I70.1 RENAL ARTERY STENOSIS, NATIVE: ICD-10-CM

## 2024-07-18 DIAGNOSIS — D64.9 NORMOCYTIC ANEMIA: ICD-10-CM

## 2024-07-18 DIAGNOSIS — E21.3 HYPERPARATHYROIDISM: ICD-10-CM

## 2024-07-18 DIAGNOSIS — R76.8 RHEUMATOID FACTOR POSITIVE: ICD-10-CM

## 2024-07-18 DIAGNOSIS — N18.31 ANEMIA IN STAGE 3A CHRONIC KIDNEY DISEASE: ICD-10-CM

## 2024-07-18 DIAGNOSIS — E79.0 ELEVATED URIC ACID IN BLOOD: ICD-10-CM

## 2024-07-18 DIAGNOSIS — R76.8 ELEVATED SERUM IMMUNOGLOBULIN FREE LIGHT CHAINS: ICD-10-CM

## 2024-07-18 PROCEDURE — 3078F DIAST BP <80 MM HG: CPT | Mod: HCNC,CPTII,S$GLB, | Performed by: FAMILY MEDICINE

## 2024-07-18 PROCEDURE — 3066F NEPHROPATHY DOC TX: CPT | Mod: HCNC,CPTII,S$GLB, | Performed by: FAMILY MEDICINE

## 2024-07-18 PROCEDURE — 3074F SYST BP LT 130 MM HG: CPT | Mod: HCNC,CPTII,S$GLB, | Performed by: FAMILY MEDICINE

## 2024-07-18 PROCEDURE — 4010F ACE/ARB THERAPY RXD/TAKEN: CPT | Mod: HCNC,CPTII,S$GLB, | Performed by: FAMILY MEDICINE

## 2024-07-18 PROCEDURE — 1101F PT FALLS ASSESS-DOCD LE1/YR: CPT | Mod: HCNC,CPTII,S$GLB, | Performed by: FAMILY MEDICINE

## 2024-07-18 PROCEDURE — 1126F AMNT PAIN NOTED NONE PRSNT: CPT | Mod: HCNC,CPTII,S$GLB, | Performed by: FAMILY MEDICINE

## 2024-07-18 PROCEDURE — 3288F FALL RISK ASSESSMENT DOCD: CPT | Mod: HCNC,CPTII,S$GLB, | Performed by: FAMILY MEDICINE

## 2024-07-18 PROCEDURE — 99999 PR PBB SHADOW E&M-EST. PATIENT-LVL V: CPT | Mod: PBBFAC,HCNC,, | Performed by: FAMILY MEDICINE

## 2024-07-18 PROCEDURE — 1159F MED LIST DOCD IN RCRD: CPT | Mod: HCNC,CPTII,S$GLB, | Performed by: FAMILY MEDICINE

## 2024-07-18 PROCEDURE — 99215 OFFICE O/P EST HI 40 MIN: CPT | Mod: HCNC,S$GLB,, | Performed by: FAMILY MEDICINE

## 2024-07-18 RX ORDER — METHYLPREDNISOLONE 4 MG/1
TABLET ORAL
Qty: 21 TABLET | Refills: 0 | Status: SHIPPED | OUTPATIENT
Start: 2024-07-18

## 2024-07-18 RX ORDER — SODIUM BICARBONATE 650 MG/1
650 TABLET ORAL 2 TIMES DAILY
COMMUNITY

## 2024-07-18 RX ORDER — NIFEDIPINE 60 MG/1
60 TABLET, EXTENDED RELEASE ORAL
COMMUNITY

## 2024-07-18 NOTE — PROGRESS NOTES
Follow-up hospitalization as per below discharge summary.  She had evidence of encephalopathy that they felt was related to hypertension.  Mental status improved with improvement of her blood pressure.  Addition she had evidence of metabolic acidosis.  Her TSH was slightly suppressed.  She had elevated inflammatory markers.  She apparently did have some pain and swelling at left foot which had developed a day or so prior to this.  This partially improved after she took some ibuprofen, but not completely resolved.  She is not aware of any history of gout x-ray did show some degenerative changes in the foot.  She denies any fever chills nausea vomiting or diarrhea.  She does have previous chronic kidney disease though her GFR was actually better this hospitalization.  She does have anemia noted.  She had a recent mildly positive rheumatoid factor.  She has an appointment with rheumatologist but not until beginning of next year.  She does have previous hyperparathyroidism and is being managed through [facility].  She does have previous mild elevated serum free light chains not felt to be significant when she saw Hematology.    [facility] DISCHARGE SUMMARY    Patient ID:  Carla Dang  [id number]  69 y.o.  1955    Admit Date:   7/12/2024 5:38 PM    Discharge Date:   Discharge Today: 7/14/2024    Admitting Physician:   Claus Mireles MD     Discharge Physician:   CLAUS MIRELES MD    Consultants/Treatment Team:  Consultants     Provider Service Role Specialty   Provider,    -- Consulting Physician Gen Zhang Case Mgmt   Judah Aaron MD   -- Consulting Physician Cas Fritz MD   -- Consulting Physician Neurology   Provider, Occupational Therapist   -- Consulting Physician Gen Prov OT   Provider, Physical Therapist   -- Consulting Physician Gen Prov PT         Reason for Admission/Admission Diagnoses:   Present on Admission:  (Resolved) Hypertensive  encephalopathy  GERD (gastroesophageal reflux disease)  (Resolved) Elevated sed rate  Normocytic anemia  Normal anion gap metabolic acidosis  Essential hypertension  (Resolved) Acute metabolic encephalopathy    Discharge Diagnoses:   Active Hospital Problems   Diagnosis Date Noted   Normocytic anemia 07/12/2024   Normal anion gap metabolic acidosis 07/12/2024   GERD (gastroesophageal reflux disease) 03/12/2016   Essential hypertension 03/12/2016     Resolved Hospital Problems   Diagnosis Date Noted Date Resolved   Hypertensive encephalopathy 04/20/2021 07/14/2024   Elevated sed rate 07/12/2024 07/14/2024   Acute metabolic encephalopathy 07/12/2024 07/14/2024     History of Present Illness:   Ms. Dang is a 69-year-old -American female with PMH significant for hypertension, GERD, currently being worked up as outpatient with rheumatologist for possible rheumatoid arthritis versus others, presents to the ED complaining of intermittent confusion, increased sleepiness and fatigue for the past few days. Patient is a very poor historian, currently encephalopathic. Most of the information obtained from  at the bedside who reports that patient has been increasingly confused recently, and he has noticed slurred speech. Stroke evaluation was undertaken the ED. CT head and MRI brain are unremarkable, stroke ruled out. Patient is currently alert, but not oriented to time. Sleepy, but easily arousable. Laboratory workup is essentially unremarkable except for elevated sed rate greater than 100, CRP 85. Unclear etiology, currently being worked up by rheumatology as outpatient. However blood pressure has been elevated 203/89, improved to 176/80 with intervention in the ED. Admitting diagnosis hypertensive encephalopathy.     Hospital Course and Treatment:   Admission Information     Date & Time  7/12/2024 Provider  Claus Marshall MD Department  [facility] Dept. Phone  [phone]          Hypertensive encephalopathy (resolved):  -CVA ruled out, CT head, MRI brain unremarkable  -Initial /89, improved to 176/80 with intervention in the ED.  -Continued IV hydralazine every 6 hours as needed.  -Resumed home dose benazepril, amiloride. Discontinued Norvasc and exchanged for Procardia 60 mg daily. A prescription was provided for 30 days upon discharge for this medication.  -Recommend keeping BP log at home and bring to PCP in the next 1 to 2 weeks.  - reports compliance with home medications  -Encephalopathy resolved with improvement in blood pressure  -Renal Dopplers ordered to rule out renal artery stenosis: Reported as no sonographic evidence of renal artery stenosis on the right. Borderline elevated velocity in the left renal artery origin, but renal aortic ratios appear normal. Elevated resistive indices bilaterally.  -Blood cultures ordered and pending at time of discharge  -At time of discharge, patient was hemodynamically stable with improved blood pressures. Her encephalopathy had completely resolved. She was awake, alert, oriented x 4.    Neck pain (resolved)  Sinus tachycardia  -LP 7/13 with unremarkable CSF studies  -Neck pain resolved. No further photophobia. Did have some sinus tachycardia during morning of discharge, however HR was 98 upon discharge. She denies any chest pain or shortness of breath.  -TSH was noted to be mildly low, however free T4 was WNL. She is not currently on thyroid medication. This can be followed outpatient with PCP.    Generalized weakness:  Malaise, fatigue:  -Currently being worked up as outpatient by rheumatologist for possible lupus versus rheumatoid arthritis versus other rheumatological condition.  -Follow-up as outpatient.  -PT/OT    Elevated sed rate, elevated CRP:  -Unclear etiology.  -Follow-up with rheumatology as outpatient for continued workup.  -LP with unremarkable CSF studies  -Tachycardia during admission, improving on day of  discharge. Follow-up with PCP. Recommend repeating in 1 week. Blood cultures ordered and were pending at time of discharge.    Non-anion gap metabolic acidosis:  -CO2 low 17 on admission. Lactic acid within normal limits.  -Unclear etiology. Placed on sodium bicarb po for 1 week.   -Repeat CMP (ordered) in 1 week    I discussed with the patient disease process and treatment.     I have personally seen and examined the patient, Carla Dang, in a face to face encounter on the date of discharge.     She is cleared for discharge with instructions to follow up as directed.   Total time in the care and discharge planning of this patient was greater than 30 minutes.         Carla was seen today for hospital follow up.    Diagnoses and all orders for this visit:    Hypertensive encephalopathy  -     TSH; Future    Metabolic acidosis, normal anion gap (NAG)  -     Ambulatory referral/consult to Nephrology; Future  -     TSH; Future    Anemia in stage 3a chronic kidney disease  -     Ambulatory referral/consult to Nephrology; Future  -     TSH; Future  -     BASIC METABOLIC PANEL; Future  -     CBC W/ AUTO DIFFERENTIAL; Future    Anemia, chronic disease  -     TSH; Future    Rheumatoid factor positive  -     TSH; Future  -     CYCLIC CITRUL PEPTIDE ANTIBODY, IGG; Future  -     Sedimentation rate; Future  -     C-REACTIVE PROTEIN; Future  -     RHEUMATOID FACTOR; Future    Elevated sedimentation rate  -     TSH; Future  -     CYCLIC CITRUL PEPTIDE ANTIBODY, IGG; Future  -     Sedimentation rate; Future  -     C-REACTIVE PROTEIN; Future  -     Uric Acid; Future  -     RHEUMATOID FACTOR; Future    Left foot pain  -     TSH; Future  -     CYCLIC CITRUL PEPTIDE ANTIBODY, IGG; Future  -     Sedimentation rate; Future  -     C-REACTIVE PROTEIN; Future  -     Uric Acid; Future  -     RHEUMATOID FACTOR; Future    Elevated uric acid in blood  -     TSH; Future  -     Uric Acid; Future    Renal artery stenosis, native  -      Ambulatory referral/consult to Nephrology; Future  -     TSH; Future    Low TSH level  -     TSH; Future    Normocytic anemia  -     TSH; Future    Hyperparathyroidism  -     TSH; Future    Elevated serum immunoglobulin free light chains  -     TSH; Future  -     Immunoglobulin Free LT Chains Blood; Future    Other orders  -     methylPREDNISolone (MEDROL DOSEPACK) 4 mg tablet; follow package directions      Unclear etiology of her mild metabolic acidosis.  Her elevated ESR and CRP could have been related to inflammation from possible gout with her foot pain though she does not carry a prior diagnosis of gout..  The foot is better though not fully resolved.  TSH was borderline with normal T4 down significant.  We will have her follow-up with her nephrologist.  We gave her steroid pack to treat potential gout.  Will have her recheck above laboratory in 6 weeks then follow-up with after that    Transitional Care Note    Family and/or Caretaker present at visit?  Yes.  Diagnostic tests reviewed/disposition: I have reviewed all completed as well as pending diagnostic tests at the time of discharge.  Disease/illness education:  Yes  Home health/community services discussion/referrals: Patient has home health established at [facility] .   Establishment or re-establishment of referral orders for community resources: No other necessary community resources.   Discussion with other health care providers: No discussion with other health care providers necessary.               Past Medical History:  Past Medical History:   Diagnosis Date    Acute pancreatitis 3/21/2016    Breast cancer 2005    right side with lumpectomy, chemo and radiation    Closed fracture of ramus of right pubis 6/8/2016    Colon polyp     last scope 4/2012- repeat 10 y per pt- Dr. Johnson    GERD (gastroesophageal reflux disease) 5/29/2012    Hyperlipidemia 4/16/2013    Hypertension     Metabolic encephalopathy 4/20/2021    Osteopenia      Past  Surgical History:   Procedure Laterality Date    BREAST LUMPECTOMY  2005    DILATION AND CURETTAGE OF UTERUS      ESOPHAGEAL DILATION      ESOPHAGOGASTRODUODENOSCOPY  6/1/2012    ROTATOR CUFF REPAIR      TEAR DUCT SURGERY      right rye     Review of patient's allergies indicates:   Allergen Reactions    Hydrochlorothiazide Other (See Comments)     Multiple electrolyte abnormalities     Current Outpatient Medications on File Prior to Visit   Medication Sig Dispense Refill    AREXVY, PF, 120 mcg/0.5 mL SusR vaccine       baclofen (LIORESAL) 10 MG tablet Take 1 tablet (10 mg total) by mouth 2 (two) times daily as needed. 14 tablet 0    benazepriL (LOTENSIN) 10 MG tablet Take 1 tablet by mouth once daily 90 tablet 3    calcium carbonate (OS-NASEEM) 500 mg calcium (1,250 mg) tablet qid 120 tablet 0    cholecalciferol, vitamin D3, (VITAMIN D3) 50 mcg (2,000 unit) Cap capsule Take 1 capsule (2,000 Units total) by mouth once daily.      cyanocobalamin 1,000 mcg/mL injection       famotidine (PEPCID) 40 MG tablet Take 1 tablet (40 mg total) by mouth every evening. 90 tablet 1    folic acid (FOLVITE) 1 MG tablet Take 1 tablet by mouth once daily 90 tablet 3    magnesium oxide (MAG-OX) 400 mg (241.3 mg magnesium) tablet bid 60 tablet 1    minocycline (MINOCIN,DYNACIN) 50 MG Cap Take 50 mg by mouth 2 (two) times daily.      NIFEdipine (PROCARDIA-XL) 60 MG (OSM) 24 hr tablet Take 60 mg by mouth.      omeprazole (PRILOSEC) 20 MG capsule Take 1 capsule by mouth once daily 90 capsule 3    pravastatin (PRAVACHOL) 20 MG tablet Take 1 tablet by mouth once daily 90 tablet 3    SHINGRIX, PF, 50 mcg/0.5 mL injection       sodium bicarbonate 650 MG tablet Take 650 mg by mouth 2 (two) times daily.      SPIKEVAX 9095-9134,12Y UP,,PF, 50 mcg/0.5 mL injection       sulfacetamide sod-sulfur-urea 10-5-10 % Clsr Apply 1 application  topically.      amLODIPine (NORVASC) 2.5 MG tablet Take 1 tablet by mouth once daily. (Patient not taking: Reported  on 2024)      ZILXI 1.5 % Foam Apply topically 2 (two) times daily. (Patient not taking: Reported on 2024)       Current Facility-Administered Medications on File Prior to Visit   Medication Dose Route Frequency Provider Last Rate Last Admin    cyanocobalamin injection 1,000 mcg  1,000 mcg Intramuscular Q30 Days    1,000 mcg at 24 1319     Social History     Socioeconomic History    Marital status:    Tobacco Use    Smoking status: Former     Current packs/day: 0.00     Types: Cigarettes     Quit date: 2005     Years since quittin.1    Smokeless tobacco: Never   Substance and Sexual Activity    Alcohol use: Not Currently     Alcohol/week: 2.0 standard drinks of alcohol     Types: 2 Standard drinks or equivalent per week     Comment: prior 6 pack/week    Drug use: Yes    Sexual activity: Not Currently     Social Determinants of Health     Financial Resource Strain: Low Risk  (2023)    Overall Financial Resource Strain (CARDIA)     Difficulty of Paying Living Expenses: Not hard at all   Food Insecurity: Unknown (2024)    Received from [facility]    Hunger Vital Sign     Ran Out of Food in the Last Year: Never true   Transportation Needs: Unknown (2024)    Received from [facility]    PRAPARE - Transportation     Lack of Transportation (Medical): No   Physical Activity: Inactive (2023)    Exercise Vital Sign     Days of Exercise per Week: 0 days     Minutes of Exercise per Session: 0 min   Stress: No Stress Concern Present (2023)    [employer] - Occupational Stress Questionnaire     Feeling of Stress : Not at all   Housing Stability: Unknown (2023)    Housing Stability Vital Sign     Unable to Pay for Housing in the Last Year: No     Unstable Housing in the Last Year: No     Family History   Problem Relation Name Age of Onset    Stroke Brother  57    Breast cancer Mother  87           ROS:  GENERAL: No  "fever, chills,  or significant weight changes.   CARDIOVASCULAR: Denies chest pain, PND, orthopnea or reduced exercise tolerance.  ABDOMEN: Appetite fine. Denies diarrhea, abdominal pain, hematemesis or blood in stool.  URINARY: No flank pain, dysuria or hematuria.    Vitals:    07/18/24 0951   BP: 113/69   Pulse: 92   SpO2: 99%   Weight: 68 kg (150 lb)   Height: 5' 5" (1.651 m)     Wt Readings from Last 3 Encounters:   07/18/24 68 kg (150 lb)   07/11/24 68.8 kg (151 lb 9.6 oz)   07/02/24 68 kg (149 lb 14.6 oz)       OBJECTIVE:   APPEARANCE: Well nourished, well developed, in no acute distress.    HEAD: Normocephalic.  Atraumatic.  No sinus tenderness.  EYES:   Right eye: Pupil reactive.  Conjunctiva clear.    Left eye: Pupil reactive.  Conjunctiva clear.  EOMI.    EARS: TM's intact. Light reflex normal. No retraction or perforation.    NOSE:  clear.  MOUTH & THROAT:  No pharyngeal erythema or exudate. No lesions.  NECK: Supple. No bruits.  No JVD.  No cervical lymphadenopathy.  No thyromegaly.    CHEST: Breath sounds clear bilaterally.  Normal respiratory effort  CARDIOVASCULAR: Normal rate.  Regular rhythm.  No murmurs.  No rub.  No gallops.  ABDOMEN: Bowel sounds normal.  Soft.  No tenderness.  No organomegaly.  PERIPHERAL VASCULAR: No cyanosis.  No clubbing.  No edema.  NEUROLOGIC: No focal findings.  MENTAL STATUS: Alert.  Oriented x 3.  She does have some mild swelling minimal tenderness over the dorsum of the left foot.        "

## 2024-07-26 ENCOUNTER — CLINICAL SUPPORT (OUTPATIENT)
Dept: FAMILY MEDICINE | Facility: CLINIC | Age: 69
End: 2024-07-26
Payer: MEDICARE

## 2024-07-26 DIAGNOSIS — E53.8 B12 DEFICIENCY: Primary | ICD-10-CM

## 2024-07-26 PROCEDURE — 99999 PR PBB SHADOW E&M-EST. PATIENT-LVL II: CPT | Mod: PBBFAC,HCNC,,

## 2024-07-26 RX ADMIN — CYANOCOBALAMIN 1000 MCG: 1000 INJECTION, SOLUTION INTRAMUSCULAR; SUBCUTANEOUS at 10:07

## 2024-08-13 ENCOUNTER — OFFICE VISIT (OUTPATIENT)
Dept: ORTHOPEDICS | Facility: CLINIC | Age: 69
End: 2024-08-13
Payer: MEDICARE

## 2024-08-13 DIAGNOSIS — M75.41 IMPINGEMENT SYNDROME OF RIGHT SHOULDER: Primary | ICD-10-CM

## 2024-08-13 PROCEDURE — 99999 PR PBB SHADOW E&M-EST. PATIENT-LVL III: CPT | Mod: PBBFAC,HCNC,, | Performed by: NURSE PRACTITIONER

## 2024-08-13 RX ORDER — TRIAMCINOLONE ACETONIDE 40 MG/ML
40 INJECTION, SUSPENSION INTRA-ARTICULAR; INTRAMUSCULAR
Status: DISCONTINUED | OUTPATIENT
Start: 2024-08-13 | End: 2024-08-13 | Stop reason: HOSPADM

## 2024-08-13 RX ADMIN — TRIAMCINOLONE ACETONIDE 40 MG: 40 INJECTION, SUSPENSION INTRA-ARTICULAR; INTRAMUSCULAR at 10:08

## 2024-08-13 NOTE — PROGRESS NOTES
Chief Complaint   Patient presents with    Right Shoulder - Pain    Pain       HPI:   This is a 69 y.o. who returns to clinic today in follow-up for right shoulder pain for over 7 months after no known injury. She has been receiving right shoulder steroid injections that usually help. 6 weeks ago, she had right shoulder injection. Pain is aching and dull. No numbness or tingling. She isn't interested in surgery. She just comes in every 6-8 weeks for injections and this is helping.     Past Medical History:   Diagnosis Date    Acute pancreatitis 3/21/2016    Breast cancer 2005    right side with lumpectomy, chemo and radiation    Closed fracture of ramus of right pubis 6/8/2016    Colon polyp     last scope 4/2012- repeat 10 y per pt- Dr. Johnson    GERD (gastroesophageal reflux disease) 5/29/2012    Hyperlipidemia 4/16/2013    Hypertension     Metabolic encephalopathy 4/20/2021    Osteopenia      Past Surgical History:   Procedure Laterality Date    BREAST LUMPECTOMY  2005    DILATION AND CURETTAGE OF UTERUS      ESOPHAGEAL DILATION      ESOPHAGOGASTRODUODENOSCOPY  6/1/2012    ROTATOR CUFF REPAIR      TEAR DUCT SURGERY      right rye     Current Outpatient Medications on File Prior to Visit   Medication Sig Dispense Refill    amLODIPine (NORVASC) 2.5 MG tablet Take 1 tablet by mouth once daily.      AREXVY, PF, 120 mcg/0.5 mL SusR vaccine       benazepriL (LOTENSIN) 10 MG tablet Take 1 tablet by mouth once daily 90 tablet 3    calcium carbonate (OS-NASEEM) 500 mg calcium (1,250 mg) tablet qid 120 tablet 0    cholecalciferol, vitamin D3, (VITAMIN D3) 50 mcg (2,000 unit) Cap capsule Take 1 capsule (2,000 Units total) by mouth once daily.      cyanocobalamin 1,000 mcg/mL injection       famotidine (PEPCID) 40 MG tablet Take 1 tablet (40 mg total) by mouth every evening. 90 tablet 1    folic acid (FOLVITE) 1 MG tablet Take 1 tablet by mouth once daily 90 tablet 3    magnesium oxide (MAG-OX) 400 mg (241.3 mg magnesium) tablet  bid 60 tablet 1    minocycline (MINOCIN,DYNACIN) 50 MG Cap Take 50 mg by mouth 2 (two) times daily.      NIFEdipine (PROCARDIA-XL) 60 MG (OSM) 24 hr tablet Take 60 mg by mouth.      omeprazole (PRILOSEC) 20 MG capsule Take 1 capsule by mouth once daily 90 capsule 3    pravastatin (PRAVACHOL) 20 MG tablet Take 1 tablet by mouth once daily 90 tablet 3    sodium bicarbonate 650 MG tablet Take 650 mg by mouth 2 (two) times daily.      sulfacetamide sod-sulfur-urea 10-5-10 % Clsr Apply 1 application  topically.      ZILXI 1.5 % Foam Apply topically 2 (two) times daily.      baclofen (LIORESAL) 10 MG tablet Take 1 tablet (10 mg total) by mouth 2 (two) times daily as needed. 14 tablet 0    [DISCONTINUED] methylPREDNISolone (MEDROL DOSEPACK) 4 mg tablet follow package directions 21 tablet 0    [DISCONTINUED] SHINGRIX, PF, 50 mcg/0.5 mL injection       [DISCONTINUED] SPIKEVAX 6723-2997,12Y UP,,PF, 50 mcg/0.5 mL injection        Current Facility-Administered Medications on File Prior to Visit   Medication Dose Route Frequency Provider Last Rate Last Admin    cyanocobalamin injection 1,000 mcg  1,000 mcg Intramuscular Q30 Days    1,000 mcg at 24 1002     Review of patient's allergies indicates:   Allergen Reactions    Hydrochlorothiazide Other (See Comments)     Multiple electrolyte abnormalities     Family History   Problem Relation Name Age of Onset    Stroke Brother  57    Breast cancer Mother  87     Social History     Socioeconomic History    Marital status:    Tobacco Use    Smoking status: Former     Current packs/day: 0.00     Types: Cigarettes     Quit date: 2005     Years since quittin.2    Smokeless tobacco: Never   Substance and Sexual Activity    Alcohol use: Not Currently     Alcohol/week: 2.0 standard drinks of alcohol     Types: 2 Standard drinks or equivalent per week     Comment: prior 6 pack/week    Drug use: Yes    Sexual activity: Not Currently     Social Determinants of Health      Financial Resource Strain: Low Risk  (5/8/2023)    Overall Financial Resource Strain (CARDIA)     Difficulty of Paying Living Expenses: Not hard at all   Food Insecurity: Unknown (7/12/2024)    Received from [facility]    Hunger Vital Sign     Ran Out of Food in the Last Year: Never true   Transportation Needs: Unknown (7/12/2024)    Received from [facility]    PRAPARE - Transportation     Lack of Transportation (Medical): No   Physical Activity: Inactive (5/8/2023)    Exercise Vital Sign     Days of Exercise per Week: 0 days     Minutes of Exercise per Session: 0 min   Stress: No Stress Concern Present (5/8/2023)    [employer] - Occupational Stress Questionnaire     Feeling of Stress : Not at all   Housing Stability: Unknown (5/8/2023)    Housing Stability Vital Sign     Unable to Pay for Housing in the Last Year: No     Unstable Housing in the Last Year: No       Review of Systems:  Constitutional:  Denies fever or chills   Eyes:  Denies change in visual acuity   HENT:  Denies nasal congestion or sore throat   Respiratory:  Denies cough or shortness of breath   Cardiovascular:  Denies chest pain or edema   GI:  Denies abdominal pain, nausea, vomiting, bloody stools or diarrhea   :  Denies dysuria   Integument:  Denies rash   Neurologic:  Denies headache, focal weakness or sensory changes   Endocrine:  Denies polyuria or polydipsia   Lymphatic:  Denies swollen glands   Psychiatric:  Denies depression or anxiety     Physical Exam:   Constitutional:  Well developed, well nourished, no acute distress, non-toxic appearance   Integument:  Well hydrated  Neurologic:  Alert & oriented x 3  Psychiatric:  Speech and behavior appropriate     Bilateral Shoulder Exam    left Shoulder Exam   Shoulder exam performed same as contralateral side and is normal.    right Shoulder Exam   Tenderness   Shoulder tenderness location: diffusely about shoulder.    Range of  Motion   Forward Flexion: abnormal   External Rotation: abnormal     Muscle Strength   Supraspinatus: 4/5     Tests   Hawkin's test: positive  Impingement: positive    Other   Erythema: absent  Sensation: normal  Pulse: present           Assessment & plan    Impingement syndrome of right shoulder  -     Large Joint Aspiration/Injection: R subacromial bursa  -     triamcinolone acetonide injection 40 mg    Using an aseptic technique, I injected 5 cc of lidocaine 1% without and 1 cc of kenalog 40mg into the right Shoulder. The patient tolerated this well. I will have them return to clinic in 6 weeks.

## 2024-08-13 NOTE — PROCEDURES
Large Joint Aspiration/Injection: R subacromial bursa    Date/Time: 8/13/2024 10:20 AM    Performed by: Mony Garcia FNP  Authorized by: Mony Garcia FNP    Consent Done?:  Yes (Verbal)  Indications:  Pain  Timeout: prior to procedure the correct patient, procedure, and site was verified    Prep: patient was prepped and draped in usual sterile fashion      Local anesthesia used?: Yes    Local anesthetic:  Lidocaine 1% without epinephrine  Anesthetic total (ml):  5      Details:  Needle Size:  22 G  Ultrasonic Guidance for needle placement?: No    Approach:  Posterior  Location:  Shoulder  Site:  R subacromial bursa  Medications:  40 mg triamcinolone acetonide 40 mg/mL  Patient tolerance:  Patient tolerated the procedure well with no immediate complications

## 2024-08-15 RX ORDER — NIFEDIPINE 60 MG/1
60 TABLET, EXTENDED RELEASE ORAL DAILY
Qty: 90 TABLET | Refills: 3 | Status: SHIPPED | OUTPATIENT
Start: 2024-08-15

## 2024-08-15 NOTE — TELEPHONE ENCOUNTER
Spoke w/ pt . She said that they stopped the Amlodipine when she was in the hospital . Only taking Nifedipine.

## 2024-08-15 NOTE — TELEPHONE ENCOUNTER
Please confirm medications.  She also has amlodipine on her list.  She would not take amlodipine and nifedipine.

## 2024-08-15 NOTE — TELEPHONE ENCOUNTER
Care Due:                  Date            Visit Type   Department     Provider  --------------------------------------------------------------------------------                                [facility] FAMILY  Last Visit: 07-      FOLLOW UP    MEDICINE       [facility]  Next Visit: None Scheduled  None         None Found                                                            Last  Test          Frequency    Reason                     Performed    Due Date  --------------------------------------------------------------------------------    Lipid Panel.  12 months..  pravastatin..............  08- 08-    Vitamin D...  12 months..  cholecalciferol,.........  08- 08-    [employer] Embedded Care Due Messages. Reference number: [id number].   8/15/2024 11:54:19 AM CDT

## 2024-08-15 NOTE — TELEPHONE ENCOUNTER
Patient reports she has been out of this medication, she received it while in the hospital and is asking if you will prescribe it for her

## 2024-08-15 NOTE — TELEPHONE ENCOUNTER
----- Message from Danielle Larios sent at 8/15/2024 11:35 AM CDT -----  Contact: Carla  Type:  RX Refill Request    Who Called: Carla   Refill or New Rx:Refill   RX Name and Strength:NIFEdipine (PROCARDIA-XL) 60 MG (OSM) 24 hr tablet   How is the patient currently taking it? (ex. 1XDay):1xday  Is this a 30 day or 90 day RX:30 day   Preferred Pharmacy with phone number:  [facility] [address] [facility] [place] [facility] [address] [facility]  [address] [address]  Phone: [phone] Fax: [phone]  Local or Mail Order: Local   Ordering Provider: Taqueria Melendrez   Would the patient rather a call back or a response via [facility]? Call back   Best Call Back Number:[phone]   Additional Information: Pt has sent a message several times to get this refilled / Urgent

## 2024-08-16 NOTE — TELEPHONE ENCOUNTER
No care due was identified.  [facility] Embedded Care Due Messages. Reference number: [id number].   8/16/2024 12:39:07 PM CDT

## 2024-08-17 NOTE — TELEPHONE ENCOUNTER
Refill Routing Note   Medication(s) are not appropriate for processing by [facility] for the following reason(s):        Drug-disease interaction  Drug-Disease: benazepriL and Renal artery stenosis, native    [facility] action(s):  Defer               Appointments  past 12m or future 3m with PCP    Date Provider   Last Visit   7/18/2024 Taqueria Melendrez MD   Next Visit   Visit date not found Taqueria Melendrez MD   ED visits in past 90 days: 0        Note composed:6:50 AM 08/17/2024

## 2024-08-19 RX ORDER — BENAZEPRIL HYDROCHLORIDE 10 MG/1
TABLET ORAL
Qty: 90 TABLET | Refills: 3 | Status: SHIPPED | OUTPATIENT
Start: 2024-08-19

## 2024-08-27 ENCOUNTER — OFFICE VISIT (OUTPATIENT)
Dept: FAMILY MEDICINE | Facility: CLINIC | Age: 69
End: 2024-08-27
Payer: MEDICARE

## 2024-08-27 VITALS
SYSTOLIC BLOOD PRESSURE: 128 MMHG | BODY MASS INDEX: 24.16 KG/M2 | TEMPERATURE: 98 F | HEART RATE: 95 BPM | WEIGHT: 150.31 LBS | HEIGHT: 66 IN | DIASTOLIC BLOOD PRESSURE: 79 MMHG

## 2024-08-27 DIAGNOSIS — M19.072 PRIMARY OSTEOARTHRITIS OF LEFT FOOT: Primary | ICD-10-CM

## 2024-08-27 DIAGNOSIS — I10 PRIMARY HYPERTENSION: ICD-10-CM

## 2024-08-27 DIAGNOSIS — N18.32 STAGE 3B CHRONIC KIDNEY DISEASE: ICD-10-CM

## 2024-08-27 PROCEDURE — 4010F ACE/ARB THERAPY RXD/TAKEN: CPT | Mod: HCNC,CPTII,S$GLB, | Performed by: FAMILY MEDICINE

## 2024-08-27 PROCEDURE — G2211 COMPLEX E/M VISIT ADD ON: HCPCS | Mod: HCNC,S$GLB,, | Performed by: FAMILY MEDICINE

## 2024-08-27 PROCEDURE — 1101F PT FALLS ASSESS-DOCD LE1/YR: CPT | Mod: HCNC,CPTII,S$GLB, | Performed by: FAMILY MEDICINE

## 2024-08-27 PROCEDURE — 3008F BODY MASS INDEX DOCD: CPT | Mod: HCNC,CPTII,S$GLB, | Performed by: FAMILY MEDICINE

## 2024-08-27 PROCEDURE — 99213 OFFICE O/P EST LOW 20 MIN: CPT | Mod: HCNC,S$GLB,, | Performed by: FAMILY MEDICINE

## 2024-08-27 PROCEDURE — 3066F NEPHROPATHY DOC TX: CPT | Mod: HCNC,CPTII,S$GLB, | Performed by: FAMILY MEDICINE

## 2024-08-27 PROCEDURE — 1126F AMNT PAIN NOTED NONE PRSNT: CPT | Mod: HCNC,CPTII,S$GLB, | Performed by: FAMILY MEDICINE

## 2024-08-27 PROCEDURE — 3078F DIAST BP <80 MM HG: CPT | Mod: HCNC,CPTII,S$GLB, | Performed by: FAMILY MEDICINE

## 2024-08-27 PROCEDURE — 3288F FALL RISK ASSESSMENT DOCD: CPT | Mod: HCNC,CPTII,S$GLB, | Performed by: FAMILY MEDICINE

## 2024-08-27 PROCEDURE — 99999 PR PBB SHADOW E&M-EST. PATIENT-LVL IV: CPT | Mod: PBBFAC,HCNC,, | Performed by: FAMILY MEDICINE

## 2024-08-27 PROCEDURE — 3074F SYST BP LT 130 MM HG: CPT | Mod: HCNC,CPTII,S$GLB, | Performed by: FAMILY MEDICINE

## 2024-08-27 PROCEDURE — 1159F MED LIST DOCD IN RCRD: CPT | Mod: HCNC,CPTII,S$GLB, | Performed by: FAMILY MEDICINE

## 2024-08-27 NOTE — PROGRESS NOTES
History of Present Illness    Ms. Dang reports a mass on her left foot present since greater than 1 month. X-rays were done on her foot  showing arthritic change.  She is not having significant pain.  . Ms. Dang also reports intermittent swelling in both feet, describing them as feeling tight.   Ms. Dang has a history of chronic kidney disease -avoiding NSAIDs    Ms. Dang denies having any recent problems with blood pressure.  Well controlled with current medication        Carla was seen today for foot pain.    Diagnoses and all orders for this visit:    Primary osteoarthritis of left foot  -     Ambulatory referral/consult to Podiatry; Future    Primary hypertension    Stage 3b chronic kidney disease      FOOT LUMP (ARTHRITIC):  - Assessed foot lump, likely arthritic based on previous x-ray showing degenerative changes in midfoot tarsometatarsal joints.  - Determined no immediate intervention necessary for asymptomatic foot lump.  - Explained x-ray findings, indicating arthritic changes in the foot.  - Advised to use Tylenol instead of NSAIDs (e.g., ibuprofen, Aleve, naproxen, Motrin) if foot pain occurs.  - Referred to podiatrist for evaluation of foot lump, non-urgent.  - Contact the office if foot lump becomes painful or problematic.  HYPERTENSION:  - Considered blood pressure management satisfactory with current regimen.    CHRONIC KIDNEY DISEASE:  - Noted chronic kidney disease, emphasizing importance of avoiding nephrotoxic medications.    PERIPHERAL EDEMA:  - Evaluated history of peripheral edema, potentially related to nifedipine use.  - Discussed potential side effects of blood pressure medications, including peripheral edema.            Past Medical History:  Past Medical History:   Diagnosis Date    Acute pancreatitis 3/21/2016    Breast cancer 2005    right side with lumpectomy, chemo and radiation    Closed fracture of ramus of right pubis 6/8/2016    Colon polyp     last scope  4/2012- repeat 10 y per pt- Dr. Johnson    GERD (gastroesophageal reflux disease) 5/29/2012    Hyperlipidemia 4/16/2013    Hypertension     Metabolic encephalopathy 4/20/2021    Osteopenia      Past Surgical History:   Procedure Laterality Date    BREAST LUMPECTOMY  2005    DILATION AND CURETTAGE OF UTERUS      ESOPHAGEAL DILATION      ESOPHAGOGASTRODUODENOSCOPY  6/1/2012    ROTATOR CUFF REPAIR      TEAR DUCT SURGERY      right rye     Review of patient's allergies indicates:   Allergen Reactions    Hydrochlorothiazide Other (See Comments)     Multiple electrolyte abnormalities     Current Outpatient Medications on File Prior to Visit   Medication Sig Dispense Refill    AREXVY, PF, 120 mcg/0.5 mL SusR vaccine       benazepriL (LOTENSIN) 10 MG tablet Take 1 tablet by mouth once daily 90 tablet 3    calcium carbonate (OS-NASEEM) 500 mg calcium (1,250 mg) tablet qid 120 tablet 0    cholecalciferol, vitamin D3, (VITAMIN D3) 50 mcg (2,000 unit) Cap capsule Take 1 capsule (2,000 Units total) by mouth once daily.      cyanocobalamin 1,000 mcg/mL injection       famotidine (PEPCID) 40 MG tablet Take 1 tablet (40 mg total) by mouth every evening. 90 tablet 1    folic acid (FOLVITE) 1 MG tablet Take 1 tablet by mouth once daily 90 tablet 3    magnesium oxide (MAG-OX) 400 mg (241.3 mg magnesium) tablet bid 60 tablet 1    minocycline (MINOCIN,DYNACIN) 50 MG Cap Take 50 mg by mouth 2 (two) times daily.      NIFEdipine (PROCARDIA-XL) 60 MG (OSM) 24 hr tablet Take 1 tablet (60 mg total) by mouth once daily. 90 tablet 3    omeprazole (PRILOSEC) 20 MG capsule Take 1 capsule by mouth once daily 90 capsule 3    pravastatin (PRAVACHOL) 20 MG tablet Take 1 tablet by mouth once daily 90 tablet 3    sodium bicarbonate 650 MG tablet Take 650 mg by mouth 2 (two) times daily.      sulfacetamide sod-sulfur-urea 10-5-10 % Clsr Apply 1 application  topically.      ZILXI 1.5 % Foam Apply topically 2 (two) times daily.      baclofen (LIORESAL) 10 MG  tablet Take 1 tablet (10 mg total) by mouth 2 (two) times daily as needed. 14 tablet 0     Current Facility-Administered Medications on File Prior to Visit   Medication Dose Route Frequency Provider Last Rate Last Admin    cyanocobalamin injection 1,000 mcg  1,000 mcg Intramuscular Q30 Days    1,000 mcg at 24 1002     Social History     Socioeconomic History    Marital status:    Tobacco Use    Smoking status: Former     Current packs/day: 0.00     Types: Cigarettes     Quit date: 2005     Years since quittin.2    Smokeless tobacco: Never   Substance and Sexual Activity    Alcohol use: Not Currently     Alcohol/week: 2.0 standard drinks of alcohol     Types: 2 Standard drinks or equivalent per week     Comment: prior 6 pack/week    Drug use: Yes    Sexual activity: Not Currently     Social Determinants of Health     Financial Resource Strain: Low Risk  (2023)    Overall Financial Resource Strain (CARDIA)     Difficulty of Paying Living Expenses: Not hard at all   Food Insecurity: Unknown (2024)    Received from [facility]    Hunger Vital Sign     Ran Out of Food in the Last Year: Never true   Transportation Needs: Unknown (2024)    Received from [facility]    PRAPARE - Transportation     Lack of Transportation (Medical): No   Physical Activity: Inactive (2023)    Exercise Vital Sign     Days of Exercise per Week: 0 days     Minutes of Exercise per Session: 0 min   Stress: No Stress Concern Present (2023)    [employer] - Occupational Stress Questionnaire     Feeling of Stress : Not at all   Housing Stability: Unknown (2023)    Housing Stability Vital Sign     Unable to Pay for Housing in the Last Year: No     Unstable Housing in the Last Year: No     Family History   Problem Relation Name Age of Onset    Stroke Brother  57    Breast cancer Mother  87           ROS:  GENERAL: No fever, chills,  or significant  "weight changes.   CARDIOVASCULAR: Denies chest pain, PND, orthopnea or reduced exercise tolerance.  ABDOMEN: Appetite fine. Denies diarrhea, abdominal pain, hematemesis or blood in stool.  URINARY: No flank pain, dysuria or hematuria.    Vitals:    08/27/24 1350   BP: 128/79   Pulse: 95   Temp: 97.7 °F (36.5 °C)   TempSrc: Temporal   Weight: 68.2 kg (150 lb 4.8 oz)   Height: 5' 5.5" (1.664 m)       Wt Readings from Last 3 Encounters:   08/27/24 68.2 kg (150 lb 4.8 oz)   07/18/24 68 kg (150 lb)   07/11/24 68.8 kg (151 lb 9.6 oz)       APPEARANCE: Well nourished, well developed, in no acute distress.    HEAD: Normocephalic.  Atraumatic.  EYES:   Right eye: Pupil reactive.  Conjunctiva clear.    Left eye: Pupil reactive.  Conjunctiva clear.    NECK: Supple. No bruits.  No JVD.  No cervical lymphadenopathy.  No thyromegaly.    CHEST: Breath sounds clear bilaterally.  Normal respiratory effort  CARDIOVASCULAR: Normal rate.  Regular rhythm.  No murmurs.  No rub.  No gallops.   No edema.  MENTAL STATUS: Alert.  Oriented x 3.  Bony nodule palpable dorsum midfoot    X-Ray Cervical Spine AP And Lateral  Narrative: EXAMINATION:  XR CERVICAL SPINE AP LATERAL    CLINICAL HISTORY:  Cervicalgia    TECHNIQUE:  AP, lateral and open mouth views of the cervical spine were performed.    COMPARISON:  04/06/2021    FINDINGS:  The vertebral bodies demonstrate a normal height.  There is a couple mm of anterolisthesis of C3 on C4.  Moderate disc space narrowing and spondylosis is present at the C4-5 through the C6-7 levels.  Mild multilevel right-sided facet arthropathy noted.  The C1-C2 articulation is within normal limits.  Impression: As above    Electronically signed by: Carlos Monreal DO  Date:    07/11/2024  Time:    08:04  X-Ray Foot Complete Left  Narrative: EXAMINATION:  XR FOOT COMPLETE 3 VIEW LEFT    CLINICAL HISTORY:  . Pain in left foot    TECHNIQUE:  AP, lateral, and oblique views of the foot were " performed.    COMPARISON:  None    FINDINGS:  There is no evidence to suggest acute fracture or dislocation.  There are degenerative changes noted in the region the midfoot and greatest at the tarsometatarsal joint of the 2nd and 3rd tarsometatarsal joints.  Vascular calcifications are noted.  A plantar calcaneal enthesophyte is noted.  Impression: As above    Electronically signed by: Carlos Monreal DO  Date:    07/11/2024  Time:    08:03

## 2024-08-30 ENCOUNTER — TELEPHONE (OUTPATIENT)
Dept: FAMILY MEDICINE | Facility: CLINIC | Age: 69
End: 2024-08-30

## 2024-08-30 ENCOUNTER — CLINICAL SUPPORT (OUTPATIENT)
Dept: FAMILY MEDICINE | Facility: CLINIC | Age: 69
End: 2024-08-30
Payer: MEDICARE

## 2024-08-30 ENCOUNTER — LAB VISIT (OUTPATIENT)
Dept: LAB | Facility: HOSPITAL | Age: 69
End: 2024-08-30
Attending: FAMILY MEDICINE
Payer: MEDICARE

## 2024-08-30 DIAGNOSIS — M79.672 LEFT FOOT PAIN: ICD-10-CM

## 2024-08-30 DIAGNOSIS — R70.0 ELEVATED SEDIMENTATION RATE: ICD-10-CM

## 2024-08-30 DIAGNOSIS — E53.8 B12 DEFICIENCY: Primary | ICD-10-CM

## 2024-08-30 DIAGNOSIS — D63.1 ANEMIA IN STAGE 3A CHRONIC KIDNEY DISEASE: ICD-10-CM

## 2024-08-30 DIAGNOSIS — R76.8 RHEUMATOID FACTOR POSITIVE: ICD-10-CM

## 2024-08-30 DIAGNOSIS — E87.20 METABOLIC ACIDOSIS, NORMAL ANION GAP (NAG): ICD-10-CM

## 2024-08-30 DIAGNOSIS — E21.3 HYPERPARATHYROIDISM: ICD-10-CM

## 2024-08-30 DIAGNOSIS — D64.9 NORMOCYTIC ANEMIA: ICD-10-CM

## 2024-08-30 DIAGNOSIS — E79.0 ELEVATED URIC ACID IN BLOOD: ICD-10-CM

## 2024-08-30 DIAGNOSIS — D63.8 ANEMIA, CHRONIC DISEASE: ICD-10-CM

## 2024-08-30 DIAGNOSIS — R79.89 LOW TSH LEVEL: ICD-10-CM

## 2024-08-30 DIAGNOSIS — I70.1 RENAL ARTERY STENOSIS, NATIVE: ICD-10-CM

## 2024-08-30 DIAGNOSIS — R76.8 ELEVATED SERUM IMMUNOGLOBULIN FREE LIGHT CHAINS: ICD-10-CM

## 2024-08-30 DIAGNOSIS — I67.4 HYPERTENSIVE ENCEPHALOPATHY: ICD-10-CM

## 2024-08-30 DIAGNOSIS — N18.31 ANEMIA IN STAGE 3A CHRONIC KIDNEY DISEASE: ICD-10-CM

## 2024-08-30 LAB
ANION GAP SERPL CALC-SCNC: 11 MMOL/L (ref 8–16)
BASOPHILS # BLD AUTO: 0.03 K/UL (ref 0–0.2)
BASOPHILS NFR BLD: 0.4 % (ref 0–1.9)
BUN SERPL-MCNC: 20 MG/DL (ref 8–23)
CALCIUM SERPL-MCNC: 9.2 MG/DL (ref 8.7–10.5)
CCP AB SER IA-ACNC: 4 U/ML
CHLORIDE SERPL-SCNC: 108 MMOL/L (ref 95–110)
CO2 SERPL-SCNC: 21 MMOL/L (ref 23–29)
CREAT SERPL-MCNC: 1.7 MG/DL (ref 0.5–1.4)
CRP SERPL-MCNC: 1.6 MG/L (ref 0–8.2)
DIFFERENTIAL METHOD BLD: ABNORMAL
EOSINOPHIL # BLD AUTO: 0.1 K/UL (ref 0–0.5)
EOSINOPHIL NFR BLD: 1.1 % (ref 0–8)
ERYTHROCYTE [DISTWIDTH] IN BLOOD BY AUTOMATED COUNT: 14.3 % (ref 11.5–14.5)
ERYTHROCYTE [SEDIMENTATION RATE] IN BLOOD BY WESTERGREN METHOD: 34 MM/HR (ref 0–20)
EST. GFR  (NO RACE VARIABLE): 31.9 ML/MIN/1.73 M^2
GLUCOSE SERPL-MCNC: 99 MG/DL (ref 70–110)
HCT VFR BLD AUTO: 35.3 % (ref 37–48.5)
HGB BLD-MCNC: 11.1 G/DL (ref 12–16)
IMM GRANULOCYTES # BLD AUTO: 0.02 K/UL (ref 0–0.04)
IMM GRANULOCYTES NFR BLD AUTO: 0.3 % (ref 0–0.5)
LYMPHOCYTES # BLD AUTO: 2.2 K/UL (ref 1–4.8)
LYMPHOCYTES NFR BLD: 30.5 % (ref 18–48)
MCH RBC QN AUTO: 29.8 PG (ref 27–31)
MCHC RBC AUTO-ENTMCNC: 31.4 G/DL (ref 32–36)
MCV RBC AUTO: 95 FL (ref 82–98)
MONOCYTES # BLD AUTO: 0.5 K/UL (ref 0.3–1)
MONOCYTES NFR BLD: 6.8 % (ref 4–15)
NEUTROPHILS # BLD AUTO: 4.3 K/UL (ref 1.8–7.7)
NEUTROPHILS NFR BLD: 60.9 % (ref 38–73)
NRBC BLD-RTO: 0 /100 WBC
PLATELET # BLD AUTO: 316 K/UL (ref 150–450)
PMV BLD AUTO: 10.4 FL (ref 9.2–12.9)
POTASSIUM SERPL-SCNC: 4.8 MMOL/L (ref 3.5–5.1)
RBC # BLD AUTO: 3.72 M/UL (ref 4–5.4)
RHEUMATOID FACT SERPL-ACNC: <13 IU/ML (ref 0–15)
SODIUM SERPL-SCNC: 140 MMOL/L (ref 136–145)
TSH SERPL DL<=0.005 MIU/L-ACNC: 0.88 UIU/ML (ref 0.4–4)
URATE SERPL-MCNC: 7.6 MG/DL (ref 2.4–5.7)
WBC # BLD AUTO: 7.08 K/UL (ref 3.9–12.7)

## 2024-08-30 PROCEDURE — 85651 RBC SED RATE NONAUTOMATED: CPT | Mod: HCNC,PO | Performed by: FAMILY MEDICINE

## 2024-08-30 PROCEDURE — 99999 PR PBB SHADOW E&M-EST. PATIENT-LVL II: CPT | Mod: PBBFAC,HCNC,,

## 2024-08-30 PROCEDURE — 86431 RHEUMATOID FACTOR QUANT: CPT | Mod: HCNC | Performed by: FAMILY MEDICINE

## 2024-08-30 PROCEDURE — 84443 ASSAY THYROID STIM HORMONE: CPT | Mod: HCNC | Performed by: FAMILY MEDICINE

## 2024-08-30 PROCEDURE — 80048 BASIC METABOLIC PNL TOTAL CA: CPT | Mod: HCNC | Performed by: FAMILY MEDICINE

## 2024-08-30 PROCEDURE — 36415 COLL VENOUS BLD VENIPUNCTURE: CPT | Mod: HCNC,PO | Performed by: FAMILY MEDICINE

## 2024-08-30 PROCEDURE — 85025 COMPLETE CBC W/AUTO DIFF WBC: CPT | Mod: HCNC | Performed by: FAMILY MEDICINE

## 2024-08-30 PROCEDURE — 83521 IG LIGHT CHAINS FREE EACH: CPT | Mod: 59,HCNC | Performed by: FAMILY MEDICINE

## 2024-08-30 PROCEDURE — 86140 C-REACTIVE PROTEIN: CPT | Mod: HCNC | Performed by: FAMILY MEDICINE

## 2024-08-30 PROCEDURE — 86200 CCP ANTIBODY: CPT | Mod: HCNC | Performed by: FAMILY MEDICINE

## 2024-08-30 PROCEDURE — 84550 ASSAY OF BLOOD/URIC ACID: CPT | Mod: HCNC | Performed by: FAMILY MEDICINE

## 2024-08-30 RX ADMIN — CYANOCOBALAMIN 1000 MCG: 1000 INJECTION, SOLUTION INTRAMUSCULAR; SUBCUTANEOUS at 10:08

## 2024-08-30 NOTE — TELEPHONE ENCOUNTER
Pt received last ordered dose of B12 today 8/30/24. Please advise if lab work is needed or if injections should be continued. Thank you.

## 2024-09-03 PROBLEM — M10.9 GOUT OF FOOT: Status: ACTIVE | Noted: 2024-09-03

## 2024-09-03 LAB
KAPPA LC SER QL IA: 2.8 MG/DL (ref 0.33–1.94)
KAPPA LC/LAMBDA SER IA: 1.37 (ref 0.26–1.65)
LAMBDA LC SER QL IA: 2.04 MG/DL (ref 0.57–2.63)

## 2024-09-09 ENCOUNTER — LAB VISIT (OUTPATIENT)
Dept: LAB | Facility: HOSPITAL | Age: 69
End: 2024-09-09
Attending: INTERNAL MEDICINE
Payer: MEDICARE

## 2024-09-09 ENCOUNTER — OFFICE VISIT (OUTPATIENT)
Dept: NEPHROLOGY | Facility: CLINIC | Age: 69
End: 2024-09-09
Payer: MEDICARE

## 2024-09-09 VITALS
OXYGEN SATURATION: 99 % | BODY MASS INDEX: 24.05 KG/M2 | HEART RATE: 69 BPM | WEIGHT: 149.63 LBS | DIASTOLIC BLOOD PRESSURE: 70 MMHG | HEIGHT: 66 IN | SYSTOLIC BLOOD PRESSURE: 122 MMHG

## 2024-09-09 DIAGNOSIS — N18.31 ANEMIA IN STAGE 3A CHRONIC KIDNEY DISEASE: ICD-10-CM

## 2024-09-09 DIAGNOSIS — I70.1 RENAL ARTERY STENOSIS, NATIVE: ICD-10-CM

## 2024-09-09 DIAGNOSIS — D63.1 ANEMIA IN STAGE 3A CHRONIC KIDNEY DISEASE: ICD-10-CM

## 2024-09-09 DIAGNOSIS — N39.0 UTI (URINARY TRACT INFECTION), UNCOMPLICATED: ICD-10-CM

## 2024-09-09 DIAGNOSIS — I10 PRIMARY HYPERTENSION: ICD-10-CM

## 2024-09-09 DIAGNOSIS — E87.20 METABOLIC ACIDOSIS, NORMAL ANION GAP (NAG): ICD-10-CM

## 2024-09-09 DIAGNOSIS — N25.81 SECONDARY HYPERPARATHYROIDISM: ICD-10-CM

## 2024-09-09 DIAGNOSIS — N18.31 STAGE 3A CHRONIC KIDNEY DISEASE: ICD-10-CM

## 2024-09-09 DIAGNOSIS — N17.9 AKI (ACUTE KIDNEY INJURY): Primary | ICD-10-CM

## 2024-09-09 DIAGNOSIS — E55.9 VITAMIN D DEFICIENCY: ICD-10-CM

## 2024-09-09 LAB
ANION GAP SERPL CALC-SCNC: 11 MMOL/L (ref 8–16)
BACTERIA #/AREA URNS AUTO: NORMAL /HPF
BILIRUB UR QL STRIP: NEGATIVE
BUN SERPL-MCNC: 21 MG/DL (ref 8–23)
CALCIUM SERPL-MCNC: 9.7 MG/DL (ref 8.7–10.5)
CHLORIDE SERPL-SCNC: 106 MMOL/L (ref 95–110)
CLARITY UR REFRACT.AUTO: CLEAR
CO2 SERPL-SCNC: 23 MMOL/L (ref 23–29)
COLOR UR AUTO: ABNORMAL
CREAT SERPL-MCNC: 1.7 MG/DL (ref 0.5–1.4)
CREAT UR-MCNC: 60 MG/DL (ref 15–325)
EST. GFR  (NO RACE VARIABLE): 31.9 ML/MIN/1.73 M^2
GLUCOSE SERPL-MCNC: 87 MG/DL (ref 70–110)
GLUCOSE UR QL STRIP: NEGATIVE
HGB UR QL STRIP: NEGATIVE
HYALINE CASTS UR QL AUTO: 1 /LPF
KETONES UR QL STRIP: NEGATIVE
LEUKOCYTE ESTERASE UR QL STRIP: ABNORMAL
MICROSCOPIC COMMENT: NORMAL
MICROSCOPIC COMMENT: NORMAL
NITRITE UR QL STRIP: NEGATIVE
PH UR STRIP: 8 [PH] (ref 5–8)
POTASSIUM SERPL-SCNC: 5 MMOL/L (ref 3.5–5.1)
PROT UR QL STRIP: NEGATIVE
PROT UR-MCNC: <7 MG/DL (ref 0–15)
PROT/CREAT UR: NORMAL MG/G{CREAT} (ref 0–0.2)
RBC #/AREA URNS AUTO: 0 /HPF (ref 0–4)
RBC #/AREA URNS AUTO: 0 /HPF (ref 0–4)
SODIUM SERPL-SCNC: 140 MMOL/L (ref 136–145)
SP GR UR STRIP: 1.01 (ref 1–1.03)
SQUAMOUS #/AREA URNS AUTO: 1 /HPF
SQUAMOUS #/AREA URNS AUTO: 2 /HPF
URN SPEC COLLECT METH UR: ABNORMAL
WBC #/AREA URNS AUTO: 3 /HPF (ref 0–5)
WBC #/AREA URNS AUTO: 4 /HPF (ref 0–5)

## 2024-09-09 PROCEDURE — G2211 COMPLEX E/M VISIT ADD ON: HCPCS | Mod: HCNC,S$GLB,, | Performed by: INTERNAL MEDICINE

## 2024-09-09 PROCEDURE — 3074F SYST BP LT 130 MM HG: CPT | Mod: HCNC,CPTII,S$GLB, | Performed by: INTERNAL MEDICINE

## 2024-09-09 PROCEDURE — 82570 ASSAY OF URINE CREATININE: CPT | Mod: HCNC | Performed by: INTERNAL MEDICINE

## 2024-09-09 PROCEDURE — 99999 PR PBB SHADOW E&M-EST. PATIENT-LVL IV: CPT | Mod: PBBFAC,HCNC,, | Performed by: INTERNAL MEDICINE

## 2024-09-09 PROCEDURE — 99214 OFFICE O/P EST MOD 30 MIN: CPT | Mod: HCNC,S$GLB,, | Performed by: INTERNAL MEDICINE

## 2024-09-09 PROCEDURE — 36415 COLL VENOUS BLD VENIPUNCTURE: CPT | Mod: HCNC,PO | Performed by: INTERNAL MEDICINE

## 2024-09-09 PROCEDURE — 1159F MED LIST DOCD IN RCRD: CPT | Mod: HCNC,CPTII,S$GLB, | Performed by: INTERNAL MEDICINE

## 2024-09-09 PROCEDURE — 3078F DIAST BP <80 MM HG: CPT | Mod: HCNC,CPTII,S$GLB, | Performed by: INTERNAL MEDICINE

## 2024-09-09 PROCEDURE — 1160F RVW MEDS BY RX/DR IN RCRD: CPT | Mod: HCNC,CPTII,S$GLB, | Performed by: INTERNAL MEDICINE

## 2024-09-09 PROCEDURE — 80048 BASIC METABOLIC PNL TOTAL CA: CPT | Mod: HCNC | Performed by: INTERNAL MEDICINE

## 2024-09-09 PROCEDURE — 81001 URINALYSIS AUTO W/SCOPE: CPT | Mod: 91,HCNC | Performed by: INTERNAL MEDICINE

## 2024-09-09 PROCEDURE — 4010F ACE/ARB THERAPY RXD/TAKEN: CPT | Mod: HCNC,CPTII,S$GLB, | Performed by: INTERNAL MEDICINE

## 2024-09-09 PROCEDURE — 1125F AMNT PAIN NOTED PAIN PRSNT: CPT | Mod: HCNC,CPTII,S$GLB, | Performed by: INTERNAL MEDICINE

## 2024-09-09 PROCEDURE — 87086 URINE CULTURE/COLONY COUNT: CPT | Mod: HCNC | Performed by: INTERNAL MEDICINE

## 2024-09-09 PROCEDURE — 3288F FALL RISK ASSESSMENT DOCD: CPT | Mod: HCNC,CPTII,S$GLB, | Performed by: INTERNAL MEDICINE

## 2024-09-09 PROCEDURE — 1101F PT FALLS ASSESS-DOCD LE1/YR: CPT | Mod: HCNC,CPTII,S$GLB, | Performed by: INTERNAL MEDICINE

## 2024-09-09 PROCEDURE — 3008F BODY MASS INDEX DOCD: CPT | Mod: HCNC,CPTII,S$GLB, | Performed by: INTERNAL MEDICINE

## 2024-09-09 PROCEDURE — 3066F NEPHROPATHY DOC TX: CPT | Mod: HCNC,CPTII,S$GLB, | Performed by: INTERNAL MEDICINE

## 2024-09-09 NOTE — PROGRESS NOTES
Subjective:      Patient ID: Carla Dang is a 69 y.o. Black or  female who presents for follow up evaluation of Chronic Kidney Disease    HPI    She is referred by her PCP for 1) CKD and   2) electrolyte abnormalities.   Her baseline Cr is 1.3-1.5mg/dL but she has ranged as high as 1.8mg/dL  Breast cancer--chemo, sx and XRT  2005 -platin  On a PPI, lyyte abnormalities plus platin  No known kidney disease in family   HTN, no DM 2  PPI X years   HTN--50's   Siblings with HTN  Anum selter, no NSAIDs  LE edema--magesium low   High salt diet   Hydrates with coke,orange daniel, lemonade  No foamy urine   Laying in bed (depression?) recently lost her friend    accompanies pt    March 2024: She changed sodas to water, cutting back on on sodium too. Kidney function better. Potassium high on ace and amiloride.  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   HENT:  Negative for facial swelling.    Cardiovascular:  Negative for leg swelling.   Musculoskeletal:  Positive for arthralgias.   Allergic/Immunologic: Positive for immunocompromised state (age, HTN, CKD).   Psychiatric/Behavioral:  Negative for confusion and decreased concentration.       Objective:     Physical Exam  Vitals and nursing note reviewed.   Constitutional:       General: She is not in acute distress.     Appearance: She is not toxic-appearing.   HENT:      Head: Atraumatic.   Cardiovascular:      Rate and Rhythm: Normal rate.   Pulmonary:      Effort: Pulmonary effort is normal. No respiratory distress.      Breath sounds: No wheezing or rales.   Abdominal:      General: There is no distension.   Musculoskeletal:      Right lower leg: No edema.      Left lower leg: No edema.   Neurological:      Mental  Status: She is alert and oriented to person, place, and time. Mental status is at baseline.   Psychiatric:         Mood and Affect: Mood normal.       Assessment:     1. SUSAN (acute kidney injury)    2. Stage 3a chronic kidney disease    3. Metabolic acidosis, normal anion gap (NAG)    4. Anemia in stage 3a chronic kidney disease    5. Renal artery stenosis, native    6. UTI (urinary tract infection), uncomplicated    7. Secondary hyperparathyroidism    8. Vitamin D deficiency    9. Primary hypertension         Plan:     SUSAN on CKD  - unclear etiology, check urinalysis and repeat chemistries today     CKD appearing to be at Stage 3 with risk factors to include HTN and history of obesity, slowly progressive CKD since 2019  - she is already maintained on an ace inhibitor   - BP control  - ?SGLTi next visit     Hypomagnesemia/Hypokalemia  - she has tubular damage from -platin based breast cancer treatment and will need magnesium supplementation for life  - discussed adding more glycinate magnesium salt versus oxide to help with loose stools  - now with hyperkalemia--stop amiloride and recheck potassium level     HTN  - goal parameters discussed  - BP meds adjusted after recent hospitalization       MBD/Secondary HPT  - check D and continue to trend PTH        Labs today, RTC pending results   40 minutes of total time spent on the encounter, which includes face to face time and non-face to face time preparing to see the patient (eg, review of tests), Obtaining and/or reviewing separately obtained history, Documenting clinical information in the electronic or other health record, Independently interpreting results (not separately reported) and communicating results to the patient/family/caregiver, or Care coordination (not separately reported).  Visit today included increased complexity associated with the care of the episodic problem addressed and/or managing the longitudinal care of the patient due to the serious and/or  complex managed problem(s) as per above diagnosis list.

## 2024-09-10 ENCOUNTER — TELEPHONE (OUTPATIENT)
Dept: NEPHROLOGY | Facility: CLINIC | Age: 69
End: 2024-09-10
Payer: MEDICARE

## 2024-09-10 DIAGNOSIS — N17.9 AKI (ACUTE KIDNEY INJURY): Primary | ICD-10-CM

## 2024-09-10 LAB
BACTERIA UR CULT: NORMAL
BACTERIA UR CULT: NORMAL

## 2024-09-10 NOTE — TELEPHONE ENCOUNTER
Patient was called and a message left to review labs. She is scheduled for 2 weeks to repeat labs.

## 2024-09-10 NOTE — TELEPHONE ENCOUNTER
Please inform ptt hat kidney function looks the same, no worse no better. BMP in 2 weeks. Urine normal

## 2024-09-18 NOTE — TELEPHONE ENCOUNTER
Care Due:                  Date            Visit Type   Department     Provider  --------------------------------------------------------------------------------                                EP -                              PRIMARY      [facility]  Last Visit: 08-      CARE ([facility])   MEDICINE       Taqueria Melendrez  Next Visit: None Scheduled  None         None Found                                                            Last  Test          Frequency    Reason                     Performed    Due Date  --------------------------------------------------------------------------------    CMP.........  12 months..  pravastatin..............  12- 12-    [facility] Embedded Care Due Messages. Reference number: [id number].   9/18/2024 12:36:15 PM CDT

## 2024-09-19 ENCOUNTER — TELEPHONE (OUTPATIENT)
Dept: FAMILY MEDICINE | Facility: CLINIC | Age: 69
End: 2024-09-19
Payer: MEDICARE

## 2024-09-19 DIAGNOSIS — E78.5 HYPERLIPIDEMIA, UNSPECIFIED HYPERLIPIDEMIA TYPE: Primary | ICD-10-CM

## 2024-09-19 RX ORDER — PRAVASTATIN SODIUM 20 MG/1
20 TABLET ORAL
Qty: 90 TABLET | Refills: 0 | Status: SHIPPED | OUTPATIENT
Start: 2024-09-19

## 2024-09-19 NOTE — TELEPHONE ENCOUNTER
Refill Routing Note   Medication(s) are not appropriate for processing by [facility] for the following reason(s):        Required labs outdated    [facility] action(s):  Defer     Requires labs : Yes      Medication Therapy Plan: BMP scheduled; needs to be CMP      Appointments  past 12m or future 3m with PCP    Date Provider   Last Visit   8/27/2024 Taqueria Melendrez MD   Next Visit   Visit date not found Taqueria Melendrez MD   ED visits in past 90 days: 0        Note composed:10:44 AM 09/19/2024

## 2024-09-24 ENCOUNTER — LAB VISIT (OUTPATIENT)
Dept: LAB | Facility: HOSPITAL | Age: 69
End: 2024-09-24
Attending: INTERNAL MEDICINE
Payer: MEDICARE

## 2024-09-24 DIAGNOSIS — N17.9 AKI (ACUTE KIDNEY INJURY): ICD-10-CM

## 2024-09-24 LAB
ANION GAP SERPL CALC-SCNC: 8 MMOL/L (ref 8–16)
BUN SERPL-MCNC: 17 MG/DL (ref 8–23)
CALCIUM SERPL-MCNC: 9.2 MG/DL (ref 8.7–10.5)
CHLORIDE SERPL-SCNC: 109 MMOL/L (ref 95–110)
CO2 SERPL-SCNC: 21 MMOL/L (ref 23–29)
CREAT SERPL-MCNC: 1.5 MG/DL (ref 0.5–1.4)
EST. GFR  (NO RACE VARIABLE): 37 ML/MIN/1.73 M^2
GLUCOSE SERPL-MCNC: 100 MG/DL (ref 70–110)
POTASSIUM SERPL-SCNC: 4.8 MMOL/L (ref 3.5–5.1)
SODIUM SERPL-SCNC: 138 MMOL/L (ref 136–145)

## 2024-09-24 PROCEDURE — 36415 COLL VENOUS BLD VENIPUNCTURE: CPT | Mod: HCNC,PO | Performed by: INTERNAL MEDICINE

## 2024-09-24 PROCEDURE — 80048 BASIC METABOLIC PNL TOTAL CA: CPT | Mod: HCNC | Performed by: INTERNAL MEDICINE

## 2024-09-25 DIAGNOSIS — Z00.00 ENCOUNTER FOR MEDICARE ANNUAL WELLNESS EXAM: ICD-10-CM

## 2024-09-27 ENCOUNTER — OFFICE VISIT (OUTPATIENT)
Dept: ORTHOPEDICS | Facility: CLINIC | Age: 69
End: 2024-09-27
Payer: MEDICARE

## 2024-09-27 VITALS — WEIGHT: 149.69 LBS | BODY MASS INDEX: 24.06 KG/M2 | HEIGHT: 66 IN

## 2024-09-27 DIAGNOSIS — M75.41 IMPINGEMENT SYNDROME OF RIGHT SHOULDER: Primary | ICD-10-CM

## 2024-09-27 PROCEDURE — 3288F FALL RISK ASSESSMENT DOCD: CPT | Mod: HCNC,CPTII,S$GLB, | Performed by: NURSE PRACTITIONER

## 2024-09-27 PROCEDURE — 4010F ACE/ARB THERAPY RXD/TAKEN: CPT | Mod: HCNC,CPTII,S$GLB, | Performed by: NURSE PRACTITIONER

## 2024-09-27 PROCEDURE — 1125F AMNT PAIN NOTED PAIN PRSNT: CPT | Mod: HCNC,CPTII,S$GLB, | Performed by: NURSE PRACTITIONER

## 2024-09-27 PROCEDURE — 3008F BODY MASS INDEX DOCD: CPT | Mod: HCNC,CPTII,S$GLB, | Performed by: NURSE PRACTITIONER

## 2024-09-27 PROCEDURE — 1101F PT FALLS ASSESS-DOCD LE1/YR: CPT | Mod: HCNC,CPTII,S$GLB, | Performed by: NURSE PRACTITIONER

## 2024-09-27 PROCEDURE — 20610 DRAIN/INJ JOINT/BURSA W/O US: CPT | Mod: HCNC,RT,S$GLB, | Performed by: NURSE PRACTITIONER

## 2024-09-27 PROCEDURE — 99999 PR PBB SHADOW E&M-EST. PATIENT-LVL III: CPT | Mod: PBBFAC,HCNC,, | Performed by: NURSE PRACTITIONER

## 2024-09-27 PROCEDURE — 99212 OFFICE O/P EST SF 10 MIN: CPT | Mod: HCNC,25,S$GLB, | Performed by: NURSE PRACTITIONER

## 2024-09-27 PROCEDURE — 3066F NEPHROPATHY DOC TX: CPT | Mod: HCNC,CPTII,S$GLB, | Performed by: NURSE PRACTITIONER

## 2024-09-27 RX ADMIN — TRIAMCINOLONE ACETONIDE 40 MG: 40 INJECTION, SUSPENSION INTRA-ARTICULAR; INTRAMUSCULAR at 08:09

## 2024-09-27 NOTE — PROGRESS NOTES
Chief Complaint   Patient presents with    Right Shoulder - Pain       HPI:   This is a 69 y.o. who returns to clinic today in follow-up for right shoulder for the past 2 weeks after no known injury; she is here for right shoulder injection. Pain is aching and dull. No numbness or tingling. No associated signs or symptoms.    Past Medical History:   Diagnosis Date    Acute pancreatitis 03/21/2016    Breast cancer 2005    right side with lumpectomy, chemo and radiation    Closed fracture of ramus of right pubis 06/08/2016    Colon polyp     last scope 4/2012- repeat 10 y per pt- Dr. Johnson    GERD (gastroesophageal reflux disease) 05/29/2012    Gout of foot 09/03/2024    Hyperlipidemia 04/16/2013    Hypertension     Metabolic encephalopathy 04/20/2021    Osteopenia      Past Surgical History:   Procedure Laterality Date    BREAST LUMPECTOMY  2005    DILATION AND CURETTAGE OF UTERUS      ESOPHAGEAL DILATION      ESOPHAGOGASTRODUODENOSCOPY  6/1/2012    ROTATOR CUFF REPAIR      TEAR DUCT SURGERY      right rye     Current Outpatient Medications on File Prior to Visit   Medication Sig Dispense Refill    AREXVY, PF, 120 mcg/0.5 mL SusR vaccine       baclofen (LIORESAL) 10 MG tablet Take 1 tablet (10 mg total) by mouth 2 (two) times daily as needed. 14 tablet 0    benazepriL (LOTENSIN) 10 MG tablet Take 1 tablet by mouth once daily 90 tablet 3    calcium carbonate (OS-NASEEM) 500 mg calcium (1,250 mg) tablet qid 120 tablet 0    cholecalciferol, vitamin D3, (VITAMIN D3) 50 mcg (2,000 unit) Cap capsule Take 1 capsule (2,000 Units total) by mouth once daily.      cyanocobalamin 1,000 mcg/mL injection Inject 1,000 mcg into the muscle every 30 days.      famotidine (PEPCID) 40 MG tablet Take 1 tablet (40 mg total) by mouth every evening. 90 tablet 1    folic acid (FOLVITE) 1 MG tablet Take 1 tablet by mouth once daily 90 tablet 3    magnesium oxide (MAG-OX) 400 mg (241.3 mg magnesium) tablet bid (Patient taking differently: Take 400  mg by mouth 2 (two) times daily. bid) 60 tablet 1    minocycline (MINOCIN,DYNACIN) 50 MG Cap Take 50 mg by mouth 2 (two) times daily.      NIFEdipine (PROCARDIA-XL) 60 MG (OSM) 24 hr tablet Take 1 tablet (60 mg total) by mouth once daily. 90 tablet 3    omeprazole (PRILOSEC) 20 MG capsule Take 1 capsule by mouth once daily 90 capsule 3    pravastatin (PRAVACHOL) 20 MG tablet Take 1 tablet by mouth once daily 90 tablet 0    sodium bicarbonate 650 MG tablet Take 650 mg by mouth 2 (two) times daily.      sulfacetamide sod-sulfur-urea 10-5-10 % Clsr Apply 1 application  topically.      ZILXI 1.5 % Foam Apply topically 2 (two) times daily.       No current facility-administered medications on file prior to visit.     Review of patient's allergies indicates:   Allergen Reactions    Hydrochlorothiazide Other (See Comments)     Multiple electrolyte abnormalities     Family History   Problem Relation Name Age of Onset    Stroke Brother  57    Breast cancer Mother  87     Social History     Socioeconomic History    Marital status:    Tobacco Use    Smoking status: Former     Current packs/day: 0.00     Types: Cigarettes     Quit date: 2005     Years since quittin.3    Smokeless tobacco: Never   Substance and Sexual Activity    Alcohol use: Not Currently     Alcohol/week: 2.0 standard drinks of alcohol     Types: 2 Standard drinks or equivalent per week     Comment: prior 6 pack/week    Drug use: Yes    Sexual activity: Not Currently     Social Drivers of Health     Financial Resource Strain: Low Risk  (2023)    Overall Financial Resource Strain (CARDIA)     Difficulty of Paying Living Expenses: Not hard at all   Food Insecurity: Unknown (2024)    Received from [facility]    Hunger Vital Sign     Ran Out of Food in the Last Year: Never true   Transportation Needs: Unknown (2024)    Received from [facility]    PRAPARE - Transportation     Lack of Transportation  (Medical): No   Physical Activity: Inactive (5/8/2023)    Exercise Vital Sign     Days of Exercise per Week: 0 days     Minutes of Exercise per Session: 0 min   Stress: No Stress Concern Present (5/8/2023)    [employer] - Occupational Stress Questionnaire     Feeling of Stress : Not at all   Housing Stability: Unknown (5/8/2023)    Housing Stability Vital Sign     Unable to Pay for Housing in the Last Year: No     Unstable Housing in the Last Year: No       Review of Systems:  Constitutional:  Denies fever or chills   Eyes:  Denies change in visual acuity   HENT:  Denies nasal congestion or sore throat   Respiratory:  Denies cough or shortness of breath   Cardiovascular:  Denies chest pain or edema   GI:  Denies abdominal pain, nausea, vomiting, bloody stools or diarrhea   :  Denies dysuria   Integument:  Denies rash   Neurologic:  Denies headache, focal weakness or sensory changes   Endocrine:  Denies polyuria or polydipsia   Lymphatic:  Denies swollen glands   Psychiatric:  Denies depression or anxiety     Physical Exam:   Constitutional:  Well developed, well nourished, no acute distress, non-toxic appearance   Integument:  Well hydrated  Neurologic:  Alert & oriented x 3  Psychiatric:  Speech and behavior appropriate    Bilateral Shoulder Exam    Left Shoulder Exam   Shoulder exam performed same as contralateral side and is normal.    Right Shoulder Exam   Tenderness   Shoulder tenderness location: diffusely about shoulder.    Range of Motion   Forward Flexion: abnormal   External Rotation: abnormal     Muscle Strength   Supraspinatus: 4/5     Tests   Hawkin's test: positive  Impingement: positive    Other   Erythema: absent  Sensation: normal  Pulse: present             Assessment & plan    Impingement syndrome of right shoulder  -     Large Joint Aspiration/Injection: R subacromial bursa  -     triamcinolone acetonide injection 40 mg         Using an aseptic technique, I  injected 5 cc of lidocaine 1% without and 1 cc of kenalog 40mg into the right shoulder. The patient tolerated this well. I will have them return to clinic in 6 weeks.

## 2024-10-03 ENCOUNTER — TELEPHONE (OUTPATIENT)
Dept: FAMILY MEDICINE | Facility: CLINIC | Age: 69
End: 2024-10-03

## 2024-10-03 ENCOUNTER — CLINICAL SUPPORT (OUTPATIENT)
Dept: FAMILY MEDICINE | Facility: CLINIC | Age: 69
End: 2024-10-03
Payer: MEDICARE

## 2024-10-03 DIAGNOSIS — E53.8 B12 DEFICIENCY: Primary | ICD-10-CM

## 2024-10-03 PROCEDURE — 99999 PR PBB SHADOW E&M-EST. PATIENT-LVL II: CPT | Mod: PBBFAC,HCNC,,

## 2024-10-03 PROCEDURE — 96372 THER/PROPH/DIAG INJ SC/IM: CPT | Mod: HCNC,S$GLB,, | Performed by: FAMILY MEDICINE

## 2024-10-03 RX ORDER — CYANOCOBALAMIN 1000 UG/ML
1000 INJECTION, SOLUTION INTRAMUSCULAR; SUBCUTANEOUS
Status: SHIPPED | OUTPATIENT
Start: 2024-10-03 | End: 2025-04-01

## 2024-10-03 RX ADMIN — CYANOCOBALAMIN 1000 MCG: 1000 INJECTION, SOLUTION INTRAMUSCULAR; SUBCUTANEOUS at 02:10

## 2024-10-03 NOTE — TELEPHONE ENCOUNTER
----- Message from Nereida sent at 10/3/2024 10:31 AM CDT -----  Contact: Self [phone]  Patient is returning a phone call.    Who left a message for the patient: nurse    Does patient know what this is regarding:  scheduling b12 injection     Would you like a call back, or a response through your [facility] portal?:   call back    Comments:

## 2024-10-06 RX ORDER — TRIAMCINOLONE ACETONIDE 40 MG/ML
40 INJECTION, SUSPENSION INTRA-ARTICULAR; INTRAMUSCULAR
Status: DISCONTINUED | OUTPATIENT
Start: 2024-09-27 | End: 2024-10-06 | Stop reason: HOSPADM

## 2024-10-07 ENCOUNTER — OFFICE VISIT (OUTPATIENT)
Dept: PODIATRY | Facility: CLINIC | Age: 69
End: 2024-10-07
Payer: MEDICARE

## 2024-10-07 DIAGNOSIS — M21.42 PES PLANUS OF BOTH FEET: Primary | ICD-10-CM

## 2024-10-07 DIAGNOSIS — M21.41 PES PLANUS OF BOTH FEET: Primary | ICD-10-CM

## 2024-10-07 DIAGNOSIS — M19.072 ARTHRITIS OF LEFT MIDFOOT: ICD-10-CM

## 2024-10-07 DIAGNOSIS — M19.072 PRIMARY OSTEOARTHRITIS OF LEFT FOOT: ICD-10-CM

## 2024-10-07 PROCEDURE — 99999 PR PBB SHADOW E&M-EST. PATIENT-LVL III: CPT | Mod: PBBFAC,HCNC,, | Performed by: PODIATRIST

## 2024-10-07 PROCEDURE — 99203 OFFICE O/P NEW LOW 30 MIN: CPT | Mod: HCNC,S$GLB,, | Performed by: PODIATRIST

## 2024-10-07 PROCEDURE — 1101F PT FALLS ASSESS-DOCD LE1/YR: CPT | Mod: HCNC,CPTII,S$GLB, | Performed by: PODIATRIST

## 2024-10-07 PROCEDURE — 3066F NEPHROPATHY DOC TX: CPT | Mod: HCNC,CPTII,S$GLB, | Performed by: PODIATRIST

## 2024-10-07 PROCEDURE — 1159F MED LIST DOCD IN RCRD: CPT | Mod: HCNC,CPTII,S$GLB, | Performed by: PODIATRIST

## 2024-10-07 PROCEDURE — 3288F FALL RISK ASSESSMENT DOCD: CPT | Mod: HCNC,CPTII,S$GLB, | Performed by: PODIATRIST

## 2024-10-07 PROCEDURE — 1125F AMNT PAIN NOTED PAIN PRSNT: CPT | Mod: HCNC,CPTII,S$GLB, | Performed by: PODIATRIST

## 2024-10-07 PROCEDURE — 4010F ACE/ARB THERAPY RXD/TAKEN: CPT | Mod: HCNC,CPTII,S$GLB, | Performed by: PODIATRIST

## 2024-10-07 NOTE — PROCEDURES
Large Joint Aspiration/Injection: R subacromial bursa    Date/Time: 9/27/2024 8:40 AM    Performed by: Mony Garcia FNP  Authorized by: Mony Garcia FNP    Consent Done?:  Yes (Verbal)  Indications:  Pain  Timeout: prior to procedure the correct patient, procedure, and site was verified    Prep: patient was prepped and draped in usual sterile fashion      Local anesthesia used?: Yes    Local anesthetic:  Lidocaine 1% without epinephrine  Anesthetic total (ml):  5      Details:  Needle Size:  22 G  Ultrasonic Guidance for needle placement?: No    Approach:  Posterior  Location:  Shoulder  Site:  R subacromial bursa  Medications:  40 mg triamcinolone acetonide 40 mg/mL  Patient tolerance:  Patient tolerated the procedure well with no immediate complications

## 2024-10-08 NOTE — PROGRESS NOTES
Subjective:     Patient ID: Carla aDng is a 69 y.o. female.    Chief Complaint: Foot Pain    Carla is a 69 y.o. female with a past medical history of Acute pancreatitis (03/21/2016), Breast cancer (2005), Closed fracture of ramus of right pubis (06/08/2016), Colon polyp, GERD (gastroesophageal reflux disease) (05/29/2012), Gout of foot (09/03/2024), Hyperlipidemia (04/16/2013), Hypertension, Metabolic encephalopathy (04/20/2021), and Osteopenia. The patient's chief complaint consists of bilateral dorsal midfoot pain, Lt. > Rt., that has been present for the past several months.  Describes pain as sharp and exacerbated with all weight bearing.  Notes symptoms are only alleviated with rest.  Rates pain currently as a 5/10.  Denies sustaining trauma to the affected limb.  States the sites of pain appear discolored and stay moderately swollen.  She has not attempted to self treat.  Denies any additional pedal complaints.     Past Medical History:   Diagnosis Date    Acute pancreatitis 03/21/2016    Breast cancer 2005    right side with lumpectomy, chemo and radiation    Closed fracture of ramus of right pubis 06/08/2016    Colon polyp     last scope 4/2012- repeat 10 y per pt- Dr. Johnson    GERD (gastroesophageal reflux disease) 05/29/2012    Gout of foot 09/03/2024    Hyperlipidemia 04/16/2013    Hypertension     Metabolic encephalopathy 04/20/2021    Osteopenia        Past Surgical History:   Procedure Laterality Date    BREAST LUMPECTOMY  2005    DILATION AND CURETTAGE OF UTERUS      ESOPHAGEAL DILATION      ESOPHAGOGASTRODUODENOSCOPY  6/1/2012    ROTATOR CUFF REPAIR      TEAR DUCT SURGERY      right rye       Family History   Problem Relation Name Age of Onset    Stroke Brother  57    Breast cancer Mother  87       Social History     Socioeconomic History    Marital status:    Tobacco Use    Smoking status: Former     Current packs/day: 0.00     Types: Cigarettes     Quit date: 5/29/2005     Years  since quittin.3    Smokeless tobacco: Never   Substance and Sexual Activity    Alcohol use: Not Currently     Alcohol/week: 2.0 standard drinks of alcohol     Types: 2 Standard drinks or equivalent per week     Comment: prior 6 pack/week    Drug use: Yes    Sexual activity: Not Currently     Social Drivers of Health     Financial Resource Strain: Low Risk  (2023)    Overall Financial Resource Strain (CARDIA)     Difficulty of Paying Living Expenses: Not hard at all   Food Insecurity: Unknown (2024)    Received from [facility]    Hunger Vital Sign     Ran Out of Food in the Last Year: Never true   Transportation Needs: Unknown (2024)    Received from [facility]    PRAPARE - Transportation     Lack of Transportation (Medical): No   Physical Activity: Inactive (2023)    Exercise Vital Sign     Days of Exercise per Week: 0 days     Minutes of Exercise per Session: 0 min   Stress: No Stress Concern Present (2023)    [place] [place] [employer] - Occupational Stress Questionnaire     Feeling of Stress : Not at all   Housing Stability: Unknown (2023)    Housing Stability Vital Sign     Unable to Pay for Housing in the Last Year: No     Unstable Housing in the Last Year: No       Current Outpatient Medications   Medication Sig Dispense Refill    AREXVY, PF, 120 mcg/0.5 mL SusR vaccine       benazepriL (LOTENSIN) 10 MG tablet Take 1 tablet by mouth once daily 90 tablet 3    calcium carbonate (OS-NASEEM) 500 mg calcium (1,250 mg) tablet qid 120 tablet 0    cholecalciferol, vitamin D3, (VITAMIN D3) 50 mcg (2,000 unit) Cap capsule Take 1 capsule (2,000 Units total) by mouth once daily.      cyanocobalamin 1,000 mcg/mL injection Inject 1,000 mcg into the muscle every 30 days.      famotidine (PEPCID) 40 MG tablet Take 1 tablet (40 mg total) by mouth every evening. 90 tablet 1    folic acid (FOLVITE) 1 MG tablet Take 1 tablet by mouth once daily 90 tablet 3     magnesium oxide (MAG-OX) 400 mg (241.3 mg magnesium) tablet bid (Patient taking differently: Take 400 mg by mouth 2 (two) times daily. bid) 60 tablet 1    minocycline (MINOCIN,DYNACIN) 50 MG Cap Take 50 mg by mouth 2 (two) times daily.      NIFEdipine (PROCARDIA-XL) 60 MG (OSM) 24 hr tablet Take 1 tablet (60 mg total) by mouth once daily. 90 tablet 3    omeprazole (PRILOSEC) 20 MG capsule Take 1 capsule by mouth once daily 90 capsule 3    pravastatin (PRAVACHOL) 20 MG tablet Take 1 tablet by mouth once daily 90 tablet 0    sodium bicarbonate 650 MG tablet Take 650 mg by mouth 2 (two) times daily.      sulfacetamide sod-sulfur-urea 10-5-10 % Clsr Apply 1 application  topically.      ZILXI 1.5 % Foam Apply topically 2 (two) times daily.      baclofen (LIORESAL) 10 MG tablet Take 1 tablet (10 mg total) by mouth 2 (two) times daily as needed. 14 tablet 0     Current Facility-Administered Medications   Medication Dose Route Frequency Provider Last Rate Last Admin    cyanocobalamin injection 1,000 mcg  1,000 mcg Intramuscular Q30 Days    1,000 mcg at 10/03/24 1454       Review of patient's allergies indicates:   Allergen Reactions    Hydrochlorothiazide Other (See Comments)     Multiple electrolyte abnormalities        Hemoglobin A1C   Date Value Ref Range Status   04/21/2021 5.9 4.6 - 6.2 % Final       Review of Systems   Constitutional: Negative for chills and fever.   Skin:  Negative for color change and nail changes.   Musculoskeletal:  Positive for joint pain and joint swelling. Negative for muscle cramps and muscle weakness.   Gastrointestinal:  Negative for nausea and vomiting.   Neurological:  Negative for numbness and paresthesias.   Psychiatric/Behavioral:  Negative for altered mental status.         Objective:     Physical Exam  Constitutional:       Appearance: Normal appearance. She is not ill-appearing.   Cardiovascular:      Pulses:           Dorsalis pedis pulses are 2+ on the right side and 2+ on the left  side.        Posterior tibial pulses are 2+ on the right side and 2+ on the left side.      Comments: CFT is < 3 seconds bilateral.  Pedal hair growth is present bilateral.  No lower extremity edema noted bilateral.  Toes are warm to touch bilateral.    Musculoskeletal:         General: Tenderness and deformity present.      Comments: Muscle strength 5/5 in all muscle groups bilateral.  No tenderness nor crepitation with ROM of foot/ankle joints bilateral.  Palpable dorsal exostoses noted to bilateral 1st TMT joint, with the Lt. > Rt.  Bilateral pes planus foot type with eversion of bilateral heel while in RCSP.  Mild to moderate collapse of the medial longitudinal arch bilateral.     Skin:     Capillary Refill: Capillary refill takes 2 to 3 seconds.      Findings: No bruising, ecchymosis, erythema, signs of injury, laceration, lesion, petechiae, rash or wound.      Comments: Increased pedal turgor noted to bilateral midfoot.  Hyperpigmentation noted to bilateral dorsal midfoot.  Toenails x 10 appear normotrophic. Examination of the skin reveals no evidence of significant maceration, rashes, open lesions, suspicious appearing nevi or other concerning lesions.       Neurological:      General: No focal deficit present.      Mental Status: She is alert.      Sensory: No sensory deficit.      Motor: No weakness or atrophy.      Comments: Light touch is intact bilateral.             Assessment:      Encounter Diagnoses   Name Primary?    Primary osteoarthritis of left foot     Pes planus of both feet Yes    Arthritis of left midfoot      Plan:     Carla was seen today for foot pain.    Diagnoses and all orders for this visit:    Pes planus of both feet  -     ORTHOTIC DEVICE (DME)    Primary osteoarthritis of left foot  -     Ambulatory referral/consult to Podiatry    Arthritis of left midfoot  -     Ambulatory referral/consult to Physical Medicine Rehab; Future      I counseled the patient on her conditions, their  implications and medical management.    Reviewed prior x-rays of the Lt. Foot, which were consistent with arthritis of the Lt. 1st tarsometatarsal joint. There is also pes planus foot deformity on exam.    Recommend an ultrasound guided steroid injection of the 1st TMT joint.    Also, recommend addressing over pronation which resulted in the aforementioned arthritis of the midfoot.    Rx written for custom molded orthotics with a medial heel wedge.    Given information regarding the vendor who provides this service.    Patient to utilize the devices for two weeks after obtaining from the orthotist.    To follow up after this trial period to ensure the fit is accommodating and to see if further adjustments need to be made of the prescription.    RTC as mentioned.    Hari Hernandez DPM

## 2024-10-23 ENCOUNTER — TELEPHONE (OUTPATIENT)
Dept: NEPHROLOGY | Facility: CLINIC | Age: 69
End: 2024-10-23
Payer: MEDICARE

## 2024-10-23 DIAGNOSIS — N17.9 AKI (ACUTE KIDNEY INJURY): ICD-10-CM

## 2024-10-23 DIAGNOSIS — N18.30 STAGE 3 CHRONIC KIDNEY DISEASE, UNSPECIFIED WHETHER STAGE 3A OR 3B CKD: Primary | ICD-10-CM

## 2024-10-23 NOTE — TELEPHONE ENCOUNTER
Crystal with Dr. Mcmullen at [facility] was called back and given a verbal reading on her most recent Creatinine level of 1.5. she needs to know when to return to nephrology. Please advise.

## 2024-10-23 NOTE — TELEPHONE ENCOUNTER
----- Message from Hung sent at 10/23/2024  9:56 AM CDT -----  Regarding: return call  Contact: Saida with MD [facility]  Type:  Patient Returning Call    Who Called:Marichuy with MD Daniel Mcmullen  Who Left Message for Patient:nurse  Does the patient know what this is regarding?:abnormal labs/ MD needs to speak with office about  Would the patient rather a call back or a response via vozeroner? Please call  Best Call Back Number:[phone]  Additional Information:

## 2024-10-24 NOTE — TELEPHONE ENCOUNTER
[place] message was sent to patient to have labs and renal u/s. Will schedule an Evisit follow up.

## 2024-10-24 NOTE — TELEPHONE ENCOUNTER
Her kidney function has not improved--it hasn't worsened either since it changed in July. Please schuled a renal u/s and more labs then RTC or evsiist

## 2024-10-29 ENCOUNTER — PATIENT OUTREACH (OUTPATIENT)
Dept: ADMINISTRATIVE | Facility: HOSPITAL | Age: 69
End: 2024-10-29
Payer: MEDICARE

## 2024-10-29 DIAGNOSIS — N18.30 STAGE 3 CHRONIC KIDNEY DISEASE, UNSPECIFIED WHETHER STAGE 3A OR 3B CKD: Primary | ICD-10-CM

## 2024-10-31 ENCOUNTER — CLINICAL SUPPORT (OUTPATIENT)
Dept: FAMILY MEDICINE | Facility: CLINIC | Age: 69
End: 2024-10-31
Payer: MEDICARE

## 2024-10-31 DIAGNOSIS — E53.8 B12 DEFICIENCY: Primary | ICD-10-CM

## 2024-10-31 PROCEDURE — 96372 THER/PROPH/DIAG INJ SC/IM: CPT | Mod: HCNC,S$GLB,, | Performed by: FAMILY MEDICINE

## 2024-10-31 PROCEDURE — 99999 PR PBB SHADOW E&M-EST. PATIENT-LVL II: CPT | Mod: PBBFAC,HCNC,,

## 2024-10-31 RX ADMIN — CYANOCOBALAMIN 1000 MCG: 1000 INJECTION, SOLUTION INTRAMUSCULAR; SUBCUTANEOUS at 01:10

## 2024-11-02 NOTE — TELEPHONE ENCOUNTER
Care Due:                  Date            Visit Type   Department     Provider  --------------------------------------------------------------------------------                                EP -                              PRIMARY      [facility]  Last Visit: 08-      CARE ([facility])   MEDICINE       Taqueria Melendrez  Next Visit: None Scheduled  None         None Found                                                            Last  Test          Frequency    Reason                     Performed    Due Date  --------------------------------------------------------------------------------    Lipid Panel.  12 months..  pravastatin..............  08- 08-    Vitamin D...  12 months..  cholecalciferol,.........  08- 08-    [employer] Embedded Care Due Messages. Reference number: [id number].   11/01/2024 7:16:04 PM CDT

## 2024-11-04 ENCOUNTER — TELEPHONE (OUTPATIENT)
Dept: NEPHROLOGY | Facility: CLINIC | Age: 69
End: 2024-11-04
Payer: MEDICARE

## 2024-11-04 ENCOUNTER — LAB VISIT (OUTPATIENT)
Dept: LAB | Facility: HOSPITAL | Age: 69
End: 2024-11-04
Attending: INTERNAL MEDICINE
Payer: MEDICARE

## 2024-11-04 DIAGNOSIS — N18.30 STAGE 3 CHRONIC KIDNEY DISEASE, UNSPECIFIED WHETHER STAGE 3A OR 3B CKD: ICD-10-CM

## 2024-11-04 DIAGNOSIS — N17.9 AKI (ACUTE KIDNEY INJURY): ICD-10-CM

## 2024-11-04 LAB
ALBUMIN SERPL BCP-MCNC: 3.7 G/DL (ref 3.5–5.2)
ALBUMIN SERPL BCP-MCNC: 3.7 G/DL (ref 3.5–5.2)
ALP SERPL-CCNC: 83 U/L (ref 40–150)
ALT SERPL W/O P-5'-P-CCNC: 7 U/L (ref 10–44)
ANION GAP SERPL CALC-SCNC: 11 MMOL/L (ref 8–16)
AST SERPL-CCNC: 15 U/L (ref 10–40)
BASOPHILS # BLD AUTO: 0.03 K/UL (ref 0–0.2)
BASOPHILS NFR BLD: 0.5 % (ref 0–1.9)
BILIRUB DIRECT SERPL-MCNC: 0.1 MG/DL (ref 0.1–0.3)
BILIRUB SERPL-MCNC: 0.3 MG/DL (ref 0.1–1)
BUN SERPL-MCNC: 16 MG/DL (ref 8–23)
CALCIUM SERPL-MCNC: 9.1 MG/DL (ref 8.7–10.5)
CHLORIDE SERPL-SCNC: 104 MMOL/L (ref 95–110)
CO2 SERPL-SCNC: 23 MMOL/L (ref 23–29)
CREAT SERPL-MCNC: 1.3 MG/DL (ref 0.5–1.4)
DIFFERENTIAL METHOD BLD: ABNORMAL
EOSINOPHIL # BLD AUTO: 0.1 K/UL (ref 0–0.5)
EOSINOPHIL NFR BLD: 0.9 % (ref 0–8)
ERYTHROCYTE [DISTWIDTH] IN BLOOD BY AUTOMATED COUNT: 14.5 % (ref 11.5–14.5)
EST. GFR  (NO RACE VARIABLE): 44 ML/MIN/1.73 M^2
GLUCOSE SERPL-MCNC: 103 MG/DL (ref 70–110)
HBV SURFACE AG SERPL QL IA: NORMAL
HCT VFR BLD AUTO: 35.5 % (ref 37–48.5)
HCV AB SERPL QL IA: NORMAL
HGB BLD-MCNC: 11 G/DL (ref 12–16)
IMM GRANULOCYTES # BLD AUTO: 0.01 K/UL (ref 0–0.04)
IMM GRANULOCYTES NFR BLD AUTO: 0.2 % (ref 0–0.5)
LYMPHOCYTES # BLD AUTO: 1.9 K/UL (ref 1–4.8)
LYMPHOCYTES NFR BLD: 31.9 % (ref 18–48)
MCH RBC QN AUTO: 30.5 PG (ref 27–31)
MCHC RBC AUTO-ENTMCNC: 31 G/DL (ref 32–36)
MCV RBC AUTO: 98 FL (ref 82–98)
MONOCYTES # BLD AUTO: 0.4 K/UL (ref 0.3–1)
MONOCYTES NFR BLD: 7.3 % (ref 4–15)
NEUTROPHILS # BLD AUTO: 3.5 K/UL (ref 1.8–7.7)
NEUTROPHILS NFR BLD: 59.2 % (ref 38–73)
NRBC BLD-RTO: 0 /100 WBC
PHOSPHATE SERPL-MCNC: 3.3 MG/DL (ref 2.7–4.5)
PLATELET # BLD AUTO: 361 K/UL (ref 150–450)
PMV BLD AUTO: 10.1 FL (ref 9.2–12.9)
POTASSIUM SERPL-SCNC: 4.2 MMOL/L (ref 3.5–5.1)
PROT SERPL-MCNC: 7.3 G/DL (ref 6–8.4)
RBC # BLD AUTO: 3.61 M/UL (ref 4–5.4)
SODIUM SERPL-SCNC: 138 MMOL/L (ref 136–145)
URATE SERPL-MCNC: 5.5 MG/DL (ref 2.4–5.7)
WBC # BLD AUTO: 5.86 K/UL (ref 3.9–12.7)

## 2024-11-04 PROCEDURE — 87340 HEPATITIS B SURFACE AG IA: CPT | Mod: HCNC | Performed by: INTERNAL MEDICINE

## 2024-11-04 PROCEDURE — 86334 IMMUNOFIX E-PHORESIS SERUM: CPT | Mod: HCNC | Performed by: INTERNAL MEDICINE

## 2024-11-04 PROCEDURE — 80069 RENAL FUNCTION PANEL: CPT | Mod: HCNC | Performed by: INTERNAL MEDICINE

## 2024-11-04 PROCEDURE — 85025 COMPLETE CBC W/AUTO DIFF WBC: CPT | Mod: HCNC | Performed by: INTERNAL MEDICINE

## 2024-11-04 PROCEDURE — 36415 COLL VENOUS BLD VENIPUNCTURE: CPT | Mod: HCNC,PO | Performed by: INTERNAL MEDICINE

## 2024-11-04 PROCEDURE — 84550 ASSAY OF BLOOD/URIC ACID: CPT | Mod: HCNC | Performed by: INTERNAL MEDICINE

## 2024-11-04 PROCEDURE — 84460 ALANINE AMINO (ALT) (SGPT): CPT | Mod: HCNC | Performed by: INTERNAL MEDICINE

## 2024-11-04 PROCEDURE — 86803 HEPATITIS C AB TEST: CPT | Mod: HCNC | Performed by: INTERNAL MEDICINE

## 2024-11-04 PROCEDURE — 86038 ANTINUCLEAR ANTIBODIES: CPT | Mod: HCNC | Performed by: INTERNAL MEDICINE

## 2024-11-04 PROCEDURE — 84165 PROTEIN E-PHORESIS SERUM: CPT | Mod: HCNC | Performed by: INTERNAL MEDICINE

## 2024-11-04 PROCEDURE — 84165 PROTEIN E-PHORESIS SERUM: CPT | Mod: 26,HCNC,, | Performed by: PATHOLOGY

## 2024-11-04 PROCEDURE — 86334 IMMUNOFIX E-PHORESIS SERUM: CPT | Mod: 26,HCNC,, | Performed by: PATHOLOGY

## 2024-11-04 RX ORDER — FOLIC ACID 1 MG/1
TABLET ORAL
Qty: 90 TABLET | Refills: 3 | Status: SHIPPED | OUTPATIENT
Start: 2024-11-04

## 2024-11-04 NOTE — TELEPHONE ENCOUNTER
----- Message from Kellie sent at 11/4/2024 11:03 AM CST -----  Contact: [phone]  [email or web address]     Patient is calling for Medical Advice regarding:appt 11/7    Patient wants a call back or thru [facility]:Call back     Comments:Pt would like a call back due to appt on 11/7. Pt would like a call back from nurse     Please advise patient replies from provider may take up to 48 hours.

## 2024-11-04 NOTE — TELEPHONE ENCOUNTER
Refill Routing Note   Medication(s) are not appropriate for processing by [facility] for the following reason(s):        Outside of protocol    [facility] action(s):  Route     Requires labs : Yes             Appointments  past 12m or future 3m with PCP    Date Provider   Last Visit   8/27/2024 Taqueria Melendrez MD   Next Visit   Visit date not found Taqueria Melendrez MD   ED visits in past 90 days: 0        Note composed:7:32 AM 11/04/2024

## 2024-11-05 LAB
ALBUMIN SERPL ELPH-MCNC: 3.96 G/DL (ref 3.35–5.55)
ALPHA1 GLOB SERPL ELPH-MCNC: 0.29 G/DL (ref 0.17–0.41)
ALPHA2 GLOB SERPL ELPH-MCNC: 0.73 G/DL (ref 0.43–0.99)
ANA SER QL IF: NORMAL
B-GLOBULIN SERPL ELPH-MCNC: 0.88 G/DL (ref 0.5–1.1)
GAMMA GLOB SERPL ELPH-MCNC: 0.95 G/DL (ref 0.67–1.58)
INTERPRETATION SERPL IFE-IMP: NORMAL
PATHOLOGIST INTERPRETATION IFE: NORMAL
PATHOLOGIST INTERPRETATION SPE: NORMAL
PROT SERPL-MCNC: 6.8 G/DL (ref 6–8.4)

## 2024-11-08 ENCOUNTER — OFFICE VISIT (OUTPATIENT)
Dept: ORTHOPEDICS | Facility: CLINIC | Age: 69
End: 2024-11-08
Payer: MEDICARE

## 2024-11-08 VITALS — BODY MASS INDEX: 24.06 KG/M2 | HEIGHT: 66 IN | WEIGHT: 149.69 LBS

## 2024-11-08 DIAGNOSIS — M75.41 IMPINGEMENT SYNDROME OF RIGHT SHOULDER: Primary | ICD-10-CM

## 2024-11-08 PROCEDURE — 99999 PR PBB SHADOW E&M-EST. PATIENT-LVL III: CPT | Mod: PBBFAC,HCNC,, | Performed by: NURSE PRACTITIONER

## 2024-11-08 RX ORDER — TRIAMCINOLONE ACETONIDE 40 MG/ML
40 INJECTION, SUSPENSION INTRA-ARTICULAR; INTRAMUSCULAR
Status: DISCONTINUED | OUTPATIENT
Start: 2024-11-08 | End: 2024-11-08 | Stop reason: HOSPADM

## 2024-11-08 RX ADMIN — TRIAMCINOLONE ACETONIDE 40 MG: 40 INJECTION, SUSPENSION INTRA-ARTICULAR; INTRAMUSCULAR at 08:11

## 2024-11-08 NOTE — PROCEDURES
Large Joint Aspiration/Injection: R subacromial bursa    Date/Time: 11/8/2024 8:40 AM    Performed by: Mony Garcia FNP  Authorized by: Mony Garcia FNP    Consent Done?:  Yes (Verbal)  Indications:  Pain  Timeout: prior to procedure the correct patient, procedure, and site was verified    Prep: patient was prepped and draped in usual sterile fashion      Local anesthesia used?: Yes    Local anesthetic:  Lidocaine 1% without epinephrine  Anesthetic total (ml):  5      Details:  Needle Size:  22 G  Ultrasonic Guidance for needle placement?: No    Approach:  Posterior  Location:  Shoulder  Site:  R subacromial bursa  Medications:  40 mg triamcinolone acetonide 40 mg/mL  Patient tolerance:  Patient tolerated the procedure well with no immediate complications

## 2024-11-08 NOTE — PROGRESS NOTES
Chief Complaint   Patient presents with    Right Shoulder - Pain       HPI:   This is a 69 y.o. who returns to clinic today in follow-up for right shoulder pain for the past few days. Here for another right shoulder injection. Pain is aching. No numbness or tingling. No associated signs or symptoms.    Past Medical History:   Diagnosis Date    Acute pancreatitis 03/21/2016    Breast cancer 2005    right side with lumpectomy, chemo and radiation    Closed fracture of ramus of right pubis 06/08/2016    Colon polyp     last scope 4/2012- repeat 10 y per pt- Dr. Johnson    GERD (gastroesophageal reflux disease) 05/29/2012    Gout of foot 09/03/2024    Hyperlipidemia 04/16/2013    Hypertension     Metabolic encephalopathy 04/20/2021    Osteopenia      Past Surgical History:   Procedure Laterality Date    BREAST LUMPECTOMY  2005    DILATION AND CURETTAGE OF UTERUS      ESOPHAGEAL DILATION      ESOPHAGOGASTRODUODENOSCOPY  6/1/2012    ROTATOR CUFF REPAIR      TEAR DUCT SURGERY      right rye     Current Outpatient Medications on File Prior to Visit   Medication Sig Dispense Refill    AREXVY, PF, 120 mcg/0.5 mL SusR vaccine       benazepriL (LOTENSIN) 10 MG tablet Take 1 tablet by mouth once daily 90 tablet 3    calcium carbonate (OS-NASEEM) 500 mg calcium (1,250 mg) tablet qid 120 tablet 0    cholecalciferol, vitamin D3, (VITAMIN D3) 50 mcg (2,000 unit) Cap capsule Take 1 capsule (2,000 Units total) by mouth once daily.      cyanocobalamin 1,000 mcg/mL injection Inject 1,000 mcg into the muscle every 30 days.      famotidine (PEPCID) 40 MG tablet Take 1 tablet (40 mg total) by mouth every evening. 90 tablet 1    folic acid (FOLVITE) 1 MG tablet Take 1 tablet by mouth once daily 90 tablet 3    magnesium oxide (MAG-OX) 400 mg (241.3 mg magnesium) tablet bid (Patient taking differently: Take 400 mg by mouth 2 (two) times daily. bid) 60 tablet 1    minocycline (MINOCIN,DYNACIN) 50 MG Cap Take 50 mg by mouth 2 (two) times daily.       NIFEdipine (PROCARDIA-XL) 60 MG (OSM) 24 hr tablet Take 1 tablet (60 mg total) by mouth once daily. 90 tablet 3    omeprazole (PRILOSEC) 20 MG capsule Take 1 capsule by mouth once daily 90 capsule 3    pravastatin (PRAVACHOL) 20 MG tablet Take 1 tablet by mouth once daily 90 tablet 0    sodium bicarbonate 650 MG tablet Take 650 mg by mouth 2 (two) times daily.      sulfacetamide sod-sulfur-urea 10-5-10 % Clsr Apply 1 application  topically.      ZILXI 1.5 % Foam Apply topically 2 (two) times daily.      baclofen (LIORESAL) 10 MG tablet Take 1 tablet (10 mg total) by mouth 2 (two) times daily as needed. 14 tablet 0     Current Facility-Administered Medications on File Prior to Visit   Medication Dose Route Frequency Provider Last Rate Last Admin    cyanocobalamin injection 1,000 mcg  1,000 mcg Intramuscular Q30 Days    1,000 mcg at 10/31/24 1348     Review of patient's allergies indicates:   Allergen Reactions    Hydrochlorothiazide Other (See Comments)     Multiple electrolyte abnormalities     Family History   Problem Relation Name Age of Onset    Stroke Brother  57    Breast cancer Mother  87     Social History     Socioeconomic History    Marital status:    Tobacco Use    Smoking status: Former     Current packs/day: 0.00     Types: Cigarettes     Quit date: 2005     Years since quittin.4    Smokeless tobacco: Never   Substance and Sexual Activity    Alcohol use: Not Currently     Alcohol/week: 2.0 standard drinks of alcohol     Types: 2 Standard drinks or equivalent per week     Comment: prior 6 pack/week    Drug use: Yes    Sexual activity: Not Currently     Social Drivers of Health     Financial Resource Strain: Low Risk  (2023)    Overall Financial Resource Strain (CARDIA)     Difficulty of Paying Living Expenses: Not hard at all   Food Insecurity: Unknown (2024)    Received from [facility]    Hunger Vital Sign     Ran Out of Food in the Last Year: Never true    Transportation Needs: Unknown (7/12/2024)    Received from [facility]    PRAPARE - Transportation     Lack of Transportation (Medical): No   Physical Activity: Inactive (5/8/2023)    Exercise Vital Sign     Days of Exercise per Week: 0 days     Minutes of Exercise per Session: 0 min   Stress: No Stress Concern Present (5/8/2023)    [employer] [place] [employer] - Occupational Stress Questionnaire     Feeling of Stress : Not at all   Housing Stability: Unknown (5/8/2023)    Housing Stability Vital Sign     Unable to Pay for Housing in the Last Year: No     Unstable Housing in the Last Year: No       Review of Systems:  Constitutional:  Denies fever or chills   Eyes:  Denies change in visual acuity   HENT:  Denies nasal congestion or sore throat   Respiratory:  Denies cough or shortness of breath   Cardiovascular:  Denies chest pain or edema   GI:  Denies abdominal pain, nausea, vomiting, bloody stools or diarrhea   :  Denies dysuria   Integument:  Denies rash   Neurologic:  Denies headache, focal weakness or sensory changes   Endocrine:  Denies polyuria or polydipsia   Lymphatic:  Denies swollen glands   Psychiatric:  Denies depression or anxiety     Physical Exam:   Constitutional:  Well developed, well nourished, no acute distress, non-toxic appearance   Integument:  Well hydrated  Neurologic:  Alert & oriented x 3    Psychiatric:  Speech and behavior appropriate     Bilateral Shoulder Exam    Left Shoulder Exam   Shoulder exam performed same as contralateral side and is normal.    Right Shoulder Exam   Tenderness   Shoulder tenderness location: diffusely about shoulder.    Range of Motion   Forward Flexion: abnormal   External Rotation: abnormal     Muscle Strength   Supraspinatus: 4/5     Tests   Hawkin's test: positive  Impingement: positive    Other   Erythema: absent  Sensation: normal  Pulse: present           Impingement syndrome of right shoulder  -     Large Joint  Aspiration/Injection: R subacromial bursa  -     triamcinolone acetonide injection 40 mg         Using an aseptic technique, I injected 5 cc of lidocaine 1% without and 1 cc of kenalog 40mg into the right shoulder. The patient tolerated this well. I will have them return to clinic in 8 weeks.

## 2024-11-12 ENCOUNTER — HOSPITAL ENCOUNTER (OUTPATIENT)
Dept: RADIOLOGY | Facility: HOSPITAL | Age: 69
Discharge: HOME | End: 2024-11-12
Attending: INTERNAL MEDICINE
Payer: MEDICARE

## 2024-11-12 DIAGNOSIS — N18.30 STAGE 3 CHRONIC KIDNEY DISEASE, UNSPECIFIED WHETHER STAGE 3A OR 3B CKD: ICD-10-CM

## 2024-11-12 DIAGNOSIS — N17.9 AKI (ACUTE KIDNEY INJURY): ICD-10-CM

## 2024-11-12 PROCEDURE — 76770 US EXAM ABDO BACK WALL COMP: CPT | Mod: TC,HCNC,PO

## 2024-11-12 PROCEDURE — 76770 US EXAM ABDO BACK WALL COMP: CPT | Mod: 26,HCNC,, | Performed by: RADIOLOGY

## 2024-11-13 ENCOUNTER — OFFICE VISIT (OUTPATIENT)
Dept: HOME HEALTH SERVICES | Facility: CLINIC | Age: 69
End: 2024-11-13
Payer: MEDICARE

## 2024-11-13 VITALS
HEIGHT: 65 IN | HEART RATE: 84 BPM | SYSTOLIC BLOOD PRESSURE: 108 MMHG | WEIGHT: 149 LBS | DIASTOLIC BLOOD PRESSURE: 60 MMHG | BODY MASS INDEX: 24.83 KG/M2

## 2024-11-13 DIAGNOSIS — E78.5 HYPERLIPIDEMIA, UNSPECIFIED HYPERLIPIDEMIA TYPE: ICD-10-CM

## 2024-11-13 DIAGNOSIS — M85.80 OSTEOPENIA, UNSPECIFIED LOCATION: ICD-10-CM

## 2024-11-13 DIAGNOSIS — I10 PRIMARY HYPERTENSION: ICD-10-CM

## 2024-11-13 DIAGNOSIS — N18.32 STAGE 3B CHRONIC KIDNEY DISEASE: ICD-10-CM

## 2024-11-13 DIAGNOSIS — Z85.3 HISTORY OF RIGHT BREAST CANCER: ICD-10-CM

## 2024-11-13 DIAGNOSIS — N18.32 ANEMIA IN STAGE 3B CHRONIC KIDNEY DISEASE: ICD-10-CM

## 2024-11-13 DIAGNOSIS — K21.9 GASTROESOPHAGEAL REFLUX DISEASE, UNSPECIFIED WHETHER ESOPHAGITIS PRESENT: ICD-10-CM

## 2024-11-13 DIAGNOSIS — Z00.00 ENCOUNTER FOR MEDICARE ANNUAL WELLNESS EXAM: Primary | ICD-10-CM

## 2024-11-13 DIAGNOSIS — E55.9 VITAMIN D DEFICIENCY: ICD-10-CM

## 2024-11-13 DIAGNOSIS — N25.81 SECONDARY HYPERPARATHYROIDISM: ICD-10-CM

## 2024-11-13 DIAGNOSIS — D63.1 ANEMIA IN STAGE 3B CHRONIC KIDNEY DISEASE: ICD-10-CM

## 2024-11-13 DIAGNOSIS — Z00.00 ENCOUNTER FOR PREVENTIVE HEALTH EXAMINATION: ICD-10-CM

## 2024-11-13 PROCEDURE — 3074F SYST BP LT 130 MM HG: CPT | Mod: CPTII,S$GLB,, | Performed by: NURSE PRACTITIONER

## 2024-11-13 PROCEDURE — 4010F ACE/ARB THERAPY RXD/TAKEN: CPT | Mod: CPTII,S$GLB,, | Performed by: NURSE PRACTITIONER

## 2024-11-13 PROCEDURE — 3066F NEPHROPATHY DOC TX: CPT | Mod: CPTII,S$GLB,, | Performed by: NURSE PRACTITIONER

## 2024-11-13 PROCEDURE — 3078F DIAST BP <80 MM HG: CPT | Mod: CPTII,S$GLB,, | Performed by: NURSE PRACTITIONER

## 2024-11-13 PROCEDURE — 3061F NEG MICROALBUMINURIA REV: CPT | Mod: CPTII,S$GLB,, | Performed by: NURSE PRACTITIONER

## 2024-11-13 PROCEDURE — 1101F PT FALLS ASSESS-DOCD LE1/YR: CPT | Mod: CPTII,S$GLB,, | Performed by: NURSE PRACTITIONER

## 2024-11-13 PROCEDURE — G0439 PPPS, SUBSEQ VISIT: HCPCS | Mod: S$GLB,,, | Performed by: NURSE PRACTITIONER

## 2024-11-13 PROCEDURE — 1126F AMNT PAIN NOTED NONE PRSNT: CPT | Mod: CPTII,S$GLB,, | Performed by: NURSE PRACTITIONER

## 2024-11-13 PROCEDURE — 1160F RVW MEDS BY RX/DR IN RCRD: CPT | Mod: CPTII,S$GLB,, | Performed by: NURSE PRACTITIONER

## 2024-11-13 PROCEDURE — 1158F ADVNC CARE PLAN TLK DOCD: CPT | Mod: CPTII,S$GLB,, | Performed by: NURSE PRACTITIONER

## 2024-11-13 PROCEDURE — 3288F FALL RISK ASSESSMENT DOCD: CPT | Mod: CPTII,S$GLB,, | Performed by: NURSE PRACTITIONER

## 2024-11-13 PROCEDURE — 1159F MED LIST DOCD IN RCRD: CPT | Mod: CPTII,S$GLB,, | Performed by: NURSE PRACTITIONER

## 2024-11-17 PROBLEM — D63.1 ANEMIA IN STAGE 3A CHRONIC KIDNEY DISEASE: Status: RESOLVED | Noted: 2024-03-14 | Resolved: 2024-11-17

## 2024-11-17 PROBLEM — N18.31 ANEMIA IN STAGE 3A CHRONIC KIDNEY DISEASE: Status: RESOLVED | Noted: 2024-03-14 | Resolved: 2024-11-17

## 2024-11-17 PROBLEM — E21.3 HYPERPARATHYROIDISM: Status: RESOLVED | Noted: 2019-02-06 | Resolved: 2024-11-17

## 2024-11-17 PROBLEM — N63.25 MASS OVERLAPPING MULTIPLE QUADRANTS OF LEFT BREAST: Status: RESOLVED | Noted: 2020-10-13 | Resolved: 2024-11-17

## 2024-11-17 PROBLEM — N63.15 MASS OVERLAPPING MULTIPLE QUADRANTS OF RIGHT BREAST: Status: RESOLVED | Noted: 2020-10-13 | Resolved: 2024-11-17

## 2024-11-17 PROBLEM — E87.1 HYPONATREMIA: Status: RESOLVED | Noted: 2021-04-20 | Resolved: 2024-11-17

## 2024-11-17 PROBLEM — E83.51 HYPOCALCEMIA: Status: RESOLVED | Noted: 2019-02-06 | Resolved: 2024-11-17

## 2024-11-17 PROBLEM — N18.32 STAGE 3B CHRONIC KIDNEY DISEASE: Status: ACTIVE | Noted: 2022-03-30

## 2024-11-17 PROBLEM — I70.1 RENAL ARTERY STENOSIS, NATIVE: Status: RESOLVED | Noted: 2023-05-08 | Resolved: 2024-11-17

## 2024-11-17 NOTE — PATIENT INSTRUCTIONS
Counseling and Referral of Other Preventative  (Italic type indicates deductible and co-insurance are waived)    Patient Name: Carla Dang  Today's Date: 11/17/2024    Health Maintenance       Date Due Completion Date    Aspirin/Antiplatelet Therapy Never done ---    High Dose Statin Never done ---    Lipid Panel 08/30/2024 8/30/2023    COVID-19 Vaccine (7 - 2024-25 season) 09/01/2024 1/26/2024    Mammogram 11/13/2024 11/13/2023    DEXA Scan 09/16/2025 9/16/2022    Annual UACr 11/04/2025 11/4/2024    Colorectal Cancer Screening 02/15/2032 2/15/2022    TETANUS VACCINE 09/01/2033 9/1/2023        No orders of the defined types were placed in this encounter.    The following information is provided to all patients.  This information is to help you find resources for any of the problems found today that may be affecting your health:                  Living healthy guide: www.[email or web address].[email or web address].[facility]      Understanding Diabetes: www.diabetes.org      Eating healthy: www.cdc.gov/healthyweight      [facility] home safety checklist: www.cdc.gov/steadi/patient.html      Agency on Aging: www.goea.[place].gov      Alcoholics anonymous (AA): www.aa.org      Physical Activity: www.sarina.nih.gov/kt8dqua      Tobacco use: www.quitwithusla.org

## 2024-11-17 NOTE — PROGRESS NOTES
"  Carla Dang presented for a follow-up Medicare AWV today. The following components were reviewed and updated:    Medical history  Family History  Social history  Allergies and Current Medications  Health Risk Assessment  Health Maintenance  Care Team    **See Completed Assessments for Annual Wellness visit with in the encounter summary    The following assessments were completed:  Depression Screening  Cognitive function Screening  Timed Get Up Test  Whisper Test      Opioid documentation:      Patient does not have a current opioid prescription.          Vitals:    11/13/24 0925   BP: 108/60   Pulse: 84   Weight: 67.6 kg (149 lb)   Height: 5' 5" (1.651 m)     Body mass index is 24.79 kg/m².       Physical Exam  Constitutional:       Appearance: Normal appearance.   HENT:      Head: Normocephalic and atraumatic.      Nose: Nose normal.      Mouth/Throat:      Mouth: Mucous membranes are moist.   Eyes:      Extraocular Movements: Extraocular movements intact.      Pupils: Pupils are equal, round, and reactive to light.   Cardiovascular:      Rate and Rhythm: Normal rate and regular rhythm.   Pulmonary:      Effort: Pulmonary effort is normal.      Breath sounds: Normal breath sounds.   Abdominal:      General: Bowel sounds are normal.      Palpations: Abdomen is soft.   Musculoskeletal:         General: Normal range of motion.      Cervical back: Normal range of motion and neck supple.   Skin:     General: Skin is warm and dry.   Neurological:      General: No focal deficit present.      Mental Status: She is alert and oriented to person, place, and time.   Psychiatric:         Mood and Affect: Mood normal.         Behavior: Behavior normal.           Diagnoses and health risks identified today and associated recommendations/orders:  1. Encounter for Medicare annual wellness exam  - Ambulatory Referral/Consult to Enhanced Annual Wellness Visit (eAWV)    2. Encounter for preventive health examination  Awv " completed      3. Primary hypertension  On meds - BP controlled      4. Hyperlipidemia, unspecified hyperlipidemia type  On statin      5. Stage 3b chronic kidney disease  Monitored with renal       6. Anemia in stage 3b chronic kidney disease  Monitored with renal   stable    7. History of right breast cancer  Followed with mammograms   No recurrence      8. Vitamin D deficiency  Chronic and stable. Continue current treatment. Follow with PCP.  On replacement      9. Secondary hyperparathyroidism  Followed with renal      10. Gastroesophageal reflux disease, unspecified whether esophagitis present  On PPI      11. Osteopenia, unspecified location  On vit d replacment        Provided Carla with a 5-10 year written screening schedule and personal prevention plan. Recommendations were developed using the USPSTF age appropriate recommendations. Education, counseling, and referrals were provided as needed.  After Visit Summary printed and given to patient which includes a list of additional screenings\tests needed.    Fu in 1 yr for carlie Morgan, JED, APRN

## 2024-11-22 ENCOUNTER — PATIENT MESSAGE (OUTPATIENT)
Dept: FAMILY MEDICINE | Facility: CLINIC | Age: 69
End: 2024-11-22
Payer: MEDICARE

## 2024-11-26 ENCOUNTER — OFFICE VISIT (OUTPATIENT)
Dept: PHYSICAL MEDICINE AND REHAB | Facility: CLINIC | Age: 69
End: 2024-11-26
Payer: MEDICARE

## 2024-11-26 VITALS — DIASTOLIC BLOOD PRESSURE: 69 MMHG | SYSTOLIC BLOOD PRESSURE: 125 MMHG | HEART RATE: 84 BPM

## 2024-11-26 DIAGNOSIS — M19.072 ARTHRITIS OF LEFT MIDFOOT: ICD-10-CM

## 2024-11-26 DIAGNOSIS — M79.672 CHRONIC FOOT PAIN, LEFT: Primary | ICD-10-CM

## 2024-11-26 DIAGNOSIS — G89.29 CHRONIC FOOT PAIN, LEFT: Primary | ICD-10-CM

## 2024-11-26 PROCEDURE — 99999 PR PBB SHADOW E&M-EST. PATIENT-LVL III: CPT | Mod: PBBFAC,HCNC,, | Performed by: PHYSICAL MEDICINE & REHABILITATION

## 2024-11-26 RX ORDER — LIDOCAINE HYDROCHLORIDE 10 MG/ML
0.5 INJECTION, SOLUTION INFILTRATION; PERINEURAL
Status: DISCONTINUED | OUTPATIENT
Start: 2024-11-26 | End: 2024-11-26 | Stop reason: HOSPADM

## 2024-11-26 RX ORDER — TRIAMCINOLONE ACETONIDE 40 MG/ML
40 INJECTION, SUSPENSION INTRA-ARTICULAR; INTRAMUSCULAR
Status: DISCONTINUED | OUTPATIENT
Start: 2024-11-26 | End: 2024-11-26 | Stop reason: HOSPADM

## 2024-11-26 RX ADMIN — LIDOCAINE HYDROCHLORIDE 0.5 ML: 10 INJECTION, SOLUTION INFILTRATION; PERINEURAL at 02:11

## 2024-11-26 RX ADMIN — TRIAMCINOLONE ACETONIDE 40 MG: 40 INJECTION, SUSPENSION INTRA-ARTICULAR; INTRAMUSCULAR at 02:11

## 2024-11-26 NOTE — PROGRESS NOTES
[facility]    Consulting Provider: Dr. Hari Hernandez    CHIEF COMPLAINT:   Chief Complaint   Patient presents with    Arthritis     Left Midfoot        HISTORY OF PRESENT ILLNESS: Carla Dang is a 69 y.o. female who presents to me for evaluation and management of left foot pain.     Today reports, chronic left foot pain without noted traumatic event. Pain worse with ambulation and standing. Noted swelling to top of foot.     No hx of injections.     Review of Systems   Constitutional: Negative for fever.   HENT: Negative for drooling.    Eyes: Negative for discharge.   Respiratory: Negative for choking.    Cardiovascular: Negative for chest pain.   Genitourinary: Negative for flank pain.   Skin: Negative for wound.   Allergic/Immunologic: Negative for immunocompromised state.   Neurological: Negative for tremors and syncope.   Psychiatric/Behavioral: Negative for behavioral problems.     Past Medical History:   Past Medical History:   Diagnosis Date    Acute pancreatitis 03/21/2016    Breast cancer 2005    right side with lumpectomy, chemo and radiation    Closed fracture of ramus of right pubis 06/08/2016    Colon polyp     last scope 4/2012- repeat 10 y per pt- Dr. Johnson    GERD (gastroesophageal reflux disease) 05/29/2012    Gout of foot 09/03/2024    Hyperlipidemia 04/16/2013    Hypertension     Metabolic encephalopathy 04/20/2021    Osteopenia        Past Surgical History:   Past Surgical History:   Procedure Laterality Date    BREAST LUMPECTOMY  2005    DILATION AND CURETTAGE OF UTERUS      ESOPHAGEAL DILATION      ESOPHAGOGASTRODUODENOSCOPY  6/1/2012    ROTATOR CUFF REPAIR      TEAR DUCT SURGERY      right rye       Family History:   Family History   Problem Relation Name Age of Onset    Stroke Brother  57    Breast cancer Mother  87       Medications:   Current Outpatient Medications on File Prior to Visit   Medication Sig Dispense Refill    benazepriL  (LOTENSIN) 10 MG tablet Take 1 tablet by mouth once daily 90 tablet 3    calcium carbonate (OS-NASEEM) 500 mg calcium (1,250 mg) tablet qid 120 tablet 0    cholecalciferol, vitamin D3, (VITAMIN D3) 50 mcg (2,000 unit) Cap capsule Take 1 capsule (2,000 Units total) by mouth once daily.      cyanocobalamin 1,000 mcg/mL injection Inject 1,000 mcg into the muscle every 30 days.      folic acid (FOLVITE) 1 MG tablet Take 1 tablet by mouth once daily 90 tablet 3    magnesium oxide (MAG-OX) 400 mg (241.3 mg magnesium) tablet bid 60 tablet 1    minocycline (MINOCIN,DYNACIN) 50 MG Cap Take 50 mg by mouth 2 (two) times daily.      NIFEdipine (PROCARDIA-XL) 60 MG (OSM) 24 hr tablet Take 1 tablet (60 mg total) by mouth once daily. 90 tablet 3    omeprazole (PRILOSEC) 20 MG capsule Take 1 capsule by mouth once daily 90 capsule 3    pravastatin (PRAVACHOL) 20 MG tablet Take 1 tablet by mouth once daily 90 tablet 0    sulfacetamide sod-sulfur-urea 10-5-10 % Clsr Apply 1 application  topically.      ZILXI 1.5 % Foam Apply topically 2 (two) times daily.       Current Facility-Administered Medications on File Prior to Visit   Medication Dose Route Frequency Provider Last Rate Last Admin    cyanocobalamin injection 1,000 mcg  1,000 mcg Intramuscular Q30 Days    1,000 mcg at 10/31/24 1348       Allergies:   Review of patient's allergies indicates:   Allergen Reactions    Hydrochlorothiazide Other (See Comments)     Multiple electrolyte abnormalities       Social History:   Social History     Socioeconomic History    Marital status:    Tobacco Use    Smoking status: Former     Current packs/day: 0.00     Types: Cigarettes     Quit date: 2005     Years since quittin.5    Smokeless tobacco: Never   Substance and Sexual Activity    Alcohol use: Not Currently     Alcohol/week: 2.0 standard drinks of alcohol     Types: 2 Standard drinks or equivalent per week     Comment: prior 6 pack/week    Drug use: Yes    Sexual activity:  Not Currently     Social Drivers of Health     Financial Resource Strain: Low Risk  (11/13/2024)    Overall Financial Resource Strain (CARDIA)     Difficulty of Paying Living Expenses: Not hard at all   Food Insecurity: No Food Insecurity (11/13/2024)    Hunger Vital Sign     Worried About Running Out of Food in the Last Year: Never true     Ran Out of Food in the Last Year: Never true   Transportation Needs: No Transportation Needs (11/13/2024)    PRAPARE - Transportation     Lack of Transportation (Medical): No     Lack of Transportation (Non-Medical): No   Physical Activity: Inactive (11/13/2024)    Exercise Vital Sign     Days of Exercise per Week: 0 days     Minutes of Exercise per Session: 0 min   Stress: No Stress Concern Present (11/13/2024)    [place] [employer] - Occupational Stress Questionnaire     Feeling of Stress : Not at all   Housing Stability: Low Risk  (11/13/2024)    Housing Stability Vital Sign     Unable to Pay for Housing in the Last Year: No     Homeless in the Last Year: No       PHYSICAL EXAMINATION:   Vitals:    11/26/24 1353   BP: 125/69   BP Location: Right arm   Patient Position: Sitting   Pulse: 84     Constitutional: No apparent distress. Pleasant.  HENT: Trachea midline.  Head: Normocephalic and atraumatic.   Eyes: Right eye exhibits no discharge. Left eye exhibits no discharge. No scleral icterus.   CV: Well perfused.   Pulmonary/Chest: Effort normal. No respiratory distress.   Abdominal: There is no guarding.   Neurological: Awake, alert and cooperative.  SKIN: Intact no apparent lesions, cut, ulcers or abrasions  EXT:  No cyanosis, clubbing, or edema.  SENSORY: Intact to light touch in the bilateral lower extemities.  MUSCULOSKELETAL:   Muscle Strength:(0-5)                             Right     Left  Hip Flexors                5  5  Hip Abductor              5  5  Hip Adductor              5  5  Knee Extensors          5  5  Knee Flexors               5  5  Ankle Dorsiflex           5  5  Ankle Planterflex        5  5  EHL                            5  5    Reflexes: 0-4+/4   Right     Left  Patellar(L4)  2+  2+  Ankle(S1)  2+  2+    INSPECTION:         Right     Left   Localized/Generalized swelling:   -  -  Muscle contours normal and symmetrical: +  +  Ecchymoses:      -  -  Erythema:      -  -  Gross deformity:    -  -    ANKLE/FOOT DEFORMITY:            Right      Left  Normal:          +  +  Pes planus:  -  -  Pes cavus:   -  -    RANGE OF MOTION:           Right      Left  Plantarflexion:  Full  Full   Dorsiflexion:  Full  Full  Inversion:   Full  Full  Eversion:   Full  Full  Other:     TENDERNESS:                 Right      Left  Medial malleolus:      -  -  Lateral malleolus: -  -  Calcaneus:  -  -  Other: left midfoot    SOFT TISSUE PALPATION:      Right  Left   Effusion:  -  -  Other:        SPECIAL TESTS:         Right     Left  Anterior drawer: -  -  Posterior drawer: -  -  Inversion tilt:  -  -  Squeeze test:  -  +  Clonus:  -  -  Babinski down going bilaterally    IMAGING:  XR left foot from 07/11/2024 with noted diffuse midfoot OA changes.     Data Reviewed: X-ray  Supportive Actions: Independent visualization of images or test specimens.    ASSESSMENT:   1. Chronic foot pain, left    2. Arthritis of left midfoot        PLAN:   1. Time was spent reviewing the above diagnosis in depth with Carla today, including acute management and rehabilitation.     2. We discussed options for management of her pain would be to consider injection of short acting steroid. The use, benefits, risks, and expectations of all of these types of injections was discussed at length with her today. At this time, she elects to proceed with ultrasound-guided left  2/3 TMT joint  steroid injection(s). This procedure was completed today in clinic. Please see procedure note for further details. We can repeat this every 3 months if appropriate.     RTC as needed.     This is  "a consult from Dr. Hari Hernandez. Please see the "Communications" section of Epic to see how the consulting physician received the report of today's findings and recommendations. If it's an [facility] provider, it will be forwarded to his/her "in basket".    45 minutes of total time spent on the encounter, which includes face to face time and non-face to face time preparing to see the patient (eg, review of tests), obtaining and/or reviewing separately obtained history, documenting clinical information in the electronic or other health record, independently interpreting results (not separately reported), communicating results to the patient/family/caregiver, and/or care coordination (not separately reported).   "

## 2024-11-26 NOTE — PROCEDURES
Small Joint Aspiration/Injection: L intertarsal 2/3 TMT    Date/Time: 11/26/2024 2:00 PM    Performed by: Lilia Day, DO  Authorized by: Lilia Day, DO    Consent Done?:  Yes (Verbal)  Indications:  Arthritis  Site marked: the procedure site was marked    Timeout: prior to procedure the correct patient, procedure, and site was verified    Prep: patient was prepped and draped in usual sterile fashion      Local anesthesia used?: Yes    Anesthesia:  Local infiltration  Local anesthetic:  Lidocaine 1% without epinephrine  Anesthetic total (ml):  1    Location:  Foot  Site:  L intertarsal (Left 2/3 TMT)  Ultrasonic guidance for needle placement?: Yes    Needle size:  27 G  Approach:  Dorsal  Medications:  0.5 mL LIDOcaine HCL 10 mg/ml (1%) 10 mg/mL (1 %); 40 mg triamcinolone acetonide 40 mg/mL    Ultrasound guidance was used for correct needle placement, the images were saved and will be uploaded to EMR.

## 2024-12-02 RX ORDER — FAMOTIDINE 40 MG/1
40 TABLET, FILM COATED ORAL NIGHTLY
Qty: 90 TABLET | Refills: 2 | Status: SHIPPED | OUTPATIENT
Start: 2024-12-02 | End: 2025-08-29

## 2024-12-02 NOTE — TELEPHONE ENCOUNTER
No care due was identified.  Health [facility] Embedded Care Due Messages. Reference number: [id number].   12/02/2024 1:19:24 PM CST

## 2024-12-02 NOTE — TELEPHONE ENCOUNTER
Pt informed NP removed medication at [facility], reports patient not taking.  Patient said she has been taking this medication (famotidine) and did not ask her to remove it, please resend

## 2024-12-02 NOTE — TELEPHONE ENCOUNTER
----- Message from Jacqueline sent at 12/2/2024 12:40 PM CST -----  Type:  RX Refill Request    Who Called: pt  Refill or New Rx:Refill  RX Name and Strength:famotidine  How is the patient currently taking it? (ex. 1XDay):1XDay  Is this a 30 day or 90 day RX:90  Preferred Pharmacy with phone number:.  [facility] [address] [place], [place] - [address] [facility]  [address] [address]  Phone: [phone] Fax: [phone]  Local or Mail Order:local  Ordering Provider:Dr Melendrez  Would the patient rather a call back or a response via [facility]? call  Best Call Back Number:[phone]  Additional Information: States the pharmacy told her that we canceled her prescription.     Thank you

## 2024-12-05 ENCOUNTER — CLINICAL SUPPORT (OUTPATIENT)
Dept: FAMILY MEDICINE | Facility: CLINIC | Age: 69
End: 2024-12-05
Payer: MEDICARE

## 2024-12-05 DIAGNOSIS — E53.8 B12 DEFICIENCY: Primary | ICD-10-CM

## 2024-12-05 PROCEDURE — 99999 PR PBB SHADOW E&M-EST. PATIENT-LVL II: CPT | Mod: PBBFAC,HCNC,,

## 2024-12-05 PROCEDURE — 96372 THER/PROPH/DIAG INJ SC/IM: CPT | Mod: HCNC,S$GLB,, | Performed by: FAMILY MEDICINE

## 2024-12-05 RX ADMIN — CYANOCOBALAMIN 1000 MCG: 1000 INJECTION, SOLUTION INTRAMUSCULAR; SUBCUTANEOUS at 10:12

## 2024-12-18 RX ORDER — PRAVASTATIN SODIUM 20 MG/1
20 TABLET ORAL
Qty: 90 TABLET | Refills: 3 | Status: SHIPPED | OUTPATIENT
Start: 2024-12-18

## 2024-12-18 NOTE — TELEPHONE ENCOUNTER
Refill Routing Note   Medication(s) are not appropriate for processing by [facility] for the following reason(s):        Required labs outdated    [facility] action(s):  Defer               Appointments  past 12m or future 3m with PCP    Date Provider   Last Visit   8/27/2024 Taqueria Melendrez MD   Next Visit   Visit date not found Taqueria Melendrez MD   ED visits in past 90 days: 0        Note composed:2:21 PM 12/18/2024

## 2024-12-18 NOTE — TELEPHONE ENCOUNTER
No care due was identified.  Health [facility] Embedded Care Due Messages. Reference number: [id number].   12/18/2024 9:38:37 AM CST

## 2025-01-02 ENCOUNTER — CLINICAL SUPPORT (OUTPATIENT)
Dept: FAMILY MEDICINE | Facility: CLINIC | Age: 70
End: 2025-01-02
Payer: MEDICARE

## 2025-01-02 ENCOUNTER — LAB VISIT (OUTPATIENT)
Dept: LAB | Facility: HOSPITAL | Age: 70
End: 2025-01-02
Attending: FAMILY MEDICINE
Payer: MEDICARE

## 2025-01-02 DIAGNOSIS — E53.8 B12 DEFICIENCY: Primary | ICD-10-CM

## 2025-01-02 DIAGNOSIS — E78.5 HYPERLIPIDEMIA, UNSPECIFIED HYPERLIPIDEMIA TYPE: ICD-10-CM

## 2025-01-02 LAB
CHOLEST SERPL-MCNC: 184 MG/DL (ref 120–199)
CHOLEST/HDLC SERPL: 2.4 {RATIO} (ref 2–5)
HDLC SERPL-MCNC: 76 MG/DL (ref 40–75)
HDLC SERPL: 41.3 % (ref 20–50)
LDLC SERPL CALC-MCNC: 90.6 MG/DL (ref 63–159)
NONHDLC SERPL-MCNC: 108 MG/DL
TRIGL SERPL-MCNC: 87 MG/DL (ref 30–150)

## 2025-01-02 PROCEDURE — 96372 THER/PROPH/DIAG INJ SC/IM: CPT | Mod: S$GLB,,, | Performed by: FAMILY MEDICINE

## 2025-01-02 PROCEDURE — 99999 PR PBB SHADOW E&M-EST. PATIENT-LVL II: CPT | Mod: PBBFAC,,,

## 2025-01-02 PROCEDURE — 80061 LIPID PANEL: CPT | Performed by: FAMILY MEDICINE

## 2025-01-02 PROCEDURE — 36415 COLL VENOUS BLD VENIPUNCTURE: CPT | Mod: PO | Performed by: FAMILY MEDICINE

## 2025-01-02 RX ADMIN — CYANOCOBALAMIN 1000 MCG: 1000 INJECTION, SOLUTION INTRAMUSCULAR; SUBCUTANEOUS at 10:01

## 2025-01-07 ENCOUNTER — HOSPITAL ENCOUNTER (OUTPATIENT)
Dept: RADIOLOGY | Facility: HOSPITAL | Age: 70
Discharge: HOME | End: 2025-01-07
Attending: NURSE PRACTITIONER
Payer: MEDICARE

## 2025-01-07 ENCOUNTER — OFFICE VISIT (OUTPATIENT)
Dept: ORTHOPEDICS | Facility: CLINIC | Age: 70
End: 2025-01-07
Payer: MEDICARE

## 2025-01-07 VITALS
BODY MASS INDEX: 24.83 KG/M2 | HEART RATE: 84 BPM | HEIGHT: 65 IN | SYSTOLIC BLOOD PRESSURE: 125 MMHG | WEIGHT: 149.06 LBS | DIASTOLIC BLOOD PRESSURE: 69 MMHG

## 2025-01-07 DIAGNOSIS — G89.29 CHRONIC RIGHT SHOULDER PAIN: Primary | ICD-10-CM

## 2025-01-07 DIAGNOSIS — G89.29 CHRONIC RIGHT SHOULDER PAIN: ICD-10-CM

## 2025-01-07 DIAGNOSIS — M25.511 CHRONIC RIGHT SHOULDER PAIN: ICD-10-CM

## 2025-01-07 DIAGNOSIS — M25.512 ACUTE PAIN OF LEFT SHOULDER: ICD-10-CM

## 2025-01-07 DIAGNOSIS — M25.511 CHRONIC RIGHT SHOULDER PAIN: Primary | ICD-10-CM

## 2025-01-07 PROCEDURE — 1159F MED LIST DOCD IN RCRD: CPT | Mod: CPTII,S$GLB,, | Performed by: NURSE PRACTITIONER

## 2025-01-07 PROCEDURE — 1101F PT FALLS ASSESS-DOCD LE1/YR: CPT | Mod: CPTII,S$GLB,, | Performed by: NURSE PRACTITIONER

## 2025-01-07 PROCEDURE — 1125F AMNT PAIN NOTED PAIN PRSNT: CPT | Mod: CPTII,S$GLB,, | Performed by: NURSE PRACTITIONER

## 2025-01-07 PROCEDURE — 3078F DIAST BP <80 MM HG: CPT | Mod: CPTII,S$GLB,, | Performed by: NURSE PRACTITIONER

## 2025-01-07 PROCEDURE — 73030 X-RAY EXAM OF SHOULDER: CPT | Mod: TC,50,PO

## 2025-01-07 PROCEDURE — 20610 DRAIN/INJ JOINT/BURSA W/O US: CPT | Mod: 50,S$GLB,, | Performed by: NURSE PRACTITIONER

## 2025-01-07 PROCEDURE — 73030 X-RAY EXAM OF SHOULDER: CPT | Mod: 26,50,, | Performed by: RADIOLOGY

## 2025-01-07 PROCEDURE — 99214 OFFICE O/P EST MOD 30 MIN: CPT | Mod: 25,S$GLB,, | Performed by: NURSE PRACTITIONER

## 2025-01-07 PROCEDURE — 3008F BODY MASS INDEX DOCD: CPT | Mod: CPTII,S$GLB,, | Performed by: NURSE PRACTITIONER

## 2025-01-07 PROCEDURE — 3288F FALL RISK ASSESSMENT DOCD: CPT | Mod: CPTII,S$GLB,, | Performed by: NURSE PRACTITIONER

## 2025-01-07 PROCEDURE — 99999 PR PBB SHADOW E&M-EST. PATIENT-LVL III: CPT | Mod: PBBFAC,,, | Performed by: NURSE PRACTITIONER

## 2025-01-07 PROCEDURE — 3074F SYST BP LT 130 MM HG: CPT | Mod: CPTII,S$GLB,, | Performed by: NURSE PRACTITIONER

## 2025-01-07 RX ORDER — TRIAMCINOLONE ACETONIDE 40 MG/ML
40 INJECTION, SUSPENSION INTRA-ARTICULAR; INTRAMUSCULAR
Status: DISCONTINUED | OUTPATIENT
Start: 2025-01-07 | End: 2025-01-07 | Stop reason: HOSPADM

## 2025-01-07 RX ADMIN — TRIAMCINOLONE ACETONIDE 40 MG: 40 INJECTION, SUSPENSION INTRA-ARTICULAR; INTRAMUSCULAR at 09:01

## 2025-01-07 NOTE — PROCEDURES
Large Joint Aspiration/Injection: bilateral subacromial bursa    Date/Time: 1/7/2025 9:40 AM    Performed by: Mony Garcia FNP  Authorized by: Mony Garcia FNP    Consent Done?:  Yes (Verbal)  Indications:  Pain  Timeout: prior to procedure the correct patient, procedure, and site was verified    Prep: patient was prepped and draped in usual sterile fashion      Local anesthesia used?: Yes    Local anesthetic:  Lidocaine 1% without epinephrine  Anesthetic total (ml):  5      Details:  Needle Size:  22 G  Ultrasonic Guidance for needle placement?: No    Approach:  Posterior  Location:  Shoulder  Laterality:  Bilateral  Site:  Bilateral subacromial bursa  Medications (Right):  40 mg triamcinolone acetonide 40 mg/mL  Medications (Left):  40 mg triamcinolone acetonide 40 mg/mL  Patient tolerance:  Patient tolerated the procedure well with no immediate complications

## 2025-01-07 NOTE — PROGRESS NOTES
Chief Complaint   Patient presents with    Left Shoulder - Pain     Painful to touch     Right Shoulder - Pain     Painful to touch       HPI:   This is a 69 y.o. who returns to clinic today in follow-up for bilateral shoulder pain. Left shoulder has been hurting for the past week after she had blood work. Unknown if related to that. She also reports fall back in November [place] [employer]; fell more on her right shoulder. Right shoulder pain is chronic and she sees me every 6 weeks usually for injections. Here today for injections. Pain is aching and dull. No numbness or tingling. No associated signs or symptoms.    Past Medical History:   Diagnosis Date    Acute pancreatitis 03/21/2016    Breast cancer 2005    right side with lumpectomy, chemo and radiation    Closed fracture of ramus of right pubis 06/08/2016    Colon polyp     last scope 4/2012- repeat 10 y per pt- Dr. Johnson    GERD (gastroesophageal reflux disease) 05/29/2012    Gout of foot 09/03/2024    Hyperlipidemia 04/16/2013    Hypertension     Metabolic encephalopathy 04/20/2021    Osteopenia      Past Surgical History:   Procedure Laterality Date    BREAST LUMPECTOMY  2005    DILATION AND CURETTAGE OF UTERUS      ESOPHAGEAL DILATION      ESOPHAGOGASTRODUODENOSCOPY  6/1/2012    ROTATOR CUFF REPAIR      TEAR DUCT SURGERY      right rye     Current Outpatient Medications on File Prior to Visit   Medication Sig Dispense Refill    benazepriL (LOTENSIN) 10 MG tablet Take 1 tablet by mouth once daily 90 tablet 3    calcium carbonate (OS-NASEEM) 500 mg calcium (1,250 mg) tablet qid 120 tablet 0    cholecalciferol, vitamin D3, (VITAMIN D3) 50 mcg (2,000 unit) Cap capsule Take 1 capsule (2,000 Units total) by mouth once daily.      cyanocobalamin 1,000 mcg/mL injection Inject 1,000 mcg into the muscle every 30 days.      famotidine (PEPCID) 40 MG tablet Take 1 tablet (40 mg total) by mouth every evening. 90 tablet 2    folic acid (FOLVITE) 1 MG tablet Take 1 tablet by  mouth once daily 90 tablet 3    magnesium oxide (MAG-OX) 400 mg (241.3 mg magnesium) tablet bid 60 tablet 1    minocycline (MINOCIN,DYNACIN) 50 MG Cap Take 50 mg by mouth 2 (two) times daily.      NIFEdipine (PROCARDIA-XL) 60 MG (OSM) 24 hr tablet Take 1 tablet (60 mg total) by mouth once daily. 90 tablet 3    omeprazole (PRILOSEC) 20 MG capsule Take 1 capsule by mouth once daily 90 capsule 3    pravastatin (PRAVACHOL) 20 MG tablet Take 1 tablet by mouth once daily 90 tablet 3    sulfacetamide sod-sulfur-urea 10-5-10 % Clsr Apply 1 application  topically.      ZILXI 1.5 % Foam Apply topically 2 (two) times daily.       Current Facility-Administered Medications on File Prior to Visit   Medication Dose Route Frequency Provider Last Rate Last Admin    cyanocobalamin injection 1,000 mcg  1,000 mcg Intramuscular Q30 Days    1,000 mcg at 25 1015     Review of patient's allergies indicates:   Allergen Reactions    Hydrochlorothiazide Other (See Comments)     Multiple electrolyte abnormalities     Family History   Problem Relation Name Age of Onset    Stroke Brother  57    Breast cancer Mother  87     Social History     Socioeconomic History    Marital status:    Tobacco Use    Smoking status: Former     Current packs/day: 0.00     Types: Cigarettes     Quit date: 2005     Years since quittin.6    Smokeless tobacco: Never   Substance and Sexual Activity    Alcohol use: Not Currently     Alcohol/week: 2.0 standard drinks of alcohol     Types: 2 Standard drinks or equivalent per week     Comment: prior 6 pack/week    Drug use: Yes    Sexual activity: Not Currently     Social Drivers of Health     Financial Resource Strain: Low Risk  (2024)    Overall Financial Resource Strain (CARDIA)     Difficulty of Paying Living Expenses: Not hard at all   Food Insecurity: No Food Insecurity (2024)    Hunger Vital Sign     Worried About Running Out of Food in the Last Year: Never true     Ran Out of Food  in the Last Year: Never true   Transportation Needs: No Transportation Needs (11/13/2024)    PRAPARE - Transportation     Lack of Transportation (Medical): No     Lack of Transportation (Non-Medical): No   Physical Activity: Inactive (11/13/2024)    Exercise Vital Sign     Days of Exercise per Week: 0 days     Minutes of Exercise per Session: 0 min   Stress: No Stress Concern Present (11/13/2024)    [place] [employer] - Occupational Stress Questionnaire     Feeling of Stress : Not at all   Housing Stability: Low Risk  (11/13/2024)    Housing Stability Vital Sign     Unable to Pay for Housing in the Last Year: No     Homeless in the Last Year: No       Review of Systems:  Constitutional:  Denies fever or chills   Eyes:  Denies change in visual acuity   HENT:  Denies nasal congestion or sore throat   Respiratory:  Denies cough or shortness of breath   Cardiovascular:  Denies chest pain or edema   GI:  Denies abdominal pain, nausea, vomiting, bloody stools or diarrhea   :  Denies dysuria   Integument:  Denies rash   Neurologic:  Denies headache, focal weakness or sensory changes   Endocrine:  Denies polyuria or polydipsia   Lymphatic:  Denies swollen glands   Psychiatric:  Denies depression or anxiety     Physical Exam:   Constitutional:  Well developed, well nourished, no acute distress, non-toxic appearance   Integument:  Well hydrated  Neurologic:  Alert & oriented x 3  Psychiatric:  Speech and behavior appropriate     Bilateral Shoulder Exam    Bilateral Shoulder Exam   Shoulder exam performed same as contralateral side and is normal.    Bilateral Shoulder Exam   Tenderness   Shoulder tenderness location: diffusely about shoulder.    Range of Motion   Forward Flexion: abnormal   External Rotation: abnormal     Muscle Strength   Supraspinatus: 4/5     Tests   Hawkin's test: positive  Impingement: positive    Other   Erythema: absent  Sensation: normal  Pulse: present       X-rays were  performed today, personally reviewed by me and findings discussed with the patient.  3 views of the bilateral shoulder show AC joint arthritis, and hx of right shoulder rotator cuff surgery.           Chronic right shoulder pain  -     X-Ray Shoulder Trauma 3 View Bilateral; Future; Expected date: 01/07/2025  -     Large Joint Aspiration/Injection: bilateral subacromial bursa  -     triamcinolone acetonide injection 40 mg  -     triamcinolone acetonide injection 40 mg    Acute pain of left shoulder  -     X-Ray Shoulder Trauma 3 View Bilateral; Future; Expected date: 01/07/2025  -     Large Joint Aspiration/Injection: bilateral subacromial bursa  -     triamcinolone acetonide injection 40 mg  -     triamcinolone acetonide injection 40 mg         Using an aseptic technique, I injected 5 cc of lidocaine 1% without and 1 cc of kenalog 40mg into the bilateral Shoulder. The patient tolerated this well. I will have them return to clinic in 6 weeks.

## 2025-01-29 ENCOUNTER — HOSPITAL ENCOUNTER (OUTPATIENT)
Dept: RADIOLOGY | Facility: HOSPITAL | Age: 70
Discharge: HOME | End: 2025-01-29
Attending: INTERNAL MEDICINE
Payer: MEDICARE

## 2025-01-29 ENCOUNTER — OFFICE VISIT (OUTPATIENT)
Dept: RHEUMATOLOGY | Facility: CLINIC | Age: 70
End: 2025-01-29
Payer: MEDICARE

## 2025-01-29 VITALS
BODY MASS INDEX: 24.46 KG/M2 | HEART RATE: 89 BPM | HEIGHT: 65 IN | DIASTOLIC BLOOD PRESSURE: 65 MMHG | SYSTOLIC BLOOD PRESSURE: 95 MMHG | WEIGHT: 146.81 LBS

## 2025-01-29 DIAGNOSIS — R76.8 RHEUMATOID FACTOR POSITIVE: ICD-10-CM

## 2025-01-29 DIAGNOSIS — R73.03 PREDIABETES: ICD-10-CM

## 2025-01-29 DIAGNOSIS — Z51.81 MEDICATION MONITORING ENCOUNTER: ICD-10-CM

## 2025-01-29 DIAGNOSIS — R76.8 RHEUMATOID FACTOR POSITIVE: Primary | ICD-10-CM

## 2025-01-29 DIAGNOSIS — M15.0 PRIMARY OSTEOARTHRITIS INVOLVING MULTIPLE JOINTS: ICD-10-CM

## 2025-01-29 DIAGNOSIS — M65.20 CALCIFIC TENDONITIS: ICD-10-CM

## 2025-01-29 DIAGNOSIS — M25.511 CHRONIC RIGHT SHOULDER PAIN: ICD-10-CM

## 2025-01-29 DIAGNOSIS — R70.0 ESR RAISED: ICD-10-CM

## 2025-01-29 DIAGNOSIS — N18.32 STAGE 3B CHRONIC KIDNEY DISEASE: ICD-10-CM

## 2025-01-29 DIAGNOSIS — G89.29 CHRONIC RIGHT SHOULDER PAIN: ICD-10-CM

## 2025-01-29 PROBLEM — M10.9 GOUT OF FOOT: Status: RESOLVED | Noted: 2024-09-03 | Resolved: 2025-01-29

## 2025-01-29 PROCEDURE — 99999 PR PBB SHADOW E&M-EST. PATIENT-LVL IV: CPT | Mod: PBBFAC,,, | Performed by: INTERNAL MEDICINE

## 2025-01-29 PROCEDURE — 73130 X-RAY EXAM OF HAND: CPT | Mod: TC,50

## 2025-01-29 PROCEDURE — 73130 X-RAY EXAM OF HAND: CPT | Mod: 26,50,, | Performed by: STUDENT IN AN ORGANIZED HEALTH CARE EDUCATION/TRAINING PROGRAM

## 2025-01-29 RX ORDER — HYDROXYCHLOROQUINE SULFATE 200 MG/1
200 TABLET, FILM COATED ORAL 2 TIMES DAILY
Qty: 60 TABLET | Refills: 3 | Status: SHIPPED | OUTPATIENT
Start: 2025-01-29 | End: 2025-02-28

## 2025-01-29 NOTE — PROGRESS NOTES
RHEUMATOLOGY OUTPATIENT CLINIC NOTE    1/29/2025    Attending Rheumatologist: Malcolm Null  Primary Care Provider/Physician Requesting Consultation: Taqueria Melendrez MD   Chief Complaint/Reason For Consultation:  Rheumatoid Arthritis and Shoulder Pain      Subjective:     Carla Dang is a 69 y.o. Black or  female with OA and temporary RA serologies.     Recurrent widespread arthralgias w/ mechanical features.      Addendum 1/30  Active symptoms and current blood work supportive of seropositive rheumatoid arthritis impression.  Plan to escalate to biologic (TNFi SC, unless PrismRA indicates poor response. Hx of CKD, high risk of toxicity w/ other DMARD), in event of no response to HCQ after 8w or intolerance to prescribed medication.  Needs hep screen and quant TB negative within the year, prior initiation of biologic.    Review of Systems   Constitutional:  Positive for malaise/fatigue. Negative for fever.   Eyes:  Negative for photophobia and pain.   Respiratory:  Negative for cough and shortness of breath.    Cardiovascular:  Negative for chest pain and leg swelling.   Gastrointestinal:  Negative for blood in stool and melena.   Genitourinary:  Negative for dysuria, hematuria and urgency.   Musculoskeletal:  Negative for joint pain.   Skin:  Negative for rash.        No hx of PsO   Neurological:  Negative for focal weakness, weakness and headaches.     Chronic comorbid conditions affecting medical decision making today:  Past Medical History:   Diagnosis Date    Acute pancreatitis 03/21/2016    Breast cancer 2005    right side with lumpectomy, chemo and radiation    Closed fracture of ramus of right pubis 06/08/2016    Colon polyp     last scope 4/2012- repeat 10 y per pt- Dr. Johnson    GERD (gastroesophageal reflux disease) 05/29/2012    Gout of foot 09/03/2024    Hyperlipidemia 04/16/2013    Hypertension     Metabolic encephalopathy 04/20/2021    Osteopenia      Past Surgical History:    Procedure Laterality Date    BREAST LUMPECTOMY      DILATION AND CURETTAGE OF UTERUS      ESOPHAGEAL DILATION      ESOPHAGOGASTRODUODENOSCOPY  2012    ROTATOR CUFF REPAIR      TEAR DUCT SURGERY      right rye     Family History   Problem Relation Name Age of Onset    Stroke Brother  57    Breast cancer Mother  87     Social History     Tobacco Use   Smoking Status Former    Current packs/day: 0.00    Types: Cigarettes    Quit date: 2005    Years since quittin.6   Smokeless Tobacco Never       Current Outpatient Medications:     benazepriL (LOTENSIN) 10 MG tablet, Take 1 tablet by mouth once daily, Disp: 90 tablet, Rfl: 3    calcium carbonate (OS-NASEEM) 500 mg calcium (1,250 mg) tablet, qid, Disp: 120 tablet, Rfl: 0    cholecalciferol, vitamin D3, (VITAMIN D3) 50 mcg (2,000 unit) Cap capsule, Take 1 capsule (2,000 Units total) by mouth once daily., Disp: , Rfl:     cyanocobalamin 1,000 mcg/mL injection, Inject 1,000 mcg into the muscle every 30 days., Disp: , Rfl:     famotidine (PEPCID) 40 MG tablet, Take 1 tablet (40 mg total) by mouth every evening., Disp: 90 tablet, Rfl: 2    folic acid (FOLVITE) 1 MG tablet, Take 1 tablet by mouth once daily, Disp: 90 tablet, Rfl: 3    magnesium oxide (MAG-OX) 400 mg (241.3 mg magnesium) tablet, bid, Disp: 60 tablet, Rfl: 1    minocycline (MINOCIN,DYNACIN) 50 MG Cap, Take 50 mg by mouth 2 (two) times daily., Disp: , Rfl:     NIFEdipine (PROCARDIA-XL) 60 MG (OSM) 24 hr tablet, Take 1 tablet (60 mg total) by mouth once daily., Disp: 90 tablet, Rfl: 3    omeprazole (PRILOSEC) 20 MG capsule, Take 1 capsule by mouth once daily, Disp: 90 capsule, Rfl: 3    pravastatin (PRAVACHOL) 20 MG tablet, Take 1 tablet by mouth once daily, Disp: 90 tablet, Rfl: 3    sulfacetamide sod-sulfur-urea 10-5-10 % Clsr, Apply 1 application  topically., Disp: , Rfl:     ZILXI 1.5 % Foam, Apply topically 2 (two) times daily., Disp: , Rfl:     Current Facility-Administered Medications:      cyanocobalamin injection 1,000 mcg, 1,000 mcg, Intramuscular, Q30 Days, , 1,000 mcg at 01/02/25 1015     Objective:     Vitals:    01/29/25 1154   BP: 95/65   Pulse: 89     Physical Exam   Eyes: Conjunctivae are normal.   Pulmonary/Chest: Effort normal. No respiratory distress.   Musculoskeletal:         General: Deformity present. No swelling or tenderness.      Comments: Guarding Rt shoulder from pain.   Skin: No rash noted.     Reviewed available old and all outside pertinent medical records available.    All lab results personally reviewed and interpreted by me.       ASSESSMENT / PLAN     1. Rheumatoid factor positive  Equivocal titers, both RF and CCP negative on repeat.  No erosive arthropathy reported on XR.    Patient amenable for therapeutic trial of HCQ for at least 4m, goal of reducing xs by 20%.  Ambulatory referral/consult to Rheumatology    hydroxychloroquine (PLAQUENIL) 200 mg tablet    Cyclic Citrullinated Peptide Antibody, IgG    Rheumatoid Factor    X-Ray Hand Complete Bilateral      2. ESR raised  No GCA or PMR sx.  Value within acceptable range corrected for age.  CRP WNR.    hydroxychloroquine (PLAQUENIL) 200 mg tablet    C-Reactive Protein    Sedimentation rate    Interleukin-6      3. Primary osteoarthritis involving multiple joints  No obvious synovitis on exam.  Plan for NM joint scan if no response to HCQ.      4. Stage 3b chronic kidney disease        5. Prediabetes        6. Chronic right shoulder pain  hydroxychloroquine (PLAQUENIL) 200 mg tablet      7. Calcific tendonitis        8. Medication monitoring encounter  At least once per year eye clinic check ups while on plaquenil.  Comprehensive Metabolic Panel    CBC Auto Differential              Visit today included increased complexity associated with the care of the episodic problem Rheumatoid factor positive addressed and managing the longitudinal care of the patient due to the serious and/or complex managed problem(s) ESR raised,  Primary osteoarthritis involving multiple joints,  Stage 3b chronic kidney disease.    Malcolm Null M.D.

## 2025-01-29 NOTE — Clinical Note
Active symptoms and current blood work supportive of seropositive rheumatoid arthritis impression.  Plan to escalate to biologic (TNFi SC, unless PrismRA indicates poor response. Hx of CKD, high risk of toxicity w/ other DMARD), in event of no response to HCQ after 8w or intolerance to prescribed medication.  Needs hep screen and quant TB negative within the year, prior initiation of biologic.

## 2025-01-30 ENCOUNTER — TELEPHONE (OUTPATIENT)
Dept: RHEUMATOLOGY | Facility: CLINIC | Age: 70
End: 2025-01-30
Payer: MEDICARE

## 2025-01-30 ENCOUNTER — CLINICAL SUPPORT (OUTPATIENT)
Dept: FAMILY MEDICINE | Facility: CLINIC | Age: 70
End: 2025-01-30
Payer: MEDICARE

## 2025-01-30 DIAGNOSIS — E53.8 B12 DEFICIENCY: Primary | ICD-10-CM

## 2025-01-30 PROCEDURE — 96372 THER/PROPH/DIAG INJ SC/IM: CPT | Mod: S$GLB,,, | Performed by: FAMILY MEDICINE

## 2025-01-30 PROCEDURE — 99999 PR PBB SHADOW E&M-EST. PATIENT-LVL II: CPT | Mod: PBBFAC,,,

## 2025-01-30 RX ADMIN — CYANOCOBALAMIN 1000 MCG: 1000 INJECTION, SOLUTION INTRAMUSCULAR; SUBCUTANEOUS at 10:01

## 2025-01-30 NOTE — PROGRESS NOTES
Administered 1,000 mcg to pt's Right ventrogluteal. Pt tolerated well, advised to wait 15 minutes in clinic.  
Attending Only

## 2025-01-30 NOTE — TELEPHONE ENCOUNTER
Follow up message sent to patient via [facility] patient portal.       TRF + mobile phleb request faxed to [employer].

## 2025-01-30 NOTE — TELEPHONE ENCOUNTER
----- Message from Malcolm Null MD sent at 1/30/2025 10:14 AM CST -----  Active symptoms and current blood work supportive of seropositive rheumatoid arthritis impression.  Plan to escalate to biologic (TNFi SC, unless PrismRA indicates poor response. Hx of CKD, high risk of toxicity w/ other DMARD), in event of no response to HCQ after 8w or intolerance to prescribed medication.  Needs hep screen and quant TB negative within the year, prior initiation of biologic.

## 2025-02-18 ENCOUNTER — OFFICE VISIT (OUTPATIENT)
Dept: ORTHOPEDICS | Facility: CLINIC | Age: 70
End: 2025-02-18
Payer: MEDICARE

## 2025-02-18 VITALS
DIASTOLIC BLOOD PRESSURE: 65 MMHG | SYSTOLIC BLOOD PRESSURE: 95 MMHG | HEART RATE: 89 BPM | WEIGHT: 146.81 LBS | HEIGHT: 65 IN | BODY MASS INDEX: 24.46 KG/M2

## 2025-02-18 DIAGNOSIS — M25.511 CHRONIC PAIN IN RIGHT SHOULDER: ICD-10-CM

## 2025-02-18 DIAGNOSIS — M75.41 IMPINGEMENT SYNDROME OF RIGHT SHOULDER: Primary | ICD-10-CM

## 2025-02-18 DIAGNOSIS — G89.29 CHRONIC PAIN IN RIGHT SHOULDER: ICD-10-CM

## 2025-02-18 PROCEDURE — 3074F SYST BP LT 130 MM HG: CPT | Mod: CPTII,S$GLB,, | Performed by: NURSE PRACTITIONER

## 2025-02-18 PROCEDURE — 1101F PT FALLS ASSESS-DOCD LE1/YR: CPT | Mod: CPTII,S$GLB,, | Performed by: NURSE PRACTITIONER

## 2025-02-18 PROCEDURE — 99211 OFF/OP EST MAY X REQ PHY/QHP: CPT | Mod: 25,S$GLB,, | Performed by: NURSE PRACTITIONER

## 2025-02-18 PROCEDURE — 20610 DRAIN/INJ JOINT/BURSA W/O US: CPT | Mod: RT,S$GLB,, | Performed by: NURSE PRACTITIONER

## 2025-02-18 PROCEDURE — 3288F FALL RISK ASSESSMENT DOCD: CPT | Mod: CPTII,S$GLB,, | Performed by: NURSE PRACTITIONER

## 2025-02-18 PROCEDURE — 3078F DIAST BP <80 MM HG: CPT | Mod: CPTII,S$GLB,, | Performed by: NURSE PRACTITIONER

## 2025-02-18 PROCEDURE — 1125F AMNT PAIN NOTED PAIN PRSNT: CPT | Mod: CPTII,S$GLB,, | Performed by: NURSE PRACTITIONER

## 2025-02-18 PROCEDURE — 99999 PR PBB SHADOW E&M-EST. PATIENT-LVL III: CPT | Mod: PBBFAC,,, | Performed by: NURSE PRACTITIONER

## 2025-02-18 PROCEDURE — 3008F BODY MASS INDEX DOCD: CPT | Mod: CPTII,S$GLB,, | Performed by: NURSE PRACTITIONER

## 2025-02-18 RX ORDER — LIDOCAINE 50 MG/G
1 PATCH TOPICAL DAILY
Qty: 30 PATCH | Refills: 2 | Status: SHIPPED | OUTPATIENT
Start: 2025-02-18 | End: 2025-02-19 | Stop reason: SDUPTHER

## 2025-02-18 RX ADMIN — TRIAMCINOLONE ACETONIDE 40 MG: 40 INJECTION, SUSPENSION INTRA-ARTICULAR; INTRAMUSCULAR at 09:02

## 2025-02-18 NOTE — PROGRESS NOTES
Chief Complaint   Patient presents with    Right Shoulder - Pain    Left Shoulder - Pain       HPI:   This is a 69 y.o. who returns to clinic today in follow-up for right shoulder pain for the past 1 week after no trauma. Pain is aching. No numbness or tingling. No associated signs or symptoms.    Past Medical History:   Diagnosis Date    Acute pancreatitis 03/21/2016    Breast cancer 2005    right side with lumpectomy, chemo and radiation    Closed fracture of ramus of right pubis 06/08/2016    Colon polyp     last scope 4/2012- repeat 10 y per pt- Dr. Johnson    GERD (gastroesophageal reflux disease) 05/29/2012    Gout of foot 09/03/2024    Hyperlipidemia 04/16/2013    Hypertension     Metabolic encephalopathy 04/20/2021    Osteopenia      Past Surgical History:   Procedure Laterality Date    BREAST LUMPECTOMY  2005    DILATION AND CURETTAGE OF UTERUS      ESOPHAGEAL DILATION      ESOPHAGOGASTRODUODENOSCOPY  6/1/2012    ROTATOR CUFF REPAIR      TEAR DUCT SURGERY      right rye     Medications Ordered Prior to Encounter[1]  Review of patient's allergies indicates:   Allergen Reactions    Hydrochlorothiazide Other (See Comments)     Multiple electrolyte abnormalities     Family History   Problem Relation Name Age of Onset    Stroke Brother  57    Breast cancer Mother  87     Social History[2]    Review of Systems:  Constitutional:  Denies fever or chills   Eyes:  Denies change in visual acuity   HENT:  Denies nasal congestion or sore throat   Respiratory:  Denies cough or shortness of breath   Cardiovascular:  Denies chest pain or edema   GI:  Denies abdominal pain, nausea, vomiting, bloody stools or diarrhea   :  Denies dysuria   Integument:  Denies rash   Neurologic:  Denies headache, focal weakness or sensory changes   Endocrine:  Denies polyuria or polydipsia   Lymphatic:  Denies swollen glands   Psychiatric:  Denies depression or anxiety     Physical Exam:   Constitutional:  Well developed, well nourished, no  acute distress, non-toxic appearance   Integument:  Well hydrated  Neurologic:  Alert & oriented x 3  Psychiatric:  Speech and behavior appropriate     Bilateral Shoulder Exam    left Shoulder Exam   Shoulder exam performed same as contralateral side and is normal.    right Shoulder Exam   Tenderness   Shoulder tenderness location: diffusely about shoulder.    Range of Motion   Forward Flexion: abnormal   External Rotation: abnormal     Muscle Strength   Supraspinatus: 4/5     Tests   Hawkin's test: positive  Impingement: positive    Other   Erythema: absent  Sensation: normal  Pulse: present       Impingement syndrome of right shoulder  -     Discontinue: LIDOcaine (LIDODERM) 5 %; Place 1 patch onto the skin once daily. Remove & Discard patch within 12 hours or as directed by MD  Dispense: 30 patch; Refill: 2  -     Large Joint Aspiration/Injection: R subacromial bursa  -     triamcinolone acetonide injection 40 mg    Chronic pain in right shoulder  -     Discontinue: LIDOcaine (LIDODERM) 5 %; Place 1 patch onto the skin once daily. Remove & Discard patch within 12 hours or as directed by MD  Dispense: 30 patch; Refill: 2         Using an aseptic technique, I injected 5 cc of lidocaine 1% without and 1 cc of kenalog 40mg into the right Shoulder. The patient tolerated this well. I will have them return to clinic as scheduled.           [1]   Current Outpatient Medications on File Prior to Visit   Medication Sig Dispense Refill    benazepriL (LOTENSIN) 10 MG tablet Take 1 tablet by mouth once daily 90 tablet 3    calcium carbonate (OS-NASEEM) 500 mg calcium (1,250 mg) tablet qid 120 tablet 0    cholecalciferol, vitamin D3, (VITAMIN D3) 50 mcg (2,000 unit) Cap capsule Take 1 capsule (2,000 Units total) by mouth once daily.      cyanocobalamin 1,000 mcg/mL injection Inject 1,000 mcg into the muscle every 30 days.      famotidine (PEPCID) 40 MG tablet Take 1 tablet (40 mg total) by mouth every evening. 90 tablet 2    folic  acid (FOLVITE) 1 MG tablet Take 1 tablet by mouth once daily 90 tablet 3    magnesium oxide (MAG-OX) 400 mg (241.3 mg magnesium) tablet bid 60 tablet 1    minocycline (MINOCIN,DYNACIN) 50 MG Cap Take 50 mg by mouth 2 (two) times daily.      NIFEdipine (PROCARDIA-XL) 60 MG (OSM) 24 hr tablet Take 1 tablet (60 mg total) by mouth once daily. 90 tablet 3    omeprazole (PRILOSEC) 20 MG capsule Take 1 capsule by mouth once daily 90 capsule 3    pravastatin (PRAVACHOL) 20 MG tablet Take 1 tablet by mouth once daily 90 tablet 3    sulfacetamide sod-sulfur-urea 10-5-10 % Clsr Apply 1 application  topically.      ZILXI 1.5 % Foam Apply topically 2 (two) times daily.       No current facility-administered medications on file prior to visit.   [2]   Social History  Socioeconomic History    Marital status:    Tobacco Use    Smoking status: Former     Current packs/day: 0.00     Types: Cigarettes     Quit date: 2005     Years since quittin.7    Smokeless tobacco: Never   Substance and Sexual Activity    Alcohol use: Not Currently     Alcohol/week: 2.0 standard drinks of alcohol     Types: 2 Standard drinks or equivalent per week     Comment: prior 6 pack/week    Drug use: Yes    Sexual activity: Not Currently     Social Drivers of Health     Financial Resource Strain: Low Risk  (2024)    Overall Financial Resource Strain (CARDIA)     Difficulty of Paying Living Expenses: Not hard at all   Food Insecurity: No Food Insecurity (2024)    Hunger Vital Sign     Worried About Running Out of Food in the Last Year: Never true     Ran Out of Food in the Last Year: Never true   Transportation Needs: No Transportation Needs (2024)    PRAPARE - Transportation     Lack of Transportation (Medical): No     Lack of Transportation (Non-Medical): No   Physical Activity: Inactive (2024)    Exercise Vital Sign     Days of Exercise per Week: 0 days     Minutes of Exercise per Session: 0 min   Stress: No Stress  Concern Present (11/13/2024)    [facility] [place] of Occupational [facility] - Occupational Stress Questionnaire     Feeling of Stress : Not at all   Housing Stability: Unknown (11/13/2024)    Housing Stability Vital Sign     Unable to Pay for Housing in the Last Year: No     Homeless in the Last Year: No

## 2025-02-19 DIAGNOSIS — M25.511 CHRONIC PAIN IN RIGHT SHOULDER: ICD-10-CM

## 2025-02-19 DIAGNOSIS — M75.41 IMPINGEMENT SYNDROME OF RIGHT SHOULDER: ICD-10-CM

## 2025-02-19 DIAGNOSIS — G89.29 CHRONIC PAIN IN RIGHT SHOULDER: ICD-10-CM

## 2025-02-19 RX ORDER — LIDOCAINE 50 MG/G
1 PATCH TOPICAL DAILY
Qty: 30 PATCH | Refills: 2 | Status: SHIPPED | OUTPATIENT
Start: 2025-02-19

## 2025-02-20 ENCOUNTER — TELEPHONE (OUTPATIENT)
Dept: ORTHOPEDICS | Facility: CLINIC | Age: 70
End: 2025-02-20
Payer: MEDICARE

## 2025-02-20 NOTE — TELEPHONE ENCOUNTER
I called to inform Mrs Dang that her prescription was sent  to [facility] in [employer] [employer] [address] [place]

## 2025-02-28 ENCOUNTER — CLINICAL SUPPORT (OUTPATIENT)
Dept: FAMILY MEDICINE | Facility: CLINIC | Age: 70
End: 2025-02-28
Payer: MEDICARE

## 2025-02-28 ENCOUNTER — TELEPHONE (OUTPATIENT)
Dept: FAMILY MEDICINE | Facility: CLINIC | Age: 70
End: 2025-02-28

## 2025-02-28 DIAGNOSIS — E53.8 B12 DEFICIENCY: Primary | ICD-10-CM

## 2025-02-28 DIAGNOSIS — E53.8 VITAMIN B12 DEFICIENCY: Primary | ICD-10-CM

## 2025-02-28 PROCEDURE — 99999 PR PBB SHADOW E&M-EST. PATIENT-LVL II: CPT | Mod: PBBFAC,,,

## 2025-02-28 RX ADMIN — CYANOCOBALAMIN 1000 MCG: 1000 INJECTION, SOLUTION INTRAMUSCULAR; SUBCUTANEOUS at 10:02

## 2025-02-28 NOTE — TELEPHONE ENCOUNTER
Pt received last ordered dose of B12 today 2/28/25. Please advise if lab work is needed or if injections should be continued. Thank you.

## 2025-02-28 NOTE — PROGRESS NOTES
Administered 1,000 mcg B12 IM to left ventrogluteal. Pt tolerated well.     
What Type Of Note Output Would You Prefer (Optional)?: Standard Output
Have Your Spot(S) Been Treated In The Past?: has not been treated
Hpi Title: Evaluation of Skin Lesions
Year Removed: 1900

## 2025-02-28 NOTE — TELEPHONE ENCOUNTER
Continue B12 injections.  Recommend recheck B12 with her next already scheduled lab work in September.  Order placed   Isotretinoin Pregnancy And Lactation Text: This medication is Pregnancy Category X and is considered extremely dangerous during pregnancy. It is unknown if it is excreted in breast milk. Zyclara Pregnancy And Lactation Text: This medication is Pregnancy Category C. It is unknown if this medication is excreted in breast milk. Taltz Counseling: I discussed with the patient the risks of ixekizumab including but not limited to immunosuppression, serious infections, worsening of inflammatory bowel disease and drug reactions.  The patient understands that monitoring is required including a PPD at baseline and must alert us or the primary physician if symptoms of infection or other concerning signs are noted. Protopic Counseling: Patient may experience a mild burning sensation during topical application. Protopic is not approved in children less than 2 years of age. There have been case reports of hematologic and skin malignancies in patients using topical calcineurin inhibitors although causality is questionable. Elidel Counseling: Patient may experience a mild burning sensation during topical application. Elidel is not approved in children less than 2 years of age. There have been case reports of hematologic and skin malignancies in patients using topical calcineurin inhibitors although causality is questionable. Cephalexin Pregnancy And Lactation Text: This medication is Pregnancy Category B and considered safe during pregnancy.  It is also excreted in breast milk but can be used safely for shorter doses. Griseofulvin Counseling:  I discussed with the patient the risks of griseofulvin including but not limited to photosensitivity, cytopenia, liver damage, nausea/vomiting and severe allergy.  The patient understands that this medication is best absorbed when taken with a fatty meal (e.g., ice cream or french fries). Tremfya Counseling: I discussed with the patient the risks of guselkumab including but not limited to immunosuppression, serious infections, worsening of inflammatory bowel disease and drug reactions.  The patient understands that monitoring is required including a PPD at baseline and must alert us or the primary physician if symptoms of infection or other concerning signs are noted. Taltz Pregnancy And Lactation Text: The risk during pregnancy and breastfeeding is uncertain with this medication. Topical Clindamycin Pregnancy And Lactation Text: This medication is Pregnancy Category B and is considered safe during pregnancy. It is unknown if it is excreted in breast milk. Spironolactone Pregnancy And Lactation Text: This medication can cause feminization of the male fetus and should be avoided during pregnancy. The active metabolite is also found in breast milk. Arava Pregnancy And Lactation Text: This medication is Pregnancy Category X and is absolutely contraindicated during pregnancy. It is unknown if it is excreted in breast milk. Tetracycline Pregnancy And Lactation Text: This medication is Pregnancy Category D and not consider safe during pregnancy. It is also excreted in breast milk. Rituxan Pregnancy And Lactation Text: This medication is Pregnancy Category C and it isn't know if it is safe during pregnancy. It is unknown if this medication is excreted in breast milk but similar antibodies are known to be excreted. Zyclara Counseling:  I discussed with the patient the risks of imiquimod including but not limited to erythema, scaling, itching, weeping, crusting, and pain.  Patient understands that the inflammatory response to imiquimod is variable from person to person and was educated regarded proper titration schedule.  If flu-like symptoms develop, patient knows to discontinue the medication and contact us. Rituxan Counseling:  I discussed with the patient the risks of Rituxan infusions. Side effects can include infusion reactions, severe drug rashes including mucocutaneous reactions, reactivation of latent hepatitis and other infections and rarely progressive multifocal leukoencephalopathy.  All of the patient's questions and concerns were addressed. Griseofulvin Pregnancy And Lactation Text: This medication is Pregnancy Category X and is known to cause serious birth defects. It is unknown if this medication is excreted in breast milk but breast feeding should be avoided. Clindamycin Pregnancy And Lactation Text: This medication can be used in pregnancy if certain situations. Clindamycin is also present in breast milk. Imiquimod Counseling:  I discussed with the patient the risks of imiquimod including but not limited to erythema, scaling, itching, weeping, crusting, and pain.  Patient understands that the inflammatory response to imiquimod is variable from person to person and was educated regarded proper titration schedule.  If flu-like symptoms develop, patient knows to discontinue the medication and contact us. Hydroquinone Counseling:  Patient advised that medication may result in skin irritation, lightening (hypopigmentation), dryness, and burning.  In the event of skin irritation, the patient was advised to reduce the amount of the drug applied or use it less frequently.  Rarely, spots that are treated with hydroquinone can become darker (pseudoochronosis).  Should this occur, patient instructed to stop medication and call the office. The patient verbalized understanding of the proper use and possible adverse effects of hydroquinone.  All of the patient's questions and concerns were addressed. Topical Sulfur Applications Counseling: Topical Sulfur Counseling: Patient counseled that this medication may cause skin irritation or allergic reactions.  In the event of skin irritation, the patient was advised to reduce the amount of the drug applied or use it less frequently.   The patient verbalized understanding of the proper use and possible adverse effects of topical sulfur application.  All of the patient's questions and concerns were addressed. Ketoconazole Pregnancy And Lactation Text: This medication is Pregnancy Category C and it isn't know if it is safe during pregnancy. It is also excreted in breast milk and breast feeding isn't recommended. Oxybutynin Counseling:  I discussed with the patient the risks of oxybutynin including but not limited to skin rash, drowsiness, dry mouth, difficulty urinating, and blurred vision. Dapsone Counseling: I discussed with the patient the risks of dapsone including but not limited to hemolytic anemia, agranulocytosis, rashes, methemoglobinemia, kidney failure, peripheral neuropathy, headaches, GI upset, and liver toxicity.  Patients who start dapsone require monitoring including baseline LFTs and weekly CBCs for the first month, then every month thereafter.  The patient verbalized understanding of the proper use and possible adverse effects of dapsone.  All of the patient's questions and concerns were addressed. Clindamycin Counseling: I counseled the patient regarding use of clindamycin as an antibiotic for prophylactic and/or therapeutic purposes. Clindamycin is active against numerous classes of bacteria, including skin bacteria. Side effects may include nausea, diarrhea, gastrointestinal upset, rash, hives, yeast infections, and in rare cases, colitis. Cyclosporine Counseling:  I discussed with the patient the risks of cyclosporine including but not limited to hypertension, gingival hyperplasia,myelosuppression, immunosuppression, liver damage, kidney damage, neurotoxicity, lymphoma, and serious infections. The patient understands that monitoring is required including baseline blood pressure, CBC, CMP, lipid panel and uric acid, and then 1-2 times monthly CMP and blood pressure. Dapsone Pregnancy And Lactation Text: This medication is Pregnancy Category C and is not considered safe during pregnancy or breast feeding. Cephalexin Counseling: I counseled the patient regarding use of cephalexin as an antibiotic for prophylactic and/or therapeutic purposes. Cephalexin (commonly prescribed under brand name Keflex) is a cephalosporin antibiotic which is active against numerous classes of bacteria, including most skin bacteria. Side effects may include nausea, diarrhea, gastrointestinal upset, rash, hives, yeast infections, and in rare cases, hepatitis, kidney disease, seizures, fever, confusion, neurologic symptoms, and others. Patients with severe allergies to penicillin medications are cautioned that there is about a 10% incidence of cross-reactivity with cephalosporins. When possible, patients with penicillin allergies should use alternatives to cephalosporins for antibiotic therapy. Spironolactone Counseling: Patient advised regarding risks of diarrhea, abdominal pain, hyperkalemia, birth defects (for female patients), liver toxicity and renal toxicity. The patient may need blood work to monitor liver and kidney function and potassium levels while on therapy. The patient verbalized understanding of the proper use and possible adverse effects of spironolactone.  All of the patient's questions and concerns were addressed. Enbrel Pregnancy And Lactation Text: This medication is Pregnancy Category B and is considered safe during pregnancy. It is unknown if this medication is excreted in breast milk. Topical Clindamycin Counseling: Patient counseled that this medication may cause skin irritation or allergic reactions.  In the event of skin irritation, the patient was advised to reduce the amount of the drug applied or use it less frequently.   The patient verbalized understanding of the proper use and possible adverse effects of clindamycin.  All of the patient's questions and concerns were addressed. Hydroquinone Pregnancy And Lactation Text: This medication has not been assigned a Pregnancy Risk Category but animal studies failed to show danger with the topical medication. It is unknown if the medication is excreted in breast milk. Ketoconazole Counseling:   Patient counseled regarding improving absorption with orange juice.  Adverse effects include but are not limited to breast enlargement, headache, diarrhea, nausea, upset stomach, liver function test abnormalities, taste disturbance, and stomach pain.  There is a rare possibility of liver failure that can occur when taking ketoconazole. The patient understands that monitoring of LFTs may be required, especially at baseline. The patient verbalized understanding of the proper use and possible adverse effects of ketoconazole.  All of the patient's questions and concerns were addressed. Tazorac Pregnancy And Lactation Text: This medication is not safe during pregnancy. It is unknown if this medication is excreted in breast milk. Cyclosporine Pregnancy And Lactation Text: This medication is Pregnancy Category C and it isn't know if it is safe during pregnancy. This medication is excreted in breast milk. 5-Fu Counseling: 5-Fluorouracil Counseling:  I discussed with the patient the risks of 5-fluorouracil including but not limited to erythema, scaling, itching, weeping, crusting, and pain. Prednisone Counseling:  I discussed with the patient the risks of prolonged use of prednisone including but not limited to weight gain, insomnia, osteoporosis, mood changes, diabetes, susceptibility to infection, glaucoma and high blood pressure.  In cases where prednisone use is prolonged, patients should be monitored with blood pressure checks, serum glucose levels and an eye exam.  Additionally, the patient may need to be placed on GI prophylaxis, PCP prophylaxis, and calcium and vitamin D supplementation and/or a bisphosphonate.  The patient verbalized understanding of the proper use and the possible adverse effects of prednisone.  All of the patient's questions and concerns were addressed. Xolair Pregnancy And Lactation Text: This medication is Pregnancy Category B and is considered safe during pregnancy. This medication is excreted in breast milk. Ilumya Counseling: I discussed with the patient the risks of tildrakizumab including but not limited to immunosuppression, malignancy, posterior leukoencephalopathy syndrome, and serious infections.  The patient understands that monitoring is required including a PPD at baseline and must alert us or the primary physician if symptoms of infection or other concerning signs are noted. Clofazimine Counseling:  I discussed with the patient the risks of clofazimine including but not limited to skin and eye pigmentation, liver damage, nausea/vomiting, gastrointestinal bleeding and allergy. Valtrex Counseling: I discussed with the patient the risks of valacyclovir including but not limited to kidney damage, nausea, vomiting and severe allergy.  The patient understands that if the infection seems to be worsening or is not improving, they are to call. Doxepin Pregnancy And Lactation Text: This medication is Pregnancy Category C and it isn't known if it is safe during pregnancy. It is also excreted in breast milk and breast feeding isn't recommended. Ivermectin Pregnancy And Lactation Text: This medication is Pregnancy Category C and it isn't known if it is safe during pregnancy. It is also excreted in breast milk. Azithromycin Pregnancy And Lactation Text: This medication is considered safe during pregnancy and is also secreted in breast milk. Hydroxyzine Pregnancy And Lactation Text: This medication is not safe during pregnancy and should not be taken. It is also excreted in breast milk and breast feeding isn't recommended. Infliximab Counseling:  I discussed with the patient the risks of infliximab including but not limited to myelosuppression, immunosuppression, autoimmune hepatitis, demyelinating diseases, lymphoma, and serious infections.  The patient understands that monitoring is required including a PPD at baseline and must alert us or the primary physician if symptoms of infection or other concerning signs are noted. High Dose Vitamin A Counseling: Side effects reviewed, pt to contact office should one occur. Otezla Pregnancy And Lactation Text: This medication is Pregnancy Category C and it isn't known if it is safe during pregnancy. It is unknown if it is excreted in breast milk. Itraconazole Pregnancy And Lactation Text: This medication is Pregnancy Category C and it isn't know if it is safe during pregnancy. It is also excreted in breast milk. Bexarotene Counseling:  I discussed with the patient the risks of bexarotene including but not limited to hair loss, dry lips/skin/eyes, liver abnormalities, hyperlipidemia, pancreatitis, depression/suicidal ideation, photosensitivity, drug rash/allergic reactions, hypothyroidism, anemia, leukopenia, infection, cataracts, and teratogenicity.  Patient understands that they will need regular blood tests to check lipid profile, liver function tests, white blood cell count, thyroid function tests and pregnancy test if applicable. Carac Counseling:  I discussed with the patient the risks of Carac including but not limited to erythema, scaling, itching, weeping, crusting, and pain. Acitretin Counseling:  I discussed with the patient the risks of acitretin including but not limited to hair loss, dry lips/skin/eyes, liver damage, hyperlipidemia, depression/suicidal ideation, photosensitivity.  Serious rare side effects can include but are not limited to pancreatitis, pseudotumor cerebri, bony changes, clot formation/stroke/heart attack.  Patient understands that alcohol is contraindicated since it can result in liver toxicity and significantly prolong the elimination of the drug by many years. Azathioprine Pregnancy And Lactation Text: This medication is Pregnancy Category D and isn't considered safe during pregnancy. It is unknown if this medication is excreted in breast milk. Rifampin Pregnancy And Lactation Text: This medication is Pregnancy Category C and it isn't know if it is safe during pregnancy. It is also excreted in breast milk and should not be used if you are breast feeding. Cimzia Counseling:  I discussed with the patient the risks of Cimzia including but not limited to immunosuppression, allergic reactions and infections.  The patient understands that monitoring is required including a PPD at baseline and must alert us or the primary physician if symptoms of infection or other concerning signs are noted. Glycopyrrolate Pregnancy And Lactation Text: This medication is Pregnancy Category B and is considered safe during pregnancy. It is unknown if it is excreted breast milk. Erythromycin Counseling:  I discussed with the patient the risks of erythromycin including but not limited to GI upset, allergic reaction, drug rash, diarrhea, increase in liver enzymes, and yeast infections. Benzoyl Peroxide Pregnancy And Lactation Text: This medication is Pregnancy Category C. It is unknown if benzoyl peroxide is excreted in breast milk. Birth Control Pills Counseling: Birth Control Pill Counseling: I discussed with the patient the potential side effects of OCPs including but not limited to increased risk of stroke, heart attack, thrombophlebitis, deep venous thrombosis, hepatic adenomas, breast changes, GI upset, headaches, and depression.  The patient verbalized understanding of the proper use and possible adverse effects of OCPs. All of the patient's questions and concerns were addressed. Bactrim Counseling:  I discussed with the patient the risks of sulfa antibiotics including but not limited to GI upset, allergic reaction, drug rash, diarrhea, dizziness, photosensitivity, and yeast infections.  Rarely, more serious reactions can occur including but not limited to aplastic anemia, agranulocytosis, methemoglobinemia, blood dyscrasias, liver or kidney failure, lung infiltrates or desquamative/blistering drug rashes. Isotretinoin Counseling: Patient should get monthly blood tests, not donate blood, not drive at night if vision affected, not share medication, and not undergo elective surgery for 6 months after tx completed. Side effects reviewed, pt to contact office should one occur. Use Enhanced Medication Counseling?: No Itraconazole Counseling:  I discussed with the patient the risks of itraconazole including but not limited to liver damage, nausea/vomiting, neuropathy, and severe allergy.  The patient understands that this medication is best absorbed when taken with acidic beverages such as non-diet cola or ginger ale.  The patient understands that monitoring is required including baseline LFTs and repeat LFTs at intervals.  The patient understands that they are to contact us or the primary physician if concerning signs are noted. Cellcept Counseling:  I discussed with the patient the risks of mycophenolate mofetil including but not limited to infection/immunosuppression, GI upset, hypokalemia, hypercholesterolemia, bone marrow suppression, lymphoproliferative disorders, malignancy, GI ulceration/bleed/perforation, colitis, interstitial lung disease, kidney failure, progressive multifocal leukoencephalopathy, and birth defects.  The patient understands that monitoring is required including a baseline creatinine and regular CBC testing. In addition, patient must alert us immediately if symptoms of infection or other concerning signs are noted. Cimzia Pregnancy And Lactation Text: This medication crosses the placenta but can be considered safe in certain situations. Cimzia may be excreted in breast milk. Tetracycline Counseling: Patient counseled regarding possible photosensitivity and increased risk for sunburn.  Patient instructed to avoid sunlight, if possible.  When exposed to sunlight, patients should wear protective clothing, sunglasses, and sunscreen.  The patient was instructed to call the office immediately if the following severe adverse effects occur:  hearing changes, easy bruising/bleeding, severe headache, or vision changes.  The patient verbalized understanding of the proper use and possible adverse effects of tetracycline.  All of the patient's questions and concerns were addressed. Patient understands to avoid pregnancy while on therapy due to potential birth defects. 5-Fu Pregnancy And Lactation Text: This medication is Pregnancy Category X and contraindicated in pregnancy and in women who may become pregnant. It is unknown if this medication is excreted in breast milk. Siliq Counseling:  I discussed with the patient the risks of Siliq including but not limited to new or worsening depression, suicidal thoughts and behavior, immunosuppression, malignancy, posterior leukoencephalopathy syndrome, and serious infections.  The patient understands that monitoring is required including a PPD at baseline and must alert us or the primary physician if symptoms of infection or other concerning signs are noted. There is also a special program designed to monitor depression which is required with Siliq. Erythromycin Pregnancy And Lactation Text: This medication is Pregnancy Category B and is considered safe during pregnancy. It is also excreted in breast milk. Thalidomide Counseling: I discussed with the patient the risks of thalidomide including but not limited to birth defects, anxiety, weakness, chest pain, dizziness, cough and severe allergy. Quinolones Counseling:  I discussed with the patient the risks of fluoroquinolones including but not limited to GI upset, allergic reaction, drug rash, diarrhea, dizziness, photosensitivity, yeast infections, liver function test abnormalities, tendonitis/tendon rupture. Xeljanz Counseling: I discussed with the patient the risks of Xeljanz therapy including increased risk of infection, liver issues, headache, diarrhea, or cold symptoms. Live vaccines should be avoided. They were instructed to call if they have any problems. Gabapentin Counseling: I discussed with the patient the risks of gabapentin including but not limited to dizziness, somnolence, fatigue and ataxia. Terbinafine Counseling: Patient counseling regarding adverse effects of terbinafine including but not limited to headache, diarrhea, rash, upset stomach, liver function test abnormalities, itching, taste/smell disturbance, nausea, abdominal pain, and flatulence.  There is a rare possibility of liver failure that can occur when taking terbinafine.  The patient understands that a baseline LFT and kidney function test may be required. The patient verbalized understanding of the proper use and possible adverse effects of terbinafine.  All of the patient's questions and concerns were addressed. Picato Counseling:  I discussed with the patient the risks of Picato including but not limited to erythema, scaling, itching, weeping, crusting, and pain. Odomzo Counseling- I discussed with the patient the risks of Odomzo including but not limited to nausea, vomiting, diarrhea, constipation, weight loss, changes in the sense of taste, decreased appetite, muscle spasms, and hair loss.  The patient verbalized understanding of the proper use and possible adverse effects of Odomzo.  All of the patient's questions and concerns were addressed. Colchicine Counseling:  Patient counseled regarding adverse effects including but not limited to stomach upset (nausea, vomiting, stomach pain, or diarrhea).  Patient instructed to limit alcohol consumption while taking this medication.  Colchicine may reduce blood counts especially with prolonged use.  The patient understands that monitoring of kidney function and blood counts may be required, especially at baseline. The patient verbalized understanding of the proper use and possible adverse effects of colchicine.  All of the patient's questions and concerns were addressed. SSKI Counseling:  I discussed with the patient the risks of SSKI including but not limited to thyroid abnormalities, metallic taste, GI upset, fever, headache, acne, arthralgias, paraesthesias, lymphadenopathy, easy bleeding, arrhythmias, and allergic reaction. Solaraze Pregnancy And Lactation Text: This medication is Pregnancy Category B and is considered safe. There is some data to suggest avoiding during the third trimester. It is unknown if this medication is excreted in breast milk. Tazorac Counseling:  Patient advised that medication is irritating and drying.  Patient may need to apply sparingly and wash off after an hour before eventually leaving it on overnight.  The patient verbalized understanding of the proper use and possible adverse effects of tazorac.  All of the patient's questions and concerns were addressed. Terbinafine Pregnancy And Lactation Text: This medication is Pregnancy Category B and is considered safe during pregnancy. It is also excreted in breast milk and breast feeding isn't recommended. Valtrex Pregnancy And Lactation Text: this medication is Pregnancy Category B and is considered safe during pregnancy. This medication is not directly found in breast milk but it's metabolite acyclovir is present. Dupixent Counseling: I discussed with the patient the risks of dupilumab including but not limited to eye infection and irritation, cold sores, injection site reactions, worsening of asthma, allergic reactions and increased risk of parasitic infection.  Live vaccines should be avoided while taking dupilumab. Dupilumab will also interact with certain medications such as warfarin and cyclosporine. The patient understands that monitoring is required and they must alert us or the primary physician if symptoms of infection or other concerning signs are noted. Otezla Counseling: The side effects of Otezla were discussed with the patient, including but not limited to worsening or new depression, weight loss, diarrhea, nausea, upper respiratory tract infection, and headache. Patient instructed to call the office should any adverse effect occur.  The patient verbalized understanding of the proper use and possible adverse effects of Otezla.  All the patient's questions and concerns were addressed. Methotrexate Pregnancy And Lactation Text: This medication is Pregnancy Category X and is known to cause fetal harm. This medication is excreted in breast milk. Azathioprine Counseling:  I discussed with the patient the risks of azathioprine including but not limited to myelosuppression, immunosuppression, hepatotoxicity, lymphoma, and infections.  The patient understands that monitoring is required including baseline LFTs, Creatinine, possible TPMP genotyping and weekly CBCs for the first month and then every 2 weeks thereafter.  The patient verbalized understanding of the proper use and possible adverse effects of azathioprine.  All of the patient's questions and concerns were addressed. Acitretin Pregnancy And Lactation Text: This medication is Pregnancy Category X and should not be given to women who are pregnant or may become pregnant in the future. This medication is excreted in breast milk. Azithromycin Counseling:  I discussed with the patient the risks of azithromycin including but not limited to GI upset, allergic reaction, drug rash, diarrhea, and yeast infections. Xolair Counseling:  Patient informed of potential adverse effects including but not limited to fever, muscle aches, rash and allergic reactions.  The patient verbalized understanding of the proper use and possible adverse effects of Xolair.  All of the patient's questions and concerns were addressed. Protopic Pregnancy And Lactation Text: This medication is Pregnancy Category C. It is unknown if this medication is excreted in breast milk when applied topically. Gabapentin Pregnancy And Lactation Text: This medication is Pregnancy Category C and isn't considered safe during pregnancy. It is excreted in breast milk. Nsaids Counseling: NSAID Counseling: I discussed with the patient that NSAIDs should be taken with food. Prolonged use of NSAIDs can result in the development of stomach ulcers.  Patient advised to stop taking NSAIDs if abdominal pain occurs.  The patient verbalized understanding of the proper use and possible adverse effects of NSAIDs.  All of the patient's questions and concerns were addressed. Hydroxyzine Counseling: Patient advised that the medication is sedating and not to drive a car after taking this medication.  Patient informed of potential adverse effects including but not limited to dry mouth, urinary retention, and blurry vision.  The patient verbalized understanding of the proper use and possible adverse effects of hydroxyzine.  All of the patient's questions and concerns were addressed. Detail Level: Zone Fluconazole Counseling:  Patient counseled regarding adverse effects of fluconazole including but not limited to headache, diarrhea, nausea, upset stomach, liver function test abnormalities, taste disturbance, and stomach pain.  There is a rare possibility of liver failure that can occur when taking fluconazole.  The patient understands that monitoring of LFTs and kidney function test may be required, especially at baseline. The patient verbalized understanding of the proper use and possible adverse effects of fluconazole.  All of the patient's questions and concerns were addressed. Methotrexate Counseling:  Patient counseled regarding adverse effects of methotrexate including but not limited to nausea, vomiting, abnormalities in liver function tests. Patients may develop mouth sores, rash, diarrhea, and abnormalities in blood counts. The patient understands that monitoring is required including LFT's and blood counts.  There is a rare possibility of scarring of the liver and lung problems that can occur when taking methotrexate. Persistent nausea, loss of appetite, pale stools, dark urine, cough, and shortness of breath should be reported immediately. Patient advised to discontinue methotrexate treatment at least three months before attempting to become pregnant.  I discussed the need for folate supplements while taking methotrexate.  These supplements can decrease side effects during methotrexate treatment. The patient verbalized understanding of the proper use and possible adverse effects of methotrexate.  All of the patient's questions and concerns were addressed. Erivedge Counseling- I discussed with the patient the risks of Erivedge including but not limited to nausea, vomiting, diarrhea, constipation, weight loss, changes in the sense of taste, decreased appetite, muscle spasms, and hair loss.  The patient verbalized understanding of the proper use and possible adverse effects of Erivedge.  All of the patient's questions and concerns were addressed. Topical Retinoid counseling:  Patient advised to apply a pea-sized amount only at bedtime and wait 30 minutes after washing their face before applying.  If too drying, patient may add a non-comedogenic moisturizer. The patient verbalized understanding of the proper use and possible adverse effects of retinoids.  All of the patient's questions and concerns were addressed. Arava Counseling:  Patient counseled regarding adverse effects of Arava including but not limited to nausea, vomiting, abnormalities in liver function tests. Patients may develop mouth sores, rash, diarrhea, and abnormalities in blood counts. The patient understands that monitoring is required including LFTs and blood counts.  There is a rare possibility of scarring of the liver and lung problems that can occur when taking methotrexate. Persistent nausea, loss of appetite, pale stools, dark urine, cough, and shortness of breath should be reported immediately. Patient advised to discontinue Arava treatment and consult with a physician prior to attempting conception. The patient will have to undergo a treatment to eliminate Arava from the body prior to conception. Drysol Pregnancy And Lactation Text: This medication is considered safe during pregnancy and breast feeding. Topical Sulfur Applications Pregnancy And Lactation Text: This medication is Pregnancy Category C and has an unknown safety profile during pregnancy. It is unknown if this topical medication is excreted in breast milk. Dupixent Pregnancy And Lactation Text: This medication likely crosses the placenta but the risk for the fetus is uncertain. This medication is excreted in breast milk. Hydroxychloroquine Pregnancy And Lactation Text: This medication has been shown to cause fetal harm but it isn't assigned a Pregnancy Risk Category. There are small amounts excreted in breast milk. Cosentyx Counseling:  I discussed with the patient the risks of Cosentyx including but not limited to worsening of Crohn's disease, immunosuppression, allergic reactions and infections.  The patient understands that monitoring is required including a PPD at baseline and must alert us or the primary physician if symptoms of infection or other concerning signs are noted. Humira Counseling:  I discussed with the patient the risks of adalimumab including but not limited to myelosuppression, immunosuppression, autoimmune hepatitis, demyelinating diseases, lymphoma, and serious infections.  The patient understands that monitoring is required including a PPD at baseline and must alert us or the primary physician if symptoms of infection or other concerning signs are noted. Simponi Counseling:  I discussed with the patient the risks of golimumab including but not limited to myelosuppression, immunosuppression, autoimmune hepatitis, demyelinating diseases, lymphoma, and serious infections.  The patient understands that monitoring is required including a PPD at baseline and must alert us or the primary physician if symptoms of infection or other concerning signs are noted. Ivermectin Counseling:  Patient instructed to take medication on an empty stomach with a full glass of water.  Patient informed of potential adverse effects including but not limited to nausea, diarrhea, dizziness, itching, and swelling of the extremities or lymph nodes.  The patient verbalized understanding of the proper use and possible adverse effects of ivermectin.  All of the patient's questions and concerns were addressed. Solaraze Counseling:  I discussed with the patient the risks of Solaraze including but not limited to erythema, scaling, itching, weeping, crusting, and pain. Glycopyrrolate Counseling:  I discussed with the patient the risks of glycopyrrolate including but not limited to skin rash, drowsiness, dry mouth, difficulty urinating, and blurred vision. Minoxidil Counseling: Minoxidil is a topical medication which can increase blood flow where it is applied. It is uncertain how this medication increases hair growth. Side effects are uncommon and include stinging and allergic reactions. Enbrel Counseling:  I discussed with the patient the risks of etanercept including but not limited to myelosuppression, immunosuppression, autoimmune hepatitis, demyelinating diseases, lymphoma, and infections.  The patient understands that monitoring is required including a PPD at baseline and must alert us or the primary physician if symptoms of infection or other concerning signs are noted. Minocycline Counseling: Patient advised regarding possible photosensitivity and discoloration of the teeth, skin, lips, tongue and gums.  Patient instructed to avoid sunlight, if possible.  When exposed to sunlight, patients should wear protective clothing, sunglasses, and sunscreen.  The patient was instructed to call the office immediately if the following severe adverse effects occur:  hearing changes, easy bruising/bleeding, severe headache, or vision changes.  The patient verbalized understanding of the proper use and possible adverse effects of minocycline.  All of the patient's questions and concerns were addressed. Drysol Counseling:  I discussed with the patient the risks of drysol/aluminum chloride including but not limited to skin rash, itching, irritation, burning. Metronidazole Counseling:  I discussed with the patient the risks of metronidazole including but not limited to seizures, nausea/vomiting, a metallic taste in the mouth, nausea/vomiting and severe allergy. Eucrisa Counseling: Patient may experience a mild burning sensation during topical application. Eucrisa is not approved in children less than 2 years of age. High Dose Vitamin A Pregnancy And Lactation Text: High dose vitamin A therapy is contraindicated during pregnancy and breast feeding. Doxycycline Pregnancy And Lactation Text: This medication is Pregnancy Category D and not consider safe during pregnancy. It is also excreted in breast milk but is considered safe for shorter treatment courses. Cimetidine Counseling:  I discussed with the patient the risks of Cimetidine including but not limited to gynecomastia, headache, diarrhea, nausea, drowsiness, arrhythmias, pancreatitis, skin rashes, psychosis, bone marrow suppression and kidney toxicity. Benzoyl Peroxide Counseling: Patient counseled that medicine may cause skin irritation and bleach clothing.  In the event of skin irritation, the patient was advised to reduce the amount of the drug applied or use it less frequently.   The patient verbalized understanding of the proper use and possible adverse effects of benzoyl peroxide.  All of the patient's questions and concerns were addressed. Sski Pregnancy And Lactation Text: This medication is Pregnancy Category D and isn't considered safe during pregnancy. It is excreted in breast milk. Bactrim Pregnancy And Lactation Text: This medication is Pregnancy Category D and is known to cause fetal risk.  It is also excreted in breast milk. Metronidazole Pregnancy And Lactation Text: This medication is Pregnancy Category B and considered safe during pregnancy.  It is also excreted in breast milk. Rifampin Counseling: I discussed with the patient the risks of rifampin including but not limited to liver damage, kidney damage, red-orange body fluids, nausea/vomiting and severe allergy. Doxycycline Counseling:  Patient counseled regarding possible photosensitivity and increased risk for sunburn.  Patient instructed to avoid sunlight, if possible.  When exposed to sunlight, patients should wear protective clothing, sunglasses, and sunscreen.  The patient was instructed to call the office immediately if the following severe adverse effects occur:  hearing changes, easy bruising/bleeding, severe headache, or vision changes.  The patient verbalized understanding of the proper use and possible adverse effects of doxycycline.  All of the patient's questions and concerns were addressed. Cyclophosphamide Pregnancy And Lactation Text: This medication is Pregnancy Category D and it isn't considered safe during pregnancy. This medication is excreted in breast milk. Albendazole Counseling:  I discussed with the patient the risks of albendazole including but not limited to cytopenia, kidney damage, nausea/vomiting and severe allergy.  The patient understands that this medication is being used in an off-label manner. Bexarotene Pregnancy And Lactation Text: This medication is Pregnancy Category X and should not be given to women who are pregnant or may become pregnant. This medication should not be used if you are breast feeding. Xelandreyz Pregnancy And Lactation Text: This medication is Pregnancy Category D and is not considered safe during pregnancy.  The risk during breast feeding is also uncertain. Stelara Counseling:  I discussed with the patient the risks of ustekinumab including but not limited to immunosuppression, malignancy, posterior leukoencephalopathy syndrome, and serious infections.  The patient understands that monitoring is required including a PPD at baseline and must alert us or the primary physician if symptoms of infection or other concerning signs are noted. Hydroxychloroquine Counseling:  I discussed with the patient that a baseline ophthalmologic exam is needed at the start of therapy and every year thereafter while on therapy. A CBC may also be warranted for monitoring.  The side effects of this medication were discussed with the patient, including but not limited to agranulocytosis, aplastic anemia, seizures, rashes, retinopathy, and liver toxicity. Patient instructed to call the office should any adverse effect occur.  The patient verbalized understanding of the proper use and possible adverse effects of Plaquenil.  All the patient's questions and concerns were addressed. Doxepin Counseling:  Patient advised that the medication is sedating and not to drive a car after taking this medication. Patient informed of potential adverse effects including but not limited to dry mouth, urinary retention, and blurry vision.  The patient verbalized understanding of the proper use and possible adverse effects of doxepin.  All of the patient's questions and concerns were addressed. Birth Control Pills Pregnancy And Lactation Text: This medication should be avoided if pregnant and for the first 30 days post-partum. Cyclophosphamide Counseling:  I discussed with the patient the risks of cyclophosphamide including but not limited to hair loss, hormonal abnormalities, decreased fertility, abdominal pain, diarrhea, nausea and vomiting, bone marrow suppression and infection. The patient understands that monitoring is required while taking this medication. Nsaids Pregnancy And Lactation Text: These medications are considered safe up to 30 weeks gestation. It is excreted in breast milk.

## 2025-03-05 RX ORDER — TRIAMCINOLONE ACETONIDE 40 MG/ML
40 INJECTION, SUSPENSION INTRA-ARTICULAR; INTRAMUSCULAR
Status: DISCONTINUED | OUTPATIENT
Start: 2025-02-18 | End: 2025-03-05 | Stop reason: HOSPADM

## 2025-03-05 NOTE — PROCEDURES
Large Joint Aspiration/Injection: R subacromial bursa    Date/Time: 2/18/2025 9:40 AM    Performed by: Mony Garcia FNP  Authorized by: Mony Garcia FNP    Consent Done?:  Yes (Verbal)  Indications:  Pain  Timeout: prior to procedure the correct patient, procedure, and site was verified    Prep: patient was prepped and draped in usual sterile fashion      Local anesthesia used?: Yes    Local anesthetic:  Lidocaine 1% without epinephrine  Anesthetic total (ml):  5      Details:  Needle Size:  22 G  Ultrasonic Guidance for needle placement?: No    Approach:  Posterior  Location:  Shoulder  Site:  R subacromial bursa  Medications:  40 mg triamcinolone acetonide 40 mg/mL  Patient tolerance:  Patient tolerated the procedure well with no immediate complications

## 2025-03-18 ENCOUNTER — TELEPHONE (OUTPATIENT)
Dept: PHYSICAL MEDICINE AND REHAB | Facility: CLINIC | Age: 70
End: 2025-03-18
Payer: MEDICARE

## 2025-03-18 NOTE — TELEPHONE ENCOUNTER
Called and spoke with pt. Pt is scheduled for Monday 4/7/25 @ 11 pt v/u. No further assistance is needed at this time.     ----- Message from Dontrell Adam sent at 3/17/2025  2:35 PM CDT -----    ----- Message -----  From: Sherri Miguel  Sent: 3/17/2025  10:29 AM CDT  To: [facility] Staff    Type:  Patient Returning CallWho Called:Nata Venegas Message for Patient:Does the patient know what this is regarding?:Having problems againWould the patient rather a call back or a response via [facility]? [facility]:[phone] Information: Need to reschedule injection

## 2025-03-24 DIAGNOSIS — M25.569 KNEE PAIN, UNSPECIFIED CHRONICITY, UNSPECIFIED LATERALITY: Primary | ICD-10-CM

## 2025-03-25 ENCOUNTER — TELEPHONE (OUTPATIENT)
Dept: FAMILY MEDICINE | Facility: CLINIC | Age: 70
End: 2025-03-25
Payer: MEDICARE

## 2025-03-25 DIAGNOSIS — E53.8 B12 DEFICIENCY: Primary | ICD-10-CM

## 2025-03-25 RX ORDER — CYANOCOBALAMIN 1000 UG/ML
1000 INJECTION, SOLUTION INTRAMUSCULAR; SUBCUTANEOUS
Status: SHIPPED | OUTPATIENT
Start: 2025-03-28 | End: 2025-09-24

## 2025-03-28 ENCOUNTER — CLINICAL SUPPORT (OUTPATIENT)
Dept: FAMILY MEDICINE | Facility: CLINIC | Age: 70
End: 2025-03-28
Payer: MEDICARE

## 2025-03-28 DIAGNOSIS — E53.8 B12 DEFICIENCY: Primary | ICD-10-CM

## 2025-03-28 PROCEDURE — 99999 PR PBB SHADOW E&M-EST. PATIENT-LVL II: CPT | Mod: PBBFAC,,,

## 2025-03-28 RX ADMIN — CYANOCOBALAMIN 1000 MCG: 1000 INJECTION, SOLUTION INTRAMUSCULAR; SUBCUTANEOUS at 10:03

## 2025-04-01 ENCOUNTER — HOSPITAL ENCOUNTER (OUTPATIENT)
Dept: RADIOLOGY | Facility: HOSPITAL | Age: 70
Discharge: HOME | End: 2025-04-01
Attending: NURSE PRACTITIONER
Payer: MEDICARE

## 2025-04-01 ENCOUNTER — CLINICAL SUPPORT (OUTPATIENT)
Dept: PHYSICAL MEDICINE AND REHAB | Facility: CLINIC | Age: 70
End: 2025-04-01
Payer: MEDICARE

## 2025-04-01 ENCOUNTER — OFFICE VISIT (OUTPATIENT)
Dept: ORTHOPEDICS | Facility: CLINIC | Age: 70
End: 2025-04-01
Payer: MEDICARE

## 2025-04-01 VITALS
WEIGHT: 146.81 LBS | BODY MASS INDEX: 24.46 KG/M2 | SYSTOLIC BLOOD PRESSURE: 95 MMHG | HEIGHT: 65 IN | HEART RATE: 89 BPM | DIASTOLIC BLOOD PRESSURE: 65 MMHG

## 2025-04-01 VITALS — DIASTOLIC BLOOD PRESSURE: 64 MMHG | SYSTOLIC BLOOD PRESSURE: 117 MMHG | HEART RATE: 83 BPM

## 2025-04-01 DIAGNOSIS — M75.41 IMPINGEMENT SYNDROME OF RIGHT SHOULDER: Primary | ICD-10-CM

## 2025-04-01 DIAGNOSIS — M25.569 KNEE PAIN, UNSPECIFIED CHRONICITY, UNSPECIFIED LATERALITY: ICD-10-CM

## 2025-04-01 DIAGNOSIS — M19.072 ARTHRITIS OF LEFT MIDFOOT: Primary | ICD-10-CM

## 2025-04-01 PROCEDURE — 99999 PR PBB SHADOW E&M-EST. PATIENT-LVL III: CPT | Mod: PBBFAC,,, | Performed by: PHYSICAL MEDICINE & REHABILITATION

## 2025-04-01 PROCEDURE — 20604 DRAIN/INJ JOINT/BURSA W/US: CPT | Mod: LT,S$GLB,, | Performed by: PHYSICAL MEDICINE & REHABILITATION

## 2025-04-01 PROCEDURE — 99999 PR PBB SHADOW E&M-EST. PATIENT-LVL III: CPT | Mod: PBBFAC,,, | Performed by: NURSE PRACTITIONER

## 2025-04-01 RX ORDER — TRIAMCINOLONE ACETONIDE 40 MG/ML
40 INJECTION, SUSPENSION INTRA-ARTICULAR; INTRAMUSCULAR
Status: DISCONTINUED | OUTPATIENT
Start: 2025-04-01 | End: 2025-04-01 | Stop reason: HOSPADM

## 2025-04-01 RX ORDER — LIDOCAINE HYDROCHLORIDE 10 MG/ML
0.5 INJECTION, SOLUTION INFILTRATION; PERINEURAL
Status: DISCONTINUED | OUTPATIENT
Start: 2025-04-01 | End: 2025-04-01 | Stop reason: HOSPADM

## 2025-04-01 RX ADMIN — TRIAMCINOLONE ACETONIDE 40 MG: 40 INJECTION, SUSPENSION INTRA-ARTICULAR; INTRAMUSCULAR at 03:04

## 2025-04-01 RX ADMIN — LIDOCAINE HYDROCHLORIDE 0.5 ML: 10 INJECTION, SOLUTION INFILTRATION; PERINEURAL at 03:04

## 2025-04-01 RX ADMIN — TRIAMCINOLONE ACETONIDE 40 MG: 40 INJECTION, SUSPENSION INTRA-ARTICULAR; INTRAMUSCULAR at 09:04

## 2025-04-01 NOTE — PROCEDURES
Large Joint Aspiration/Injection: R subacromial bursa    Date/Time: 4/1/2025 9:20 AM    Performed by: Mony Garcia FNP  Authorized by: Mony Garcia FNP    Consent Done?:  Yes (Verbal)  Indications:  Pain  Timeout: prior to procedure the correct patient, procedure, and site was verified    Prep: patient was prepped and draped in usual sterile fashion      Local anesthesia used?: Yes    Local anesthetic:  Lidocaine 1% without epinephrine  Anesthetic total (ml):  5      Details:  Needle Size:  22 G  Ultrasonic Guidance for needle placement?: No    Approach:  Posterior  Location:  Shoulder  Site:  R subacromial bursa  Medications:  40 mg triamcinolone acetonide 40 mg/mL  Patient tolerance:  Patient tolerated the procedure well with no immediate complications

## 2025-04-01 NOTE — PROGRESS NOTES
[facility] PROCEDURE NOTE    CHIEF COMPLAINT:   Chief Complaint   Patient presents with    Foot Pain     Left Midfoot        HISTORY OF PRESENT ILLNESS: Carla Dang is a 69 y.o. female who presents to me for planned procedure/injection of steroid for management of the below noted diagnosis.      Here for repeat left foot injection. Great relief initially after last injection.     Medications: Medications Ordered Prior to Encounter[1]    Allergies:   Review of patient's allergies indicates:   Allergen Reactions    Hydrochlorothiazide Other (See Comments)     Multiple electrolyte abnormalities       Vitals:   Vitals:    04/01/25 0958   BP: 117/64   Pulse: 83       ASSESSMENT:   1. Arthritis of left midfoot        PLAN:   1. Procedure/injection was completed for management of above diagnosis.    2. Please see procedure note from today's visit with further details.     Left intertarsal 2/3 TMT steroid     RTC as needed. Okay to repeat every 3 months.        [1]   Current Outpatient Medications on File Prior to Visit   Medication Sig Dispense Refill    benazepriL (LOTENSIN) 10 MG tablet Take 1 tablet by mouth once daily 90 tablet 3    calcium carbonate (OS-NASEEM) 500 mg calcium (1,250 mg) tablet qid 120 tablet 0    cholecalciferol, vitamin D3, (VITAMIN D3) 50 mcg (2,000 unit) Cap capsule Take 1 capsule (2,000 Units total) by mouth once daily.      cyanocobalamin 1,000 mcg/mL injection Inject 1,000 mcg into the muscle every 30 days.      famotidine (PEPCID) 40 MG tablet Take 1 tablet (40 mg total) by mouth every evening. 90 tablet 2    folic acid (FOLVITE) 1 MG tablet Take 1 tablet by mouth once daily 90 tablet 3    LIDOcaine (LIDODERM) 5 % Place 1 patch onto the skin once daily. Remove & Discard patch within 12 hours or as directed by MD 30 patch 2    magnesium oxide (MAG-OX) 400 mg (241.3 mg magnesium) tablet bid 60 tablet 1    minocycline (MINOCIN,DYNACIN) 50 MG Cap  Take 50 mg by mouth 2 (two) times daily.      NIFEdipine (PROCARDIA-XL) 60 MG (OSM) 24 hr tablet Take 1 tablet (60 mg total) by mouth once daily. 90 tablet 3    omeprazole (PRILOSEC) 20 MG capsule Take 1 capsule by mouth once daily 90 capsule 3    pravastatin (PRAVACHOL) 20 MG tablet Take 1 tablet by mouth once daily 90 tablet 3    sulfacetamide sod-sulfur-urea 10-5-10 % Clsr Apply 1 application  topically.      ZILXI 1.5 % Foam Apply topically 2 (two) times daily.       Current Facility-Administered Medications on File Prior to Visit   Medication Dose Route Frequency Provider Last Rate Last Admin    cyanocobalamin injection 1,000 mcg  1,000 mcg Intramuscular Q30 Days    1,000 mcg at 03/28/25 1003    [DISCONTINUED] triamcinolone acetonide injection 40 mg  40 mg Intra-articular  Mony Garcia FNP   40 mg at 04/01/25 9108

## 2025-04-01 NOTE — PROGRESS NOTES
Chief Complaint   Patient presents with    Right Shoulder - Pain    Left Shoulder - Pain       HPI:   This is a 69 y.o. who returns to clinic today in follow-up for right shoulder pain for the past 2 weeks after no known injury; she is here for injection to right subacromial space. Pain is aching and dull. No numbness or tingling. No associated signs or symptoms.    Past Medical History:   Diagnosis Date    Acute pancreatitis 03/21/2016    Breast cancer 2005    right side with lumpectomy, chemo and radiation    Closed fracture of ramus of right pubis 06/08/2016    Colon polyp     last scope 4/2012- repeat 10 y per pt- Dr. Johnson    GERD (gastroesophageal reflux disease) 05/29/2012    Gout of foot 09/03/2024    Hyperlipidemia 04/16/2013    Hypertension     Metabolic encephalopathy 04/20/2021    Osteopenia      Past Surgical History:   Procedure Laterality Date    BREAST LUMPECTOMY  2005    DILATION AND CURETTAGE OF UTERUS      ESOPHAGEAL DILATION      ESOPHAGOGASTRODUODENOSCOPY  6/1/2012    ROTATOR CUFF REPAIR      TEAR DUCT SURGERY      right rye     Medications Ordered Prior to Encounter[1]  Review of patient's allergies indicates:   Allergen Reactions    Hydrochlorothiazide Other (See Comments)     Multiple electrolyte abnormalities     Family History   Problem Relation Name Age of Onset    Stroke Brother  57    Breast cancer Mother  87     Social History[2]    Review of Systems:  Constitutional:  Denies fever or chills   Eyes:  Denies change in visual acuity   HENT:  Denies nasal congestion or sore throat   Respiratory:  Denies cough or shortness of breath   Cardiovascular:  Denies chest pain or edema   GI:  Denies abdominal pain, nausea, vomiting, bloody stools or diarrhea   :  Denies dysuria   Integument:  Denies rash   Neurologic:  Denies headache, focal weakness or sensory changes   Endocrine:  Denies polyuria or polydipsia   Lymphatic:  Denies swollen glands   Psychiatric:  Denies depression or anxiety      Physical Exam:   Constitutional:  Well developed, well nourished, no acute distress, non-toxic appearance   Integument:  Well hydrated  Neurologic:  Alert & oriented x 3  Psychiatric:  Speech and behavior appropriate     Bilateral Shoulder Exam    Left Shoulder Exam   Shoulder exam performed same as contralateral side and is normal.    Right Shoulder Exam   Tenderness   Shoulder tenderness location: diffusely about shoulder.    Range of Motion   Forward Flexion: abnormal   External Rotation: abnormal     Muscle Strength   Supraspinatus: 4/5     Tests   Hawkin's test: positive  Impingement: positive    Other   Erythema: absent  Sensation: normal  Pulse: present           Impingement syndrome of right shoulder  -     Large Joint Aspiration/Injection: R subacromial bursa  -     triamcinolone acetonide injection 40 mg         Using an aseptic technique, I injected 5 cc of lidocaine 1% without and 1 cc of kenalog 40mg into the right Shoulder. The patient tolerated this well. I will have them return to clinic in 6 weeks.           [1]   Current Outpatient Medications on File Prior to Visit   Medication Sig Dispense Refill    benazepriL (LOTENSIN) 10 MG tablet Take 1 tablet by mouth once daily 90 tablet 3    calcium carbonate (OS-NASEEM) 500 mg calcium (1,250 mg) tablet qid 120 tablet 0    cholecalciferol, vitamin D3, (VITAMIN D3) 50 mcg (2,000 unit) Cap capsule Take 1 capsule (2,000 Units total) by mouth once daily.      cyanocobalamin 1,000 mcg/mL injection Inject 1,000 mcg into the muscle every 30 days.      famotidine (PEPCID) 40 MG tablet Take 1 tablet (40 mg total) by mouth every evening. 90 tablet 2    folic acid (FOLVITE) 1 MG tablet Take 1 tablet by mouth once daily 90 tablet 3    LIDOcaine (LIDODERM) 5 % Place 1 patch onto the skin once daily. Remove & Discard patch within 12 hours or as directed by MD 30 patch 2    magnesium oxide (MAG-OX) 400 mg (241.3 mg magnesium) tablet bid 60 tablet 1    minocycline  (MINOCIN,DYNACIN) 50 MG Cap Take 50 mg by mouth 2 (two) times daily.      NIFEdipine (PROCARDIA-XL) 60 MG (OSM) 24 hr tablet Take 1 tablet (60 mg total) by mouth once daily. 90 tablet 3    omeprazole (PRILOSEC) 20 MG capsule Take 1 capsule by mouth once daily 90 capsule 3    pravastatin (PRAVACHOL) 20 MG tablet Take 1 tablet by mouth once daily 90 tablet 3    sulfacetamide sod-sulfur-urea 10-5-10 % Clsr Apply 1 application  topically.      ZILXI 1.5 % Foam Apply topically 2 (two) times daily.       Current Facility-Administered Medications on File Prior to Visit   Medication Dose Route Frequency Provider Last Rate Last Admin    cyanocobalamin injection 1,000 mcg  1,000 mcg Intramuscular Q30 Days    1,000 mcg at 25 1003   [2]   Social History  Socioeconomic History    Marital status:    Tobacco Use    Smoking status: Former     Current packs/day: 0.00     Types: Cigarettes     Quit date: 2005     Years since quittin.8    Smokeless tobacco: Never   Substance and Sexual Activity    Alcohol use: Not Currently     Alcohol/week: 2.0 standard drinks of alcohol     Types: 2 Standard drinks or equivalent per week     Comment: prior 6 pack/week    Drug use: Yes    Sexual activity: Not Currently     Social Drivers of Health     Financial Resource Strain: Low Risk  (2024)    Overall Financial Resource Strain (CARDIA)     Difficulty of Paying Living Expenses: Not hard at all   Food Insecurity: No Food Insecurity (2024)    Hunger Vital Sign     Worried About Running Out of Food in the Last Year: Never true     Ran Out of Food in the Last Year: Never true   Transportation Needs: No Transportation Needs (2024)    PRAPARE - Transportation     Lack of Transportation (Medical): No     Lack of Transportation (Non-Medical): No   Physical Activity: Inactive (2024)    Exercise Vital Sign     Days of Exercise per Week: 0 days     Minutes of Exercise per Session: 0 min   Stress: No Stress  Concern Present (11/13/2024)    [facility] - Occupational Stress Questionnaire     Feeling of Stress : Not at all   Housing Stability: Unknown (11/13/2024)    Housing Stability Vital Sign     Unable to Pay for Housing in the Last Year: No     Homeless in the Last Year: No

## 2025-04-01 NOTE — PROCEDURES
Small Joint Aspiration/Injection: L intertarsal 2/3 TMT    Date/Time: 4/1/2025 3:30 PM    Performed by: Lilia Day, DO  Authorized by: Lilia Day, DO    Consent Done?:  Yes (Verbal)  Indications:  Arthritis  Site marked: the procedure site was marked    Timeout: prior to procedure the correct patient, procedure, and site was verified    Prep: patient was prepped and draped in usual sterile fashion      Local anesthesia used?: Yes    Anesthesia:  Local infiltration  Local anesthetic:  Lidocaine 1% without epinephrine  Anesthetic total (ml):  1    Location:  Foot  Site:  L intertarsal (Left 2/3 TMT)  Ultrasonic guidance for needle placement?: Yes    Needle size:  27 G  Approach:  Dorsal  Medications:  0.5 mL LIDOcaine HCL 10 mg/ml (1%) 10 mg/mL (1 %); 40 mg triamcinolone acetonide 40 mg/mL    Ultrasound guidance was used for correct needle placement, the images were saved and will be uploaded to EMR.

## 2025-04-25 ENCOUNTER — CLINICAL SUPPORT (OUTPATIENT)
Dept: FAMILY MEDICINE | Facility: CLINIC | Age: 70
End: 2025-04-25
Payer: MEDICARE

## 2025-04-25 DIAGNOSIS — E53.8 B12 DEFICIENCY: Primary | ICD-10-CM

## 2025-04-25 PROCEDURE — 99999 PR PBB SHADOW E&M-EST. PATIENT-LVL II: CPT | Mod: PBBFAC,,,

## 2025-04-25 RX ADMIN — CYANOCOBALAMIN 1000 MCG: 1000 INJECTION, SOLUTION INTRAMUSCULAR; SUBCUTANEOUS at 09:04

## 2025-05-14 ENCOUNTER — OFFICE VISIT (OUTPATIENT)
Dept: ORTHOPEDICS | Facility: CLINIC | Age: 70
End: 2025-05-14
Payer: MEDICARE

## 2025-05-14 VITALS
BODY MASS INDEX: 24.46 KG/M2 | WEIGHT: 146.81 LBS | HEIGHT: 65 IN | HEART RATE: 83 BPM | DIASTOLIC BLOOD PRESSURE: 64 MMHG | SYSTOLIC BLOOD PRESSURE: 117 MMHG

## 2025-05-14 DIAGNOSIS — M75.41 IMPINGEMENT SYNDROME OF RIGHT SHOULDER: Primary | ICD-10-CM

## 2025-05-14 PROCEDURE — 99999 PR PBB SHADOW E&M-EST. PATIENT-LVL III: CPT | Mod: PBBFAC,,, | Performed by: NURSE PRACTITIONER

## 2025-05-14 RX ORDER — TRIAMCINOLONE ACETONIDE 40 MG/ML
40 INJECTION, SUSPENSION INTRA-ARTICULAR; INTRAMUSCULAR
Status: DISCONTINUED | OUTPATIENT
Start: 2025-05-14 | End: 2025-05-14 | Stop reason: HOSPADM

## 2025-05-14 RX ADMIN — TRIAMCINOLONE ACETONIDE 40 MG: 40 INJECTION, SUSPENSION INTRA-ARTICULAR; INTRAMUSCULAR at 10:05

## 2025-05-14 NOTE — PROCEDURES
Large Joint Aspiration/Injection: R subacromial bursa    Date/Time: 5/14/2025 10:40 AM    Performed by: Mony Garcia FNP  Authorized by: Mony Garcia FNP    Consent Done?:  Yes (Verbal)  Indications:  Pain  Timeout: prior to procedure the correct patient, procedure, and site was verified    Prep: patient was prepped and draped in usual sterile fashion      Local anesthesia used?: Yes    Local anesthetic:  Lidocaine 1% without epinephrine  Anesthetic total (ml):  5      Details:  Needle Size:  22 G  Ultrasonic Guidance for needle placement?: No    Approach:  Posterior  Location:  Shoulder  Site:  R subacromial bursa  Medications:  40 mg triamcinolone acetonide 40 mg/mL  Patient tolerance:  Patient tolerated the procedure well with no immediate complications

## 2025-05-14 NOTE — PROGRESS NOTES
Chief Complaint   Patient presents with    Right Shoulder - Pain       HPI:   This is a 69 y.o. who returns to clinic today in follow-up for right shoulder pain for the past 1 week after no known trauma. She is here for right shoulder injection.  Pain is dull and throbbing. No numbness or tingling. No associated signs or symptoms.    Past Medical History:   Diagnosis Date    Acute pancreatitis 03/21/2016    Breast cancer 2005    right side with lumpectomy, chemo and radiation    Closed fracture of ramus of right pubis 06/08/2016    Colon polyp     last scope 4/2012- repeat 10 y per pt- Dr. Johnson    GERD (gastroesophageal reflux disease) 05/29/2012    Gout of foot 09/03/2024    Hyperlipidemia 04/16/2013    Hypertension     Metabolic encephalopathy 04/20/2021    Osteopenia      Past Surgical History:   Procedure Laterality Date    BREAST LUMPECTOMY  2005    DILATION AND CURETTAGE OF UTERUS      ESOPHAGEAL DILATION      ESOPHAGOGASTRODUODENOSCOPY  6/1/2012    ROTATOR CUFF REPAIR      TEAR DUCT SURGERY      right rye     Medications Ordered Prior to Encounter[1]  Review of patient's allergies indicates:   Allergen Reactions    Hydrochlorothiazide Other (See Comments)     Multiple electrolyte abnormalities     Family History   Problem Relation Name Age of Onset    Stroke Brother  57    Breast cancer Mother  87     Social History[2]    Review of Systems:  Constitutional:  Denies fever or chills   Eyes:  Denies change in visual acuity   HENT:  Denies nasal congestion or sore throat   Respiratory:  Denies cough or shortness of breath   Cardiovascular:  Denies chest pain or edema   GI:  Denies abdominal pain, nausea, vomiting, bloody stools or diarrhea   :  Denies dysuria   Integument:  Denies rash   Neurologic:  Denies headache, focal weakness or sensory changes   Endocrine:  Denies polyuria or polydipsia   Lymphatic:  Denies swollen glands   Psychiatric:  Denies depression or anxiety     Physical Exam:   Constitutional:   Well developed, well nourished, no acute distress, non-toxic appearance   Integument:  Well hydrated  Neurologic:  Alert & oriented x 3  Psychiatric:  Speech and behavior appropriate     Bilateral Shoulder Exam    Left Shoulder Exam   Shoulder exam performed same as contralateral side and is normal.    Right Shoulder Exam   Tenderness   Shoulder tenderness location: diffusely about shoulder.    Range of Motion   Forward Flexion: abnormal   External Rotation: abnormal     Muscle Strength   Supraspinatus: 4/5     Tests   Hawkin's test: positive  Impingement: positive    Other   Erythema: absent  Sensation: normal  Pulse: present           Impingement syndrome of right shoulder  -     Large Joint Aspiration/Injection: R subacromial bursa  -     triamcinolone acetonide injection 40 mg         Using an aseptic technique, I injected 5 cc of lidocaine 1% without and 1 cc of kenalog 40mg into the right Shoulder. The patient tolerated this well. I will have them return to clinic in 6 weeks.           [1]   Current Outpatient Medications on File Prior to Visit   Medication Sig Dispense Refill    benazepriL (LOTENSIN) 10 MG tablet Take 1 tablet by mouth once daily 90 tablet 3    calcium carbonate (OS-NASEEM) 500 mg calcium (1,250 mg) tablet qid 120 tablet 0    cholecalciferol, vitamin D3, (VITAMIN D3) 50 mcg (2,000 unit) Cap capsule Take 1 capsule (2,000 Units total) by mouth once daily.      cyanocobalamin 1,000 mcg/mL injection Inject 1,000 mcg into the muscle every 30 days.      famotidine (PEPCID) 40 MG tablet Take 1 tablet (40 mg total) by mouth every evening. 90 tablet 2    folic acid (FOLVITE) 1 MG tablet Take 1 tablet by mouth once daily 90 tablet 3    LIDOcaine (LIDODERM) 5 % Place 1 patch onto the skin once daily. Remove & Discard patch within 12 hours or as directed by MD 30 patch 2    magnesium oxide (MAG-OX) 400 mg (241.3 mg magnesium) tablet bid 60 tablet 1    minocycline (MINOCIN,DYNACIN) 50 MG Cap Take 50 mg by  mouth 2 (two) times daily.      NIFEdipine (PROCARDIA-XL) 60 MG (OSM) 24 hr tablet Take 1 tablet (60 mg total) by mouth once daily. 90 tablet 3    omeprazole (PRILOSEC) 20 MG capsule Take 1 capsule by mouth once daily 90 capsule 3    pravastatin (PRAVACHOL) 20 MG tablet Take 1 tablet by mouth once daily 90 tablet 3    sulfacetamide sod-sulfur-urea 10-5-10 % Clsr Apply 1 application  topically.      ZILXI 1.5 % Foam Apply topically 2 (two) times daily.       Current Facility-Administered Medications on File Prior to Visit   Medication Dose Route Frequency Provider Last Rate Last Admin    cyanocobalamin injection 1,000 mcg  1,000 mcg Intramuscular Q30 Days    1,000 mcg at 25 0923   [2]   Social History  Socioeconomic History    Marital status:    Tobacco Use    Smoking status: Former     Current packs/day: 0.00     Types: Cigarettes     Quit date: 2005     Years since quittin.9    Smokeless tobacco: Never   Substance and Sexual Activity    Alcohol use: Not Currently     Alcohol/week: 2.0 standard drinks of alcohol     Types: 2 Standard drinks or equivalent per week     Comment: prior 6 pack/week    Drug use: Yes    Sexual activity: Not Currently     Social Drivers of Health     Financial Resource Strain: Low Risk  (2024)    Overall Financial Resource Strain (CARDIA)     Difficulty of Paying Living Expenses: Not hard at all   Food Insecurity: No Food Insecurity (2024)    Hunger Vital Sign     Worried About Running Out of Food in the Last Year: Never true     Ran Out of Food in the Last Year: Never true   Transportation Needs: No Transportation Needs (2024)    PRAPARE - Transportation     Lack of Transportation (Medical): No     Lack of Transportation (Non-Medical): No   Physical Activity: Inactive (2024)    Exercise Vital Sign     Days of Exercise per Week: 0 days     Minutes of Exercise per Session: 0 min   Stress: No Stress Concern Present (2024)    [facility] - Occupational Stress Questionnaire     Feeling of Stress : Not at all   Housing Stability: Unknown (11/13/2024)    Housing Stability Vital Sign     Unable to Pay for Housing in the Last Year: No     Homeless in the Last Year: No

## 2025-05-26 ENCOUNTER — TELEPHONE (OUTPATIENT)
Dept: FAMILY MEDICINE | Facility: CLINIC | Age: 70
End: 2025-05-26
Payer: MEDICARE

## 2025-05-26 NOTE — TELEPHONE ENCOUNTER
Appointment scheduled forCopied from CRM #[id number]. Topic: General Inquiry - Patient Advice  >> May 26, 2025  9:15 AM Yobany wrote:  Type:  Needs Medical Advice    Who Called: Carla Dang  Symptoms (please be specific):    How long has patient had these symptoms:    Pharmacy name and phone #:    Would the patient rather a call back or a response via [facility]?   Best Call Back Number: [phone]  Additional Information: Patient will like to reschedule  appt for nurse visit on 5-29

## 2025-05-27 ENCOUNTER — CLINICAL SUPPORT (OUTPATIENT)
Dept: FAMILY MEDICINE | Facility: CLINIC | Age: 70
End: 2025-05-27
Payer: MEDICARE

## 2025-05-27 ENCOUNTER — TELEPHONE (OUTPATIENT)
Dept: FAMILY MEDICINE | Facility: CLINIC | Age: 70
End: 2025-05-27

## 2025-05-27 DIAGNOSIS — E53.8 B12 DEFICIENCY: Primary | ICD-10-CM

## 2025-05-27 PROCEDURE — 99999 PR PBB SHADOW E&M-EST. PATIENT-LVL II: CPT | Mod: PBBFAC,,,

## 2025-05-27 PROCEDURE — 96372 THER/PROPH/DIAG INJ SC/IM: CPT | Mod: S$GLB,,, | Performed by: FAMILY MEDICINE

## 2025-05-27 RX ADMIN — CYANOCOBALAMIN 1000 MCG: 1000 INJECTION, SOLUTION INTRAMUSCULAR; SUBCUTANEOUS at 09:05

## 2025-05-27 NOTE — TELEPHONE ENCOUNTER
----- Message from Nurse Mccormack sent at 5/23/2025  9:17 AM CDT -----  Can she come sooner. She states she is going out of town.  ----- Message -----  From: Justo Pollock  Sent: 5/23/2025   9:09 AM CDT  To: [facility] Staff    Pt would like a call back regarding trying to get an earlier nurse visit appt for her B12 injection. Pt number is [phone]

## 2025-06-16 ENCOUNTER — TELEPHONE (OUTPATIENT)
Dept: FAMILY MEDICINE | Facility: CLINIC | Age: 70
End: 2025-06-16
Payer: MEDICARE

## 2025-06-16 ENCOUNTER — PATIENT MESSAGE (OUTPATIENT)
Dept: FAMILY MEDICINE | Facility: CLINIC | Age: 70
End: 2025-06-16
Payer: MEDICARE

## 2025-06-18 ENCOUNTER — HOSPITAL ENCOUNTER (OUTPATIENT)
Dept: RADIOLOGY | Facility: HOSPITAL | Age: 70
Discharge: HOME | End: 2025-06-18
Attending: NURSE PRACTITIONER
Payer: MEDICARE

## 2025-06-18 ENCOUNTER — OFFICE VISIT (OUTPATIENT)
Dept: FAMILY MEDICINE | Facility: CLINIC | Age: 70
End: 2025-06-18
Payer: MEDICARE

## 2025-06-18 VITALS
BODY MASS INDEX: 22.66 KG/M2 | HEIGHT: 66 IN | DIASTOLIC BLOOD PRESSURE: 50 MMHG | SYSTOLIC BLOOD PRESSURE: 104 MMHG | WEIGHT: 141 LBS | HEART RATE: 99 BPM | TEMPERATURE: 98 F

## 2025-06-18 DIAGNOSIS — M79.89 FOOT SWELLING: Primary | ICD-10-CM

## 2025-06-18 DIAGNOSIS — R60.0 LOCALIZED EDEMA: ICD-10-CM

## 2025-06-18 DIAGNOSIS — M79.89 FOOT SWELLING: ICD-10-CM

## 2025-06-18 PROCEDURE — 1101F PT FALLS ASSESS-DOCD LE1/YR: CPT | Mod: CPTII,S$GLB,, | Performed by: NURSE PRACTITIONER

## 2025-06-18 PROCEDURE — 93970 EXTREMITY STUDY: CPT | Mod: TC,PO

## 2025-06-18 PROCEDURE — 1159F MED LIST DOCD IN RCRD: CPT | Mod: CPTII,S$GLB,, | Performed by: NURSE PRACTITIONER

## 2025-06-18 PROCEDURE — 93970 EXTREMITY STUDY: CPT | Mod: 26,,, | Performed by: STUDENT IN AN ORGANIZED HEALTH CARE EDUCATION/TRAINING PROGRAM

## 2025-06-18 PROCEDURE — 99999 PR PBB SHADOW E&M-EST. PATIENT-LVL V: CPT | Mod: PBBFAC,,, | Performed by: NURSE PRACTITIONER

## 2025-06-18 PROCEDURE — 3074F SYST BP LT 130 MM HG: CPT | Mod: CPTII,S$GLB,, | Performed by: NURSE PRACTITIONER

## 2025-06-18 PROCEDURE — 3078F DIAST BP <80 MM HG: CPT | Mod: CPTII,S$GLB,, | Performed by: NURSE PRACTITIONER

## 2025-06-18 PROCEDURE — 1160F RVW MEDS BY RX/DR IN RCRD: CPT | Mod: CPTII,S$GLB,, | Performed by: NURSE PRACTITIONER

## 2025-06-18 PROCEDURE — 1126F AMNT PAIN NOTED NONE PRSNT: CPT | Mod: CPTII,S$GLB,, | Performed by: NURSE PRACTITIONER

## 2025-06-18 PROCEDURE — 99213 OFFICE O/P EST LOW 20 MIN: CPT | Mod: S$GLB,,, | Performed by: NURSE PRACTITIONER

## 2025-06-18 PROCEDURE — 4010F ACE/ARB THERAPY RXD/TAKEN: CPT | Mod: CPTII,S$GLB,, | Performed by: NURSE PRACTITIONER

## 2025-06-18 PROCEDURE — 3008F BODY MASS INDEX DOCD: CPT | Mod: CPTII,S$GLB,, | Performed by: NURSE PRACTITIONER

## 2025-06-18 PROCEDURE — 3288F FALL RISK ASSESSMENT DOCD: CPT | Mod: CPTII,S$GLB,, | Performed by: NURSE PRACTITIONER

## 2025-06-18 NOTE — PATIENT INSTRUCTIONS
Elevate affected extremities while lying/sitting  Low sodium diet recommended  RTC as needed  Report to ER immediately if symptoms worsen or persist

## 2025-06-19 ENCOUNTER — RESULTS FOLLOW-UP (OUTPATIENT)
Dept: FAMILY MEDICINE | Facility: CLINIC | Age: 70
End: 2025-06-19

## 2025-06-19 NOTE — PROGRESS NOTES
Subjective     Patient ID: Carla Dang is a 70 y.o. female.    Chief Complaint: Foot Swelling    Edema  This is a recurrent problem. The current episode started 1 to 4 weeks ago (Bilateral feet). The problem occurs intermittently. The problem has been resolved (No swelling at present). Pertinent negatives include no abdominal pain, anorexia, arthralgias, change in bowel habit, chest pain, chills, congestion, coughing, diaphoresis, fatigue, fever, headaches, joint swelling, myalgias, nausea, neck pain, numbness, rash, sore throat, swollen glands, urinary symptoms, vertigo, visual change, vomiting or weakness. Nothing aggravates the symptoms. She has tried nothing (Pt sees rheumatology for RA) for the symptoms. The treatment provided moderate relief.     Review of Systems   Constitutional: Negative.  Negative for chills, diaphoresis, fatigue and fever.   HENT: Negative.  Negative for nasal congestion and sore throat.    Eyes: Negative.    Respiratory: Negative.  Negative for cough.    Cardiovascular: Negative.  Negative for chest pain.   Gastrointestinal: Negative.  Negative for abdominal pain, anorexia, change in bowel habit, nausea and vomiting.   Endocrine: Negative.    Genitourinary: Negative.    Musculoskeletal:  Negative for arthralgias, joint swelling, myalgias and neck pain.        Bilateral foot swelling   Integumentary:  Negative for rash. Negative.   Allergic/Immunologic: Negative.    Neurological: Negative.  Negative for vertigo, weakness, numbness and headaches.   Psychiatric/Behavioral: Negative.            Objective     Physical Exam  Vitals and nursing note reviewed.   Constitutional:       Appearance: Normal appearance.   HENT:      Head: Normocephalic.      Right Ear: Tympanic membrane, ear canal and external ear normal.      Left Ear: Tympanic membrane, ear canal and external ear normal.      Nose: Nose normal.      Mouth/Throat:      Mouth: Mucous membranes are moist.      Pharynx:  Oropharynx is clear.   Eyes:      Conjunctiva/sclera: Conjunctivae normal.      Pupils: Pupils are equal, round, and reactive to light.   Cardiovascular:      Rate and Rhythm: Normal rate and regular rhythm.      Pulses: Normal pulses.      Heart sounds: Normal heart sounds.   Pulmonary:      Effort: Pulmonary effort is normal.      Breath sounds: Normal breath sounds.   Abdominal:      General: Bowel sounds are normal.      Palpations: Abdomen is soft.   Musculoskeletal:         General: Normal range of motion.      Cervical back: Normal range of motion and neck supple.      Right foot: Normal range of motion and normal capillary refill. No swelling, deformity, bunion, Charcot foot, foot drop, prominent metatarsal heads, laceration, tenderness, bony tenderness or crepitus. Normal pulse.      Left foot: Normal range of motion and normal capillary refill. No swelling, deformity, bunion, Charcot foot, foot drop, prominent metatarsal heads, laceration, tenderness, bony tenderness or crepitus. Normal pulse.   Skin:     General: Skin is warm and dry.      Capillary Refill: Capillary refill takes 2 to 3 seconds.   Neurological:      Mental Status: She is alert and oriented to person, place, and time.   Psychiatric:         Mood and Affect: Mood normal.         Behavior: Behavior normal.         Thought Content: Thought content normal.         Judgment: Judgment normal.            Assessment and Plan     1. Foot swelling  2. Localized edema  -     Uric Acid; Future; Expected date: 06/18/2025  -     BNP; Future; Expected date: 06/18/2025  BMP on 6/9/25  -     US Lower Extremity Veins Bilateral; Future; Expected date: 06/18/2025  Elevate affected extremities while lying/sitting  Low sodium diet recommended  RTC as needed  Report to ER immediately if symptoms worsen or persist                 No follow-ups on file.

## 2025-06-24 ENCOUNTER — PATIENT MESSAGE (OUTPATIENT)
Dept: FAMILY MEDICINE | Facility: CLINIC | Age: 70
End: 2025-06-24
Payer: MEDICARE

## 2025-06-24 ENCOUNTER — TELEPHONE (OUTPATIENT)
Dept: FAMILY MEDICINE | Facility: CLINIC | Age: 70
End: 2025-06-24
Payer: MEDICARE

## 2025-06-24 RX ORDER — PREDNISONE 5 MG/1
2.5 TABLET ORAL DAILY
Qty: 2 TABLET | Refills: 0 | Status: SHIPPED | OUTPATIENT
Start: 2025-06-24 | End: 2025-06-28

## 2025-06-24 NOTE — TELEPHONE ENCOUNTER
Copied from CRM #[id number]. Topic: General Inquiry - Return Call  >> Jun 24, 2025 11:58 AM Iniki wrote:  .Type:  Patient Returning Call    Who Called:patient  Who Left Message for Patient:Jessica Miguel LPN  Does the patient know what this is regarding?:yes  Would the patient rather a call back or a response via [facility]? call  Best Call Back Number:.[phone]    Additional Information: discuss test result

## 2025-06-24 NOTE — TELEPHONE ENCOUNTER
06/24/2025 1:47 PM   I spoke with the patient about this. Pt notified and verbalized understanding. See result on labs.

## 2025-06-24 NOTE — TELEPHONE ENCOUNTER
Copied from CRM #[id number]. Topic: General Inquiry - Return Call  >> Jun 24, 2025  1:25 PM Cynthia wrote:  :.Type:  Patient Returning Call    Who Called: Carla  Who Left Message for Patient:Magda Lal LPN to Carla Dang  Does the patient know what this is regarding?:Labs  Would the patient rather a call back or a response via [email or web address]? Call back  Best Call Back Number:[phone]  Additional Information: Pt returning call.

## 2025-06-25 ENCOUNTER — OFFICE VISIT (OUTPATIENT)
Dept: ORTHOPEDICS | Facility: CLINIC | Age: 70
End: 2025-06-25
Payer: MEDICARE

## 2025-06-25 VITALS
BODY MASS INDEX: 23.52 KG/M2 | WEIGHT: 141.13 LBS | HEART RATE: 99 BPM | DIASTOLIC BLOOD PRESSURE: 50 MMHG | HEIGHT: 65 IN | SYSTOLIC BLOOD PRESSURE: 104 MMHG

## 2025-06-25 DIAGNOSIS — M66.322 NONTRAUMATIC RUPTURE OF LEFT PROXIMAL BICEPS TENDON: ICD-10-CM

## 2025-06-25 DIAGNOSIS — M75.41 IMPINGEMENT SYNDROME OF RIGHT SHOULDER: Primary | ICD-10-CM

## 2025-06-25 DIAGNOSIS — Z78.0 MENOPAUSE: ICD-10-CM

## 2025-06-25 PROCEDURE — 1159F MED LIST DOCD IN RCRD: CPT | Mod: CPTII,S$GLB,, | Performed by: NURSE PRACTITIONER

## 2025-06-25 PROCEDURE — 99214 OFFICE O/P EST MOD 30 MIN: CPT | Mod: 25,S$GLB,, | Performed by: NURSE PRACTITIONER

## 2025-06-25 PROCEDURE — 4010F ACE/ARB THERAPY RXD/TAKEN: CPT | Mod: CPTII,S$GLB,, | Performed by: NURSE PRACTITIONER

## 2025-06-25 PROCEDURE — 1101F PT FALLS ASSESS-DOCD LE1/YR: CPT | Mod: CPTII,S$GLB,, | Performed by: NURSE PRACTITIONER

## 2025-06-25 PROCEDURE — 20610 DRAIN/INJ JOINT/BURSA W/O US: CPT | Mod: RT,S$GLB,, | Performed by: NURSE PRACTITIONER

## 2025-06-25 PROCEDURE — 3074F SYST BP LT 130 MM HG: CPT | Mod: CPTII,S$GLB,, | Performed by: NURSE PRACTITIONER

## 2025-06-25 PROCEDURE — 3008F BODY MASS INDEX DOCD: CPT | Mod: CPTII,S$GLB,, | Performed by: NURSE PRACTITIONER

## 2025-06-25 PROCEDURE — 1125F AMNT PAIN NOTED PAIN PRSNT: CPT | Mod: CPTII,S$GLB,, | Performed by: NURSE PRACTITIONER

## 2025-06-25 PROCEDURE — 1160F RVW MEDS BY RX/DR IN RCRD: CPT | Mod: CPTII,S$GLB,, | Performed by: NURSE PRACTITIONER

## 2025-06-25 PROCEDURE — 99999 PR PBB SHADOW E&M-EST. PATIENT-LVL III: CPT | Mod: PBBFAC,,, | Performed by: NURSE PRACTITIONER

## 2025-06-25 PROCEDURE — 3078F DIAST BP <80 MM HG: CPT | Mod: CPTII,S$GLB,, | Performed by: NURSE PRACTITIONER

## 2025-06-25 PROCEDURE — 3288F FALL RISK ASSESSMENT DOCD: CPT | Mod: CPTII,S$GLB,, | Performed by: NURSE PRACTITIONER

## 2025-06-25 RX ADMIN — TRIAMCINOLONE ACETONIDE 40 MG: 40 INJECTION, SUSPENSION INTRA-ARTICULAR; INTRAMUSCULAR at 08:06

## 2025-06-26 ENCOUNTER — CLINICAL SUPPORT (OUTPATIENT)
Dept: FAMILY MEDICINE | Facility: CLINIC | Age: 70
End: 2025-06-26
Payer: MEDICARE

## 2025-06-26 DIAGNOSIS — E53.8 B12 DEFICIENCY: Primary | ICD-10-CM

## 2025-06-26 PROCEDURE — 96372 THER/PROPH/DIAG INJ SC/IM: CPT | Mod: S$GLB,,, | Performed by: FAMILY MEDICINE

## 2025-06-26 PROCEDURE — 99999 PR PBB SHADOW E&M-EST. PATIENT-LVL II: CPT | Mod: PBBFAC,,,

## 2025-06-26 RX ADMIN — CYANOCOBALAMIN 1000 MCG: 1000 INJECTION, SOLUTION INTRAMUSCULAR; SUBCUTANEOUS at 02:06

## 2025-06-27 ENCOUNTER — HOSPITAL ENCOUNTER (OUTPATIENT)
Dept: RADIOLOGY | Facility: HOSPITAL | Age: 70
Discharge: HOME | End: 2025-06-27
Attending: FAMILY MEDICINE
Payer: MEDICARE

## 2025-06-27 ENCOUNTER — RESULTS FOLLOW-UP (OUTPATIENT)
Dept: FAMILY MEDICINE | Facility: CLINIC | Age: 70
End: 2025-06-27

## 2025-06-27 DIAGNOSIS — Z78.0 MENOPAUSE: ICD-10-CM

## 2025-06-27 PROCEDURE — 77080 DXA BONE DENSITY AXIAL: CPT | Mod: TC,PO

## 2025-06-27 PROCEDURE — 77080 DXA BONE DENSITY AXIAL: CPT | Mod: 26,,, | Performed by: RADIOLOGY

## 2025-07-01 ENCOUNTER — CLINICAL SUPPORT (OUTPATIENT)
Dept: PHYSICAL MEDICINE AND REHAB | Facility: CLINIC | Age: 70
End: 2025-07-01
Payer: MEDICARE

## 2025-07-01 VITALS — DIASTOLIC BLOOD PRESSURE: 65 MMHG | HEART RATE: 92 BPM | SYSTOLIC BLOOD PRESSURE: 120 MMHG

## 2025-07-01 DIAGNOSIS — M19.072 ARTHRITIS OF LEFT MIDFOOT: Primary | ICD-10-CM

## 2025-07-01 PROCEDURE — 20604 DRAIN/INJ JOINT/BURSA W/US: CPT | Mod: LT,S$GLB,, | Performed by: PHYSICAL MEDICINE & REHABILITATION

## 2025-07-01 PROCEDURE — 99499 UNLISTED E&M SERVICE: CPT | Mod: S$GLB,,, | Performed by: PHYSICAL MEDICINE & REHABILITATION

## 2025-07-01 PROCEDURE — 99999 PR PBB SHADOW E&M-EST. PATIENT-LVL II: CPT | Mod: PBBFAC,,, | Performed by: PHYSICAL MEDICINE & REHABILITATION

## 2025-07-01 RX ORDER — LIDOCAINE HYDROCHLORIDE 10 MG/ML
0.5 INJECTION, SOLUTION INFILTRATION; PERINEURAL
Status: DISCONTINUED | OUTPATIENT
Start: 2025-07-01 | End: 2025-07-01 | Stop reason: HOSPADM

## 2025-07-01 RX ORDER — TRIAMCINOLONE ACETONIDE 40 MG/ML
40 INJECTION, SUSPENSION INTRA-ARTICULAR; INTRAMUSCULAR
Status: DISCONTINUED | OUTPATIENT
Start: 2025-07-01 | End: 2025-07-01 | Stop reason: HOSPADM

## 2025-07-01 RX ADMIN — TRIAMCINOLONE ACETONIDE 40 MG: 40 INJECTION, SUSPENSION INTRA-ARTICULAR; INTRAMUSCULAR at 11:07

## 2025-07-01 RX ADMIN — LIDOCAINE HYDROCHLORIDE 0.5 ML: 10 INJECTION, SOLUTION INFILTRATION; PERINEURAL at 11:07

## 2025-07-01 NOTE — PROCEDURES
Small Joint Aspiration/Injection: L intertarsal 2/3 TMT    Date/Time: 7/1/2025 11:00 AM    Performed by: Lilia Day, DO  Authorized by: Lilia Day, DO    Consent Done?:  Yes (Verbal)  Indications:  Arthritis  Site marked: the procedure site was marked    Timeout: prior to procedure the correct patient, procedure, and site was verified    Prep: patient was prepped and draped in usual sterile fashion      Local anesthesia used?: Yes    Anesthesia:  Local infiltration  Local anesthetic:  Lidocaine 1% without epinephrine  Anesthetic total (ml):  1    Location:  Foot  Site:  L intertarsal (Left 2/3 TMT)  Ultrasonic guidance for needle placement?: Yes    Needle size:  27 G  Approach:  Dorsal  Medications:  0.5 mL LIDOcaine HCL 10 mg/ml (1%) 10 mg/mL (1 %); 40 mg triamcinolone acetonide 40 mg/mL    Ultrasound guidance was used for correct needle placement, the images were saved and will be uploaded to EMR.

## 2025-07-01 NOTE — PROGRESS NOTES
[facility] PROCEDURE NOTE    CHIEF COMPLAINT:   Chief Complaint   Patient presents with    Foot Pain     Left        HISTORY OF PRESENT ILLNESS: Carla Dang is a 70 y.o. female who presents to me for planned procedure/injection of steroid for management of the below noted diagnosis.      Here for repeat left foot injection. States her feet feel tight and swelling.     Medications: Medications Ordered Prior to Encounter[1]    Allergies:   Review of patient's allergies indicates:   Allergen Reactions    Hydrochlorothiazide Other (See Comments)     Multiple electrolyte abnormalities       Vitals:   Vitals:    07/01/25 1056   BP: 120/65   Pulse: 92       ASSESSMENT:   1. Arthritis of left midfoot        PLAN:   1. Procedure/injection was completed for management of above diagnosis.    2. Please see procedure note from today's visit with further details.     Left intertarsal 2/3 TMT steroid     3. Follow up with PCP to discuss foot swelling complaints.     RTC as needed. Okay to repeat every 3 months.            [1]   Current Outpatient Medications on File Prior to Visit   Medication Sig Dispense Refill    benazepriL (LOTENSIN) 10 MG tablet Take 1 tablet by mouth once daily 90 tablet 3    calcium carbonate (OS-NASEEM) 500 mg calcium (1,250 mg) tablet qid 120 tablet 0    cholecalciferol, vitamin D3, (VITAMIN D3) 50 mcg (2,000 unit) Cap capsule Take 1 capsule (2,000 Units total) by mouth once daily.      cyanocobalamin 1,000 mcg/mL injection Inject 1,000 mcg into the muscle every 30 days.      famotidine (PEPCID) 40 MG tablet Take 1 tablet (40 mg total) by mouth every evening. 90 tablet 2    folic acid (FOLVITE) 1 MG tablet Take 1 tablet by mouth once daily 90 tablet 3    LIDOcaine (LIDODERM) 5 % Place 1 patch onto the skin once daily. Remove & Discard patch within 12 hours or as directed by MD 30 patch 2    magnesium oxide (MAG-OX) 400 mg (241.3 mg magnesium) tablet bid 60  tablet 1    minocycline (MINOCIN,DYNACIN) 50 MG Cap Take 50 mg by mouth 2 (two) times daily.      NIFEdipine (PROCARDIA-XL) 60 MG (OSM) 24 hr tablet Take 1 tablet (60 mg total) by mouth once daily. 90 tablet 3    omeprazole (PRILOSEC) 20 MG capsule Take 1 capsule by mouth once daily 90 capsule 3    pravastatin (PRAVACHOL) 20 MG tablet Take 1 tablet by mouth once daily 90 tablet 3    sulfacetamide sod-sulfur-urea 10-5-10 % Clsr Apply 1 application  topically.      ZILXI 1.5 % Foam Apply topically 2 (two) times daily.       Current Facility-Administered Medications on File Prior to Visit   Medication Dose Route Frequency Provider Last Rate Last Admin    cyanocobalamin injection 1,000 mcg  1,000 mcg Intramuscular Q30 Days    1,000 mcg at 06/26/25 1420

## 2025-07-03 RX ORDER — TRIAMCINOLONE ACETONIDE 40 MG/ML
40 INJECTION, SUSPENSION INTRA-ARTICULAR; INTRAMUSCULAR
Status: DISCONTINUED | OUTPATIENT
Start: 2025-06-25 | End: 2025-07-03 | Stop reason: HOSPADM

## 2025-07-03 NOTE — PROGRESS NOTES
Chief Complaint   Patient presents with    Right Shoulder - Pain       HPI:   This is a 70 y.o. who returns to clinic today in follow-up for right shoulder pain for the past 2 weeks after no known trauma. Also reports left biceps swelling with mild bruising; denies pain or trauma. Pain is aching and dull. No numbness or tingling. No associated signs or symptoms.    Past Medical History:   Diagnosis Date    Acute pancreatitis 03/21/2016    Breast cancer 2005    right side with lumpectomy, chemo and radiation    Closed fracture of ramus of right pubis 06/08/2016    Colon polyp     last scope 4/2012- repeat 10 y per pt- Dr. Johnson    GERD (gastroesophageal reflux disease) 05/29/2012    Gout of foot 09/03/2024    Hyperlipidemia 04/16/2013    Hypertension     Metabolic encephalopathy 04/20/2021    Osteopenia      Past Surgical History:   Procedure Laterality Date    BREAST LUMPECTOMY  2005    DILATION AND CURETTAGE OF UTERUS      ESOPHAGEAL DILATION      ESOPHAGOGASTRODUODENOSCOPY  6/1/2012    ROTATOR CUFF REPAIR      TEAR DUCT SURGERY      right rye     Medications Ordered Prior to Encounter[1]  Review of patient's allergies indicates:   Allergen Reactions    Hydrochlorothiazide Other (See Comments)     Multiple electrolyte abnormalities     Family History   Problem Relation Name Age of Onset    Stroke Brother  57    Breast cancer Mother  87     Social History[2]    Review of Systems:  Constitutional:  Denies fever or chills   Eyes:  Denies change in visual acuity   HENT:  Denies nasal congestion or sore throat   Respiratory:  Denies cough or shortness of breath   Cardiovascular:  Denies chest pain or edema   GI:  Denies abdominal pain, nausea, vomiting, bloody stools or diarrhea   :  Denies dysuria   Integument:  Denies rash   Neurologic:  Denies headache, focal weakness or sensory changes   Endocrine:  Denies polyuria or polydipsia   Lymphatic:  Denies swollen glands   Psychiatric:  Denies depression or anxiety      Physical Exam:   Constitutional:  Well developed, well nourished, no acute distress, non-toxic appearance   Integument:  Well hydrated  Neurologic:  Alert & oriented x 3  Psychiatric:  Speech and behavior appropriate     Left biceps   Distal biceps tendon is intact.   Mild swelling and bruising noted to mid biceps region.   Neurovascularly intact.       Bilateral Shoulder Exam    Left Shoulder Exam   Shoulder exam performed same as contralateral side and is normal.    Right Shoulder Exam   Tenderness   Shoulder tenderness location: diffusely about shoulder.    Range of Motion   Forward Flexion: abnormal   External Rotation: abnormal     Muscle Strength   Supraspinatus: 4/5     Tests   Hawkin's test: positive  Impingement: positive    Other   Erythema: absent  Sensation: normal  Pulse: present               Impingement syndrome of right shoulder  -     Large Joint Aspiration/Injection: R subacromial bursa  -     triamcinolone acetonide injection 40 mg    Nontraumatic rupture of left proximal biceps tendon       Ice and compress left biceps for swelling. If increased pain or other complaints can refer her to Dr. Zavala, but proximal tear can be treated non operative.     Using an aseptic technique, I injected 5 cc of lidocaine 1% without and 1 cc of kenalog 40mg into the right shoulder. The patient tolerated this well. I will have them return to clinic in 6-8 weeks.           [1]   Current Outpatient Medications on File Prior to Visit   Medication Sig Dispense Refill    benazepriL (LOTENSIN) 10 MG tablet Take 1 tablet by mouth once daily 90 tablet 3    calcium carbonate (OS-NASEEM) 500 mg calcium (1,250 mg) tablet qid 120 tablet 0    cholecalciferol, vitamin D3, (VITAMIN D3) 50 mcg (2,000 unit) Cap capsule Take 1 capsule (2,000 Units total) by mouth once daily.      cyanocobalamin 1,000 mcg/mL injection Inject 1,000 mcg into the muscle every 30 days.      famotidine (PEPCID) 40 MG tablet Take 1 tablet (40 mg  total) by mouth every evening. 90 tablet 2    folic acid (FOLVITE) 1 MG tablet Take 1 tablet by mouth once daily 90 tablet 3    LIDOcaine (LIDODERM) 5 % Place 1 patch onto the skin once daily. Remove & Discard patch within 12 hours or as directed by MD 30 patch 2    magnesium oxide (MAG-OX) 400 mg (241.3 mg magnesium) tablet bid 60 tablet 1    minocycline (MINOCIN,DYNACIN) 50 MG Cap Take 50 mg by mouth 2 (two) times daily.      NIFEdipine (PROCARDIA-XL) 60 MG (OSM) 24 hr tablet Take 1 tablet (60 mg total) by mouth once daily. 90 tablet 3    omeprazole (PRILOSEC) 20 MG capsule Take 1 capsule by mouth once daily 90 capsule 3    pravastatin (PRAVACHOL) 20 MG tablet Take 1 tablet by mouth once daily 90 tablet 3    sulfacetamide sod-sulfur-urea 10-5-10 % Clsr Apply 1 application  topically.      ZILXI 1.5 % Foam Apply topically 2 (two) times daily.       Current Facility-Administered Medications on File Prior to Visit   Medication Dose Route Frequency Provider Last Rate Last Admin    cyanocobalamin injection 1,000 mcg  1,000 mcg Intramuscular Q30 Days    1,000 mcg at 25 1426   [2]   Social History  Socioeconomic History    Marital status:    Tobacco Use    Smoking status: Former     Current packs/day: 0.00     Types: Cigarettes     Quit date: 2005     Years since quittin.1    Smokeless tobacco: Never   Substance and Sexual Activity    Alcohol use: Not Currently     Alcohol/week: 2.0 standard drinks of alcohol     Types: 2 Standard drinks or equivalent per week     Comment: prior 6 pack/week    Drug use: Yes    Sexual activity: Not Currently     Social Drivers of Health     Financial Resource Strain: Low Risk  (2024)    Overall Financial Resource Strain (CARDIA)     Difficulty of Paying Living Expenses: Not hard at all   Food Insecurity: No Food Insecurity (2024)    Hunger Vital Sign     Worried About Running Out of Food in the Last Year: Never true     Ran Out of Food in the Last Year:  Never true   Transportation Needs: No Transportation Needs (11/13/2024)    PRAPARE - Transportation     Lack of Transportation (Medical): No     Lack of Transportation (Non-Medical): No   Physical Activity: Inactive (11/13/2024)    Exercise Vital Sign     Days of Exercise per Week: 0 days     Minutes of Exercise per Session: 0 min   Stress: No Stress Concern Present (11/13/2024)    [employer] - Occupational Stress Questionnaire     Feeling of Stress : Not at all   Housing Stability: Unknown (11/13/2024)    Housing Stability Vital Sign     Unable to Pay for Housing in the Last Year: No     Homeless in the Last Year: No

## 2025-07-03 NOTE — PROCEDURES
Large Joint Aspiration/Injection: R subacromial bursa    Date/Time: 6/25/2025 8:40 AM    Performed by: Mony Garcia FNP  Authorized by: Mony Garcia FNP    Consent Done?:  Yes (Verbal)  Indications:  Pain  Timeout: prior to procedure the correct patient, procedure, and site was verified    Prep: patient was prepped and draped in usual sterile fashion      Local anesthesia used?: Yes    Local anesthetic:  Lidocaine 1% without epinephrine  Anesthetic total (ml):  5      Details:  Needle Size:  22 G  Ultrasonic Guidance for needle placement?: No    Approach:  Posterior  Location:  Shoulder  Site:  R subacromial bursa  Medications:  40 mg triamcinolone acetonide 40 mg/mL  Patient tolerance:  Patient tolerated the procedure well with no immediate complications

## 2025-07-09 RX ORDER — NIFEDIPINE 60 MG/1
60 TABLET, EXTENDED RELEASE ORAL
Qty: 90 TABLET | Refills: 0 | Status: SHIPPED | OUTPATIENT
Start: 2025-07-09

## 2025-07-09 NOTE — TELEPHONE ENCOUNTER
No care due was identified.  [facility] Embedded Care Due Messages. Reference number: [id number].   7/09/2025 2:41:36 PM CDT

## 2025-07-09 NOTE — TELEPHONE ENCOUNTER
Copied from CRM #[id number]. Topic: Medications - Medication Refill  >> Jul 9, 2025  2:42 PM Sherri wrote:  Type:  RX Refill Request    Who Called: Carla  Refill or New Rx:Refill  RX Name and Strength:NIFEdipine (PROCARDIA-XL) 60 MG (OSM) 24 hr tablet  How is the patient currently taking it? (ex. 1XDay):  Is this a 30 day or 90 day RX:  Preferred Pharmacy with phone number:  [id number] [place] - [address] [facility]  [address] [address]  Phone: [phone] Fax: [phone]     Local or Mail Order:Local  Ordering Provider:Taqueria Melendrez  Would the patient rather a call back or a response via [facility]? Callback  Best Call Back Number:[phone]  Additional Information: Need refill

## 2025-07-25 ENCOUNTER — CLINICAL SUPPORT (OUTPATIENT)
Dept: FAMILY MEDICINE | Facility: CLINIC | Age: 70
End: 2025-07-25
Payer: MEDICARE

## 2025-07-25 DIAGNOSIS — E53.8 B12 DEFICIENCY: Primary | ICD-10-CM

## 2025-07-25 PROCEDURE — 99999 PR PBB SHADOW E&M-EST. PATIENT-LVL II: CPT | Mod: PBBFAC,,,

## 2025-07-25 RX ADMIN — CYANOCOBALAMIN 1000 MCG: 1000 INJECTION, SOLUTION INTRAMUSCULAR; SUBCUTANEOUS at 10:07

## 2025-08-07 ENCOUNTER — OFFICE VISIT (OUTPATIENT)
Dept: ORTHOPEDICS | Facility: CLINIC | Age: 70
End: 2025-08-07
Payer: MEDICARE

## 2025-08-07 VITALS
HEART RATE: 92 BPM | HEIGHT: 65 IN | BODY MASS INDEX: 23.52 KG/M2 | DIASTOLIC BLOOD PRESSURE: 65 MMHG | SYSTOLIC BLOOD PRESSURE: 120 MMHG | WEIGHT: 141.13 LBS

## 2025-08-07 DIAGNOSIS — M75.41 IMPINGEMENT SYNDROME OF RIGHT SHOULDER: Primary | ICD-10-CM

## 2025-08-07 PROCEDURE — 99999 PR PBB SHADOW E&M-EST. PATIENT-LVL III: CPT | Mod: PBBFAC,,, | Performed by: NURSE PRACTITIONER

## 2025-08-07 RX ORDER — TRIAMCINOLONE ACETONIDE 40 MG/ML
40 INJECTION, SUSPENSION INTRA-ARTICULAR; INTRAMUSCULAR
Status: DISCONTINUED | OUTPATIENT
Start: 2025-08-07 | End: 2025-08-07 | Stop reason: HOSPADM

## 2025-08-07 RX ADMIN — TRIAMCINOLONE ACETONIDE 40 MG: 40 INJECTION, SUSPENSION INTRA-ARTICULAR; INTRAMUSCULAR at 09:08

## 2025-08-07 NOTE — PROGRESS NOTES
Chief Complaint   Patient presents with    Right Shoulder - Pain       HPI:   This is a 70 y.o. who returns to clinic today in follow-up for right shoulder pain for the past 1 week; here for injection. Pain is aching and dull. No numbness or tingling. No associated signs or symptoms.    Past Medical History:   Diagnosis Date    Acute pancreatitis 03/21/2016    Breast cancer 2005    right side with lumpectomy, chemo and radiation    Closed fracture of ramus of right pubis 06/08/2016    Colon polyp     last scope 4/2012- repeat 10 y per pt- Dr. Johnson    GERD (gastroesophageal reflux disease) 05/29/2012    Gout of foot 09/03/2024    Hyperlipidemia 04/16/2013    Hypertension     Metabolic encephalopathy 04/20/2021    Osteopenia      Past Surgical History:   Procedure Laterality Date    BREAST LUMPECTOMY  2005    DILATION AND CURETTAGE OF UTERUS      ESOPHAGEAL DILATION      ESOPHAGOGASTRODUODENOSCOPY  6/1/2012    ROTATOR CUFF REPAIR      TEAR DUCT SURGERY      right rye     Medications Ordered Prior to Encounter[1]  Review of patient's allergies indicates:   Allergen Reactions    Hydrochlorothiazide Other (See Comments)     Multiple electrolyte abnormalities     Family History   Problem Relation Name Age of Onset    Stroke Brother  57    Breast cancer Mother  87     Social History[2]    Review of Systems:  Constitutional:  Denies fever or chills   Eyes:  Denies change in visual acuity   HENT:  Denies nasal congestion or sore throat   Respiratory:  Denies cough or shortness of breath   Cardiovascular:  Denies chest pain or edema   GI:  Denies abdominal pain, nausea, vomiting, bloody stools or diarrhea   :  Denies dysuria   Integument:  Denies rash   Neurologic:  Denies headache, focal weakness or sensory changes   Endocrine:  Denies polyuria or polydipsia   Lymphatic:  Denies swollen glands   Psychiatric:  Denies depression or anxiety     Physical Exam:   Constitutional:  Well developed, well nourished, no acute  distress, non-toxic appearance   Integument:  Well hydrated  Neurologic:  Alert & oriented x 3  Psychiatric:  Speech and behavior appropriate     Bilateral Shoulder Exam    left Shoulder Exam   Shoulder exam performed same as contralateral side and is normal.    right Shoulder Exam   Tenderness   Shoulder tenderness location: diffusely about shoulder.    Range of Motion   Forward Flexion: abnormal   External Rotation: abnormal     Muscle Strength   Supraspinatus: 4/5     Tests   Hawkin's test: positive  Impingement: positive    Other   Erythema: absent  Sensation: normal  Pulse: present           Impingement syndrome of right shoulder  -     Large Joint Aspiration/Injection: R subacromial bursa  -     triamcinolone acetonide injection 40 mg         Using an aseptic technique, I injected 5 cc of lidocaine 1% without and 1 cc of kenalog 40mg into the right Shoulder. The patient tolerated this well. I will have them return to clinic in 6 weeks or as needed.           [1]   Current Outpatient Medications on File Prior to Visit   Medication Sig Dispense Refill    benazepriL (LOTENSIN) 10 MG tablet Take 1 tablet by mouth once daily 90 tablet 3    calcium carbonate (OS-NASEEM) 500 mg calcium (1,250 mg) tablet qid 120 tablet 0    cholecalciferol, vitamin D3, (VITAMIN D3) 50 mcg (2,000 unit) Cap capsule Take 1 capsule (2,000 Units total) by mouth once daily.      cyanocobalamin 1,000 mcg/mL injection Inject 1,000 mcg into the muscle every 30 days.      famotidine (PEPCID) 40 MG tablet Take 1 tablet (40 mg total) by mouth every evening. 90 tablet 2    folic acid (FOLVITE) 1 MG tablet Take 1 tablet by mouth once daily 90 tablet 3    LIDOcaine (LIDODERM) 5 % Place 1 patch onto the skin once daily. Remove & Discard patch within 12 hours or as directed by MD 30 patch 2    magnesium oxide (MAG-OX) 400 mg (241.3 mg magnesium) tablet bid 60 tablet 1    minocycline (MINOCIN,DYNACIN) 50 MG Cap Take 50 mg by mouth 2 (two) times daily.       NIFEdipine (PROCARDIA-XL) 60 MG (OSM) 24 hr tablet Take 1 tablet by mouth once daily 90 tablet 0    omeprazole (PRILOSEC) 20 MG capsule Take 1 capsule by mouth once daily 90 capsule 3    pravastatin (PRAVACHOL) 20 MG tablet Take 1 tablet by mouth once daily 90 tablet 3    sulfacetamide sod-sulfur-urea 10-5-10 % Clsr Apply 1 application  topically.      ZILXI 1.5 % Foam Apply topically 2 (two) times daily.       Current Facility-Administered Medications on File Prior to Visit   Medication Dose Route Frequency Provider Last Rate Last Admin    cyanocobalamin injection 1,000 mcg  1,000 mcg Intramuscular Q30 Days    1,000 mcg at 25 1021   [2]   Social History  Socioeconomic History    Marital status:    Tobacco Use    Smoking status: Former     Current packs/day: 0.00     Types: Cigarettes     Quit date: 2005     Years since quittin.2    Smokeless tobacco: Never   Substance and Sexual Activity    Alcohol use: Not Currently     Alcohol/week: 2.0 standard drinks of alcohol     Types: 2 Standard drinks or equivalent per week     Comment: prior 6 pack/week    Drug use: Yes    Sexual activity: Not Currently     Social Drivers of Health     Financial Resource Strain: Low Risk  (2024)    Overall Financial Resource Strain (CARDIA)     Difficulty of Paying Living Expenses: Not hard at all   Food Insecurity: No Food Insecurity (2024)    Hunger Vital Sign     Worried About Running Out of Food in the Last Year: Never true     Ran Out of Food in the Last Year: Never true   Transportation Needs: No Transportation Needs (2024)    PRAPARE - Transportation     Lack of Transportation (Medical): No     Lack of Transportation (Non-Medical): No   Physical Activity: Inactive (2024)    Exercise Vital Sign     Days of Exercise per Week: 0 days     Minutes of Exercise per Session: 0 min   Stress: No Stress Concern Present (2024)    [employer] - Occupational  Stress Questionnaire     Feeling of Stress : Not at all   Housing Stability: Unknown (11/13/2024)    Housing Stability Vital Sign     Unable to Pay for Housing in the Last Year: No     Homeless in the Last Year: No

## 2025-08-07 NOTE — PROCEDURES
Large Joint Aspiration/Injection: R subacromial bursa    Date/Time: 8/7/2025 9:20 AM    Performed by: Mony Garcia FNP  Authorized by: Mony Garcia FNP    Timeout: prior to procedure the correct patient, procedure, and site was verified    Prep: patient was prepped and draped in usual sterile fashion      Local anesthesia used?: Yes    Local anesthetic:  Lidocaine 1% without epinephrine  Anesthetic total (ml):  5      Details:  Needle Size:  22 G  Ultrasonic Guidance for needle placement?: No    Approach:  Posterior  Location:  Shoulder  Site:  R subacromial bursa  Medications:  40 mg triamcinolone acetonide 40 mg/mL  Patient tolerance:  Patient tolerated the procedure well with no immediate complications

## 2025-08-13 RX ORDER — BENAZEPRIL HYDROCHLORIDE 10 MG/1
10 TABLET ORAL
Qty: 90 TABLET | Refills: 0 | Status: SHIPPED | OUTPATIENT
Start: 2025-08-13

## 2025-08-16 RX ORDER — BENAZEPRIL HYDROCHLORIDE 10 MG/1
10 TABLET ORAL
Qty: 90 TABLET | Refills: 0 | OUTPATIENT
Start: 2025-08-16

## 2025-08-20 ENCOUNTER — OFFICE VISIT (OUTPATIENT)
Dept: FAMILY MEDICINE | Facility: CLINIC | Age: 70
End: 2025-08-20
Payer: MEDICARE

## 2025-08-20 VITALS
WEIGHT: 141.88 LBS | SYSTOLIC BLOOD PRESSURE: 114 MMHG | BODY MASS INDEX: 22.8 KG/M2 | HEIGHT: 66 IN | DIASTOLIC BLOOD PRESSURE: 62 MMHG | HEART RATE: 72 BPM | TEMPERATURE: 98 F

## 2025-08-20 DIAGNOSIS — I10 PRIMARY HYPERTENSION: ICD-10-CM

## 2025-08-20 DIAGNOSIS — M19.072 ARTHRITIS OF LEFT MIDFOOT: ICD-10-CM

## 2025-08-20 DIAGNOSIS — Z85.3 HISTORY OF RIGHT BREAST CANCER: ICD-10-CM

## 2025-08-20 DIAGNOSIS — E78.5 HYPERLIPIDEMIA, UNSPECIFIED HYPERLIPIDEMIA TYPE: ICD-10-CM

## 2025-08-20 DIAGNOSIS — E83.42 HYPOMAGNESEMIA: ICD-10-CM

## 2025-08-20 DIAGNOSIS — E55.9 VITAMIN D DEFICIENCY: ICD-10-CM

## 2025-08-20 DIAGNOSIS — K59.00 CONSTIPATION, UNSPECIFIED CONSTIPATION TYPE: ICD-10-CM

## 2025-08-20 DIAGNOSIS — Z00.00 ROUTINE HISTORY AND PHYSICAL EXAMINATION OF ADULT: Primary | ICD-10-CM

## 2025-08-20 DIAGNOSIS — M79.672 LEFT FOOT PAIN: ICD-10-CM

## 2025-08-20 DIAGNOSIS — Z79.899 ENCOUNTER FOR LONG-TERM (CURRENT) USE OF MEDICATIONS: ICD-10-CM

## 2025-08-20 DIAGNOSIS — E53.8 VITAMIN B12 DEFICIENCY: ICD-10-CM

## 2025-08-20 DIAGNOSIS — N18.32 STAGE 3B CHRONIC KIDNEY DISEASE: ICD-10-CM

## 2025-08-20 DIAGNOSIS — M06.9 RHEUMATOID ARTHRITIS INVOLVING MULTIPLE SITES, UNSPECIFIED WHETHER RHEUMATOID FACTOR PRESENT: ICD-10-CM

## 2025-08-20 PROCEDURE — 4010F ACE/ARB THERAPY RXD/TAKEN: CPT | Mod: CPTII,S$GLB,, | Performed by: FAMILY MEDICINE

## 2025-08-20 PROCEDURE — 99397 PER PM REEVAL EST PAT 65+ YR: CPT | Mod: S$GLB,,, | Performed by: FAMILY MEDICINE

## 2025-08-20 PROCEDURE — 99999 PR PBB SHADOW E&M-EST. PATIENT-LVL IV: CPT | Mod: PBBFAC,,, | Performed by: FAMILY MEDICINE

## 2025-08-20 PROCEDURE — 1159F MED LIST DOCD IN RCRD: CPT | Mod: CPTII,S$GLB,, | Performed by: FAMILY MEDICINE

## 2025-08-20 PROCEDURE — 3074F SYST BP LT 130 MM HG: CPT | Mod: CPTII,S$GLB,, | Performed by: FAMILY MEDICINE

## 2025-08-20 PROCEDURE — 1125F AMNT PAIN NOTED PAIN PRSNT: CPT | Mod: CPTII,S$GLB,, | Performed by: FAMILY MEDICINE

## 2025-08-20 PROCEDURE — 3078F DIAST BP <80 MM HG: CPT | Mod: CPTII,S$GLB,, | Performed by: FAMILY MEDICINE

## 2025-08-20 PROCEDURE — 1101F PT FALLS ASSESS-DOCD LE1/YR: CPT | Mod: CPTII,S$GLB,, | Performed by: FAMILY MEDICINE

## 2025-08-20 PROCEDURE — 3008F BODY MASS INDEX DOCD: CPT | Mod: CPTII,S$GLB,, | Performed by: FAMILY MEDICINE

## 2025-08-20 PROCEDURE — 3288F FALL RISK ASSESSMENT DOCD: CPT | Mod: CPTII,S$GLB,, | Performed by: FAMILY MEDICINE

## 2025-08-20 RX ORDER — FAMOTIDINE 40 MG/1
40 TABLET, FILM COATED ORAL NIGHTLY
Qty: 90 TABLET | Refills: 3 | Status: SHIPPED | OUTPATIENT
Start: 2025-08-20

## 2025-08-20 RX ORDER — POLYETHYLENE GLYCOL 3350 17 G/17G
17 POWDER, FOR SOLUTION ORAL DAILY
COMMUNITY
Start: 2025-08-20

## 2025-08-20 RX ORDER — METHYLPREDNISOLONE 4 MG/1
TABLET ORAL
Qty: 21 TABLET | Refills: 0 | Status: SHIPPED | OUTPATIENT
Start: 2025-08-20

## 2025-08-27 ENCOUNTER — TELEPHONE (OUTPATIENT)
Dept: PRIMARY CARE CLINIC | Facility: CLINIC | Age: 70
End: 2025-08-27
Payer: MEDICARE

## 2025-08-27 DIAGNOSIS — E53.8 B12 DEFICIENCY: Primary | ICD-10-CM

## 2025-08-27 RX ORDER — CYANOCOBALAMIN 1000 UG/ML
1000 INJECTION, SOLUTION INTRAMUSCULAR; SUBCUTANEOUS
Status: SHIPPED | OUTPATIENT
Start: 2025-09-05 | End: 2026-03-04

## 2025-09-05 ENCOUNTER — CLINICAL SUPPORT (OUTPATIENT)
Dept: FAMILY MEDICINE | Facility: CLINIC | Age: 70
End: 2025-09-05
Payer: MEDICARE

## 2025-09-05 DIAGNOSIS — E53.8 B12 DEFICIENCY: Primary | ICD-10-CM

## 2025-09-05 PROCEDURE — 99999 PR PBB SHADOW E&M-EST. PATIENT-LVL II: CPT | Mod: PBBFAC,,,

## 2025-09-05 RX ADMIN — CYANOCOBALAMIN 1000 MCG: 1000 INJECTION, SOLUTION INTRAMUSCULAR; SUBCUTANEOUS at 09:09
